# Patient Record
Sex: MALE | Race: BLACK OR AFRICAN AMERICAN | NOT HISPANIC OR LATINO | URBAN - METROPOLITAN AREA
[De-identification: names, ages, dates, MRNs, and addresses within clinical notes are randomized per-mention and may not be internally consistent; named-entity substitution may affect disease eponyms.]

---

## 2019-07-10 ENCOUNTER — INPATIENT (INPATIENT)
Facility: HOSPITAL | Age: 74
LOS: 25 days | DRG: 3 | End: 2019-08-05
Attending: THORACIC SURGERY (CARDIOTHORACIC VASCULAR SURGERY) | Admitting: INTERNAL MEDICINE
Payer: MEDICARE

## 2019-07-10 VITALS
WEIGHT: 162.26 LBS | HEIGHT: 67 IN | OXYGEN SATURATION: 99 % | DIASTOLIC BLOOD PRESSURE: 73 MMHG | RESPIRATION RATE: 18 BRPM | SYSTOLIC BLOOD PRESSURE: 139 MMHG

## 2019-07-10 DIAGNOSIS — I25.110 ATHEROSCLEROTIC HEART DISEASE OF NATIVE CORONARY ARTERY WITH UNSTABLE ANGINA PECTORIS: ICD-10-CM

## 2019-07-10 DIAGNOSIS — I50.9 HEART FAILURE, UNSPECIFIED: ICD-10-CM

## 2019-07-10 LAB
APPEARANCE UR: CLEAR — SIGNIFICANT CHANGE UP
BACTERIA # UR AUTO: NEGATIVE — SIGNIFICANT CHANGE UP
BILIRUB UR-MCNC: NEGATIVE — SIGNIFICANT CHANGE UP
COLOR SPEC: YELLOW — SIGNIFICANT CHANGE UP
DIFF PNL FLD: NEGATIVE — SIGNIFICANT CHANGE UP
EPI CELLS # UR: 1 /HPF — SIGNIFICANT CHANGE UP
GLUCOSE UR QL: NEGATIVE — SIGNIFICANT CHANGE UP
HYALINE CASTS # UR AUTO: 1 /LPF — SIGNIFICANT CHANGE UP (ref 0–2)
KETONES UR-MCNC: SIGNIFICANT CHANGE UP
LEUKOCYTE ESTERASE UR-ACNC: NEGATIVE — SIGNIFICANT CHANGE UP
NITRITE UR-MCNC: NEGATIVE — SIGNIFICANT CHANGE UP
PH UR: 6 — SIGNIFICANT CHANGE UP (ref 5–8)
PROT UR-MCNC: ABNORMAL
RBC CASTS # UR COMP ASSIST: 3 /HPF — SIGNIFICANT CHANGE UP (ref 0–4)
SP GR SPEC: 1.02 — SIGNIFICANT CHANGE UP (ref 1.01–1.02)
UROBILINOGEN FLD QL: ABNORMAL
WBC UR QL: 1 /HPF — SIGNIFICANT CHANGE UP (ref 0–5)

## 2019-07-10 PROCEDURE — 74176 CT ABD & PELVIS W/O CONTRAST: CPT | Mod: 26

## 2019-07-10 PROCEDURE — 99223 1ST HOSP IP/OBS HIGH 75: CPT

## 2019-07-10 PROCEDURE — 93306 TTE W/DOPPLER COMPLETE: CPT | Mod: 26

## 2019-07-10 PROCEDURE — 93454 CORONARY ARTERY ANGIO S&I: CPT | Mod: 26,GC

## 2019-07-10 PROCEDURE — 99152 MOD SED SAME PHYS/QHP 5/>YRS: CPT | Mod: GC

## 2019-07-10 PROCEDURE — 99253 IP/OBS CNSLTJ NEW/EST LOW 45: CPT

## 2019-07-10 PROCEDURE — 93010 ELECTROCARDIOGRAM REPORT: CPT

## 2019-07-10 RX ORDER — ISOSORBIDE MONONITRATE 60 MG/1
60 TABLET, EXTENDED RELEASE ORAL DAILY
Refills: 0 | Status: DISCONTINUED | OUTPATIENT
Start: 2019-07-10 | End: 2019-07-12

## 2019-07-10 RX ORDER — ASPIRIN/CALCIUM CARB/MAGNESIUM 324 MG
81 TABLET ORAL DAILY
Refills: 0 | Status: DISCONTINUED | OUTPATIENT
Start: 2019-07-10 | End: 2019-07-12

## 2019-07-10 RX ORDER — HYDRALAZINE HCL 50 MG
50 TABLET ORAL EVERY 8 HOURS
Refills: 0 | Status: DISCONTINUED | OUTPATIENT
Start: 2019-07-10 | End: 2019-07-12

## 2019-07-10 RX ORDER — CHOLECALCIFEROL (VITAMIN D3) 125 MCG
2000 CAPSULE ORAL DAILY
Refills: 0 | Status: DISCONTINUED | OUTPATIENT
Start: 2019-07-10 | End: 2019-07-12

## 2019-07-10 RX ORDER — FUROSEMIDE 40 MG
1 TABLET ORAL
Qty: 0 | Refills: 0 | DISCHARGE

## 2019-07-10 RX ORDER — HYDRALAZINE HCL 50 MG
0 TABLET ORAL
Qty: 0 | Refills: 0 | DISCHARGE

## 2019-07-10 RX ORDER — ISOSORBIDE MONONITRATE 60 MG/1
1 TABLET, EXTENDED RELEASE ORAL
Qty: 0 | Refills: 0 | DISCHARGE

## 2019-07-10 RX ORDER — ASPIRIN/CALCIUM CARB/MAGNESIUM 324 MG
81 TABLET ORAL
Qty: 0 | Refills: 0 | DISCHARGE

## 2019-07-10 RX ORDER — ATORVASTATIN CALCIUM 80 MG/1
40 TABLET, FILM COATED ORAL AT BEDTIME
Refills: 0 | Status: DISCONTINUED | OUTPATIENT
Start: 2019-07-10 | End: 2019-07-12

## 2019-07-10 RX ORDER — FUROSEMIDE 40 MG
40 TABLET ORAL DAILY
Refills: 0 | Status: DISCONTINUED | OUTPATIENT
Start: 2019-07-10 | End: 2019-07-12

## 2019-07-10 RX ORDER — HEPARIN SODIUM 5000 [USP'U]/ML
5000 INJECTION INTRAVENOUS; SUBCUTANEOUS EVERY 8 HOURS
Refills: 0 | Status: DISCONTINUED | OUTPATIENT
Start: 2019-07-10 | End: 2019-07-12

## 2019-07-10 RX ORDER — ATORVASTATIN CALCIUM 80 MG/1
1 TABLET, FILM COATED ORAL
Qty: 0 | Refills: 0 | DISCHARGE

## 2019-07-10 RX ORDER — CLOPIDOGREL BISULFATE 75 MG/1
1 TABLET, FILM COATED ORAL
Qty: 0 | Refills: 0 | DISCHARGE

## 2019-07-10 RX ADMIN — Medication 1 TABLET(S): at 17:45

## 2019-07-10 RX ADMIN — Medication 81 MILLIGRAM(S): at 17:44

## 2019-07-10 RX ADMIN — Medication 2000 UNIT(S): at 17:44

## 2019-07-10 RX ADMIN — Medication 40 MILLIGRAM(S): at 17:45

## 2019-07-10 RX ADMIN — ISOSORBIDE MONONITRATE 60 MILLIGRAM(S): 60 TABLET, EXTENDED RELEASE ORAL at 17:45

## 2019-07-10 RX ADMIN — ATORVASTATIN CALCIUM 40 MILLIGRAM(S): 80 TABLET, FILM COATED ORAL at 21:10

## 2019-07-10 RX ADMIN — Medication 50 MILLIGRAM(S): at 21:09

## 2019-07-10 NOTE — CONSULT NOTE ADULT - SUBJECTIVE AND OBJECTIVE BOX
History of Present Illness:  74 y/o male  h/o  No PMH  presented to [facility] on 19 with shortness of breath and LE edema found to have newly diagnosed HFrEF (EF 30%) and RALPH vs CKD, also PVD and claudication with extensive LE arterial disease on doppler. 19 abd US: small amount of perihepatic ascited. 19 TTE: EF 30%, mild LVH, multiple regional WMA's, mod MR/TR. 7/3/19 Nuclear ST: moderate focal apical/inferolat/aterolat ischemia. 19: h/h 12.9/39.5, platelets 180, Bun/Cr 28/1.9, eGFR 42. Pt was diuresed at [facility] treated with lasix/hydralzine/nitrates/statin/ASA. No beta blockade 2/2 low blood pressures. Per [facility] discharge note, pt will need  vascular intervention d/t extensive lower extremity vascular disease. Pt transferred to [facility] today for left heart cath to rule out ischemia.  Found to have multi vessel disease cardiac surgery consult called.    CT Surgery consulted for CABG  Past Medical History  Arterial disease: peripheral  TR (tricuspid regurgitation)  MR (mitral regurgitation)  RALPH (acute kidney injury)  HFrEF (heart failure with reduced ejection fraction)  Past Surgical History  No significant past surgical history    MEDICATIONS  (STANDING):  aspirin enteric coated 81 milliGRAM(s) Oral daily  atorvastatin 40 milliGRAM(s) Oral at bedtime  cholecalciferol 2000 Unit(s) Oral daily  furosemide    Tablet 40 milliGRAM(s) Oral daily  hydrALAZINE 50 milliGRAM(s) Oral every 8 hours  isosorbide   mononitrate ER Tablet (IMDUR) 60 milliGRAM(s) Oral daily  multivitamin 1 Tablet(s) Oral daily    MEDICATIONS  (PRN):    Vital Signs Last 24 Hrs  T(C): 37 (07-10-19 @ 12:52), Max: 37 (07-10-19 @ 12:52)  T(F): 98.6 (07-10-19 @ 12:52), Max: 98.6 (07-10-19 @ 12:52)  HR: 76 (07-10-19 @ 16:10) (74 - 88)  BP: 120/61 (07-10-19 @ 15:55) (120/61 - 139/73)  RR: 18 (07-10-19 @ 16:10) (16 - 18)  SpO2: 95% (07-10-19 @ 16:10) (95% - 99%)           Daily Height in cm: 170.18 (10 Jul 2019 12:52)    Daily Weight in k.5 (10 Jul 2019 12:52)  Admit Wt: Drug Dosing Weight  Height (cm): 170.18 (10 Jul 2019 13:48)  Weight (kg): 73.5 (10 Jul 2019 13:48)  BMI (kg/m2): 25.4 (10 Jul 2019 13:48)  BSA (m2): 1.85 (10 Jul 2019 13:48)    Allergies: No Known Allergies      SOCIAL HISTORY:  retired    Smoker: x[ ] Yes  [ ] No        PACK YEARS:      20                   WHEN QUIT?  ETOH use: [ x] Yes  [ ] No              FREQUENCY / QUANTITY: daily reports4 beers daily  Ilicit Drug use:  [ ] Yes  [ x] No      Relevant Family History  FAMILY HISTORY:  Family history of cancer (Father)      Review of Systems  GENERAL:  no weakness, fatigue, fevers or chills  NEURO: no dizziness, vertigo or fainting, numbness, tingling or weakness  SKIN: no itching, burning, rashes, or lesions   HEENT: no visual changes;  no headache, no vertigo, no recent colds  RESPIRATORY:  shortness of breath, denies  cough, sputum, wheezing, hemoptysis;   CARDIOVASCULAR:  no chest pain,  or palpitations  GI: no abdominal or epigastric pain. no heartburn, nausea, vomiting, or hematemesis; no diarrhea or constipation. no melena  PERIPHERAL VASCULAR: no swelling, no tenderness, no reports  intermittent claudication,  left leg cramps, no varicose veins     PHYSICAL EXAM  General: Well nourished, well developed, NAD.                                              Neuro: Normal exam oriented to person/place & time with no focal motor or sensory  deficits.                    Eyes: Normal exam of conjunctiva & lids, pupils equally reactive.   ENT: Normal exam of nasal/oral mucosa with absence of cyanosis.   Neck: Normal exam of jugular veins, trachea & thyroid.   Chest: Normal lung exam with good air movement absence of wheezes, rales, or rhonchi:                                                                          CV:  Auscultation: normal S1S2, Iregular   Murmurs   Carotids: No Bruits[ ]  Abdominal Aorta: normal [ ] nonpalpable[ ]                                                                         GI:  exam of abdomen with no  masses or tenderness- with noted fullness     +[id number]                                                                                            Extremities: Normal no evidence of cyanosis or deformity, Edema:-  LE dry warm pulses by doppler   Lower Extremity Pulses:By doppler only  Right[ x] Left[ x] Varicosities[-  SKIN : Normal exam to inspection & palpation.                                                           LABS:     Cardiac Cath: multi vessel disease    TTE / JAYME: pending

## 2019-07-10 NOTE — H&P CARDIOLOGY - PMH
RALPH (acute kidney injury)    Arterial disease  peripheral  HFrEF (heart failure with reduced ejection fraction)    MR (mitral regurgitation)    TR (tricuspid regurgitation)

## 2019-07-10 NOTE — PATIENT PROFILE ADULT - VISION (WITH CORRECTIVE LENSES IF THE PATIENT USUALLY WEARS THEM):
pt mentioned that he will be needing reading glasses soon/Partially impaired: cannot see medication labels or newsprint, but can see obstacles in path, and the surrounding layout; can count fingers at arm's length

## 2019-07-10 NOTE — H&P CARDIOLOGY - HISTORY OF PRESENT ILLNESS
73 year old Black male h/o no implantable devices, 73 year old Black male h/o no implantable devices who prior to his admission to [facility] had not been followed by a doctor regularly (on no meds at home), presented to [facility] on 7/1/19 with shortness of breath and LE edema found to have newly diagnosed HFrEF (EF 30%) and RALPH vs CKD, also PVD and claudication with extensive LE arterial disease on doppler. 7/2/19 abd US: small amount of perihepatic ascited. 7/2/19 TTE: EF 30%, mild LVH, multiple regional WMA's, mod MR/TR. 7/3/19 Nuclear ST: moderate focal apical/inferolat/aterolat ischemia. 7/9/19: h/h 12.9/39.5, platelets 180, Bun/Cr 28/1.9, eGFR 42. Pt was diuresed at [facility] treated with lasix/hydralzine/nitrates/statin/ASA. No beta blockade 2/2 low blood pressures. Per [facility] discharge note, pt will need  vascular intervention d/t extensive lower extremity vascular disease. Pt transferred to [facility] today for left heart cath to rule out ischemia.

## 2019-07-10 NOTE — CONSULT NOTE ADULT - ASSESSMENT
This is a 74 y/o male no PMH  presented to [facility] on 7/1/19 with shortness of breath and LE edema found to have newly diagnosed HFrEF (EF 30%) and RALPH vs CKD, also PVD and claudication with extensive LE arterial disease on doppler. 7/2/19 abd US: small amount of perihepatic ascites 7/2/19 TTE: EF 30%, mild LVH, multiple regional WMA's, mod MR/TR. 7/3/19 Nuclear ST: moderate focal apical/inferolat/aterolat ischemia. 7/9/19: h/h 12.9/39.5, platelets 180, Bun/Cr 28/1.9, eGFR 42. Pt was diuresed at [facility] treated with lasix/hydralzine/nitrates/statin/ASA. No beta blockade 2/2 low blood pressures. Per [facility] discharge note, pt will need  vascular intervention d/t extensive lower extremity vascular disease. Pt transferred to [facility] today for left heart cath to rule out ischemia.  Found to have multi vessel disease cardiac surgery consult called. Patient amenable to surgery.  Patient will need abdominal CT for hepatic congestion, LE duplex in addition to cardiac surgery workup. OR scheduled for Friday July 12,219 second case

## 2019-07-10 NOTE — CONSULT NOTE ADULT - PROBLEM SELECTOR RECOMMENDATION 9
Admit to Dr Hernandez   tele  ECHO Carotids UA MRSA  fibrinogen   LE duplex   vein  mapping by CTS team   AB CT  CABG Friday 2nd case

## 2019-07-10 NOTE — CHART NOTE - NSCHARTNOTEFT_GEN_A_CORE
Pt was seen and examined.  Briefly this is a elderly male c hx HTN PVD CKD stage 3 with CHF and CAD  Suggest  -Check UA and quantify proteinuria  -Abd sono ordered  -Awaiting bloods and contour of the liver      Full dictated note to follow in am     [facility]  [phone]

## 2019-07-11 ENCOUNTER — TRANSCRIPTION ENCOUNTER (OUTPATIENT)
Age: 74
End: 2019-07-11

## 2019-07-11 LAB
ALBUMIN SERPL ELPH-MCNC: 3.6 G/DL — SIGNIFICANT CHANGE UP (ref 3.3–5)
ALP SERPL-CCNC: 122 U/L — HIGH (ref 40–120)
ALT FLD-CCNC: 33 U/L — SIGNIFICANT CHANGE UP (ref 10–45)
ANION GAP SERPL CALC-SCNC: 12 MMOL/L — SIGNIFICANT CHANGE UP (ref 5–17)
ANION GAP SERPL CALC-SCNC: 13 MMOL/L — SIGNIFICANT CHANGE UP (ref 5–17)
APTT BLD: 35.6 SEC — SIGNIFICANT CHANGE UP (ref 27.5–36.3)
AST SERPL-CCNC: 58 U/L — HIGH (ref 10–40)
BASOPHILS # BLD AUTO: 0 K/UL — SIGNIFICANT CHANGE UP (ref 0–0.2)
BASOPHILS NFR BLD AUTO: 0.8 % — SIGNIFICANT CHANGE UP (ref 0–2)
BILIRUB SERPL-MCNC: 1 MG/DL — SIGNIFICANT CHANGE UP (ref 0.2–1.2)
BLD GP AB SCN SERPL QL: NEGATIVE — SIGNIFICANT CHANGE UP
BUN SERPL-MCNC: 22 MG/DL — SIGNIFICANT CHANGE UP (ref 7–23)
BUN SERPL-MCNC: 23 MG/DL — SIGNIFICANT CHANGE UP (ref 7–23)
CALCIUM SERPL-MCNC: 8.8 MG/DL — SIGNIFICANT CHANGE UP (ref 8.4–10.5)
CALCIUM SERPL-MCNC: 9.1 MG/DL — SIGNIFICANT CHANGE UP (ref 8.4–10.5)
CHLORIDE SERPL-SCNC: 92 MMOL/L — LOW (ref 96–108)
CHLORIDE SERPL-SCNC: 96 MMOL/L — SIGNIFICANT CHANGE UP (ref 96–108)
CO2 SERPL-SCNC: 27 MMOL/L — SIGNIFICANT CHANGE UP (ref 22–31)
CO2 SERPL-SCNC: 29 MMOL/L — SIGNIFICANT CHANGE UP (ref 22–31)
CREAT SERPL-MCNC: 1.59 MG/DL — HIGH (ref 0.5–1.3)
CREAT SERPL-MCNC: 1.67 MG/DL — HIGH (ref 0.5–1.3)
EOSINOPHIL # BLD AUTO: 0.1 K/UL — SIGNIFICANT CHANGE UP (ref 0–0.5)
EOSINOPHIL NFR BLD AUTO: 2 % — SIGNIFICANT CHANGE UP (ref 0–6)
FIBRINOGEN PPP-MCNC: 484 MG/DL — SIGNIFICANT CHANGE UP (ref 350–510)
GLUCOSE SERPL-MCNC: 90 MG/DL — SIGNIFICANT CHANGE UP (ref 70–99)
GLUCOSE SERPL-MCNC: 97 MG/DL — SIGNIFICANT CHANGE UP (ref 70–99)
HBA1C BLD-MCNC: 6.8 % — HIGH (ref 4–5.6)
HCT VFR BLD CALC: 36.8 % — LOW (ref 39–50)
HCT VFR BLD CALC: 39.6 % — SIGNIFICANT CHANGE UP (ref 39–50)
HGB BLD-MCNC: 12.8 G/DL — LOW (ref 13–17)
HGB BLD-MCNC: 13.2 G/DL — SIGNIFICANT CHANGE UP (ref 13–17)
INR BLD: 1.11 RATIO — SIGNIFICANT CHANGE UP (ref 0.88–1.16)
LYMPHOCYTES # BLD AUTO: 1.5 K/UL — SIGNIFICANT CHANGE UP (ref 1–3.3)
LYMPHOCYTES # BLD AUTO: 31.3 % — SIGNIFICANT CHANGE UP (ref 13–44)
MCHC RBC-ENTMCNC: 32.5 PG — SIGNIFICANT CHANGE UP (ref 27–34)
MCHC RBC-ENTMCNC: 33.3 GM/DL — SIGNIFICANT CHANGE UP (ref 32–36)
MCHC RBC-ENTMCNC: 34 PG — SIGNIFICANT CHANGE UP (ref 27–34)
MCHC RBC-ENTMCNC: 34.8 GM/DL — SIGNIFICANT CHANGE UP (ref 32–36)
MCV RBC AUTO: 97.5 FL — SIGNIFICANT CHANGE UP (ref 80–100)
MCV RBC AUTO: 97.7 FL — SIGNIFICANT CHANGE UP (ref 80–100)
MONOCYTES # BLD AUTO: 0.6 K/UL — SIGNIFICANT CHANGE UP (ref 0–0.9)
MONOCYTES NFR BLD AUTO: 13.2 % — SIGNIFICANT CHANGE UP (ref 2–14)
MRSA PCR RESULT.: SIGNIFICANT CHANGE UP
NEUTROPHILS # BLD AUTO: 2.5 K/UL — SIGNIFICANT CHANGE UP (ref 1.8–7.4)
NEUTROPHILS NFR BLD AUTO: 52.7 % — SIGNIFICANT CHANGE UP (ref 43–77)
PA ADP PRP-ACNC: 139 PRU — LOW (ref 194–417)
PA ADP PRP-ACNC: 150 PRU — LOW (ref 194–417)
PLATELET # BLD AUTO: 134 K/UL — LOW (ref 150–400)
PLATELET # BLD AUTO: 159 K/UL — SIGNIFICANT CHANGE UP (ref 150–400)
POTASSIUM SERPL-MCNC: 3.5 MMOL/L — SIGNIFICANT CHANGE UP (ref 3.5–5.3)
POTASSIUM SERPL-MCNC: 4.5 MMOL/L — SIGNIFICANT CHANGE UP (ref 3.5–5.3)
POTASSIUM SERPL-SCNC: 3.5 MMOL/L — SIGNIFICANT CHANGE UP (ref 3.5–5.3)
POTASSIUM SERPL-SCNC: 4.5 MMOL/L — SIGNIFICANT CHANGE UP (ref 3.5–5.3)
PROT SERPL-MCNC: 7.8 G/DL — SIGNIFICANT CHANGE UP (ref 6–8.3)
PROTHROM AB SERPL-ACNC: 12.8 SEC — SIGNIFICANT CHANGE UP (ref 10–12.9)
RBC # BLD: 3.77 M/UL — LOW (ref 4.2–5.8)
RBC # BLD: 4.06 M/UL — LOW (ref 4.2–5.8)
RBC # FLD: 13.1 % — SIGNIFICANT CHANGE UP (ref 10.3–14.5)
RBC # FLD: 13.1 % — SIGNIFICANT CHANGE UP (ref 10.3–14.5)
RH IG SCN BLD-IMP: POSITIVE — SIGNIFICANT CHANGE UP
RH IG SCN BLD-IMP: POSITIVE — SIGNIFICANT CHANGE UP
S AUREUS DNA NOSE QL NAA+PROBE: SIGNIFICANT CHANGE UP
SODIUM SERPL-SCNC: 132 MMOL/L — LOW (ref 135–145)
SODIUM SERPL-SCNC: 137 MMOL/L — SIGNIFICANT CHANGE UP (ref 135–145)
T3 SERPL-MCNC: 90 NG/DL — SIGNIFICANT CHANGE UP (ref 80–200)
T4 AB SER-ACNC: 9.3 UG/DL — SIGNIFICANT CHANGE UP (ref 4.6–12)
TSH SERPL-MCNC: 5.78 UIU/ML — HIGH (ref 0.27–4.2)
WBC # BLD: 4.2 K/UL — SIGNIFICANT CHANGE UP (ref 3.8–10.5)
WBC # BLD: 4.7 K/UL — SIGNIFICANT CHANGE UP (ref 3.8–10.5)
WBC # FLD AUTO: 4.2 K/UL — SIGNIFICANT CHANGE UP (ref 3.8–10.5)
WBC # FLD AUTO: 4.7 K/UL — SIGNIFICANT CHANGE UP (ref 3.8–10.5)

## 2019-07-11 PROCEDURE — 93010 ELECTROCARDIOGRAM REPORT: CPT | Mod: 77

## 2019-07-11 PROCEDURE — 76700 US EXAM ABDOM COMPLETE: CPT | Mod: 26

## 2019-07-11 PROCEDURE — 93010 ELECTROCARDIOGRAM REPORT: CPT

## 2019-07-11 PROCEDURE — 71045 X-RAY EXAM CHEST 1 VIEW: CPT | Mod: 26

## 2019-07-11 PROCEDURE — 94010 BREATHING CAPACITY TEST: CPT | Mod: 26

## 2019-07-11 PROCEDURE — 93880 EXTRACRANIAL BILAT STUDY: CPT | Mod: 26

## 2019-07-11 PROCEDURE — 93925 LOWER EXTREMITY STUDY: CPT | Mod: 26

## 2019-07-11 RX ORDER — CHLORHEXIDINE GLUCONATE 213 G/1000ML
30 SOLUTION TOPICAL ONCE
Refills: 0 | Status: COMPLETED | OUTPATIENT
Start: 2019-07-11 | End: 2019-07-12

## 2019-07-11 RX ORDER — CHLORHEXIDINE GLUCONATE 213 G/1000ML
1 SOLUTION TOPICAL ONCE
Refills: 0 | Status: COMPLETED | OUTPATIENT
Start: 2019-07-11 | End: 2019-07-11

## 2019-07-11 RX ORDER — LEVOTHYROXINE SODIUM 125 MCG
25 TABLET ORAL DAILY
Refills: 0 | Status: DISCONTINUED | OUTPATIENT
Start: 2019-07-11 | End: 2019-07-12

## 2019-07-11 RX ORDER — CEFUROXIME AXETIL 250 MG
1500 TABLET ORAL ONCE
Refills: 0 | Status: DISCONTINUED | OUTPATIENT
Start: 2019-07-11 | End: 2019-07-12

## 2019-07-11 RX ORDER — CEFUROXIME AXETIL 250 MG
1500 TABLET ORAL EVERY 8 HOURS
Refills: 0 | Status: DISCONTINUED | OUTPATIENT
Start: 2019-07-11 | End: 2019-07-11

## 2019-07-11 RX ADMIN — HEPARIN SODIUM 5000 UNIT(S): 5000 INJECTION INTRAVENOUS; SUBCUTANEOUS at 18:49

## 2019-07-11 RX ADMIN — Medication 50 MILLIGRAM(S): at 14:30

## 2019-07-11 RX ADMIN — ISOSORBIDE MONONITRATE 60 MILLIGRAM(S): 60 TABLET, EXTENDED RELEASE ORAL at 12:43

## 2019-07-11 RX ADMIN — CHLORHEXIDINE GLUCONATE 1 APPLICATION(S): 213 SOLUTION TOPICAL at 21:00

## 2019-07-11 RX ADMIN — Medication 50 MILLIGRAM(S): at 22:44

## 2019-07-11 RX ADMIN — Medication 2000 UNIT(S): at 12:43

## 2019-07-11 RX ADMIN — Medication 1 TABLET(S): at 12:43

## 2019-07-11 RX ADMIN — Medication 25 MICROGRAM(S): at 17:30

## 2019-07-11 RX ADMIN — Medication 40 MILLIGRAM(S): at 05:53

## 2019-07-11 RX ADMIN — Medication 81 MILLIGRAM(S): at 05:53

## 2019-07-11 RX ADMIN — HEPARIN SODIUM 5000 UNIT(S): 5000 INJECTION INTRAVENOUS; SUBCUTANEOUS at 09:24

## 2019-07-11 RX ADMIN — ATORVASTATIN CALCIUM 40 MILLIGRAM(S): 80 TABLET, FILM COATED ORAL at 21:38

## 2019-07-11 RX ADMIN — Medication 50 MILLIGRAM(S): at 05:53

## 2019-07-11 NOTE — PROVIDER CONTACT NOTE (OTHER) - SITUATION
[facility] tele tech Adrienne called pt HR briefly debi down  to 47 with 2:1 block. pt asymptomatic and sleeping not on any beta blockers. admit for diagnostic cath pending CT surgery Friday.

## 2019-07-11 NOTE — CONSULT NOTE ADULT - ASSESSMENT
ASSESSMENT: 73M w/ newly diagnosed CHF     PLAN:  - No contraindication to CABG, patient may follow up outpatient with Dr. Rankin for CEA workup  - Recommend medical management for peripheral vascular disease in the setting of 2block claudication pain w/o rest pain   - Please call with questions    Vascular Surgery  [id number]

## 2019-07-11 NOTE — PROGRESS NOTE ADULT - SUBJECTIVE AND OBJECTIVE BOX
Patient  is  seen Non  invasive  carotid  and  lower  extremity testing  looked  at  [facility]  does not  have  any  history of  TIA or  CVA  His  main symptoms  are bilateral  2 block claudication  and  recently  exertional dyspnea that  led  to the  cardiac  work up  he  needs  triple vessel CABG  for  symptomatic  CAD  As  far as  carotids  are  concerned  he  does  have  a high  grade  right  ICA stenosis  Left  side  is  less severe   stenosis  As  far as  lower  extremities  are  concerned  he has bilateral femoral  pulses   He  does not  have  any  palpable  distal pedal pulses  He is  a diabetic In my opinion  he does not  have  any Limb threat at present  He has no rest pain or paresthesias  As far as carotids  are concerned  Being  asymptomatic  staged  CABG followed by right  CEA  after he recovers from CABG  is  recommended  considering  combined  CABG abd  CEA  probably carries  higher risk than staged  His  creatinine  is high  and  he just got  Contrast for the  cath  Any  additional large  contrast dose  for  CTA will put  hm at  risk of  Renal failure  as well  IAB  is  feasible  based on  Physical exam

## 2019-07-11 NOTE — PROGRESS NOTE ADULT - ASSESSMENT
The patient is a 73-year-old ex-alcoholic drinker and tobacco smoker with severe peripheral vascular disease and coronary disease.  He has abnormal kidney function, which is likely related to hypertensive nephrosclerosis.  I do not think this is going to be new renal failure.  The patient has had very poor medical followup prior to this point.                            1.	Renal:  Check UA.  Quantify proteinuria burden.  Abdominal sonogram has already been ordered.  He is also getting a CAT scan of his abdomen.  Can check PTH and phos as well.  2.	Cardiovascular:  The patient at present seems to be well compensated.  He has no JVD and he is on afterload reduction.  Would not start him on an ACE or ARB prior to open heart surgery.  3.	Vascular:  The patient will need carotid Dopplers done prior to heart surgery.  4.	CTS:  Slated for possible open heart surgery as second case Friday. The patient is a 73-year-old ex-alcoholic drinker and tobacco smoker with severe peripheral vascular disease and coronary disease.  He has abnormal kidney function, which is likely related to hypertensive nephrosclerosis.  I do not think this is going to be new renal failure.  The patient has had very poor medical followup prior to this point.  CKD stage 3       1.	Renal:  Minimal proteinuria at present. Abdominal sonogram has already been ordered.  He is also getting a CAT scan of his abdomen.    2.	Cardiovascular:  The patient at present seems to be well compensated.  He has no JVD and he is on afterload reduction.  Would not start him on an ACE or ARB prior to open heart surgery.  3.	Vascular:  The patient will need carotid Dopplers done prior to heart surgery.  4.	CTS:  Slated for possible open heart surgery as second case Friday.

## 2019-07-11 NOTE — CONSULT NOTE ADULT - SUBJECTIVE AND OBJECTIVE BOX
General Surgery Consult  Consulting surgical team: Vascular  Consulting attending: Chucho Roberts     HPI:  73M h/o no implantable devices who prior to his admission to [facility] had not been followed by a doctor regularly (on no meds at home), presented to [facility] on 19 with shortness of breath and LE edema found to have newly diagnosed HFrEF (EF 30%) and RALPH vs CKD, also PVD and claudication with extensive LE arterial disease on doppler. 19 abd US: small amount of perihepatic ascited. 19 TTE: EF 30%, mild LVH, multiple regional WMA's, mod MR/TR. 7/3/19 Nuclear ST: moderate focal apical/inferolat/aterolat ischemia. 19: h/h 12.9/39.5, platelets 180, Bun/Cr 28/1.9, eGFR 42. Pt was diuresed at [facility] treated with lasix/hydralzine/nitrates/statin/ASA. No beta blockade 2/2 low blood pressures. Per [facility] discharge note, pt will need  vascular intervention d/t extensive lower extremity vascular disease. Pt transferred to [facility] today for left heart cath to rule out ischemia. Found to have multi vessel disease cardiac surgery consult called and patient planned for CABG tomorrow AM.    Vascular surgery consulted for carotid stenosis and PVD. Patient seen and examined with Dr. Rankin. Patient has asymptomatic carotid stenosis found on carotid duplex and is scheduled for a CABG tomorrow AM. Per patient he is able to walk 2 blocks before having claudication pain in b/l lower extremities. He denies pain at rest.       PAST MEDICAL HISTORY:  Arterial disease  TR (tricuspid regurgitation)  MR (mitral regurgitation)  RALPH (acute kidney injury)  HFrEF (heart failure with reduced ejection fraction)      PAST SURGICAL HISTORY:  No significant past surgical history      MEDICATIONS:  aspirin enteric coated 81 milliGRAM(s) Oral daily  atorvastatin 40 milliGRAM(s) Oral at bedtime  cefuroxime  IVPB 1500 milliGRAM(s) IV Intermittent once  chlorhexidine 0.12% Liquid 30 milliLiter(s) Swish and Spit once  chlorhexidine 4% Liquid 1 Application(s) Topical once  cholecalciferol 2000 Unit(s) Oral daily  furosemide    Tablet 40 milliGRAM(s) Oral daily  heparin  Injectable 5000 Unit(s) SubCutaneous every 8 hours  hydrALAZINE 50 milliGRAM(s) Oral every 8 hours  isosorbide   mononitrate ER Tablet (IMDUR) 60 milliGRAM(s) Oral daily  levothyroxine 25 MICROGram(s) Oral daily  multivitamin 1 Tablet(s) Oral daily      ALLERGIES:  No Known Allergies      VITALS & I/Os:  Vital Signs Last 24 Hrs  T(C): 36.9 (2019 14:31), Max: 36.9 (2019 14:31)  T(F): 98.4 (2019 14:31), Max: 98.4 (2019 14:31)  HR: 71 (2019 14:31) (18 - 81)  BP: 118/53 (2019 14:31) (103/58 - 126/66)  BP(mean): --  RR: 18 (2019 14:31) (18 - 18)  SpO2: 95% (2019 14:31) (95% - 98%)    I&O's Summary    10 Jul 2019 07:  -  2019 07:00  --------------------------------------------------------  IN: 140 mL / OUT: 1300 mL / NET: -1160 mL    2019 07:01  -  2019 17:18  --------------------------------------------------------  IN: 140 mL / OUT: 0 mL / NET: 140 mL        PHYSICAL EXAM:  General: No acute distress  Respiratory: Nonlabored  Extremities: Warm, b/l DP/PT signals    LABS:                        12.8   4.7   )-----------( 134      ( 2019 09:19 )             36.8     07-11    137  |  96  |  22  ----------------------------<  97  4.5   |  29  |  1.67<H>    Ca    9.1      2019 09:19    TPro  7.8  /  Alb  3.6  /  TBili  1.0  /  DBili  x   /  AST  58<H>  /  ALT  33  /  AlkPhos  122<H>  07-11    Lactate:    PT/INR - ( 2019 06:26 )   PT: 12.8 sec;   INR: 1.11 ratio         PTT - ( 2019 06:26 )  PTT:35.6 sec          Urinalysis Basic - ( 10 Jul 2019 21:34 )    Color: Yellow / Appearance: Clear / S.025 / pH: x  Gluc: x / Ketone: Trace  / Bili: Negative / Urobili: 2 mg/dL   Blood: x / Protein: Trace / Nitrite: Negative   Leuk Esterase: Negative / RBC: 3 /hpf / WBC 1 /HPF   Sq Epi: x / Non Sq Epi: 1 /hpf / Bacteria: Negative        IMAGING:

## 2019-07-11 NOTE — PROGRESS NOTE ADULT - SUBJECTIVE AND OBJECTIVE BOX
NEPHROLOGY-[facility] [phone]        Patient seen and examined in bed        MEDICATIONS  (STANDING):  aspirin enteric coated 81 milliGRAM(s) Oral daily  atorvastatin 40 milliGRAM(s) Oral at bedtime  cholecalciferol 2000 Unit(s) Oral daily  furosemide    Tablet 40 milliGRAM(s) Oral daily  heparin  Injectable 5000 Unit(s) SubCutaneous every 8 hours  hydrALAZINE 50 milliGRAM(s) Oral every 8 hours  isosorbide   mononitrate ER Tablet (IMDUR) 60 milliGRAM(s) Oral daily  multivitamin 1 Tablet(s) Oral daily      VITAL:  T(C): , Max: 37 (07-10-19 @ 12:52)  T(F): , Max: 98.6 (07-10-19 @ 12:52)  HR: 71 (19 @ 07:30)  BP: 114/61 (19 @ 07:30)  BP(mean): 95 (07-10-19 @ 12:52)  RR: 18 (19 @ 05:44)  SpO2: 98% (19 @ 05:44)  Wt(kg): --    I and O's:    07-10 @ 07:01  -   @ 07:00  --------------------------------------------------------  IN: 140 mL / OUT: 1300 mL / NET: -1160 mL      Height (cm): 170.18 (07-10 @ 13:48)  Weight (kg): 73.5 (07-10 @ 13:48)  BMI (kg/m2): 25.4 (07-10 @ 13:48)  BSA (m2): 1.85 (07-10 @ 13:48)    PHYSICAL EXAM:    Constitutional: NAD  Neck:  No JVD  Respiratory: CTAB/L  Cardiovascular: S1 and S2  Gastrointestinal: BS+, soft, NT/ND  Extremities: No peripheral edema  Neurological: A/O x 3, no focal deficits  Psychiatric: Normal mood, normal affect  : No Chávez  Skin: No rashes  Access: Not applicable    LABS:        132<L>  |  92<L>  |  23  ----------------------------<  90  3.5   |  27  |  1.59<H>    Ca    8.8      2019 06:26            Urine Studies:  Urinalysis Basic - ( 10 Jul 2019 21:34 )    Color: Yellow / Appearance: Clear / S.025 / pH: x  Gluc: x / Ketone: Trace  / Bili: Negative / Urobili: 2 mg/dL   Blood: x / Protein: Trace / Nitrite: Negative   Leuk Esterase: Negative / RBC: 3 /hpf / WBC 1 /HPF   Sq Epi: x / Non Sq Epi: 1 /hpf / Bacteria: Negative            RADIOLOGY & ADDITIONAL STUDIES: NEPHROLOGY-[facility] [phone]        Patient seen and examined in bed in bed.  He was in good spirits         MEDICATIONS  (STANDING):  aspirin enteric coated 81 milliGRAM(s) Oral daily  atorvastatin 40 milliGRAM(s) Oral at bedtime  cholecalciferol 2000 Unit(s) Oral daily  furosemide    Tablet 40 milliGRAM(s) Oral daily  heparin  Injectable 5000 Unit(s) SubCutaneous every 8 hours  hydrALAZINE 50 milliGRAM(s) Oral every 8 hours  isosorbide   mononitrate ER Tablet (IMDUR) 60 milliGRAM(s) Oral daily  multivitamin 1 Tablet(s) Oral daily      VITAL:  T(C): , Max: 37 (07-10-19 @ 12:52)  T(F): , Max: 98.6 (07-10-19 @ 12:52)  HR: 71 (19 @ 07:30)  BP: 114/61 (19 @ 07:30)  BP(mean): 95 (07-10-19 @ 12:52)  RR: 18 (19 @ 05:44)  SpO2: 98% (19 @ 05:44)  Wt(kg): --    I and O's:    07-10 @ 07:01  -   @ 07:00  --------------------------------------------------------  IN: 140 mL / OUT: 1300 mL / NET: -1160 mL      Height (cm): 170.18 (07-10 @ 13:48)  Weight (kg): 73.5 (07-10 @ 13:48)  BMI (kg/m2): 25.4 (07-10 @ 13:48)  BSA (m2): 1.85 (07-10 @ 13:48)    PHYSICAL EXAM:    Constitutional: NAD  Neck:  No JVD  Respiratory: CTAB/L  Cardiovascular: S1 and S2  Gastrointestinal: BS+, soft, NT/ND  Extremities: No peripheral edema  Neurological: A/O x 3, no focal deficits  Psychiatric: Normal mood, normal affect  : No Chávez  Skin: No rashes  Access: Not applicable    LABS:    07-11    132<L>  |  92<L>  |  23  ----------------------------<  90  3.5   |  27  |  1.59<H>    Ca    8.8      2019 06:26            Urine Studies:  Urinalysis Basic - ( 10 Jul 2019 21:34 )    Color: Yellow / Appearance: Clear / S.025 / pH: x  Gluc: x / Ketone: Trace  / Bili: Negative / Urobili: 2 mg/dL   Blood: x / Protein: Trace / Nitrite: Negative   Leuk Esterase: Negative / RBC: 3 /hpf / WBC 1 /HPF   Sq Epi: x / Non Sq Epi: 1 /hpf / Bacteria: Negative            RADIOLOGY & ADDITIONAL STUDIES:

## 2019-07-11 NOTE — PROGRESS NOTE ADULT - SUBJECTIVE AND OBJECTIVE BOX
Cardiac Surgery Pre-op Note:    CC: Patient is a 73y old  Male who presents with a chief complaint of CAD (10 Jul 2019 16:35)      Referring Physician:                                                                                                           Surgeon: Dr. Darrick Hernandez     Procedure: (19) (CABG)    Allergies    No Known Allergies    Intolerances    HPI:  73 year old Black male h/o no implantable devices who prior to his admission to [facility] had not been followed by a doctor regularly (on no meds at home), presented to [facility] on 19 with shortness of breath and LE edema found to have newly diagnosed HFrEF (EF 30%) and RALPH vs CKD, also PVD and claudication with extensive LE arterial disease on doppler. 19 abd US: small amount of perihepatic ascited. 19 TTE: EF 30%, mild LVH, multiple regional WMA's, mod MR/TR. 7/3/19 Nuclear ST: moderate focal apical/inferolat/aterolat ischemia. 19: h/h 12.9/39.5, platelets 180, Bun/Cr 28/1.9, eGFR 42. Pt was diuresed at [facility] treated with lasix/hydralzine/nitrates/statin/ASA. No beta blockade 2/2 low blood pressures. Per [facility] discharge note, pt will need  vascular intervention d/t extensive lower extremity vascular disease. Pt transferred to [facility] today for left heart cath to rule out ischemia. (10 Jul 2019 12:52)    PAST MEDICAL & SURGICAL HISTORY:  Arterial disease: peripheral  TR (tricuspid regurgitation)  MR (mitral regurgitation)  RALPH (acute kidney injury)  HFrEF (heart failure with reduced ejection fraction)  No significant past surgical history    MEDICATIONS  (STANDING):  aspirin enteric coated 81 milliGRAM(s) Oral daily  atorvastatin 40 milliGRAM(s) Oral at bedtime  cefuroxime  IVPB 1500 milliGRAM(s) IV Intermittent every 8 hours  chlorhexidine 0.12% Liquid 30 milliLiter(s) Swish and Spit once  chlorhexidine 4% Liquid 1 Application(s) Topical once  cholecalciferol 2000 Unit(s) Oral daily  furosemide    Tablet 40 milliGRAM(s) Oral daily  heparin  Injectable 5000 Unit(s) SubCutaneous every 8 hours  hydrALAZINE 50 milliGRAM(s) Oral every 8 hours  isosorbide   mononitrate ER Tablet (IMDUR) 60 milliGRAM(s) Oral daily  levothyroxine 25 MICROGram(s) Oral daily  multivitamin 1 Tablet(s) Oral daily    MEDICATIONS  (PRN):    Vital Signs Last 24 Hrs  T(C): 36.3 (19 @ 05:44), Max: 36.8 (07-10-19 @ 19:55)  T(F): 97.3 (19 @ 05:44), Max: 98.3 (07-10-19 @ 19:55)  HR: 18 (19 @ 14:00) (18 - 81)  BP: 118/53 (19 @ 14:00) (103/58 - 127/63)  RR: 18 (19 @ 05:44) (16 - 18)  SpO2: 98% (19 @ 05:44) (94% - 98%)          ABO Interpretation: A (:27)     Daily     Daily   Admit Wt: Drug Dosing Weight  Height (cm): 170.18 (10 Jul 2019 13:48)  Weight (kg): 73.5 (10 Jul 2019 13:48)  BMI (kg/m2): 25.4 (10 Jul 2019 13:48)  BSA (m2): 1.85 (10 Jul 2019 13:48)    Labs:                        12.8   4.7   )-----------( 134      ( 2019 09:19 )             36.8         137  |  96  |  22  ----------------------------<  97  4.5   |  29  |  1.67<H>    Ca    9.1      2019 09:19    TPro  7.8  /  Alb  3.6  /  TBili  1.0  /  DBili  x   /  AST  58<H>  /  ALT  33  /  AlkPhos  122<H>      PT/INR - ( 2019 06:26 )   PT: 12.8 sec;   INR: 1.11 ratio         PTT - ( 2019 06:26 )  PTT:35.6 sec  P2Y12: P2Y12 Plt Reactivity: 150 PRU (19 @ 06:25)      Blood Type: ABO Interpretation: A ( 06:27)    HGB A1C: Hemoglobin A1C, Whole Blood: 6.8 % ( @ 08:30)    Prealbumin:   Pro-BNP:   Thyroid Panel:  @ 09:34/5.78  --/.3/90    MRSA: MRSA PCR Result.: Gertrudistec ( @ 08:36)   / MSSA:   Urinalysis Basic - ( 10 Jul 2019 21:34 )    Color: Yellow / Appearance: Clear / S.025 / pH: x  Gluc: x / Ketone: Trace  / Bili: Negative / Urobili: 2 mg/dL   Blood: x / Protein: Trace / Nitrite: Negative   Leuk Esterase: Negative / RBC: 3 /hpf / WBC 1 /HPF   Sq Epi: x / Non Sq Epi: 1 /hpf / Bacteria: Negative    EKG < from: 12 Lead ECG (19 @ 03:12) >    Ventricular Rate 70 BPM    Atrial Rate 70 BPM    P-R Interval 346 ms    QRS Duration 120 ms    Q-T Interval 460 ms    QTC Calculation(Bezet) 496 ms    P Axis 49 degrees    R Axis -49 degrees    T Axis 141 degrees    Diagnosis Line SINUS RHYTHM [id number] DEGREE A-V BLOCK  POSSIBLE LEFT ATRIAL ENLARGEMENT  LEFT AXIS DEVIATION  ANTERIOR INFARCT , AGE UNDETERMINED  T WAVE ABNORMALITY, CONSIDER LATERAL ISCHEMIA  ABNORMAL ECG    CXR:     Carotid Duplex:      Echocardiogram: < from: Transthoracic Echocardiogram (07.10.19 @ 17:17) >    Tethered mitral valve leaflets with normal opening.  Mild-moderate mitral regurgitation.  2. Calcified trileaflet aortic valve with normal opening.  No aortic valve regurgitation seen.  3. Mild global left ventricular systolic dysfunction.  4. Moderate diastolic dysfunction (Stage II).  5. Mild right ventricular enlargement with decreased right  ventricular systolic function.  6. Normal pericardium with trace pericardial effusion.    Cardiac catheterization:     Gen: WN/WD NAD  Neuro: AAOx3, nonfocal  Pulm: CTA B/L  CV: RRR, S1S2  Abd: Soft, NT, ND +BS  Ext: No edema, + peripheral pulses      Pt has AICD/PPM [ ] Yes  [ X] No             Brand Name:  Pre-op Beta Blocker ordered within 24 hrs of surgery (CABG ONLY)?  [ ] Yes  [ ] No  If not, Why?  Type & Cross  [ ] Yes  [ ] No  NPO after Midnight [ ] Yes  [ ] No  Pre-op ABX ordered, to be taped on chart:  [ ] Yes  [ ] No     Hibiclens/Peridex ordered [ ] Yes  [ ] No  Intraop on Hold: PRBCs, CXR, JAYME [ ]   Consent obtained  [ ] Yes  [ ] No Cardiac Surgery Pre-op Note:    CC: Patient is a 73y old  Male who presents with a chief complaint of CAD (10 Jul 2019 16:35)    Referring Physician:                                                                                                           Surgeon: Dr. Darrick Hernandez     Procedure: (19) (CABG)    Allergies    No Known Allergies    Intolerances    HPI:  73 year old Black male h/o no implantable devices who prior to his admission to [facility] had not been followed by a doctor regularly (on no meds at home), presented to [facility] on 19 with shortness of breath and LE edema found to have newly diagnosed HFrEF (EF 30%) and RALPH vs CKD, also PVD and claudication with extensive LE arterial disease on doppler. 19 abd US: small amount of perihepatic ascited. 19 TTE: EF 30%, mild LVH, multiple regional WMA's, mod MR/TR. 7/3/19 Nuclear ST: moderate focal apical/inferolat/aterolat ischemia. 19: h/h 12.9/39.5, platelets 180, Bun/Cr 28/1.9, eGFR 42. Pt was diuresed at [facility] treated with lasix/hydralzine/nitrates/statin/ASA. No beta blockade 2/2 low blood pressures. Per [facility] discharge note, pt will need  vascular intervention d/t extensive lower extremity vascular disease. Pt transferred to [facility] today for left heart cath to rule out ischemia. (10 Jul 2019 12:52)    PAST MEDICAL & SURGICAL HISTORY:  Arterial disease: peripheral  TR (tricuspid regurgitation)  MR (mitral regurgitation)  RALPH (acute kidney injury)  HFrEF (heart failure with reduced ejection fraction)  No significant past surgical history    MEDICATIONS  (STANDING):  aspirin enteric coated 81 milliGRAM(s) Oral daily  atorvastatin 40 milliGRAM(s) Oral at bedtime  cefuroxime  IVPB 1500 milliGRAM(s) IV Intermittent every 8 hours  chlorhexidine 0.12% Liquid 30 milliLiter(s) Swish and Spit once  chlorhexidine 4% Liquid 1 Application(s) Topical once  cholecalciferol 2000 Unit(s) Oral daily  furosemide Tablet 40 milliGRAM(s) Oral daily  heparin  Injectable 5000 Unit(s) SubCutaneous every 8 hours  hydrALAZINE 50 milliGRAM(s) Oral every 8 hours  isosorbide  mononitrate ER Tablet (IMDUR) 60 milliGRAM(s) Oral daily  levothyroxine 25 MICROGram(s) Oral daily  multivitamin 1 Tablet(s) Oral daily    MEDICATIONS  (PRN):    Vital Signs Last 24 Hrs  T(C): 36.3 (19 @ 05:44), Max: 36.8 (07-10-19 @ 19:55)  T(F): 97.3 (19 @ 05:44), Max: 98.3 (07-10-19 @ 19:55)  HR: 18 (19 @ 14:00) (18 - 81)  BP: 118/53 (19 @ 14:00) (103/58 - 127/63)  RR: 18 (19 @ 05:44) (16 - 18)  SpO2: 98% (19 @ 05:44) (94% - 98%)          ABO Interpretation: A (:27)     Daily     Daily   Admit Wt: Drug Dosing Weight  Height (cm): 170.18 (10 Jul 2019 13:48)  Weight (kg): 73.5 (10 Jul 2019 13:48)  BMI (kg/m2): 25.4 (10 Jul 2019 13:48)  BSA (m2): 1.85 (10 Jul 2019 13:48)    Labs:                        12.8   4.7   )-----------( 134      ( 2019 09:19 )             36.8         137  |  96  |  22  ----------------------------<  97  4.5   |  29  |  1.67<H>    Ca    9.1      2019 09:19    TPro  7.8  /  Alb  3.6  /  TBili  1.0  /  DBili  x   /  AST  58<H>  /  ALT  33  /  AlkPhos  122<H>      PT/INR - ( 2019 06:26 )   PT: 12.8 sec;   INR: 1.11 ratio         PTT - ( 2019 06:26 )  PTT:35.6 sec  P2Y12: P2Y12 Plt Reactivity: 150 PRU (19 @ 06:25)      Blood Type: ABO Interpretation: A ( 06:27)    HGB A1C: Hemoglobin A1C, Whole Blood: 6.8 % ( @ 08:30)    Prealbumin:   Pro-BNP:   Thyroid Panel:  @ 09:34/5.78  --/.3/90    MRSA: MRSA PCR Result.: Gertrudistec ( @ 08:36)   / MSSA:   Urinalysis Basic - ( 10 Jul 2019 21:34 )    Color: Yellow / Appearance: Clear / S.025 / pH: x  Gluc: x / Ketone: Trace  / Bili: Negative / Urobili: 2 mg/dL   Blood: x / Protein: Trace / Nitrite: Negative   Leuk Esterase: Negative / RBC: 3 /hpf / WBC 1 /HPF   Sq Epi: x / Non Sq Epi: 1 /hpf / Bacteria: Negative    EKG < from: 12 Lead ECG (19 @ 03:12) >    Ventricular Rate 70 BPM    Atrial Rate 70 BPM    P-R Interval 346 ms    QRS Duration 120 ms    Q-T Interval 460 ms    QTC Calculation(Bezet) 496 ms    P Axis 49 degrees    R Axis -49 degrees    T Axis 141 degrees    Diagnosis Line SINUS RHYTHM [id number] DEGREE A-V BLOCK  POSSIBLE LEFT ATRIAL ENLARGEMENT  LEFT AXIS DEVIATION  ANTERIOR INFARCT , AGE UNDETERMINED  T WAVE ABNORMALITY, CONSIDER LATERAL ISCHEMIA  ABNORMAL ECG    CXR:     Carotid Duplex:      Echocardiogram: < from: Transthoracic Echocardiogram (07.10.19 @ 17:17) >    Tethered mitral valve leaflets with normal opening.  Mild-moderate mitral regurgitation.  2. Calcified trileaflet aortic valve with normal opening.  No aortic valve regurgitation seen.  3. Mild global left ventricular systolic dysfunction.  4. Moderate diastolic dysfunction (Stage II).  5. Mild right ventricular enlargement with decreased right  ventricular systolic function.  6. Normal pericardium with trace pericardial effusion.    Cardiac catheterization:     Gen: WN/WD NAD  Neuro: AAOx3, nonfocal  Pulm: CTA B/L  CV: RRR, S1S2  Abd: Soft, NT, ND +BS  Ext: No edema, + peripheral pulses      Pt has AICD/PPM [ ] Yes  [ X] No             Brand Name:  Pre-op Beta Blocker ordered within 24 hrs of surgery (CABG ONLY)?  [ ] Yes  [ ] No  If not, Why?  Type & Cross  [X ] Yes  [ ] No  NPO after Midnight [X ] Yes  [ ] No  Pre-op ABX ordered, to be taped on chart:  [X ] Yes  [ ] No     Hibiclens/Peridex ordered [X] Yes  [ ] No  Intraop on Hold:  JAYME [X ]   Consent obtained  [X ] Yes  [ ] No

## 2019-07-11 NOTE — PROGRESS NOTE ADULT - ASSESSMENT
72 y/o male no PMH  presented to [facility] on 7/1/19 with shortness of breath and LE edema found to have newly diagnosed HFrEF (EF 30%) and RALPH vs CKD, also PVD and claudication with extensive LE arterial disease on doppler. 7/2/19 abd US: small amount of perihepatic ascites 7/2/19 TTE: EF 30%, mild LVH, multiple regional WMA's, mod MR/TR. 7/3/19 Nuclear ST: moderate focal apical/inferolat/aterolat ischemia. 7/9/19: h/h 12.9/39.5, platelets 180, Bun/Cr 28/1.9, eGFR 42. Pt was diuresed at [facility] treated with lasix/hydralzine/nitrates/statin/ASA. No beta blockade 2/2 low blood pressures. Per [facility] discharge note, pt will need  vascular intervention d/t extensive lower extremity vascular disease. Pt transferred to [facility] today for left heart cath to rule out ischemia.  Found to have multi vessel disease cardiac surgery consult called. Patient amenable to surgery.  Patient will need abdominal CT for hepatic congestion, LE duplex in addition to cardiac surgery workup. OR scheduled for Friday July 12,219 second case.   7/10 VVS; Continue with current medication regimen.  Recheck P2Y12.  Abd/Pelvic CT revealed:  Mild pulmonary edema with small bilateral pleural effusions. Small ascites and anasarca.  No acute pathology at noncontrast imaging. Abd US revealed: Small nonmobile echogenic material in the gallbladder favoring tumefactive sludge. However, polyps are not excluded. Six-month follow-up ultrasound is recommended. Small amount of abdominal ascites. Pre op Cardiac surgery work up in progress.  Awaiting Carotid duplex study and LE Atrial duplex study. NPO after midnight for CABG 7/12 2nd case with DR. Hernandez

## 2019-07-12 ENCOUNTER — APPOINTMENT (OUTPATIENT)
Dept: CARDIOTHORACIC SURGERY | Facility: HOSPITAL | Age: 74
End: 2019-07-12

## 2019-07-12 PROBLEM — I34.0 NONRHEUMATIC MITRAL (VALVE) INSUFFICIENCY: Chronic | Status: ACTIVE | Noted: 2019-07-10

## 2019-07-12 PROBLEM — I77.9 DISORDER OF ARTERIES AND ARTERIOLES, UNSPECIFIED: Chronic | Status: ACTIVE | Noted: 2019-07-10

## 2019-07-12 PROBLEM — Z00.00 ENCOUNTER FOR PREVENTIVE HEALTH EXAMINATION: Status: ACTIVE | Noted: 2019-07-12

## 2019-07-12 PROBLEM — I07.1 RHEUMATIC TRICUSPID INSUFFICIENCY: Chronic | Status: ACTIVE | Noted: 2019-07-10

## 2019-07-12 PROBLEM — N17.9 ACUTE KIDNEY FAILURE, UNSPECIFIED: Chronic | Status: ACTIVE | Noted: 2019-07-10

## 2019-07-12 PROBLEM — I50.20 UNSPECIFIED SYSTOLIC (CONGESTIVE) HEART FAILURE: Chronic | Status: ACTIVE | Noted: 2019-07-10

## 2019-07-12 LAB
ALBUMIN SERPL ELPH-MCNC: 3.7 G/DL — SIGNIFICANT CHANGE UP (ref 3.3–5)
ALP SERPL-CCNC: 70 U/L — SIGNIFICANT CHANGE UP (ref 40–120)
ALT FLD-CCNC: 15 U/L — SIGNIFICANT CHANGE UP (ref 10–45)
ANION GAP SERPL CALC-SCNC: 14 MMOL/L — SIGNIFICANT CHANGE UP (ref 5–17)
ANION GAP SERPL CALC-SCNC: 14 MMOL/L — SIGNIFICANT CHANGE UP (ref 5–17)
APTT BLD: 34.7 SEC — SIGNIFICANT CHANGE UP (ref 27.5–36.3)
AST SERPL-CCNC: 33 U/L — SIGNIFICANT CHANGE UP (ref 10–40)
BASE EXCESS BLDV CALC-SCNC: 2.8 MMOL/L — HIGH (ref -2–2)
BASE EXCESS BLDV CALC-SCNC: 3.7 MMOL/L — HIGH (ref -2–2)
BASE EXCESS BLDV CALC-SCNC: 3.9 MMOL/L — HIGH (ref -2–2)
BASOPHILS # BLD AUTO: 0 K/UL — SIGNIFICANT CHANGE UP (ref 0–0.2)
BASOPHILS NFR BLD AUTO: 0.5 % — SIGNIFICANT CHANGE UP (ref 0–2)
BILIRUB SERPL-MCNC: 1.1 MG/DL — SIGNIFICANT CHANGE UP (ref 0.2–1.2)
BLOOD GAS VENOUS - CREATININE: SIGNIFICANT CHANGE UP MG/DL (ref 0.5–1.3)
BUN SERPL-MCNC: 22 MG/DL — SIGNIFICANT CHANGE UP (ref 7–23)
BUN SERPL-MCNC: 24 MG/DL — HIGH (ref 7–23)
CA-I SERPL-SCNC: 0.99 MMOL/L — LOW (ref 1.12–1.3)
CA-I SERPL-SCNC: 1.03 MMOL/L — LOW (ref 1.12–1.3)
CA-I SERPL-SCNC: 1.38 MMOL/L — HIGH (ref 1.12–1.3)
CALCIUM SERPL-MCNC: 9 MG/DL — SIGNIFICANT CHANGE UP (ref 8.4–10.5)
CALCIUM SERPL-MCNC: 9.1 MG/DL — SIGNIFICANT CHANGE UP (ref 8.4–10.5)
CHLORIDE BLDV-SCNC: SIGNIFICANT CHANGE UP MMOL/L (ref 96–108)
CHLORIDE SERPL-SCNC: 95 MMOL/L — LOW (ref 96–108)
CHLORIDE SERPL-SCNC: 99 MMOL/L — SIGNIFICANT CHANGE UP (ref 96–108)
CO2 SERPL-SCNC: 24 MMOL/L — SIGNIFICANT CHANGE UP (ref 22–31)
CO2 SERPL-SCNC: 25 MMOL/L — SIGNIFICANT CHANGE UP (ref 22–31)
CREAT SERPL-MCNC: 1.53 MG/DL — HIGH (ref 0.5–1.3)
CREAT SERPL-MCNC: 1.75 MG/DL — HIGH (ref 0.5–1.3)
EOSINOPHIL # BLD AUTO: 0.1 K/UL — SIGNIFICANT CHANGE UP (ref 0–0.5)
EOSINOPHIL NFR BLD AUTO: 1.1 % — SIGNIFICANT CHANGE UP (ref 0–6)
FIBRINOGEN PPP-MCNC: 327 MG/DL — LOW (ref 350–510)
GAS PNL BLDA: SIGNIFICANT CHANGE UP
GAS PNL BLDV: 134 MMOL/L — LOW (ref 135–145)
GAS PNL BLDV: 135 MMOL/L — SIGNIFICANT CHANGE UP (ref 135–145)
GAS PNL BLDV: 136 MMOL/L — SIGNIFICANT CHANGE UP (ref 135–145)
GAS PNL BLDV: SIGNIFICANT CHANGE UP
GLUCOSE BLDC GLUCOMTR-MCNC: 132 MG/DL — HIGH (ref 70–99)
GLUCOSE BLDC GLUCOMTR-MCNC: 185 MG/DL — HIGH (ref 70–99)
GLUCOSE BLDV-MCNC: 110 MG/DL — HIGH (ref 70–99)
GLUCOSE BLDV-MCNC: 114 MG/DL — HIGH (ref 70–99)
GLUCOSE BLDV-MCNC: 118 MG/DL — HIGH (ref 70–99)
GLUCOSE SERPL-MCNC: 106 MG/DL — HIGH (ref 70–99)
GLUCOSE SERPL-MCNC: 128 MG/DL — HIGH (ref 70–99)
HCO3 BLDV-SCNC: 27 MMOL/L — SIGNIFICANT CHANGE UP (ref 21–29)
HCO3 BLDV-SCNC: 28 MMOL/L — SIGNIFICANT CHANGE UP (ref 21–29)
HCO3 BLDV-SCNC: 29 MMOL/L — SIGNIFICANT CHANGE UP (ref 21–29)
HCT VFR BLD CALC: 27 % — LOW (ref 39–50)
HCT VFR BLD CALC: 36.8 % — LOW (ref 39–50)
HCT VFR BLDA CALC: 23 % — LOW (ref 39–50)
HCT VFR BLDA CALC: 27 % — LOW (ref 39–50)
HCT VFR BLDA CALC: 28 % — LOW (ref 39–50)
HGB BLD CALC-MCNC: 7.3 G/DL — LOW (ref 13–17)
HGB BLD CALC-MCNC: 8.6 G/DL — LOW (ref 13–17)
HGB BLD CALC-MCNC: 9 G/DL — LOW (ref 13–17)
HGB BLD-MCNC: 12.4 G/DL — LOW (ref 13–17)
HGB BLD-MCNC: 9.6 G/DL — LOW (ref 13–17)
HOROWITZ INDEX BLDV+IHG-RTO: 0 — SIGNIFICANT CHANGE UP
INR BLD: 1.43 RATIO — HIGH (ref 0.88–1.16)
LACTATE BLDV-MCNC: 0.7 MMOL/L — SIGNIFICANT CHANGE UP (ref 0.7–2)
LACTATE BLDV-MCNC: 0.8 MMOL/L — SIGNIFICANT CHANGE UP (ref 0.7–2)
LACTATE BLDV-MCNC: 1 MMOL/L — SIGNIFICANT CHANGE UP (ref 0.7–2)
LYMPHOCYTES # BLD AUTO: 0.8 K/UL — LOW (ref 1–3.3)
LYMPHOCYTES # BLD AUTO: 8.5 % — LOW (ref 13–44)
MCHC RBC-ENTMCNC: 32.9 PG — SIGNIFICANT CHANGE UP (ref 27–34)
MCHC RBC-ENTMCNC: 33.6 GM/DL — SIGNIFICANT CHANGE UP (ref 32–36)
MCHC RBC-ENTMCNC: 34.5 PG — HIGH (ref 27–34)
MCHC RBC-ENTMCNC: 35.7 GM/DL — SIGNIFICANT CHANGE UP (ref 32–36)
MCV RBC AUTO: 96.7 FL — SIGNIFICANT CHANGE UP (ref 80–100)
MCV RBC AUTO: 97.7 FL — SIGNIFICANT CHANGE UP (ref 80–100)
MONOCYTES # BLD AUTO: 0.5 K/UL — SIGNIFICANT CHANGE UP (ref 0–0.9)
MONOCYTES NFR BLD AUTO: 5.2 % — SIGNIFICANT CHANGE UP (ref 2–14)
NEUTROPHILS # BLD AUTO: 7.9 K/UL — HIGH (ref 1.8–7.4)
NEUTROPHILS NFR BLD AUTO: 84.8 % — HIGH (ref 43–77)
PA ADP PRP-ACNC: 178 PRU — LOW (ref 194–417)
PCO2 BLDV: 44 MMHG — SIGNIFICANT CHANGE UP (ref 35–50)
PCO2 BLDV: 46 MMHG — SIGNIFICANT CHANGE UP (ref 35–50)
PCO2 BLDV: 54 MMHG — HIGH (ref 35–50)
PH BLDV: 7.35 — SIGNIFICANT CHANGE UP (ref 7.35–7.45)
PH BLDV: 7.41 — SIGNIFICANT CHANGE UP (ref 7.35–7.45)
PH BLDV: 7.41 — SIGNIFICANT CHANGE UP (ref 7.35–7.45)
PLATELET # BLD AUTO: 131 K/UL — LOW (ref 150–400)
PLATELET # BLD AUTO: 185 K/UL — SIGNIFICANT CHANGE UP (ref 150–400)
PO2 BLDV: 56 MMHG — HIGH (ref 25–45)
PO2 BLDV: 63 MMHG — HIGH (ref 25–45)
PO2 BLDV: 85 MMHG — HIGH (ref 25–45)
POTASSIUM BLDV-SCNC: 3.7 MMOL/L — SIGNIFICANT CHANGE UP (ref 3.5–5)
POTASSIUM BLDV-SCNC: 3.9 MMOL/L — SIGNIFICANT CHANGE UP (ref 3.5–5)
POTASSIUM BLDV-SCNC: 4.3 MMOL/L — SIGNIFICANT CHANGE UP (ref 3.5–5)
POTASSIUM SERPL-MCNC: 3.8 MMOL/L — SIGNIFICANT CHANGE UP (ref 3.5–5.3)
POTASSIUM SERPL-MCNC: 4.1 MMOL/L — SIGNIFICANT CHANGE UP (ref 3.5–5.3)
POTASSIUM SERPL-SCNC: 3.8 MMOL/L — SIGNIFICANT CHANGE UP (ref 3.5–5.3)
POTASSIUM SERPL-SCNC: 4.1 MMOL/L — SIGNIFICANT CHANGE UP (ref 3.5–5.3)
PROT SERPL-MCNC: 6.1 G/DL — SIGNIFICANT CHANGE UP (ref 6–8.3)
PROTHROM AB SERPL-ACNC: 16.6 SEC — HIGH (ref 10–12.9)
RBC # BLD: 2.79 M/UL — LOW (ref 4.2–5.8)
RBC # BLD: 3.77 M/UL — LOW (ref 4.2–5.8)
RBC # FLD: 13 % — SIGNIFICANT CHANGE UP (ref 10.3–14.5)
RBC # FLD: 13.4 % — SIGNIFICANT CHANGE UP (ref 10.3–14.5)
SAO2 % BLDV: 87 % — SIGNIFICANT CHANGE UP (ref 67–88)
SAO2 % BLDV: 89 % — HIGH (ref 67–88)
SAO2 % BLDV: 96 % — HIGH (ref 67–88)
SODIUM SERPL-SCNC: 134 MMOL/L — LOW (ref 135–145)
SODIUM SERPL-SCNC: 137 MMOL/L — SIGNIFICANT CHANGE UP (ref 135–145)
WBC # BLD: 4.9 K/UL — SIGNIFICANT CHANGE UP (ref 3.8–10.5)
WBC # BLD: 9.3 K/UL — SIGNIFICANT CHANGE UP (ref 3.8–10.5)
WBC # FLD AUTO: 4.9 K/UL — SIGNIFICANT CHANGE UP (ref 3.8–10.5)
WBC # FLD AUTO: 9.3 K/UL — SIGNIFICANT CHANGE UP (ref 3.8–10.5)

## 2019-07-12 PROCEDURE — 99232 SBSQ HOSP IP/OBS MODERATE 35: CPT | Mod: GC

## 2019-07-12 PROCEDURE — 33518 CABG ARTERY-VEIN TWO: CPT

## 2019-07-12 PROCEDURE — 99291 CRITICAL CARE FIRST HOUR: CPT

## 2019-07-12 PROCEDURE — 33533 CABG ARTERIAL SINGLE: CPT

## 2019-07-12 PROCEDURE — 33508 ENDOSCOPIC VEIN HARVEST: CPT

## 2019-07-12 PROCEDURE — 71045 X-RAY EXAM CHEST 1 VIEW: CPT | Mod: 26

## 2019-07-12 PROCEDURE — 93010 ELECTROCARDIOGRAM REPORT: CPT

## 2019-07-12 RX ORDER — CHLORHEXIDINE GLUCONATE 213 G/1000ML
1 SOLUTION TOPICAL
Refills: 0 | Status: DISCONTINUED | OUTPATIENT
Start: 2019-07-12 | End: 2019-07-21

## 2019-07-12 RX ORDER — AMIODARONE HYDROCHLORIDE 400 MG/1
150 TABLET ORAL ONCE
Refills: 0 | Status: COMPLETED | OUTPATIENT
Start: 2019-07-12 | End: 2019-07-12

## 2019-07-12 RX ORDER — ATORVASTATIN CALCIUM 80 MG/1
40 TABLET, FILM COATED ORAL AT BEDTIME
Refills: 0 | Status: DISCONTINUED | OUTPATIENT
Start: 2019-07-12 | End: 2019-07-19

## 2019-07-12 RX ORDER — CHLORHEXIDINE GLUCONATE 213 G/1000ML
5 SOLUTION TOPICAL EVERY 4 HOURS
Refills: 0 | Status: DISCONTINUED | OUTPATIENT
Start: 2019-07-12 | End: 2019-07-12

## 2019-07-12 RX ORDER — AMIODARONE HYDROCHLORIDE 400 MG/1
0.5 TABLET ORAL
Qty: 900 | Refills: 0 | Status: DISCONTINUED | OUTPATIENT
Start: 2019-07-12 | End: 2019-07-13

## 2019-07-12 RX ORDER — DEXTROSE 50 % IN WATER 50 %
50 SYRINGE (ML) INTRAVENOUS
Refills: 0 | Status: DISCONTINUED | OUTPATIENT
Start: 2019-07-12 | End: 2019-08-03

## 2019-07-12 RX ORDER — POTASSIUM CHLORIDE 20 MEQ
10 PACKET (EA) ORAL
Refills: 0 | Status: DISCONTINUED | OUTPATIENT
Start: 2019-07-12 | End: 2019-07-13

## 2019-07-12 RX ORDER — SODIUM CHLORIDE 9 MG/ML
10 INJECTION INTRAMUSCULAR; INTRAVENOUS; SUBCUTANEOUS
Refills: 0 | Status: DISCONTINUED | OUTPATIENT
Start: 2019-07-12 | End: 2019-07-20

## 2019-07-12 RX ORDER — ALBUMIN HUMAN 25 %
250 VIAL (ML) INTRAVENOUS
Refills: 0 | Status: COMPLETED | OUTPATIENT
Start: 2019-07-12 | End: 2019-07-12

## 2019-07-12 RX ORDER — SODIUM CHLORIDE 9 MG/ML
1000 INJECTION INTRAMUSCULAR; INTRAVENOUS; SUBCUTANEOUS
Refills: 0 | Status: DISCONTINUED | OUTPATIENT
Start: 2019-07-12 | End: 2019-08-03

## 2019-07-12 RX ORDER — OXYCODONE AND ACETAMINOPHEN 5; 325 MG/1; MG/1
2 TABLET ORAL EVERY 6 HOURS
Refills: 0 | Status: DISCONTINUED | OUTPATIENT
Start: 2019-07-12 | End: 2019-07-14

## 2019-07-12 RX ORDER — DEXTROSE 50 % IN WATER 50 %
25 SYRINGE (ML) INTRAVENOUS
Refills: 0 | Status: DISCONTINUED | OUTPATIENT
Start: 2019-07-12 | End: 2019-08-03

## 2019-07-12 RX ORDER — NOREPINEPHRINE BITARTRATE/D5W 8 MG/250ML
0.04 PLASTIC BAG, INJECTION (ML) INTRAVENOUS
Qty: 8 | Refills: 0 | Status: DISCONTINUED | OUTPATIENT
Start: 2019-07-12 | End: 2019-07-13

## 2019-07-12 RX ORDER — MEPERIDINE HYDROCHLORIDE 50 MG/ML
25 INJECTION INTRAMUSCULAR; INTRAVENOUS; SUBCUTANEOUS ONCE
Refills: 0 | Status: DISCONTINUED | OUTPATIENT
Start: 2019-07-12 | End: 2019-07-12

## 2019-07-12 RX ORDER — INSULIN HUMAN 100 [IU]/ML
4 INJECTION, SOLUTION SUBCUTANEOUS
Qty: 100 | Refills: 0 | Status: DISCONTINUED | OUTPATIENT
Start: 2019-07-12 | End: 2019-07-13

## 2019-07-12 RX ORDER — METOCLOPRAMIDE HCL 10 MG
10 TABLET ORAL EVERY 8 HOURS
Refills: 0 | Status: DISCONTINUED | OUTPATIENT
Start: 2019-07-12 | End: 2019-07-12

## 2019-07-12 RX ORDER — CEFUROXIME AXETIL 250 MG
1500 TABLET ORAL EVERY 8 HOURS
Refills: 0 | Status: DISCONTINUED | OUTPATIENT
Start: 2019-07-12 | End: 2019-07-13

## 2019-07-12 RX ORDER — METOCLOPRAMIDE HCL 10 MG
5 TABLET ORAL EVERY 8 HOURS
Refills: 0 | Status: COMPLETED | OUTPATIENT
Start: 2019-07-12 | End: 2019-07-14

## 2019-07-12 RX ORDER — AMIODARONE HYDROCHLORIDE 400 MG/1
1 TABLET ORAL
Qty: 900 | Refills: 0 | Status: DISCONTINUED | OUTPATIENT
Start: 2019-07-12 | End: 2019-07-12

## 2019-07-12 RX ORDER — DOBUTAMINE HCL 250MG/20ML
4 VIAL (ML) INTRAVENOUS
Qty: 500 | Refills: 0 | Status: DISCONTINUED | OUTPATIENT
Start: 2019-07-12 | End: 2019-07-16

## 2019-07-12 RX ORDER — ASPIRIN/CALCIUM CARB/MAGNESIUM 324 MG
81 TABLET ORAL DAILY
Refills: 0 | Status: DISCONTINUED | OUTPATIENT
Start: 2019-07-13 | End: 2019-07-14

## 2019-07-12 RX ORDER — POLYETHYLENE GLYCOL 3350 17 G/17G
17 POWDER, FOR SOLUTION ORAL DAILY
Refills: 0 | Status: DISCONTINUED | OUTPATIENT
Start: 2019-07-12 | End: 2019-07-19

## 2019-07-12 RX ORDER — AMIODARONE HYDROCHLORIDE 400 MG/1
400 TABLET ORAL EVERY 8 HOURS
Refills: 0 | Status: DISCONTINUED | OUTPATIENT
Start: 2019-07-13 | End: 2019-07-14

## 2019-07-12 RX ORDER — ASPIRIN/CALCIUM CARB/MAGNESIUM 324 MG
300 TABLET ORAL ONCE
Refills: 0 | Status: DISCONTINUED | OUTPATIENT
Start: 2019-07-12 | End: 2019-07-12

## 2019-07-12 RX ORDER — DOCUSATE SODIUM 100 MG
100 CAPSULE ORAL THREE TIMES A DAY
Refills: 0 | Status: DISCONTINUED | OUTPATIENT
Start: 2019-07-12 | End: 2019-07-14

## 2019-07-12 RX ORDER — MAGNESIUM SULFATE 500 MG/ML
1 VIAL (ML) INJECTION ONCE
Refills: 0 | Status: COMPLETED | OUTPATIENT
Start: 2019-07-12 | End: 2019-07-12

## 2019-07-12 RX ORDER — FAMOTIDINE 10 MG/ML
20 INJECTION INTRAVENOUS DAILY
Refills: 0 | Status: DISCONTINUED | OUTPATIENT
Start: 2019-07-12 | End: 2019-07-14

## 2019-07-12 RX ORDER — PANTOPRAZOLE SODIUM 20 MG/1
40 TABLET, DELAYED RELEASE ORAL DAILY
Refills: 0 | Status: DISCONTINUED | OUTPATIENT
Start: 2019-07-12 | End: 2019-07-12

## 2019-07-12 RX ORDER — MAGNESIUM SULFATE 500 MG/ML
1 VIAL (ML) INJECTION ONCE
Refills: 0 | Status: DISCONTINUED | OUTPATIENT
Start: 2019-07-12 | End: 2019-07-12

## 2019-07-12 RX ORDER — OXYCODONE AND ACETAMINOPHEN 5; 325 MG/1; MG/1
1 TABLET ORAL EVERY 4 HOURS
Refills: 0 | Status: DISCONTINUED | OUTPATIENT
Start: 2019-07-12 | End: 2019-07-14

## 2019-07-12 RX ADMIN — Medication 5 MILLIGRAM(S): at 22:42

## 2019-07-12 RX ADMIN — Medication 50 MILLIGRAM(S): at 06:17

## 2019-07-12 RX ADMIN — Medication 40 MILLIGRAM(S): at 06:16

## 2019-07-12 RX ADMIN — CHLORHEXIDINE GLUCONATE 30 MILLILITER(S): 213 SOLUTION TOPICAL at 06:22

## 2019-07-12 RX ADMIN — Medication 125 MILLILITER(S): at 16:16

## 2019-07-12 RX ADMIN — AMIODARONE HYDROCHLORIDE 600 MILLIGRAM(S): 400 TABLET ORAL at 15:51

## 2019-07-12 RX ADMIN — CHLORHEXIDINE GLUCONATE 5 MILLILITER(S): 213 SOLUTION TOPICAL at 22:39

## 2019-07-12 RX ADMIN — Medication 5 MILLIGRAM(S): at 14:34

## 2019-07-12 RX ADMIN — CHLORHEXIDINE GLUCONATE 5 MILLILITER(S): 213 SOLUTION TOPICAL at 14:34

## 2019-07-12 RX ADMIN — Medication 100 MILLIGRAM(S): at 16:11

## 2019-07-12 RX ADMIN — Medication 100 GRAM(S): at 15:29

## 2019-07-12 RX ADMIN — Medication 25 MICROGRAM(S): at 06:16

## 2019-07-12 RX ADMIN — Medication 125 MILLILITER(S): at 16:33

## 2019-07-12 RX ADMIN — PANTOPRAZOLE SODIUM 40 MILLIGRAM(S): 20 TABLET, DELAYED RELEASE ORAL at 14:34

## 2019-07-12 RX ADMIN — CHLORHEXIDINE GLUCONATE 5 MILLILITER(S): 213 SOLUTION TOPICAL at 17:19

## 2019-07-12 NOTE — PROGRESS NOTE ADULT - SUBJECTIVE AND OBJECTIVE BOX
SYLVIA ROSA  MRN#:  [id number]    The patient is a 73y Male without prior medical care who presented with SOB and LE edema, found to have newly diagnosed HFrEF (EF 30%) and RALPH vs CKD, also PVD and claudication with extensive LE arterial disease on doppler, reports heavy alcohol use, further work up revealed multivessel CAD, now s/p C3L today, severe biventricular dysfunction on echocardiogram with some improvement after bypass was seen, evaluated, & examined with the CTICU staff on rounds and later in the evening with a multidisciplinary care plan formulated & implemented.  All available clinical, laboratory, radiographic, pharmacologic, and electrocardiographic data were reviewed & analyzed.      The patient was in the CTICU in critical condition secondary to persistent cardiopulmonary dysfunction, hemodynamically significant anemia/hypovolemia-shock, persistent cardiovascular dysfunction, renal insufficiency & hyperglycemia.      Respiratory status required full ventilatory support, close monitoring of respiratory rate and breathing pattern, the following of ABG’s with A-line monitoring, and continuous pulse oximetry monitoring for support & to evaluate for & prevent further decompensation secondary to persistent cardiopulmonary dysfunction.     Invasive hemodynamic monitoring with a central venous catheter & an A-line were required for the continuous central venous and MAP/BP monitoring to ensure adequate cardiovascular support and to evaluate for & help prevent decompensation while receiving intermittent volume expansion, external epicardial pacing, blood transfusion, an IV Levophed drip, an IV Dobutamine drip, and an IV Amiodarone drip secondary to hemodynamically significant anemia/anemia due to acute blood loss, hypovolemia-shock, cardiovascular dysfunction, and combined systolic and diastolic biventricular failure.    Metabolic stability, stress hyperglycemia/suspected untreated type 2 diabetes, & infection prophylaxis required an IV regular Insulin drip & the following of serial glucose levels to help achieve & maintain euglycemia.      Patient required acute postoperative critical care management and I provided 35 minutes of non-continuous care to the patient. I reviewed and assessed all available clinical, laboratory, radiographic, pharmacologic, and electrocardiographic data in order to decide on maintenance or adjustment of medications, ventilator settings/management of respiratory status, and fluid management.  Discussed at length with the CTICU staff and helped coordinate care.

## 2019-07-12 NOTE — PROGRESS NOTE ADULT - SUBJECTIVE AND OBJECTIVE BOX
NEPHROLOGY-[facility] [phone]        Patient seen and examined in bed in the CTICU.  He is intubated and sedated.  He was not woken up p anaesthesia  He is on pressors at present     ros-unable         MEDICATIONS  (STANDING):  albumin human  5% IVPB 250 milliLiter(s) IV Intermittent every 1 hour  amiodarone IVPB 150 milliGRAM(s) IV Intermittent once  aspirin Suppository 300 milliGRAM(s) Rectal once  cefuroxime  IVPB 1500 milliGRAM(s) IV Intermittent every 8 hours  chlorhexidine 0.12% Liquid 5 milliLiter(s) Oral Mucosa every 4 hours  chlorhexidine 4% Liquid 1 Application(s) Topical <User Schedule>  dextrose 50% Injectable 50 milliLiter(s) IV Push every 15 minutes  dextrose 50% Injectable 25 milliLiter(s) IV Push every 15 minutes  docusate sodium 100 milliGRAM(s) Oral three times a day  meperidine     Injectable 25 milliGRAM(s) IV Push once  metoclopramide Injectable 5 milliGRAM(s) IV Push every 8 hours  pantoprazole  Injectable 40 milliGRAM(s) IV Push daily  polyethylene glycol 3350 17 Gram(s) Oral daily  potassium chloride  10 mEq/50 mL IVPB 10 milliEquivalent(s) IV Intermittent every 1 hour  potassium chloride  10 mEq/50 mL IVPB 10 milliEquivalent(s) IV Intermittent every 1 hour  potassium chloride  10 mEq/50 mL IVPB 10 milliEquivalent(s) IV Intermittent every 1 hour  sodium chloride 0.9%. 1000 milliLiter(s) (10 mL/Hr) IV Continuous <Continuous>      VITAL:  T(C): , Max: 36.7 (19 @ 04:51)  T(F): , Max: 98.1 (19 @ 05:11)  HR: 97 (19 @ 15:00)  BP: 131/71 (19 @ 08:32)  BP(mean): --  RR: 12 (19 @ 15:00)  SpO2: 99% (19 @ 15:00)  Wt(kg): --    I and O's:     @ 07:01  -   @ 07:00  --------------------------------------------------------  IN: 400 mL / OUT: 200 mL / NET: 200 mL     @ 07:01  -   @ 15:45  --------------------------------------------------------  IN: 740 mL / OUT: 345 mL / NET: 395 mL      Height (cm): 170.18 ( @ 08:32)  Weight (kg): 73.6 ( @ 08:32)  BMI (kg/m2): 25.4 ( @ 08:32)  BSA (m2): 1.85 ( @ 08:32)    PHYSICAL EXAM:    Constitutional: intubated  Neck:  No JVD  Respiratory: transmitted upper   Cardiovascular: S1 and S2  Gastrointestinal: BS+, soft, NT/ND  Extremities: No peripheral edema  Neurological: unable   : +  Chávez  Skin: No rashes  Access: Not applicable    LABS:                        9.6    9.3   )-----------( 185      ( 2019 13:49 )             27.0         137  |  99  |  22  ----------------------------<  128<H>  3.8   |  24  |  1.53<H>    Ca    9.1      2019 13:49    TPro  6.1  /  Alb  3.7  /  TBili  1.1  /  DBili  x   /  AST  33  /  ALT  15  /  AlkPhos  70  0712          Urine Studies:  Urinalysis Basic - ( 10 Jul 2019 21:34 )    Color: Yellow / Appearance: Clear / S.025 / pH: x  Gluc: x / Ketone: Trace  / Bili: Negative / Urobili: 2 mg/dL   Blood: x / Protein: Trace / Nitrite: Negative   Leuk Esterase: Negative / RBC: 3 /hpf / WBC 1 /HPF   Sq Epi: x / Non Sq Epi: 1 /hpf / Bacteria: Negative            RADIOLOGY & ADDITIONAL STUDIES:

## 2019-07-12 NOTE — PROGRESS NOTE ADULT - ASSESSMENT
The patient is a 73-year-old ex-alcoholic drinker and tobacco smoker with severe peripheral vascular disease and coronary disease.  He has abnormal kidney function, which is likely related to hypertensive nephrosclerosis.     The patient has had very poor medical followup prior to this point.  CKD stage 3   POD #0 for Bypass  Cardiogenic shock       1.	Renal:  Minimal proteinuria at present.  Creatinine will increase in am.  Good urine output post op  2.	Cardiovascular:  On pressors at present.  Slowly wean as tolerated by BP   3.	Pulm-Wait for mental status to improve before weaning   4.	-Leave in law The patient is a 73-year-old ex-alcoholic drinker and tobacco smoker with severe peripheral vascular disease and coronary disease.  He has abnormal kidney function, which is likely related to hypertensive nephrosclerosis.     The patient has had very poor medical followup prior to this point.  CKD stage 3   POD #0 for Bypass  Cardiogenic shock   Afib during the surgery and now NSR      1.	Renal:  Minimal proteinuria at present.  Creatinine will increase in am.  Good urine output post op  2.	Cardiovascular:  On pressors at present.  Slowly wean as tolerated by BP   3.	Pulm-Wait for mental status to improve before weaning   4.	-Leave in law

## 2019-07-13 LAB
ALBUMIN SERPL ELPH-MCNC: 3.8 G/DL — SIGNIFICANT CHANGE UP (ref 3.3–5)
ALBUMIN SERPL ELPH-MCNC: 4 G/DL — SIGNIFICANT CHANGE UP (ref 3.3–5)
ALP SERPL-CCNC: 61 U/L — SIGNIFICANT CHANGE UP (ref 40–120)
ALP SERPL-CCNC: 67 U/L — SIGNIFICANT CHANGE UP (ref 40–120)
ALT FLD-CCNC: 19 U/L — SIGNIFICANT CHANGE UP (ref 10–45)
ALT FLD-CCNC: 21 U/L — SIGNIFICANT CHANGE UP (ref 10–45)
ANION GAP SERPL CALC-SCNC: 14 MMOL/L — SIGNIFICANT CHANGE UP (ref 5–17)
ANION GAP SERPL CALC-SCNC: 16 MMOL/L — SIGNIFICANT CHANGE UP (ref 5–17)
ANION GAP SERPL CALC-SCNC: 16 MMOL/L — SIGNIFICANT CHANGE UP (ref 5–17)
AST SERPL-CCNC: 40 U/L — SIGNIFICANT CHANGE UP (ref 10–40)
AST SERPL-CCNC: 50 U/L — HIGH (ref 10–40)
BASE EXCESS BLDV CALC-SCNC: -0.7 MMOL/L — SIGNIFICANT CHANGE UP (ref -2–2)
BASE EXCESS BLDV CALC-SCNC: -0.8 MMOL/L — SIGNIFICANT CHANGE UP (ref -2–2)
BASE EXCESS BLDV CALC-SCNC: -0.9 MMOL/L — SIGNIFICANT CHANGE UP (ref -2–2)
BASE EXCESS BLDV CALC-SCNC: -1.4 MMOL/L — SIGNIFICANT CHANGE UP (ref -2–2)
BASOPHILS # BLD AUTO: 0 K/UL — SIGNIFICANT CHANGE UP (ref 0–0.2)
BASOPHILS NFR BLD AUTO: 0 % — SIGNIFICANT CHANGE UP (ref 0–2)
BILIRUB SERPL-MCNC: 1.8 MG/DL — HIGH (ref 0.2–1.2)
BILIRUB SERPL-MCNC: 1.8 MG/DL — HIGH (ref 0.2–1.2)
BUN SERPL-MCNC: 26 MG/DL — HIGH (ref 7–23)
BUN SERPL-MCNC: 31 MG/DL — HIGH (ref 7–23)
BUN SERPL-MCNC: 35 MG/DL — HIGH (ref 7–23)
CALCIUM SERPL-MCNC: 8.6 MG/DL — SIGNIFICANT CHANGE UP (ref 8.4–10.5)
CALCIUM SERPL-MCNC: 8.8 MG/DL — SIGNIFICANT CHANGE UP (ref 8.4–10.5)
CALCIUM SERPL-MCNC: 9.2 MG/DL — SIGNIFICANT CHANGE UP (ref 8.4–10.5)
CHLORIDE SERPL-SCNC: 94 MMOL/L — LOW (ref 96–108)
CHLORIDE SERPL-SCNC: 97 MMOL/L — SIGNIFICANT CHANGE UP (ref 96–108)
CHLORIDE SERPL-SCNC: 99 MMOL/L — SIGNIFICANT CHANGE UP (ref 96–108)
CO2 BLDV-SCNC: 26 MMOL/L — SIGNIFICANT CHANGE UP (ref 22–30)
CO2 BLDV-SCNC: 26 MMOL/L — SIGNIFICANT CHANGE UP (ref 22–30)
CO2 BLDV-SCNC: 27 MMOL/L — SIGNIFICANT CHANGE UP (ref 22–30)
CO2 BLDV-SCNC: 27 MMOL/L — SIGNIFICANT CHANGE UP (ref 22–30)
CO2 SERPL-SCNC: 21 MMOL/L — LOW (ref 22–31)
CO2 SERPL-SCNC: 22 MMOL/L — SIGNIFICANT CHANGE UP (ref 22–31)
CO2 SERPL-SCNC: 24 MMOL/L — SIGNIFICANT CHANGE UP (ref 22–31)
CREAT ?TM UR-MCNC: 89 MG/DL — SIGNIFICANT CHANGE UP
CREAT SERPL-MCNC: 1.81 MG/DL — HIGH (ref 0.5–1.3)
CREAT SERPL-MCNC: 2.11 MG/DL — HIGH (ref 0.5–1.3)
CREAT SERPL-MCNC: 2.27 MG/DL — HIGH (ref 0.5–1.3)
EOSINOPHIL # BLD AUTO: 0 K/UL — SIGNIFICANT CHANGE UP (ref 0–0.5)
EOSINOPHIL NFR BLD AUTO: 0.3 % — SIGNIFICANT CHANGE UP (ref 0–6)
GAS PNL BLDA: SIGNIFICANT CHANGE UP
GAS PNL BLDV: SIGNIFICANT CHANGE UP
GLUCOSE BLDC GLUCOMTR-MCNC: 102 MG/DL — HIGH (ref 70–99)
GLUCOSE BLDC GLUCOMTR-MCNC: 111 MG/DL — HIGH (ref 70–99)
GLUCOSE BLDC GLUCOMTR-MCNC: 115 MG/DL — HIGH (ref 70–99)
GLUCOSE BLDC GLUCOMTR-MCNC: 129 MG/DL — HIGH (ref 70–99)
GLUCOSE BLDC GLUCOMTR-MCNC: 131 MG/DL — HIGH (ref 70–99)
GLUCOSE BLDC GLUCOMTR-MCNC: 134 MG/DL — HIGH (ref 70–99)
GLUCOSE BLDC GLUCOMTR-MCNC: 145 MG/DL — HIGH (ref 70–99)
GLUCOSE BLDC GLUCOMTR-MCNC: 213 MG/DL — HIGH (ref 70–99)
GLUCOSE BLDC GLUCOMTR-MCNC: 91 MG/DL — SIGNIFICANT CHANGE UP (ref 70–99)
GLUCOSE SERPL-MCNC: 139 MG/DL — HIGH (ref 70–99)
GLUCOSE SERPL-MCNC: 220 MG/DL — HIGH (ref 70–99)
GLUCOSE SERPL-MCNC: 94 MG/DL — SIGNIFICANT CHANGE UP (ref 70–99)
HCO3 BLDV-SCNC: 25 MMOL/L — SIGNIFICANT CHANGE UP (ref 21–29)
HCT VFR BLD CALC: 25.9 % — LOW (ref 39–50)
HGB BLD-MCNC: 9.2 G/DL — LOW (ref 13–17)
HOROWITZ INDEX BLDV+IHG-RTO: 32 — SIGNIFICANT CHANGE UP
LYMPHOCYTES # BLD AUTO: 0.4 K/UL — LOW (ref 1–3.3)
LYMPHOCYTES # BLD AUTO: 3 % — LOW (ref 13–44)
MAGNESIUM SERPL-MCNC: 1.6 MG/DL — SIGNIFICANT CHANGE UP (ref 1.6–2.6)
MCHC RBC-ENTMCNC: 33.6 PG — SIGNIFICANT CHANGE UP (ref 27–34)
MCHC RBC-ENTMCNC: 35.5 GM/DL — SIGNIFICANT CHANGE UP (ref 32–36)
MCV RBC AUTO: 94.6 FL — SIGNIFICANT CHANGE UP (ref 80–100)
MONOCYTES # BLD AUTO: 0.8 K/UL — SIGNIFICANT CHANGE UP (ref 0–0.9)
MONOCYTES NFR BLD AUTO: 6.3 % — SIGNIFICANT CHANGE UP (ref 2–14)
NEUTROPHILS # BLD AUTO: 11.5 K/UL — HIGH (ref 1.8–7.4)
NEUTROPHILS NFR BLD AUTO: 90.5 % — HIGH (ref 43–77)
PCO2 BLDV: 50 MMHG — SIGNIFICANT CHANGE UP (ref 35–50)
PCO2 BLDV: 52 MMHG — HIGH (ref 35–50)
PH BLDV: 7.3 — LOW (ref 7.35–7.45)
PH BLDV: 7.3 — LOW (ref 7.35–7.45)
PH BLDV: 7.31 — LOW (ref 7.35–7.45)
PH BLDV: 7.32 — LOW (ref 7.35–7.45)
PHOSPHATE SERPL-MCNC: 3.2 MG/DL — SIGNIFICANT CHANGE UP (ref 2.5–4.5)
PLATELET # BLD AUTO: 159 K/UL — SIGNIFICANT CHANGE UP (ref 150–400)
PO2 BLDV: 26 MMHG — SIGNIFICANT CHANGE UP (ref 25–45)
PO2 BLDV: 29 MMHG — SIGNIFICANT CHANGE UP (ref 25–45)
PO2 BLDV: 29 MMHG — SIGNIFICANT CHANGE UP (ref 25–45)
PO2 BLDV: 33 MMHG — SIGNIFICANT CHANGE UP (ref 25–45)
POTASSIUM SERPL-MCNC: 4.7 MMOL/L — SIGNIFICANT CHANGE UP (ref 3.5–5.3)
POTASSIUM SERPL-MCNC: 5.2 MMOL/L — SIGNIFICANT CHANGE UP (ref 3.5–5.3)
POTASSIUM SERPL-MCNC: 5.3 MMOL/L — SIGNIFICANT CHANGE UP (ref 3.5–5.3)
POTASSIUM SERPL-SCNC: 4.7 MMOL/L — SIGNIFICANT CHANGE UP (ref 3.5–5.3)
POTASSIUM SERPL-SCNC: 5.2 MMOL/L — SIGNIFICANT CHANGE UP (ref 3.5–5.3)
POTASSIUM SERPL-SCNC: 5.3 MMOL/L — SIGNIFICANT CHANGE UP (ref 3.5–5.3)
PROT SERPL-MCNC: 6.3 G/DL — SIGNIFICANT CHANGE UP (ref 6–8.3)
PROT SERPL-MCNC: 7 G/DL — SIGNIFICANT CHANGE UP (ref 6–8.3)
RBC # BLD: 2.74 M/UL — LOW (ref 4.2–5.8)
RBC # FLD: 14 % — SIGNIFICANT CHANGE UP (ref 10.3–14.5)
SAO2 % BLDV: 38 % — LOW (ref 67–88)
SAO2 % BLDV: 42 % — LOW (ref 67–88)
SAO2 % BLDV: 45 % — LOW (ref 67–88)
SAO2 % BLDV: 54 % — LOW (ref 67–88)
SODIUM SERPL-SCNC: 131 MMOL/L — LOW (ref 135–145)
SODIUM SERPL-SCNC: 135 MMOL/L — SIGNIFICANT CHANGE UP (ref 135–145)
SODIUM SERPL-SCNC: 137 MMOL/L — SIGNIFICANT CHANGE UP (ref 135–145)
SODIUM UR-SCNC: 28 MMOL/L — SIGNIFICANT CHANGE UP
WBC # BLD: 12.7 K/UL — HIGH (ref 3.8–10.5)
WBC # FLD AUTO: 12.7 K/UL — HIGH (ref 3.8–10.5)

## 2019-07-13 PROCEDURE — 93308 TTE F-UP OR LMTD: CPT | Mod: 26

## 2019-07-13 PROCEDURE — 93321 DOPPLER ECHO F-UP/LMTD STD: CPT | Mod: 26

## 2019-07-13 PROCEDURE — 99291 CRITICAL CARE FIRST HOUR: CPT

## 2019-07-13 PROCEDURE — 71045 X-RAY EXAM CHEST 1 VIEW: CPT | Mod: 26

## 2019-07-13 PROCEDURE — 93010 ELECTROCARDIOGRAM REPORT: CPT

## 2019-07-13 RX ORDER — FUROSEMIDE 40 MG
40 TABLET ORAL ONCE
Refills: 0 | Status: COMPLETED | OUTPATIENT
Start: 2019-07-13 | End: 2019-07-13

## 2019-07-13 RX ORDER — INSULIN LISPRO 100/ML
VIAL (ML) SUBCUTANEOUS AT BEDTIME
Refills: 0 | Status: DISCONTINUED | OUTPATIENT
Start: 2019-07-13 | End: 2019-07-15

## 2019-07-13 RX ORDER — INSULIN HUMAN 100 [IU]/ML
10 INJECTION, SOLUTION SUBCUTANEOUS ONCE
Refills: 0 | Status: COMPLETED | OUTPATIENT
Start: 2019-07-13 | End: 2019-07-13

## 2019-07-13 RX ORDER — CLOPIDOGREL BISULFATE 75 MG/1
75 TABLET, FILM COATED ORAL DAILY
Refills: 0 | Status: DISCONTINUED | OUTPATIENT
Start: 2019-07-13 | End: 2019-07-16

## 2019-07-13 RX ORDER — INSULIN LISPRO 100/ML
VIAL (ML) SUBCUTANEOUS
Refills: 0 | Status: DISCONTINUED | OUTPATIENT
Start: 2019-07-13 | End: 2019-07-15

## 2019-07-13 RX ORDER — FUROSEMIDE 40 MG
20 TABLET ORAL
Qty: 500 | Refills: 0 | Status: DISCONTINUED | OUTPATIENT
Start: 2019-07-13 | End: 2019-07-14

## 2019-07-13 RX ORDER — DEXTROSE 50 % IN WATER 50 %
25 SYRINGE (ML) INTRAVENOUS ONCE
Refills: 0 | Status: COMPLETED | OUTPATIENT
Start: 2019-07-13 | End: 2019-07-13

## 2019-07-13 RX ORDER — ALBUMIN HUMAN 25 %
250 VIAL (ML) INTRAVENOUS ONCE
Refills: 0 | Status: COMPLETED | OUTPATIENT
Start: 2019-07-13 | End: 2019-07-13

## 2019-07-13 RX ORDER — FUROSEMIDE 40 MG
100 TABLET ORAL ONCE
Refills: 0 | Status: COMPLETED | OUTPATIENT
Start: 2019-07-13 | End: 2019-07-13

## 2019-07-13 RX ORDER — HEPARIN SODIUM 5000 [USP'U]/ML
5000 INJECTION INTRAVENOUS; SUBCUTANEOUS EVERY 8 HOURS
Refills: 0 | Status: DISCONTINUED | OUTPATIENT
Start: 2019-07-13 | End: 2019-07-14

## 2019-07-13 RX ORDER — HYDROMORPHONE HYDROCHLORIDE 2 MG/ML
0.5 INJECTION INTRAMUSCULAR; INTRAVENOUS; SUBCUTANEOUS ONCE
Refills: 0 | Status: DISCONTINUED | OUTPATIENT
Start: 2019-07-13 | End: 2019-07-13

## 2019-07-13 RX ORDER — WARFARIN SODIUM 2.5 MG/1
3 TABLET ORAL ONCE
Refills: 0 | Status: COMPLETED | OUTPATIENT
Start: 2019-07-13 | End: 2019-07-13

## 2019-07-13 RX ORDER — FUROSEMIDE 40 MG
80 TABLET ORAL ONCE
Refills: 0 | Status: COMPLETED | OUTPATIENT
Start: 2019-07-13 | End: 2019-07-13

## 2019-07-13 RX ORDER — ASPIRIN/CALCIUM CARB/MAGNESIUM 324 MG
81 TABLET ORAL ONCE
Refills: 0 | Status: COMPLETED | OUTPATIENT
Start: 2019-07-13 | End: 2019-07-13

## 2019-07-13 RX ADMIN — Medication 100 MILLIGRAM(S): at 00:30

## 2019-07-13 RX ADMIN — CHLORHEXIDINE GLUCONATE 1 APPLICATION(S): 213 SOLUTION TOPICAL at 06:37

## 2019-07-13 RX ADMIN — Medication 100 MILLIGRAM(S): at 07:36

## 2019-07-13 RX ADMIN — HYDROMORPHONE HYDROCHLORIDE 0.5 MILLIGRAM(S): 2 INJECTION INTRAMUSCULAR; INTRAVENOUS; SUBCUTANEOUS at 17:32

## 2019-07-13 RX ADMIN — AMIODARONE HYDROCHLORIDE 400 MILLIGRAM(S): 400 TABLET ORAL at 21:30

## 2019-07-13 RX ADMIN — HEPARIN SODIUM 5000 UNIT(S): 5000 INJECTION INTRAVENOUS; SUBCUTANEOUS at 14:54

## 2019-07-13 RX ADMIN — Medication 4: at 17:41

## 2019-07-13 RX ADMIN — HYDROMORPHONE HYDROCHLORIDE 0.5 MILLIGRAM(S): 2 INJECTION INTRAMUSCULAR; INTRAVENOUS; SUBCUTANEOUS at 08:46

## 2019-07-13 RX ADMIN — AMIODARONE HYDROCHLORIDE 400 MILLIGRAM(S): 400 TABLET ORAL at 06:35

## 2019-07-13 RX ADMIN — Medication 40 MILLIGRAM(S): at 12:00

## 2019-07-13 RX ADMIN — POLYETHYLENE GLYCOL 3350 17 GRAM(S): 17 POWDER, FOR SOLUTION ORAL at 12:02

## 2019-07-13 RX ADMIN — Medication 40 MILLIGRAM(S): at 07:34

## 2019-07-13 RX ADMIN — Medication 80 MILLIGRAM(S): at 14:44

## 2019-07-13 RX ADMIN — CLOPIDOGREL BISULFATE 75 MILLIGRAM(S): 75 TABLET, FILM COATED ORAL at 12:02

## 2019-07-13 RX ADMIN — HYDROMORPHONE HYDROCHLORIDE 0.5 MILLIGRAM(S): 2 INJECTION INTRAMUSCULAR; INTRAVENOUS; SUBCUTANEOUS at 08:08

## 2019-07-13 RX ADMIN — Medication 120 MILLIGRAM(S): at 20:30

## 2019-07-13 RX ADMIN — Medication 81 MILLIGRAM(S): at 12:02

## 2019-07-13 RX ADMIN — INSULIN HUMAN 10 UNIT(S): 100 INJECTION, SOLUTION SUBCUTANEOUS at 20:30

## 2019-07-13 RX ADMIN — Medication 100 MILLIGRAM(S): at 21:30

## 2019-07-13 RX ADMIN — Medication 100 MILLIGRAM(S): at 14:54

## 2019-07-13 RX ADMIN — Medication 5 MILLIGRAM(S): at 06:35

## 2019-07-13 RX ADMIN — Medication 81 MILLIGRAM(S): at 03:45

## 2019-07-13 RX ADMIN — Medication 5 MILLIGRAM(S): at 21:31

## 2019-07-13 RX ADMIN — AMIODARONE HYDROCHLORIDE 400 MILLIGRAM(S): 400 TABLET ORAL at 14:54

## 2019-07-13 RX ADMIN — HEPARIN SODIUM 5000 UNIT(S): 5000 INJECTION INTRAVENOUS; SUBCUTANEOUS at 21:31

## 2019-07-13 RX ADMIN — Medication 25 MILLILITER(S): at 20:30

## 2019-07-13 RX ADMIN — ATORVASTATIN CALCIUM 40 MILLIGRAM(S): 80 TABLET, FILM COATED ORAL at 21:30

## 2019-07-13 RX ADMIN — Medication 5 MILLIGRAM(S): at 14:54

## 2019-07-13 RX ADMIN — Medication 125 MILLILITER(S): at 14:44

## 2019-07-13 RX ADMIN — WARFARIN SODIUM 3 MILLIGRAM(S): 2.5 TABLET ORAL at 21:37

## 2019-07-13 RX ADMIN — Medication 100 MILLIGRAM(S): at 06:35

## 2019-07-13 RX ADMIN — Medication 10 MG/HR: at 21:52

## 2019-07-13 RX ADMIN — FAMOTIDINE 20 MILLIGRAM(S): 10 INJECTION INTRAVENOUS at 12:02

## 2019-07-13 RX ADMIN — HYDROMORPHONE HYDROCHLORIDE 0.5 MILLIGRAM(S): 2 INJECTION INTRAMUSCULAR; INTRAVENOUS; SUBCUTANEOUS at 14:43

## 2019-07-13 NOTE — PROGRESS NOTE ADULT - SUBJECTIVE AND OBJECTIVE BOX
The patient is a 73y Male seen today at [facility]. Awake/alert, sitting up in chair without complaints.    REVIEW OF SYSTEMS:  Denies any nausea, vomiting, diarrhea, fevers or chills. Denies chest pain, SOB, focal weakness.  Fair oral intake.  All other pertinent systems are reviewed and are negative.    MEDICATIONS  (STANDING):  amiodarone    Tablet 400 milliGRAM(s) Oral every 8 hours  aspirin enteric coated 81 milliGRAM(s) Oral daily  atorvastatin 40 milliGRAM(s) Oral at bedtime  cefuroxime  IVPB 1500 milliGRAM(s) IV Intermittent every 8 hours  chlorhexidine 4% Liquid 1 Application(s) Topical <User Schedule>  clopidogrel Tablet 75 milliGRAM(s) Oral daily  dextrose 50% Injectable 50 milliLiter(s) IV Push every 15 minutes  dextrose 50% Injectable 25 milliLiter(s) IV Push every 15 minutes  dextrose 50% Injectable 50 milliLiter(s) IV Push every 15 minutes  dextrose 50% Injectable 25 milliLiter(s) IV Push every 15 minutes  DOBUTamine Infusion 2.5 MICROgram(s)/kG/Min (5.52 mL/Hr) IV Continuous <Continuous>  docusate sodium 100 milliGRAM(s) Oral three times a day  famotidine    Tablet 20 milliGRAM(s) Oral daily  heparin  Injectable 5000 Unit(s) SubCutaneous every 8 hours  insulin lispro (HumaLOG) corrective regimen sliding scale   SubCutaneous three times a day before meals  insulin lispro (HumaLOG) corrective regimen sliding scale   SubCutaneous at bedtime  metoclopramide Injectable 5 milliGRAM(s) IV Push every 8 hours  polyethylene glycol 3350 17 Gram(s) Oral daily  sodium chloride 0.9%. 1000 milliLiter(s) (10 mL/Hr) IV Continuous <Continuous>  warfarin 3 milliGRAM(s) Oral once      VITAL:  T(F): 98.1 (07-13-19 @ 12:00), Max: 99 (07-13-19 @ 08:00)  HR: 79 (07-13-19 @ 12:00) (61 - 97)  SpO2: 100% (07-13-19 @ 12:00)  WEIGHTS:  Weight (kg): 73.6 (07-12 @ 08:32)    I and O's:    07-12 @ 07:01  -  07-13 @ 07:00  --------------------------------------------------------  IN: 2201.2 mL / OUT: 1475 mL / NET: 726.2 mL    07-13 @ 07:01  -  07-13 @ 12:27  --------------------------------------------------------  IN: 382 mL / OUT: 275 mL / NET: 107 mL      PHYSICAL EXAM:  General:  alert, cooperative and no distress  Lungs: clear to auscultation bilaterally  Heart: normal S1/S2  Abdomen: soft, non-tender not distended, + BS  Extremities: tr edema  Urologic: + law  Neurologic: Grossly normal    LABS:                        9.2    12.7  )-----------( 159      ( 13 Jul 2019 02:22 )             25.9     07-13    137  |  99  |  26<H>  ----------------------------<  94  4.7   |  24  |  1.81<H>    Ca    9.2      13 Jul 2019 02:22  Phos  3.2     07-13  Mg     1.6     07-13    TPro  6.3  /  Alb  3.8  /  TBili  1.8<H>  /  DBili  x   /  AST  40  /  ALT  19  /  AlkPhos  61  07-13      ASSESSMENT  The patient is a 73y Male with CAD, HFrEF and PVD s/p CABGx3 followed by AFib.  - CKD vs RALPH: nonoliguric. Limited baseline data available (poor medical follow-up).       - azotemia slightly higher this AM and urine output marginal (10-40 cc/hr). Emerging toxic ATN?  - volume status acceptable    RECOMMENDATIONS  - check urine Na+ and creatinine  - repeat BMP  - a/w lasix 40 mg IV x1 now; may need higher dosage  - please dose any new medications for a CrCl of 15-20 cc/min  - avoid NSAIDs/nephrotoxins as able    Case d/w CTU team.    Oscar Ramos M.D.  [facility], [facility]  [id number]  [place] [address]  [phone]

## 2019-07-13 NOTE — PHYSICAL THERAPY INITIAL EVALUATION ADULT - PERTINENT HX OF CURRENT PROBLEM, REHAB EVAL
Pt is a 73yoM h/o  presented to [facility] on 7/1/19 with SOB and LE edema found to have newly diagnosed HFrEF (EF 30%) and RALPH vs CKD, also PVD and claudication with extensive LE arterial disease. 7/2/19 abd US: small amount of perihepatic ascited. 7/2/19 TTE: EF 30%, mild LVH, multiple regional WMA's, mod MR/TR. 7/3/19 Nuclear ST: moderate focal apical/inferolat/aterolat ischemia. Pt transferred to [facility] today for left heart cath to rule out ischemia. Pt found to have MVD. Pt is now s/p C3L.

## 2019-07-13 NOTE — PHYSICAL THERAPY INITIAL EVALUATION ADULT - GENERAL OBSERVATIONS, REHAB EVAL
Pt recd seated in bedside chair, NAD, +Chest tube, +External pacemaker, +RIJ, +Chávez, agreeable to PT.

## 2019-07-13 NOTE — PHYSICAL THERAPY INITIAL EVALUATION ADULT - PLANNED THERAPY INTERVENTIONS, PT EVAL
bed mobility training/GOAL: pt will be able to negotiate 9 steps independently with least restrictive AD in 2 weeks/balance training/gait training/transfer training/strengthening

## 2019-07-13 NOTE — PROGRESS NOTE ADULT - SUBJECTIVE AND OBJECTIVE BOX
Patient is  seen  Neurologically intact  lower  extremities   stable No rest pain  He is  going to need  Carotid  intervention  once  recovered from CABG

## 2019-07-13 NOTE — PROGRESS NOTE ADULT - SUBJECTIVE AND OBJECTIVE BOX
5pm-5:30pm     Remained critically ill on continuos ICU monitoring.    OBJECTIVE:  ICU Vital Signs Last 24 Hrs  T(C): 36.8 (2019 16:00), Max: 37.2 (2019 08:00)  T(F): 98.2 (2019 16:00), Max: 99 (2019 08:00)  HR: 87 (2019 18:00) (61 - 87)  BP: --  BP(mean): --  ABP: 153/60 (2019 18:00) (108/36 - 153/60)  ABP(mean): 85 (2019 18:00) (63 - 87)  RR: 23 (2019 18:00) (0 - 109)  SpO2: 99% (2019 18:00) (98% - 100%)    Mode: off     @ :  -   @ 07:00  --------------------------------------------------------  IN: 2201.2 mL / OUT: 1475 mL / NET: 726.2 mL     @ : @ 19:43  --------------------------------------------------------  IN: 1097 mL / OUT: 460 mL / NET: 637 mL      CAPILLARY BLOOD GLUCOSE      POCT Blood Glucose.: 213 mg/dL (2019 17:29)      PHYSICAL EXAM:   Daily Weight in k (2019 05:00)  General: in bed   Neurology: A&Ox3, nonfocal, GÓMEZ x 4  Eyes: PERRLA/ EOMI, Gross vision intact  ENT/Neck: Neck supple, trachea midline, No++ JVD, Gross hearing intact  Respiratory:  B/L fine  rales, + sternal dressing intact + M & L chest tube   CV: A fib with occasional V pacing ,  Abdominal: Soft, NT, ND +BS,   Extremities: No edema, + peripheral pulses  Skin: No Rashes, Hematoma, Ecchymosis      LINES: R IJ Intro, L rad A-line     [facility] MEDICATIONS:  MEDICATIONS  (STANDING):  amiodarone    Tablet 400 milliGRAM(s) Oral every 8 hours  aspirin enteric coated 81 milliGRAM(s) Oral daily  atorvastatin 40 milliGRAM(s) Oral at bedtime  chlorhexidine 4% Liquid 1 Application(s) Topical <User Schedule>  clopidogrel Tablet 75 milliGRAM(s) Oral daily  dextrose 50% Injectable 50 milliLiter(s) IV Push every 15 minutes  dextrose 50% Injectable 25 milliLiter(s) IV Push every 15 minutes  dextrose 50% Injectable 50 milliLiter(s) IV Push every 15 minutes  dextrose 50% Injectable 25 milliLiter(s) IV Push every 15 minutes  DOBUTamine Infusion 5 MICROgram(s)/kG/Min (11.04 mL/Hr) IV Continuous <Continuous>  docusate sodium 100 milliGRAM(s) Oral three times a day  famotidine    Tablet 20 milliGRAM(s) Oral daily  furosemide Infusion 20 mG/Hr (10 mL/Hr) IV Continuous <Continuous>  heparin  Injectable 5000 Unit(s) SubCutaneous every 8 hours  insulin lispro (HumaLOG) corrective regimen sliding scale   SubCutaneous three times a day before meals  insulin lispro (HumaLOG) corrective regimen sliding scale   SubCutaneous at bedtime  metoclopramide Injectable 5 milliGRAM(s) IV Push every 8 hours  polyethylene glycol 3350 17 Gram(s) Oral daily  sodium chloride 0.9%. 1000 milliLiter(s) (10 mL/Hr) IV Continuous <Continuous>  warfarin 3 milliGRAM(s) Oral once    MEDICATIONS  (PRN):  oxyCODONE    5 mG/acetaminophen 325 mG 1 Tablet(s) Oral every 4 hours PRN Mild Pain (1 - 3)  oxyCODONE    5 mG/acetaminophen 325 mG 2 Tablet(s) Oral every 6 hours PRN Moderate Pain (4 - 6)  sodium chloride 0.9% lock flush 10 milliLiter(s) IV Push every 1 hour PRN Pre/post blood products, medications, blood draw, and to maintain line patency      LABS:                        9.2    12.7  )-----------( 159      ( 2019 02:22 )             25.9     07-13    131<L>  |  94<L>  |  35<H>  ----------------------------<  220<H>  5.3   |  21<L>  |  2.27<H>    Ca    8.6      2019 18:31  Phos  3.2       Mg     1.6         TPro  7.0  /  Alb  4.0  /  TBili  1.8<H>  /  DBili  x   /  AST  50<H>  /  ALT  21  /  AlkPhos  67      PT/INR - ( 2019 13:49 )   PT: 16.6 sec;   INR: 1.43 ratio         PTT - ( 2019 13:49 )  PTT:34.7 sec    Arterial Blood Gas:   @ 12:51  7.38/41/147/24/99/-.7  ABG lactate: --  Arterial Blood Gas:   @ 10:28  7.39/40/137/24/99/-.7  ABG lactate: --  Arterial Blood Gas:   @ 06:08  7.42/39/125/25/99/.7  ABG lactate: --  Arterial Blood Gas:   @ 02:02  7.40/43/170/26/100/1.1  ABG lactate: --  Arterial Blood Gas:   @ 00:07  7.40/40/166/25/99/.4  ABG lactate: --  Arterial Blood Gas:   @ 23:19  7.36/46/221/25/100/.2  ABG lactate: --  Arterial Blood Gas:   @ 20:59  7.45/36/225/24/99/.7  ABG lactate: --  Arterial Blood Gas:   @ 19:38  7.46/36/214/25/99/1.7  ABG lactate: --  Arterial Blood Gas:   @ 17:37  7.45/36/204/25/100/1.4  ABG lactate: --  Arterial Blood Gas:   @ 14:51  7.46/34/162/24/99/1.1  ABG lactate: --  Arterial Blood Gas:   @ 13:16  7.47/35/355/25/100/2.2  ABG lactate: --  Arterial Blood Gas:   @ 12:32  7.52/33/489/27/100/4.1  ABG lactate: --  Arterial Blood Gas:   @ 11:35  7.49/34/513/26/100/3.1  ABG lactate: --  Arterial Blood Gas:   @ 11:15  7.36/50/483/27/100/2.1  ABG lactate: --  Arterial Blood Gas:   @ 10:46  7.44/42/539/28/100/3.6  ABG lactate: --  Arterial Blood Gas:   @ 10:18  7.47/34/589/24/100/1.2  ABG lactate: --  Arterial Blood Gas:   @ 07:53  7.57/28/533/26/100/4.6  ABG lactate: --    Venous Blood Gas:   @ 14:54  7.30/52/29/25/42  VBG Lactate: --  Venous Blood Gas:   @ 11:15  7.35/54/63/29/89  VBG Lactate: 1.0  Venous Blood Gas:   @ 10:47  7.41/46/85/28/96  VBG Lactate: 0.8  Venous Blood Gas:   @ 10:18  7.41/44/56/27/87  VBG Lactate: 0.7      LIVER FUNCTIONS - ( 2019 12:53 )  Alb: 4.0 g/dL / Pro: 7.0 g/dL / ALK PHOS: 67 U/L / ALT: 21 U/L / AST: 50 U/L / GGT: x           MICROBIOLOGY:     RADIOLOGY:  X Reviewed and interpreted by me

## 2019-07-13 NOTE — PROGRESS NOTE ADULT - ASSESSMENT
73 yr old male with H/O DM2, HLD, HTN, multivessel CAD, severe left & Right ventricular dysfunction EF <30%, systolic heart failure, PVD, severe B/L carotid stenosis &  renal insufficiency.  S/P CABG x3 POD # 1  post op course complicated with oliguria, Acute Kidney Injury, PAF, Inotropic support & hyperglycemia.    Supplemental oxygen, f/u ABGs, Spo2, CXR  Maintain chest tubes monitor outputs  Invasive hemodynamic monitoring  Increase CVP with oligo-anuric renal failure, low ScVo2  A fib cont Amio load, back up pacing in place   Dobutamine gtt f/u perfusion indices  ASA, Plavix & statins  TTE to R/O any pericardial effusion    Renal consult appreciated, maintain Chávez, S/P > 160 mg Lasix with  no response with rising Cr, start Lasix gtt treat hyperkalemia will require hemodialysis if no response.    DM2, severe Hyperglycemia cont INS gtt BG Q 1hr.    B/L carotid disease & PVD, cont to monitor neurologically intact & legs with positive pulses.    I have reviewed all labs and radiological data and coordinated care with CTICU staff & spend 30 min providing acute care to this critically ill patient.

## 2019-07-13 NOTE — PHYSICAL THERAPY INITIAL EVALUATION ADULT - ADDITIONAL COMMENTS
PTA Pt was independent with functional mobility and ADL's without an AD, pt states recently he has been limited in his wlking ability used to go for 10minute walks but now required multiple breaks throught walks due to fatigue/leg pain (Pt with PVD). Pt lives in a  with a friend, 1 flight of steps (~10), 1st floor setup inside.

## 2019-07-14 LAB
ALBUMIN SERPL ELPH-MCNC: 4 G/DL — SIGNIFICANT CHANGE UP (ref 3.3–5)
ALP SERPL-CCNC: 65 U/L — SIGNIFICANT CHANGE UP (ref 40–120)
ALT FLD-CCNC: 22 U/L — SIGNIFICANT CHANGE UP (ref 10–45)
ANION GAP SERPL CALC-SCNC: 14 MMOL/L — SIGNIFICANT CHANGE UP (ref 5–17)
APTT BLD: 58.9 SEC — HIGH (ref 27.5–36.3)
APTT BLD: 73.2 SEC — HIGH (ref 27.5–36.3)
AST SERPL-CCNC: 47 U/L — HIGH (ref 10–40)
BASE EXCESS BLDMV CALC-SCNC: -0.2 MMOL/L — SIGNIFICANT CHANGE UP (ref -3–3)
BASE EXCESS BLDMV CALC-SCNC: -1.1 MMOL/L — SIGNIFICANT CHANGE UP (ref -3–3)
BASE EXCESS BLDMV CALC-SCNC: -1.9 MMOL/L — SIGNIFICANT CHANGE UP (ref -3–3)
BASE EXCESS BLDMV CALC-SCNC: -3.1 MMOL/L — LOW (ref -3–3)
BASE EXCESS BLDMV CALC-SCNC: 0.2 MMOL/L — SIGNIFICANT CHANGE UP (ref -3–3)
BASE EXCESS BLDMV CALC-SCNC: 0.5 MMOL/L — SIGNIFICANT CHANGE UP (ref -3–3)
BASE EXCESS BLDMV CALC-SCNC: 0.7 MMOL/L — SIGNIFICANT CHANGE UP (ref -3–3)
BASE EXCESS BLDMV CALC-SCNC: 0.8 MMOL/L — SIGNIFICANT CHANGE UP (ref -3–3)
BASE EXCESS BLDMV CALC-SCNC: 1.7 MMOL/L — SIGNIFICANT CHANGE UP (ref -3–3)
BILIRUB SERPL-MCNC: 1.4 MG/DL — HIGH (ref 0.2–1.2)
BUN SERPL-MCNC: 39 MG/DL — HIGH (ref 7–23)
CALCIUM SERPL-MCNC: 8.5 MG/DL — SIGNIFICANT CHANGE UP (ref 8.4–10.5)
CHLORIDE SERPL-SCNC: 96 MMOL/L — SIGNIFICANT CHANGE UP (ref 96–108)
CO2 BLDMV-SCNC: 24 MMOL/L — SIGNIFICANT CHANGE UP (ref 21–29)
CO2 BLDMV-SCNC: 26 MMOL/L — SIGNIFICANT CHANGE UP (ref 21–29)
CO2 BLDMV-SCNC: 27 MMOL/L — SIGNIFICANT CHANGE UP (ref 21–29)
CO2 BLDMV-SCNC: 28 MMOL/L — SIGNIFICANT CHANGE UP (ref 21–29)
CO2 SERPL-SCNC: 23 MMOL/L — SIGNIFICANT CHANGE UP (ref 22–31)
CREAT SERPL-MCNC: 2.66 MG/DL — HIGH (ref 0.5–1.3)
GAS PNL BLDA: SIGNIFICANT CHANGE UP
GAS PNL BLDMV: SIGNIFICANT CHANGE UP
GLUCOSE BLDC GLUCOMTR-MCNC: 107 MG/DL — HIGH (ref 70–99)
GLUCOSE BLDC GLUCOMTR-MCNC: 47 MG/DL — LOW (ref 70–99)
GLUCOSE BLDC GLUCOMTR-MCNC: 96 MG/DL — SIGNIFICANT CHANGE UP (ref 70–99)
GLUCOSE SERPL-MCNC: 141 MG/DL — HIGH (ref 70–99)
HCO3 BLDMV-SCNC: 23 MMOL/L — SIGNIFICANT CHANGE UP (ref 20–28)
HCO3 BLDMV-SCNC: 24 MMOL/L — SIGNIFICANT CHANGE UP (ref 20–28)
HCO3 BLDMV-SCNC: 25 MMOL/L — SIGNIFICANT CHANGE UP (ref 20–28)
HCO3 BLDMV-SCNC: 26 MMOL/L — SIGNIFICANT CHANGE UP (ref 20–28)
HCO3 BLDMV-SCNC: 27 MMOL/L — SIGNIFICANT CHANGE UP (ref 20–28)
HCT VFR BLD CALC: 22.6 % — LOW (ref 39–50)
HGB BLD-MCNC: 8 G/DL — LOW (ref 13–17)
HOROWITZ INDEX BLDMV+IHG-RTO: 100 — SIGNIFICANT CHANGE UP
HOROWITZ INDEX BLDMV+IHG-RTO: 32 — SIGNIFICANT CHANGE UP
HOROWITZ INDEX BLDMV+IHG-RTO: 50 — SIGNIFICANT CHANGE UP
INR BLD: 1.16 RATIO — SIGNIFICANT CHANGE UP (ref 0.88–1.16)
MAGNESIUM SERPL-MCNC: 1.7 MG/DL — SIGNIFICANT CHANGE UP (ref 1.6–2.6)
MCHC RBC-ENTMCNC: 34 PG — SIGNIFICANT CHANGE UP (ref 27–34)
MCHC RBC-ENTMCNC: 35.6 GM/DL — SIGNIFICANT CHANGE UP (ref 32–36)
MCV RBC AUTO: 95.5 FL — SIGNIFICANT CHANGE UP (ref 80–100)
O2 CT VFR BLD CALC: 28 MMHG — LOW (ref 30–65)
O2 CT VFR BLD CALC: 31 MMHG — SIGNIFICANT CHANGE UP (ref 30–65)
O2 CT VFR BLD CALC: 33 MMHG — SIGNIFICANT CHANGE UP (ref 30–65)
O2 CT VFR BLD CALC: 34 MMHG — SIGNIFICANT CHANGE UP (ref 30–65)
O2 CT VFR BLD CALC: 35 MMHG — SIGNIFICANT CHANGE UP (ref 30–65)
O2 CT VFR BLD CALC: 36 MMHG — SIGNIFICANT CHANGE UP (ref 30–65)
O2 CT VFR BLD CALC: 46 MMHG — SIGNIFICANT CHANGE UP (ref 30–65)
PCO2 BLDMV: 43 MMHG — SIGNIFICANT CHANGE UP (ref 30–65)
PCO2 BLDMV: 46 MMHG — SIGNIFICANT CHANGE UP (ref 30–65)
PCO2 BLDMV: 46 MMHG — SIGNIFICANT CHANGE UP (ref 30–65)
PCO2 BLDMV: 47 MMHG — SIGNIFICANT CHANGE UP (ref 30–65)
PCO2 BLDMV: 47 MMHG — SIGNIFICANT CHANGE UP (ref 30–65)
PCO2 BLDMV: 48 MMHG — SIGNIFICANT CHANGE UP (ref 30–65)
PCO2 BLDMV: 48 MMHG — SIGNIFICANT CHANGE UP (ref 30–65)
PCO2 BLDMV: 49 MMHG — SIGNIFICANT CHANGE UP (ref 30–65)
PCO2 BLDMV: 50 MMHG — SIGNIFICANT CHANGE UP (ref 30–65)
PCO2 BLDMV: 51 MMHG — SIGNIFICANT CHANGE UP (ref 30–65)
PCO2 BLDMV: 54 MMHG — SIGNIFICANT CHANGE UP (ref 30–65)
PH BLDMV: 7.29 — LOW (ref 7.32–7.45)
PH BLDMV: 7.33 — SIGNIFICANT CHANGE UP (ref 7.32–7.45)
PH BLDMV: 7.34 — SIGNIFICANT CHANGE UP (ref 7.32–7.45)
PH BLDMV: 7.35 — SIGNIFICANT CHANGE UP (ref 7.32–7.45)
PH BLDMV: 7.36 — SIGNIFICANT CHANGE UP (ref 7.32–7.45)
PH BLDMV: 7.36 — SIGNIFICANT CHANGE UP (ref 7.32–7.45)
PH BLDMV: 7.37 — SIGNIFICANT CHANGE UP (ref 7.32–7.45)
PH BLDMV: 7.37 — SIGNIFICANT CHANGE UP (ref 7.32–7.45)
PH BLDMV: 7.39 — SIGNIFICANT CHANGE UP (ref 7.32–7.45)
PHOSPHATE SERPL-MCNC: 4.2 MG/DL — SIGNIFICANT CHANGE UP (ref 2.5–4.5)
PLATELET # BLD AUTO: 111 K/UL — LOW (ref 150–400)
POTASSIUM SERPL-MCNC: 5.6 MMOL/L — HIGH (ref 3.5–5.3)
POTASSIUM SERPL-SCNC: 5.6 MMOL/L — HIGH (ref 3.5–5.3)
PROT SERPL-MCNC: 6.9 G/DL — SIGNIFICANT CHANGE UP (ref 6–8.3)
PROTHROM AB SERPL-ACNC: 13.4 SEC — HIGH (ref 10–12.9)
RBC # BLD: 2.37 M/UL — LOW (ref 4.2–5.8)
RBC # FLD: 14 % — SIGNIFICANT CHANGE UP (ref 10.3–14.5)
SAO2 % BLDMV: 41 % — LOW (ref 60–90)
SAO2 % BLDMV: 42 % — LOW (ref 60–90)
SAO2 % BLDMV: 45 % — LOW (ref 60–90)
SAO2 % BLDMV: 54 % — LOW (ref 60–90)
SAO2 % BLDMV: 59 % — LOW (ref 60–90)
SAO2 % BLDMV: 61 % — SIGNIFICANT CHANGE UP (ref 60–90)
SAO2 % BLDMV: 62 % — SIGNIFICANT CHANGE UP (ref 60–90)
SAO2 % BLDMV: 64 % — SIGNIFICANT CHANGE UP (ref 60–90)
SAO2 % BLDMV: 65 % — SIGNIFICANT CHANGE UP (ref 60–90)
SAO2 % BLDMV: 66 % — SIGNIFICANT CHANGE UP (ref 60–90)
SAO2 % BLDMV: 74 % — SIGNIFICANT CHANGE UP (ref 60–90)
SODIUM SERPL-SCNC: 133 MMOL/L — LOW (ref 135–145)
WBC # BLD: 12.3 K/UL — HIGH (ref 3.8–10.5)
WBC # FLD AUTO: 12.3 K/UL — HIGH (ref 3.8–10.5)

## 2019-07-14 PROCEDURE — 36800 INSERTION OF CANNULA: CPT

## 2019-07-14 PROCEDURE — 31500 INSERT EMERGENCY AIRWAY: CPT

## 2019-07-14 PROCEDURE — 71045 X-RAY EXAM CHEST 1 VIEW: CPT | Mod: 26,76

## 2019-07-14 PROCEDURE — 99291 CRITICAL CARE FIRST HOUR: CPT | Mod: 25

## 2019-07-14 PROCEDURE — 93503 INSERT/PLACE HEART CATHETER: CPT

## 2019-07-14 PROCEDURE — 93321 DOPPLER ECHO F-UP/LMTD STD: CPT | Mod: 26

## 2019-07-14 PROCEDURE — 93308 TTE F-UP OR LMTD: CPT | Mod: 26

## 2019-07-14 PROCEDURE — 71045 X-RAY EXAM CHEST 1 VIEW: CPT | Mod: 26,77

## 2019-07-14 RX ORDER — VECURONIUM BROMIDE 20 MG/1
10 INJECTION, POWDER, FOR SOLUTION INTRAVENOUS ONCE
Refills: 0 | Status: COMPLETED | OUTPATIENT
Start: 2019-07-14 | End: 2019-07-14

## 2019-07-14 RX ORDER — ASPIRIN/CALCIUM CARB/MAGNESIUM 324 MG
325 TABLET ORAL DAILY
Refills: 0 | Status: DISCONTINUED | OUTPATIENT
Start: 2019-07-14 | End: 2019-07-16

## 2019-07-14 RX ORDER — CALCIUM GLUCONATE 100 MG/ML
1 VIAL (ML) INTRAVENOUS ONCE
Refills: 0 | Status: COMPLETED | OUTPATIENT
Start: 2019-07-14 | End: 2019-07-14

## 2019-07-14 RX ORDER — LEVOTHYROXINE SODIUM 125 MCG
25 TABLET ORAL AT BEDTIME
Refills: 0 | Status: DISCONTINUED | OUTPATIENT
Start: 2019-07-14 | End: 2019-07-19

## 2019-07-14 RX ORDER — INSULIN HUMAN 100 [IU]/ML
10 INJECTION, SOLUTION SUBCUTANEOUS ONCE
Refills: 0 | Status: COMPLETED | OUTPATIENT
Start: 2019-07-14 | End: 2019-07-14

## 2019-07-14 RX ORDER — HEPARIN SODIUM 5000 [USP'U]/ML
800 INJECTION INTRAVENOUS; SUBCUTANEOUS
Qty: 25000 | Refills: 0 | Status: DISCONTINUED | OUTPATIENT
Start: 2019-07-14 | End: 2019-07-16

## 2019-07-14 RX ORDER — DEXTROSE 10 % IN WATER 10 %
1000 INTRAVENOUS SOLUTION INTRAVENOUS
Refills: 0 | Status: DISCONTINUED | OUTPATIENT
Start: 2019-07-14 | End: 2019-07-16

## 2019-07-14 RX ORDER — MAGNESIUM SULFATE 500 MG/ML
1 VIAL (ML) INJECTION ONCE
Refills: 0 | Status: COMPLETED | OUTPATIENT
Start: 2019-07-14 | End: 2019-07-14

## 2019-07-14 RX ORDER — SODIUM CHLORIDE 9 MG/ML
1000 INJECTION, SOLUTION INTRAVENOUS
Refills: 0 | Status: DISCONTINUED | OUTPATIENT
Start: 2019-07-14 | End: 2019-07-14

## 2019-07-14 RX ORDER — DOCUSATE SODIUM 100 MG
200 CAPSULE ORAL
Refills: 0 | Status: DISCONTINUED | OUTPATIENT
Start: 2019-07-14 | End: 2019-07-19

## 2019-07-14 RX ORDER — HYDROMORPHONE HYDROCHLORIDE 2 MG/ML
0.5 INJECTION INTRAMUSCULAR; INTRAVENOUS; SUBCUTANEOUS ONCE
Refills: 0 | Status: DISCONTINUED | OUTPATIENT
Start: 2019-07-14 | End: 2019-07-14

## 2019-07-14 RX ORDER — CHLORHEXIDINE GLUCONATE 213 G/1000ML
15 SOLUTION TOPICAL EVERY 12 HOURS
Refills: 0 | Status: DISCONTINUED | OUTPATIENT
Start: 2019-07-14 | End: 2019-07-16

## 2019-07-14 RX ORDER — PROPOFOL 10 MG/ML
10 INJECTION, EMULSION INTRAVENOUS
Qty: 500 | Refills: 0 | Status: DISCONTINUED | OUTPATIENT
Start: 2019-07-14 | End: 2019-07-16

## 2019-07-14 RX ORDER — FAMOTIDINE 10 MG/ML
20 INJECTION INTRAVENOUS DAILY
Refills: 0 | Status: DISCONTINUED | OUTPATIENT
Start: 2019-07-14 | End: 2019-07-17

## 2019-07-14 RX ORDER — MILRINONE LACTATE 1 MG/ML
0.12 INJECTION, SOLUTION INTRAVENOUS
Qty: 20 | Refills: 0 | Status: DISCONTINUED | OUTPATIENT
Start: 2019-07-14 | End: 2019-07-21

## 2019-07-14 RX ORDER — ETOMIDATE 2 MG/ML
20 INJECTION INTRAVENOUS ONCE
Refills: 0 | Status: COMPLETED | OUTPATIENT
Start: 2019-07-14 | End: 2019-07-14

## 2019-07-14 RX ORDER — DEXTROSE 50 % IN WATER 50 %
25 SYRINGE (ML) INTRAVENOUS ONCE
Refills: 0 | Status: COMPLETED | OUTPATIENT
Start: 2019-07-14 | End: 2019-07-14

## 2019-07-14 RX ADMIN — INSULIN HUMAN 10 UNIT(S): 100 INJECTION, SOLUTION SUBCUTANEOUS at 02:17

## 2019-07-14 RX ADMIN — HYDROMORPHONE HYDROCHLORIDE 0.5 MILLIGRAM(S): 2 INJECTION INTRAMUSCULAR; INTRAVENOUS; SUBCUTANEOUS at 21:20

## 2019-07-14 RX ADMIN — Medication 325 MILLIGRAM(S): at 12:15

## 2019-07-14 RX ADMIN — HYDROMORPHONE HYDROCHLORIDE 0.5 MILLIGRAM(S): 2 INJECTION INTRAMUSCULAR; INTRAVENOUS; SUBCUTANEOUS at 21:15

## 2019-07-14 RX ADMIN — PROPOFOL 4.42 MICROGRAM(S)/KG/MIN: 10 INJECTION, EMULSION INTRAVENOUS at 22:46

## 2019-07-14 RX ADMIN — ATORVASTATIN CALCIUM 40 MILLIGRAM(S): 80 TABLET, FILM COATED ORAL at 22:45

## 2019-07-14 RX ADMIN — ETOMIDATE 20 MILLIGRAM(S): 2 INJECTION INTRAVENOUS at 06:00

## 2019-07-14 RX ADMIN — Medication 200 MILLIGRAM(S): at 18:12

## 2019-07-14 RX ADMIN — HYDROMORPHONE HYDROCHLORIDE 0.5 MILLIGRAM(S): 2 INJECTION INTRAMUSCULAR; INTRAVENOUS; SUBCUTANEOUS at 12:00

## 2019-07-14 RX ADMIN — CHLORHEXIDINE GLUCONATE 15 MILLILITER(S): 213 SOLUTION TOPICAL at 18:12

## 2019-07-14 RX ADMIN — Medication 15 MILLILITER(S): at 22:47

## 2019-07-14 RX ADMIN — Medication 6.62 MICROGRAM(S)/KG/MIN: at 22:47

## 2019-07-14 RX ADMIN — Medication 25 MILLILITER(S): at 02:15

## 2019-07-14 RX ADMIN — MILRINONE LACTATE 8.83 MICROGRAM(S)/KG/MIN: 1 INJECTION, SOLUTION INTRAVENOUS at 22:46

## 2019-07-14 RX ADMIN — Medication 25 MICROGRAM(S): at 22:45

## 2019-07-14 RX ADMIN — Medication 200 GRAM(S): at 12:16

## 2019-07-14 RX ADMIN — Medication 100 GRAM(S): at 05:41

## 2019-07-14 RX ADMIN — CLOPIDOGREL BISULFATE 75 MILLIGRAM(S): 75 TABLET, FILM COATED ORAL at 12:15

## 2019-07-14 RX ADMIN — FAMOTIDINE 100 MILLIGRAM(S): 10 INJECTION INTRAVENOUS at 12:15

## 2019-07-14 RX ADMIN — HYDROMORPHONE HYDROCHLORIDE 0.5 MILLIGRAM(S): 2 INJECTION INTRAMUSCULAR; INTRAVENOUS; SUBCUTANEOUS at 11:45

## 2019-07-14 RX ADMIN — VECURONIUM BROMIDE 10 MILLIGRAM(S): 20 INJECTION, POWDER, FOR SOLUTION INTRAVENOUS at 06:00

## 2019-07-14 NOTE — PROGRESS NOTE ADULT - SUBJECTIVE AND OBJECTIVE BOX
The patient is a 73y Male seen today at [facility]. Events noted. CRRT started ~4:30 AM as planned. Patient subsequently intubated for resp distress. Patient remains intubated in ICU.    REVIEW OF SYSTEMS:  Unable to obtain as patient intubated and sedated.     MEDICATIONS  (STANDING):  aspirin 325 milliGRAM(s) Oral daily  aspirin enteric coated 81 milliGRAM(s) Oral daily  atorvastatin 40 milliGRAM(s) Oral at bedtime  chlorhexidine 0.12% Liquid 15 milliLiter(s) Oral Mucosa every 12 hours  chlorhexidine 4% Liquid 1 Application(s) Topical <User Schedule>  clopidogrel Tablet 75 milliGRAM(s) Oral daily  CRRT Treatment    <Continuous>  dextrose 10%. 1000 milliLiter(s) (15 mL/Hr) IV Continuous <Continuous>  dextrose 50% Injectable 50 milliLiter(s) IV Push every 15 minutes  dextrose 50% Injectable 25 milliLiter(s) IV Push every 15 minutes  dextrose 50% Injectable 50 milliLiter(s) IV Push every 15 minutes  dextrose 50% Injectable 25 milliLiter(s) IV Push every 15 minutes  DOBUTamine Infusion 3 MICROgram(s)/kG/Min (6.624 mL/Hr) IV Continuous <Continuous>  docusate sodium Liquid 200 milliGRAM(s) Oral two times a day  famotidine  IVPB 20 milliGRAM(s) IV Intermittent daily  heparin  Infusion 800 Unit(s)/Hr (8 mL/Hr) IV Continuous <Continuous>  insulin lispro (HumaLOG) corrective regimen sliding scale   SubCutaneous three times a day before meals  insulin lispro (HumaLOG) corrective regimen sliding scale   SubCutaneous at bedtime  levothyroxine Injectable 25 MICROGram(s) IV Push at bedtime  milrinone Infusion 0.4 MICROgram(s)/kG/Min (8.832 mL/Hr) IV Continuous <Continuous>  polyethylene glycol 3350 17 Gram(s) Oral daily  PrismaSATE Dialysate BK 0 / 3.5 5000 milliLiter(s) (1000 mL/Hr) CRRT <Continuous>  PrismaSOL Filtration BGK 0 / 2.5 5000 milliLiter(s) (1000 mL/Hr) CRRT <Continuous>  PrismaSOL Filtration BGK 0 / 2.5 5000 milliLiter(s) (1000 mL/Hr) CRRT <Continuous>  propofol Infusion 10 MICROgram(s)/kG/Min (4.416 mL/Hr) IV Continuous <Continuous>  sodium chloride 0.9%. 1000 milliLiter(s) (10 mL/Hr) IV Continuous <Continuous>    VITAL:  T(F): 93.2 (07-14-19 @ 07:00), Max: 98.6 (07-13-19 @ 20:00)  HR: 69 (07-14-19 @ 08:54) (69 - 89)  SpO2: 100% (07-14-19 @ 08:54)    WEIGHTS:  Weight (kg): 73.6 (07-12 @ 08:32)    I and O's:    07-13 @ 07:01  -  07-14 @ 07:00  --------------------------------------------------------  IN: 1577 mL / OUT: 465 mL / NET: 1112 mL    07-14 @ 07:01  -  07-14 @ 09:27  --------------------------------------------------------  IN: 90.6 mL / OUT: 5 mL / NET: 85.6 mL      PHYSICAL EXAM:  General:   intubated  Lungs: coarse BS  Heart: normal S1/S2  Abdomen: soft, non-tender not distended,  Extremities: no edema  Urologic: + law  Neurologic: UTO   Vascular Access: L SC temp HD catheter (7/14)    LABS:                        8.0    12.3  )-----------( 111      ( 14 Jul 2019 00:53 )             22.6     07-14    133<L>  |  96  |  39<H>  ----------------------------<  141<H>  5.6<H>   |  23  |  2.66<H>    Ca    8.5      14 Jul 2019 00:53  Phos  4.2     07-14  Mg     1.7     07-14    TPro  6.9  /  Alb  4.0  /  TBili  1.4<H>  /  DBili  x   /  AST  47<H>  /  ALT  22  /  AlkPhos  65  07-14      Urine Studies:  Sodium, Random Urine: 28 mmol/L (07-13 @ 13:48)  Creatinine, Random Urine: 89 mg/dL (07-13 @ 13:48)    ASSESSMENT  The patient is a 73y Male with CAD, HFrEF and PVD s/p CABGx3 followed by AFib, resp failure and acute on chronic kidney injury.  - CKD: stage TBD with limited baseline data available (poor medical follow-up).   - RALPH: nonoliguric. Urine lytes c/w prerenal physiology, likely hypotension +/- ineffective circulating volume. Now on dialysis.  - hyperkalemia: due to RALPH. Improving with last K+ (gas) 4.0    RECOMMENDATIONS  - continue CVVHDF for clearance and volume removal:    - BFR  200 cc/min    - DFR 1000 cc/hr (4K+)    - RFR(pre): 500 cc/hr (0K+)    - RFR (post) 500 cc/hr (0K+)    - increase  cc/hr  - please dose any new medications for a CrCl of 20-25 cc/min on CRRT  - avoid NSAIDs/nephrotoxins as able    Case d/w CTU team.    Oscar Ramos M.D.  [facility], [facility]  [id number]  [place] [address] [phone]

## 2019-07-14 NOTE — PROVIDER CONTACT NOTE (CHANGE IN STATUS NOTIFICATION) - SITUATION
Pt remains oliguric and hyperkalemic on ABGs/mixed. Hyperkalemia protocol initiated x2 with little effect.

## 2019-07-14 NOTE — PROGRESS NOTE ADULT - SUBJECTIVE AND OBJECTIVE BOX
4:30pm-5pm     Remained critically ill on continuos ICU monitoring.     OBJECTIVE:  ICU Vital Signs Last 24 Hrs  T(C): 36.2 (14 Jul 2019 15:00), Max: 37 (13 Jul 2019 20:00)  T(F): 97.2 (14 Jul 2019 15:00), Max: 98.6 (13 Jul 2019 20:00)  HR: 84 (14 Jul 2019 18:00) (68 - 89)  BP: --  BP(mean): --  ABP: 122/49 (14 Jul 2019 18:00) (106/47 - 193/193)  ABP(mean): 66 (14 Jul 2019 18:00) (61 - 193)  RR: 14 (14 Jul 2019 18:00) (3 - 50)  SpO2: 100% (14 Jul 2019 18:00) (92% - 100%)    Mode: AC/ CMV (Assist Control/ Continuous Mandatory Ventilation), RR (machine): 14, TV (machine): 500, FiO2: 50, PEEP: 5, ITime: 1, MAP: 10, PIP: 28    07-13 @ 07:01 - 07-14 @ 07:00  --------------------------------------------------------  IN: 1577 mL / OUT: 596 mL / NET: 981 mL    07-14 @ 07:01 - 07-14 @ 19:21  --------------------------------------------------------  IN: 1266 mL / OUT: 2439 mL / NET: -1173 mL      CAPILLARY BLOOD GLUCOSE      POCT Blood Glucose.: 96 mg/dL (14 Jul 2019 06:04)      PHYSICAL EXAM:     General: intubated sedated on ventilator  support with multiple lines & gtt   Neurology: Sedated  nonfocal, GÓMEZ x 4  Eyes: PERRLA/ EOMI, Gross vision intact  ENT/Neck: Neck supple, +ET trachea midline, No JVD, Gross hearing intact  Respiratory:  B/L fine  rales + sternal dressing Left pleural chest tube   CV: A- FIB with occasional pacing   Abdominal: Soft, NT, ND +BS,   Extremities: No edema, + peripheral pulses  Skin: No Rashes, Hematoma, Ecchymosis    Tubes: NGT       HOSPITAL MEDICATIONS:  MEDICATIONS  (STANDING):  aspirin 325 milliGRAM(s) Oral daily  atorvastatin 40 milliGRAM(s) Oral at bedtime  chlorhexidine 0.12% Liquid 15 milliLiter(s) Oral Mucosa every 12 hours  chlorhexidine 4% Liquid 1 Application(s) Topical <User Schedule>  clopidogrel Tablet 75 milliGRAM(s) Oral daily  CRRT Treatment    <Continuous>  dextrose 10%. 1000 milliLiter(s) (15 mL/Hr) IV Continuous <Continuous>  dextrose 50% Injectable 50 milliLiter(s) IV Push every 15 minutes  dextrose 50% Injectable 25 milliLiter(s) IV Push every 15 minutes  dextrose 50% Injectable 50 milliLiter(s) IV Push every 15 minutes  dextrose 50% Injectable 25 milliLiter(s) IV Push every 15 minutes  DOBUTamine Infusion 3 MICROgram(s)/kG/Min (6.624 mL/Hr) IV Continuous <Continuous>  docusate sodium Liquid 200 milliGRAM(s) Oral two times a day  famotidine  IVPB 20 milliGRAM(s) IV Intermittent daily  heparin  Infusion 800 Unit(s)/Hr (8 mL/Hr) IV Continuous <Continuous>  insulin lispro (HumaLOG) corrective regimen sliding scale   SubCutaneous three times a day before meals  insulin lispro (HumaLOG) corrective regimen sliding scale   SubCutaneous at bedtime  levothyroxine Injectable 25 MICROGram(s) IV Push at bedtime  milrinone Infusion 0.4 MICROgram(s)/kG/Min (8.832 mL/Hr) IV Continuous <Continuous>  polyethylene glycol 3350 17 Gram(s) Oral daily  PrismaSATE Dialysate BGK 4 / 2.5 5000 milliLiter(s) (1000 mL/Hr) CRRT <Continuous>  PrismaSOL Filtration BGK 0 / 2.5 5000 milliLiter(s) (500 mL/Hr) CRRT <Continuous>  PrismaSOL Filtration BGK 0 / 2.5 5000 milliLiter(s) (500 mL/Hr) CRRT <Continuous>  propofol Infusion 10 MICROgram(s)/kG/Min (4.416 mL/Hr) IV Continuous <Continuous>  sodium chloride 0.9%. 1000 milliLiter(s) (10 mL/Hr) IV Continuous <Continuous>    MEDICATIONS  (PRN):  sodium chloride 0.9% lock flush 10 milliLiter(s) IV Push every 1 hour PRN Pre/post blood products, medications, blood draw, and to maintain line patency      LABS:                        8.0    12.3  )-----------( 111      ( 14 Jul 2019 00:53 )             22.6     07-14    133<L>  |  96  |  39<H>  ----------------------------<  141<H>  5.6<H>   |  23  |  2.66<H>    Ca    8.5      14 Jul 2019 00:53  Phos  4.2     07-14  Mg     1.7     07-14    TPro  6.9  /  Alb  4.0  /  TBili  1.4<H>  /  DBili  x   /  AST  47<H>  /  ALT  22  /  AlkPhos  65  07-14    PTT - ( 14 Jul 2019 16:06 )  PTT:58.9 sec    Arterial Blood Gas:  07-14 @ 17:18  7.40/41/131/25/99/1.1  ABG lactate: --  Arterial Blood Gas:  07-14 @ 15:20  7.39/42/120/25/98/.2  ABG lactate: --  Arterial Blood Gas:  07-14 @ 12:55  7.36/44/157/24/99/-.4  ABG lactate: --  Arterial Blood Gas:  07-14 @ 10:56  7.39/41/136/24/99/.0  ABG lactate: --  Arterial Blood Gas:  07-14 @ 09:11  7.39/41/146/24/99/-.3  ABG lactate: --  Arterial Blood Gas:  07-14 @ 07:00  7.33/46/397/24/99/-1.4  ABG lactate: --  Arterial Blood Gas:  07-14 @ 05:46  7.36/40/105/22/98/-2.6  ABG lactate: --  Arterial Blood Gas:  07-14 @ 05:20  7.36/40/103/22/98/-2.6  ABG lactate: --  Arterial Blood Gas:  07-14 @ 04:17  7.39/39/134/23/99/-.9  ABG lactate: --  Arterial Blood Gas:  07-14 @ 03:27  7.40/38/69/22/94/-1.6  ABG lactate: --  Arterial Blood Gas:  07-14 @ 02:07  7.39/41/143/24/99/-.3  ABG lactate: --  Arterial Blood Gas:  07-13 @ 23:32  7.38/40/120/23/98/-1.5  ABG lactate: --  Arterial Blood Gas:  07-13 @ 21:23  7.39/39/143/23/99/-1.2  ABG lactate: --  Arterial Blood Gas:  07-13 @ 19:57  7.38/38/125/22/99/-2.1  ABG lactate: --  Arterial Blood Gas:  07-13 @ 12:51  7.38/41/147/24/99/-.7  ABG lactate: --  Arterial Blood Gas:  07-13 @ 10:28  7.39/40/137/24/99/-.7  ABG lactate: --  Arterial Blood Gas:  07-13 @ 06:08  7.42/39/125/25/99/.7  ABG lactate: --  Arterial Blood Gas:  07-13 @ 02:02  7.40/43/170/26/100/1.1  ABG lactate: --  Arterial Blood Gas:  07-13 @ 00:07  7.40/40/166/25/99/.4  ABG lactate: --  Arterial Blood Gas:  07-12 @ 23:19  7.36/46/221/25/100/.2  ABG lactate: --  Arterial Blood Gas:  07-12 @ 20:59  7.45/36/225/24/99/.7  ABG lactate: --  Arterial Blood Gas:  07-12 @ 19:38  7.46/36/214/25/99/1.7  ABG lactate: --    Venous Blood Gas:  07-13 @ 23:32  7.31/52/26/25/38  VBG Lactate: --  Venous Blood Gas:  07-13 @ 21:23  7.32/50/33/25/54  VBG Lactate: --  Venous Blood Gas:  07-13 @ 19:57  7.30/52/29/25/45  VBG Lactate: --  Venous Blood Gas:  07-13 @ 14:54  7.30/52/29/25/42  VBG Lactate: --      LIVER FUNCTIONS - ( 14 Jul 2019 00:53 )  Alb: 4.0 g/dL / Pro: 6.9 g/dL / ALK PHOS: 65 U/L / ALT: 22 U/L / AST: 47 U/L / GGT: x           MICROBIOLOGY:     RADIOLOGY:  X Reviewed and interpreted by me        I have spent 30 minutes providing critical care management to this patient. 4:30pm-5pm     Remained critically ill on continuos ICU monitoring.     OBJECTIVE:  ICU Vital Signs Last 24 Hrs  T(C): 36.2 (14 Jul 2019 15:00), Max: 37 (13 Jul 2019 20:00)  T(F): 97.2 (14 Jul 2019 15:00), Max: 98.6 (13 Jul 2019 20:00)  HR: 84 (14 Jul 2019 18:00) (68 - 89)  BP: --  BP(mean): --  ABP: 122/49 (14 Jul 2019 18:00) (106/47 - 193/193)  ABP(mean): 66 (14 Jul 2019 18:00) (61 - 193)  RR: 14 (14 Jul 2019 18:00) (3 - 50)  SpO2: 100% (14 Jul 2019 18:00) (92% - 100%)    Mode: AC/ CMV (Assist Control/ Continuous Mandatory Ventilation), RR (machine): 14, TV (machine): 500, FiO2: 50, PEEP: 5, ITime: 1, MAP: 10, PIP: 28    07-13 @ 07:01 - 07-14 @ 07:00  --------------------------------------------------------  IN: 1577 mL / OUT: 596 mL / NET: 981 mL    07-14 @ 07:01 - 07-14 @ 19:21  --------------------------------------------------------  IN: 1266 mL / OUT: 2439 mL / NET: -1173 mL      CAPILLARY BLOOD GLUCOSE      POCT Blood Glucose.: 96 mg/dL (14 Jul 2019 06:04)      PHYSICAL EXAM:     General: intubated sedated on ventilator  support with multiple lines & gtt   Neurology: Sedated  nonfocal, GÓMEZ x 4  Eyes: PERRLA/ EOMI, Gross vision intact  ENT/Neck: Neck supple, +ET trachea midline, No JVD, Gross hearing intact  Respiratory:  B/L fine  rales + sternal dressing Left pleural chest tube   CV: A- FIB with occasional pacing   Abdominal: Soft, NT, ND +BS,   Extremities: No edema, + peripheral pulses  Skin: No Rashes, Hematoma, Ecchymosis    Tubes: NGT       HOSPITAL MEDICATIONS:  MEDICATIONS  (STANDING):  aspirin 325 milliGRAM(s) Oral daily  atorvastatin 40 milliGRAM(s) Oral at bedtime  chlorhexidine 0.12% Liquid 15 milliLiter(s) Oral Mucosa every 12 hours  chlorhexidine 4% Liquid 1 Application(s) Topical <User Schedule>  clopidogrel Tablet 75 milliGRAM(s) Oral daily  CRRT Treatment    <Continuous>  dextrose 10%. 1000 milliLiter(s) (15 mL/Hr) IV Continuous <Continuous>  dextrose 50% Injectable 50 milliLiter(s) IV Push every 15 minutes  dextrose 50% Injectable 25 milliLiter(s) IV Push every 15 minutes  dextrose 50% Injectable 50 milliLiter(s) IV Push every 15 minutes  dextrose 50% Injectable 25 milliLiter(s) IV Push every 15 minutes  DOBUTamine Infusion 3 MICROgram(s)/kG/Min (6.624 mL/Hr) IV Continuous <Continuous>  docusate sodium Liquid 200 milliGRAM(s) Oral two times a day  famotidine  IVPB 20 milliGRAM(s) IV Intermittent daily  heparin  Infusion 800 Unit(s)/Hr (8 mL/Hr) IV Continuous <Continuous>  insulin lispro (HumaLOG) corrective regimen sliding scale   SubCutaneous three times a day before meals  insulin lispro (HumaLOG) corrective regimen sliding scale   SubCutaneous at bedtime  levothyroxine Injectable 25 MICROGram(s) IV Push at bedtime  milrinone Infusion 0.4 MICROgram(s)/kG/Min (8.832 mL/Hr) IV Continuous <Continuous>  polyethylene glycol 3350 17 Gram(s) Oral daily  PrismaSATE Dialysate BGK 4 / 2.5 5000 milliLiter(s) (1000 mL/Hr) CRRT <Continuous>  PrismaSOL Filtration BGK 0 / 2.5 5000 milliLiter(s) (500 mL/Hr) CRRT <Continuous>  PrismaSOL Filtration BGK 0 / 2.5 5000 milliLiter(s) (500 mL/Hr) CRRT <Continuous>  propofol Infusion 10 MICROgram(s)/kG/Min (4.416 mL/Hr) IV Continuous <Continuous>  sodium chloride 0.9%. 1000 milliLiter(s) (10 mL/Hr) IV Continuous <Continuous>    MEDICATIONS  (PRN):  sodium chloride 0.9% lock flush 10 milliLiter(s) IV Push every 1 hour PRN Pre/post blood products, medications, blood draw, and to maintain line patency      LABS:                        8.0    12.3  )-----------( 111      ( 14 Jul 2019 00:53 )             22.6     07-14    133<L>  |  96  |  39<H>  ----------------------------<  141<H>  5.6<H>   |  23  |  2.66<H>    Ca    8.5      14 Jul 2019 00:53  Phos  4.2     07-14  Mg     1.7     07-14    TPro  6.9  /  Alb  4.0  /  TBili  1.4<H>  /  DBili  x   /  AST  47<H>  /  ALT  22  /  AlkPhos  65  07-14    PTT - ( 14 Jul 2019 16:06 )  PTT:58.9 sec    Arterial Blood Gas:  07-14 @ 17:18  7.40/41/131/25/99/1.1  ABG lactate: --  Arterial Blood Gas:  07-14 @ 15:20  7.39/42/120/25/98/.2  ABG lactate: --  Arterial Blood Gas:  07-14 @ 12:55  7.36/44/157/24/99/-.4  ABG lactate: --  Arterial Blood Gas:  07-14 @ 10:56  7.39/41/136/24/99/.0  ABG lactate: --  Arterial Blood Gas:  07-14 @ 09:11  7.39/41/146/24/99/-.3  ABG lactate: --  Arterial Blood Gas:  07-14 @ 07:00  7.33/46/397/24/99/-1.4  ABG lactate: --  Arterial Blood Gas:  07-14 @ 05:46  7.36/40/105/22/98/-2.6  ABG lactate: --  Arterial Blood Gas:  07-14 @ 05:20  7.36/40/103/22/98/-2.6  ABG lactate: --  Arterial Blood Gas:  07-14 @ 04:17  7.39/39/134/23/99/-.9  ABG lactate: --  Arterial Blood Gas:  07-14 @ 03:27  7.40/38/69/22/94/-1.6  ABG lactate: --  Arterial Blood Gas:  07-14 @ 02:07  7.39/41/143/24/99/-.3  ABG lactate: --  Arterial Blood Gas:  07-13 @ 23:32  7.38/40/120/23/98/-1.5  ABG lactate: --  Arterial Blood Gas:  07-13 @ 21:23  7.39/39/143/23/99/-1.2  ABG lactate: --  Arterial Blood Gas:  07-13 @ 19:57  7.38/38/125/22/99/-2.1  ABG lactate: --  Arterial Blood Gas:  07-13 @ 12:51  7.38/41/147/24/99/-.7  ABG lactate: --  Arterial Blood Gas:  07-13 @ 10:28  7.39/40/137/24/99/-.7  ABG lactate: --  Arterial Blood Gas:  07-13 @ 06:08  7.42/39/125/25/99/.7  ABG lactate: --  Arterial Blood Gas:  07-13 @ 02:02  7.40/43/170/26/100/1.1  ABG lactate: --  Arterial Blood Gas:  07-13 @ 00:07  7.40/40/166/25/99/.4  ABG lactate: --  Arterial Blood Gas:  07-12 @ 23:19  7.36/46/221/25/100/.2  ABG lactate: --  Arterial Blood Gas:  07-12 @ 20:59  7.45/36/225/24/99/.7  ABG lactate: --  Arterial Blood Gas:  07-12 @ 19:38  7.46/36/214/25/99/1.7  ABG lactate: --    Venous Blood Gas:  07-13 @ 23:32  7.31/52/26/25/38  VBG Lactate: --  Venous Blood Gas:  07-13 @ 21:23  7.32/50/33/25/54  VBG Lactate: --  Venous Blood Gas:  07-13 @ 19:57  7.30/52/29/25/45  VBG Lactate: --  Venous Blood Gas:  07-13 @ 14:54  7.30/52/29/25/42  VBG Lactate: --      LIVER FUNCTIONS - ( 14 Jul 2019 00:53 )  Alb: 4.0 g/dL / Pro: 6.9 g/dL / ALK PHOS: 65 U/L / ALT: 22 U/L / AST: 47 U/L / GGT: x           MICROBIOLOGY:     RADIOLOGY:  X Reviewed and interpreted by me

## 2019-07-14 NOTE — PROGRESS NOTE ADULT - ASSESSMENT
73 yr old male with H/O DM2, HLD, HTN, multivessel CAD, severe left & Right ventricular dysfunction EF <30%, systolic heart failure, PVD, severe B/L carotid stenosis &  renal insufficiency.  S/P CABG x3 POD # 2  post op course complicated with oliguria, Acute on chronic renal failure with fluid overload, hyperkalemia, respiratory failure requiring intubation, cardiogenic shock,  PAF, Double  Inotropic support & hyperglycemia.  Maintain Sedation, Vent support titrate Fio2 &Peep, on NO for severe pulm HTN  Supplemental oxygen, f/u ABGs, Spo2, CXR  Maintain chest tubes monitor outputs  Invasive hemodynamic monitoring,   Increase CVP with oligo-anuric renal failure, low ScVo2  A fib cont Amio Dced sec slow Ventricular rate  back up pacing in place   Dobutamine & Primacor for heart failure gtt f/u perfusion indices  Hep gtt for Afib   ASA, Plavix & statins  TTE  x2 small  pericardial effusion severe Biventricular dysfunction     Renal consult appreciated, maintain Chávez, RRT started continue fluid removal     DM2, severe Hyperglycemia off INS gtt for hypoglycemia   Enteral nutrition   B/L carotid disease & PVD, cont to monitor neurologically intact post op & legs with positive pulses.    I have reviewed all labs and radiological data and coordinated care with CTICU staff & spend 30 min providing acute care to this critically ill patient 73 yr old male with H/O DM2, HLD, HTN, multivessel CAD, severe left & Right ventricular dysfunction EF <30%, systolic heart failure, PVD, severe B/L carotid stenosis &  renal insufficiency.  S/P CABG x3 POD # 2  post op course complicated with oliguria, Acute on chronic renal failure with fluid overload, hyperkalemia, respiratory failure requiring intubation, cardiogenic shock,  PAF, Double  Inotropic support & hyperglycemia.  Maintain Sedation, Vent support titrate Fio2 &Peep, on NO for severe pulm HTN  Supplemental oxygen, f/u ABGs, Spo2, CXR  Maintain chest tubes monitor outputs  Post op anemia, S/P 2 Units PRBC Tx f/u Hct.  Invasive hemodynamic monitoring,   Increase CVP with oligo-anuric renal failure, low ScVo2  A fib cont Amio Dced sec slow Ventricular rate  back up pacing in place   Dobutamine & Primacor for heart failure gtt f/u perfusion indices  Hep gtt for Afib   ASA, Plavix & statins  TTE  x2 small  pericardial effusion severe Biventricular dysfunction     Renal consult appreciated, maintain Chávez, RRT started continue fluid removal     DM2, severe Hyperglycemia off INS gtt for hypoglycemia   Enteral nutrition   B/L carotid disease & PVD, cont to monitor neurologically intact post op & legs with positive pulses.    I have reviewed all labs and radiological data and coordinated care with CTICU staff & spend 30 min providing acute care to this critically ill patient

## 2019-07-14 NOTE — PROGRESS NOTE ADULT - SUBJECTIVE AND OBJECTIVE BOX
Patient with oligoanuric RALPH and hyperkalemia not responsive to lasix infusion.  Will start CVVHDF for clearance and volume removal:    -  cc/min    - DFR 1000 cc/hr (OK+)    - RFR(pre): 1000 cc/hr (0K+)    - RFR (post) 1000 cc/hr (0K+)    -  cc/hr    LABS:  07-14    133<L>  |  96  |  39<H>  ----------------------------<  141<H>  5.6<H>   |  23  |  2.66<H>    Case d/w CTU.    Oscar Ramos M.D.  [facility], [facility]  [id number]  [place] [address] [phone]

## 2019-07-15 LAB
ALBUMIN SERPL ELPH-MCNC: 3.8 G/DL — SIGNIFICANT CHANGE UP (ref 3.3–5)
ALP SERPL-CCNC: 66 U/L — SIGNIFICANT CHANGE UP (ref 40–120)
ALT FLD-CCNC: 23 U/L — SIGNIFICANT CHANGE UP (ref 10–45)
ANION GAP SERPL CALC-SCNC: 15 MMOL/L — SIGNIFICANT CHANGE UP (ref 5–17)
APTT BLD: 71.4 SEC — HIGH (ref 27.5–36.3)
AST SERPL-CCNC: 53 U/L — HIGH (ref 10–40)
BASE EXCESS BLDMV CALC-SCNC: 0.9 MMOL/L — SIGNIFICANT CHANGE UP (ref -3–3)
BASE EXCESS BLDMV CALC-SCNC: 1.1 MMOL/L — SIGNIFICANT CHANGE UP (ref -3–3)
BASE EXCESS BLDMV CALC-SCNC: 1.1 MMOL/L — SIGNIFICANT CHANGE UP (ref -3–3)
BASE EXCESS BLDMV CALC-SCNC: 1.3 MMOL/L — SIGNIFICANT CHANGE UP (ref -3–3)
BASE EXCESS BLDMV CALC-SCNC: 1.3 MMOL/L — SIGNIFICANT CHANGE UP (ref -3–3)
BASE EXCESS BLDMV CALC-SCNC: 1.4 MMOL/L — SIGNIFICANT CHANGE UP (ref -3–3)
BASE EXCESS BLDMV CALC-SCNC: 1.5 MMOL/L — SIGNIFICANT CHANGE UP (ref -3–3)
BASE EXCESS BLDMV CALC-SCNC: 1.7 MMOL/L — SIGNIFICANT CHANGE UP (ref -3–3)
BASE EXCESS BLDMV CALC-SCNC: 1.8 MMOL/L — SIGNIFICANT CHANGE UP (ref -3–3)
BASE EXCESS BLDMV CALC-SCNC: 2.4 MMOL/L — SIGNIFICANT CHANGE UP (ref -3–3)
BASE EXCESS BLDMV CALC-SCNC: 2.6 MMOL/L — SIGNIFICANT CHANGE UP (ref -3–3)
BILIRUB SERPL-MCNC: 1.8 MG/DL — HIGH (ref 0.2–1.2)
BLD GP AB SCN SERPL QL: NEGATIVE — SIGNIFICANT CHANGE UP
BUN SERPL-MCNC: 26 MG/DL — HIGH (ref 7–23)
CALCIUM SERPL-MCNC: 8.3 MG/DL — LOW (ref 8.4–10.5)
CHLORIDE SERPL-SCNC: 94 MMOL/L — LOW (ref 96–108)
CO2 BLDMV-SCNC: 27 MMOL/L — SIGNIFICANT CHANGE UP (ref 21–29)
CO2 BLDMV-SCNC: 27 MMOL/L — SIGNIFICANT CHANGE UP (ref 21–29)
CO2 BLDMV-SCNC: 28 MMOL/L — SIGNIFICANT CHANGE UP (ref 21–29)
CO2 BLDMV-SCNC: 29 MMOL/L — SIGNIFICANT CHANGE UP (ref 21–29)
CO2 BLDMV-SCNC: 29 MMOL/L — SIGNIFICANT CHANGE UP (ref 21–29)
CO2 SERPL-SCNC: 23 MMOL/L — SIGNIFICANT CHANGE UP (ref 22–31)
CREAT SERPL-MCNC: 1.8 MG/DL — HIGH (ref 0.5–1.3)
GAS PNL BLDA: SIGNIFICANT CHANGE UP
GAS PNL BLDMV: SIGNIFICANT CHANGE UP
GLUCOSE BLDC GLUCOMTR-MCNC: 200 MG/DL — HIGH (ref 70–99)
GLUCOSE SERPL-MCNC: 179 MG/DL — HIGH (ref 70–99)
HCO3 BLDMV-SCNC: 26 MMOL/L — SIGNIFICANT CHANGE UP (ref 20–28)
HCO3 BLDMV-SCNC: 27 MMOL/L — SIGNIFICANT CHANGE UP (ref 20–28)
HCO3 BLDMV-SCNC: 28 MMOL/L — SIGNIFICANT CHANGE UP (ref 20–28)
HCO3 BLDMV-SCNC: 28 MMOL/L — SIGNIFICANT CHANGE UP (ref 20–28)
HCT VFR BLD CALC: 25.4 % — LOW (ref 39–50)
HEPARIN-PF4 AB RESULT: 0.8 U/ML — SIGNIFICANT CHANGE UP (ref 0–0.9)
HGB BLD-MCNC: 8.9 G/DL — LOW (ref 13–17)
HOROWITZ INDEX BLDMV+IHG-RTO: 50 — SIGNIFICANT CHANGE UP
INR BLD: 1.14 RATIO — SIGNIFICANT CHANGE UP (ref 0.88–1.16)
MAGNESIUM SERPL-MCNC: 2.1 MG/DL — SIGNIFICANT CHANGE UP (ref 1.6–2.6)
MCHC RBC-ENTMCNC: 32.9 PG — SIGNIFICANT CHANGE UP (ref 27–34)
MCHC RBC-ENTMCNC: 34.9 GM/DL — SIGNIFICANT CHANGE UP (ref 32–36)
MCV RBC AUTO: 94.4 FL — SIGNIFICANT CHANGE UP (ref 80–100)
O2 CT VFR BLD CALC: 35 MMHG — SIGNIFICANT CHANGE UP (ref 30–65)
O2 CT VFR BLD CALC: 35 MMHG — SIGNIFICANT CHANGE UP (ref 30–65)
O2 CT VFR BLD CALC: 36 MMHG — SIGNIFICANT CHANGE UP (ref 30–65)
O2 CT VFR BLD CALC: 37 MMHG — SIGNIFICANT CHANGE UP (ref 30–65)
O2 CT VFR BLD CALC: 37 MMHG — SIGNIFICANT CHANGE UP (ref 30–65)
O2 CT VFR BLD CALC: 41 MMHG — SIGNIFICANT CHANGE UP (ref 30–65)
O2 CT VFR BLD CALC: 42 MMHG — SIGNIFICANT CHANGE UP (ref 30–65)
PCO2 BLDMV: 44 MMHG — SIGNIFICANT CHANGE UP (ref 30–65)
PCO2 BLDMV: 46 MMHG — SIGNIFICANT CHANGE UP (ref 30–65)
PCO2 BLDMV: 47 MMHG — SIGNIFICANT CHANGE UP (ref 30–65)
PCO2 BLDMV: 48 MMHG — SIGNIFICANT CHANGE UP (ref 30–65)
PCO2 BLDMV: 49 MMHG — SIGNIFICANT CHANGE UP (ref 30–65)
PCO2 BLDMV: 49 MMHG — SIGNIFICANT CHANGE UP (ref 30–65)
PCO2 BLDMV: 50 MMHG — SIGNIFICANT CHANGE UP (ref 30–65)
PF4 HEPARIN CMPLX AB SER-ACNC: NEGATIVE — SIGNIFICANT CHANGE UP
PH BLDMV: 7.35 — SIGNIFICANT CHANGE UP (ref 7.32–7.45)
PH BLDMV: 7.36 — SIGNIFICANT CHANGE UP (ref 7.32–7.45)
PH BLDMV: 7.37 — SIGNIFICANT CHANGE UP (ref 7.32–7.45)
PH BLDMV: 7.38 — SIGNIFICANT CHANGE UP (ref 7.32–7.45)
PH BLDMV: 7.39 — SIGNIFICANT CHANGE UP (ref 7.32–7.45)
PHOSPHATE SERPL-MCNC: 2.5 MG/DL — SIGNIFICANT CHANGE UP (ref 2.5–4.5)
PLATELET # BLD AUTO: 84 K/UL — LOW (ref 150–400)
POTASSIUM SERPL-MCNC: 4.3 MMOL/L — SIGNIFICANT CHANGE UP (ref 3.5–5.3)
POTASSIUM SERPL-SCNC: 4.3 MMOL/L — SIGNIFICANT CHANGE UP (ref 3.5–5.3)
PROT SERPL-MCNC: 6.9 G/DL — SIGNIFICANT CHANGE UP (ref 6–8.3)
PROTHROM AB SERPL-ACNC: 13.2 SEC — HIGH (ref 10–12.9)
RBC # BLD: 2.69 M/UL — LOW (ref 4.2–5.8)
RBC # FLD: 13.9 % — SIGNIFICANT CHANGE UP (ref 10.3–14.5)
RH IG SCN BLD-IMP: POSITIVE — SIGNIFICANT CHANGE UP
SAO2 % BLDMV: 64 % — SIGNIFICANT CHANGE UP (ref 60–90)
SAO2 % BLDMV: 66 % — SIGNIFICANT CHANGE UP (ref 60–90)
SAO2 % BLDMV: 67 % — SIGNIFICANT CHANGE UP (ref 60–90)
SAO2 % BLDMV: 69 % — SIGNIFICANT CHANGE UP (ref 60–90)
SAO2 % BLDMV: 75 % — SIGNIFICANT CHANGE UP (ref 60–90)
SAO2 % BLDMV: 76 % — SIGNIFICANT CHANGE UP (ref 60–90)
SODIUM SERPL-SCNC: 132 MMOL/L — LOW (ref 135–145)
WBC # BLD: 12.4 K/UL — HIGH (ref 3.8–10.5)
WBC # FLD AUTO: 12.4 K/UL — HIGH (ref 3.8–10.5)

## 2019-07-15 PROCEDURE — 99291 CRITICAL CARE FIRST HOUR: CPT

## 2019-07-15 PROCEDURE — 71045 X-RAY EXAM CHEST 1 VIEW: CPT | Mod: 26

## 2019-07-15 PROCEDURE — 76604 US EXAM CHEST: CPT | Mod: 26,RT

## 2019-07-15 RX ORDER — DEXMEDETOMIDINE HYDROCHLORIDE IN 0.9% SODIUM CHLORIDE 4 UG/ML
0.7 INJECTION INTRAVENOUS
Qty: 200 | Refills: 0 | Status: DISCONTINUED | OUTPATIENT
Start: 2019-07-15 | End: 2019-07-16

## 2019-07-15 RX ORDER — INSULIN LISPRO 100/ML
VIAL (ML) SUBCUTANEOUS EVERY 6 HOURS
Refills: 0 | Status: DISCONTINUED | OUTPATIENT
Start: 2019-07-15 | End: 2019-07-19

## 2019-07-15 RX ORDER — CALCIUM GLUCONATE 100 MG/ML
1 VIAL (ML) INTRAVENOUS ONCE
Refills: 0 | Status: COMPLETED | OUTPATIENT
Start: 2019-07-15 | End: 2019-07-15

## 2019-07-15 RX ORDER — VASOPRESSIN 20 [USP'U]/ML
0.05 INJECTION INTRAVENOUS
Qty: 50 | Refills: 0 | Status: DISCONTINUED | OUTPATIENT
Start: 2019-07-15 | End: 2019-08-03

## 2019-07-15 RX ORDER — HYDROMORPHONE HYDROCHLORIDE 2 MG/ML
0.5 INJECTION INTRAMUSCULAR; INTRAVENOUS; SUBCUTANEOUS ONCE
Refills: 0 | Status: DISCONTINUED | OUTPATIENT
Start: 2019-07-15 | End: 2019-07-15

## 2019-07-15 RX ADMIN — Medication 200 MILLIGRAM(S): at 17:40

## 2019-07-15 RX ADMIN — MILRINONE LACTATE 4.42 MICROGRAM(S)/KG/MIN: 1 INJECTION, SOLUTION INTRAVENOUS at 06:17

## 2019-07-15 RX ADMIN — FAMOTIDINE 100 MILLIGRAM(S): 10 INJECTION INTRAVENOUS at 14:04

## 2019-07-15 RX ADMIN — Medication 200 MILLIGRAM(S): at 06:16

## 2019-07-15 RX ADMIN — CHLORHEXIDINE GLUCONATE 15 MILLILITER(S): 213 SOLUTION TOPICAL at 06:16

## 2019-07-15 RX ADMIN — Medication 25 MICROGRAM(S): at 23:34

## 2019-07-15 RX ADMIN — HYDROMORPHONE HYDROCHLORIDE 0.5 MILLIGRAM(S): 2 INJECTION INTRAMUSCULAR; INTRAVENOUS; SUBCUTANEOUS at 16:30

## 2019-07-15 RX ADMIN — Medication 4 UNIT(S): at 20:30

## 2019-07-15 RX ADMIN — CLOPIDOGREL BISULFATE 75 MILLIGRAM(S): 75 TABLET, FILM COATED ORAL at 12:15

## 2019-07-15 RX ADMIN — Medication 2: at 06:45

## 2019-07-15 RX ADMIN — CHLORHEXIDINE GLUCONATE 1 APPLICATION(S): 213 SOLUTION TOPICAL at 06:22

## 2019-07-15 RX ADMIN — ATORVASTATIN CALCIUM 40 MILLIGRAM(S): 80 TABLET, FILM COATED ORAL at 21:09

## 2019-07-15 RX ADMIN — HYDROMORPHONE HYDROCHLORIDE 0.5 MILLIGRAM(S): 2 INJECTION INTRAMUSCULAR; INTRAVENOUS; SUBCUTANEOUS at 16:45

## 2019-07-15 RX ADMIN — Medication 1: at 19:08

## 2019-07-15 RX ADMIN — POLYETHYLENE GLYCOL 3350 17 GRAM(S): 17 POWDER, FOR SOLUTION ORAL at 12:15

## 2019-07-15 RX ADMIN — Medication 325 MILLIGRAM(S): at 12:15

## 2019-07-15 RX ADMIN — CHLORHEXIDINE GLUCONATE 15 MILLILITER(S): 213 SOLUTION TOPICAL at 17:40

## 2019-07-15 RX ADMIN — Medication 200 GRAM(S): at 21:09

## 2019-07-15 NOTE — PROGRESS NOTE ADULT - ASSESSMENT
The patient is a 73y Male with CAD, HFrEF and PVD s/p CABGx3 followed by AFib, resp failure and acute on chronic kidney injury.  - CKD: stage TBD with limited baseline data available (poor medical follow-up).   - RALPH: nonoliguric.   Now on dialysis.      RECOMMENDATIONS  - continue CVVHDF for clearance and volume removal.  At present at 75cc/hr   -Given the low phos will give Phoxillium on the pre dialysate.  Would also like the feeds changed to Nepro   - please dose any new medications for a CrCl of 20-25 cc/min on CRRT  - avoid NSAIDs/nephrotoxins as able  -Remains on Nitric oxide and has elevated PA pressures.  On Cpap at present     Case d/w CTU team.

## 2019-07-15 NOTE — DIETITIAN INITIAL EVALUATION ADULT. - ADD RECOMMEND
1) See above for EN recommendations. 2) Defer initiation of PO diet to medical team. Defer consistency to medical team, SLP. Consider consistent CHO diet, renal. 3) Diet education deferred at this time in context of intubation. RD to obtain subjective diet/wt history, provide education (if pt amenable) PRN. 4) Monitor EN provision, RD to adjust formulary, volume/rate PRN. If PO diet, monitor intake/tolerance to diet. 5) Monitor wt trends, nutrition related labs, skin integrity, BM regularity

## 2019-07-15 NOTE — PROGRESS NOTE ADULT - SUBJECTIVE AND OBJECTIVE BOX
CRITICAL CARE ATTENDING - CTICU    MEDICATIONS  (STANDING):  aspirin 325 milliGRAM(s) Oral daily  atorvastatin 40 milliGRAM(s) Oral at bedtime  chlorhexidine 0.12% Liquid 15 milliLiter(s) Oral Mucosa every 12 hours  chlorhexidine 4% Liquid 1 Application(s) Topical <User Schedule>  clopidogrel Tablet 75 milliGRAM(s) Oral daily  CRRT Treatment    <Continuous>  dexmedetomidine Infusion 0.7 MICROgram(s)/kG/Hr (12.88 mL/Hr) IV Continuous <Continuous>  dextrose 10%. 1000 milliLiter(s) (15 mL/Hr) IV Continuous <Continuous>  dextrose 50% Injectable 50 milliLiter(s) IV Push every 15 minutes  dextrose 50% Injectable 25 milliLiter(s) IV Push every 15 minutes  dextrose 50% Injectable 50 milliLiter(s) IV Push every 15 minutes  dextrose 50% Injectable 25 milliLiter(s) IV Push every 15 minutes  DOBUTamine Infusion 5 MICROgram(s)/kG/Min (11.04 mL/Hr) IV Continuous <Continuous>  docusate sodium Liquid 200 milliGRAM(s) Oral two times a day  famotidine  IVPB 20 milliGRAM(s) IV Intermittent daily  heparin  Infusion 800 Unit(s)/Hr (8 mL/Hr) IV Continuous <Continuous>  insulin lispro (HumaLOG) corrective regimen sliding scale   SubCutaneous every 6 hours  levothyroxine Injectable 25 MICROGram(s) IV Push at bedtime  milrinone Infusion 0.2 MICROgram(s)/kG/Min (4.416 mL/Hr) IV Continuous <Continuous>  Phoxillum Filtration BK 4 / 2.5 5000 milliLiter(s) (500 mL/Hr) CRRT <Continuous>  polyethylene glycol 3350 17 Gram(s) Oral daily  PrismaSATE Dialysate BGK 4 / 2.5 5000 milliLiter(s) (1000 mL/Hr) CRRT <Continuous>  PrismaSOL Filtration BGK 4 / 2.5 5000 milliLiter(s) (500 mL/Hr) CRRT <Continuous>  propofol Infusion 10 MICROgram(s)/kG/Min (4.416 mL/Hr) IV Continuous <Continuous>  sodium chloride 0.9%. 1000 milliLiter(s) (10 mL/Hr) IV Continuous <Continuous>  vasopressin Infusion 0.05 Unit(s)/Min (3 mL/Hr) IV Continuous <Continuous>                                    8.9    12.4  )-----------( 84       ( 15 Jul 2019 00:54 )             25.4       07-15    132<L>  |  94<L>  |  26<H>  ----------------------------<  179<H>  4.3   |  23  |  1.80<H>    Ca    8.3<L>      15 Jul 2019 00:54  Phos  2.5     07-15  Mg     2.1     07-15    TPro  6.9  /  Alb  3.8  /  TBili  1.8<H>  /  DBili  x   /  AST  53<H>  /  ALT  23  /  AlkPhos  66  07-15      PT/INR - ( 15 Jul 2019 05:18 )   PT: 13.2 sec;   INR: 1.14 ratio         PTT - ( 15 Jul 2019 05:18 )  PTT:71.4 sec    Mode: CPAP with PS  RR (machine):   TV (machine):   FiO2: 50  PEEP: 5  PS: 10  ITime:   MAP:   PIP:       Daily     Daily Weight in k.3 (15 Jul 2019 00:00)       @ 07:01  -  07-15 @ 07:00  --------------------------------------------------------  IN: 2402.8 mL / OUT: 4465 mL / NET: -2062.2 mL    07-15 @ 07:01  -  07-15 @ 11:42  --------------------------------------------------------  IN: 795.1 mL / OUT: 884 mL / NET: -88.9 mL        Critically Ill patient  : [ ] preoperative ,   [x ] post operative    Requires :  [x ] Arterial Line   [x ] Central Line  [ ] PA catheter  [ ] IABP  [ ] ECMO  [ ] LVAD  [ x] Ventilator  [x ] pacemaker [ ] Impella.                      [ x] ABG's     [x ] Pulse Oxymetry Monitoring  Bedside evaluation , monitoring , treatment of hemodynamics , fluids , IVP/ IVCD meds.        Diagnosis:     POD 3 - CABG X 3 L    Cardiogenic Shock     CHF- acute [x ]   chronic [x ]    systolic [x ]   diatolic [ ]          - Echo- EF -   40%          [ x] RV dysfunction          - Cxr-cardiomegally, edema          - Clinical-  [x ]inotropes   [x ]pressors   [x ]diuresis   [ ]IABP   [ ]ECMO   [ ]LVAD   [ x]Respiratory Failure    Hemodynamic lability,instability. Requires IVCD [ x] vasopressors [x ] inotropes  [ ] vasodilator  [ ]IVSS fluid  to maintain MAP, perfusion, C.I.      Renal Failure - Acute Kidney Injury     CVVHD     respiratory failure     Requires chest PT, pulmonary toilet, ambu bagging, suctioning to maintain SaO2,  patent airway and treat atelectasis.     Ventilator Management:  [x ]AC-rest    [x ]CPAP-PS Wean    [ ]Trach Collar     [ ]Extubate    [ ] T-Piece  [ ]peep>5     Difficult weaning process - multiple organ system involvement in critically ill patient     fluid overload     Temporary pacemaker (TPM) interrogation and setting.     Requires   [x ]VVI   temporary pacing at 80 min     to maintain HR, MAP, CI, and perfusion.     PVD / Carotid Stenosis     Thrombocytopenia     Hyponatremia                         -                     Discussed with CT surgeon, Physician's Assistant - Nurse Practitioner- Critical care medicine team.   Dicussed at  AM / PM rounds.   Chart, labs , films reviewed.    Total Time: 30 min

## 2019-07-15 NOTE — DIETITIAN INITIAL EVALUATION ADULT. - REASON INDICATOR FOR ASSESSMENT
Nutrition Assessment warranted for length of stay.  Information obtained from: RN, comprehensive chart review. Pt intubated, no family present at bedside.   Per chart: Pt is a 72 yo M, presented to OSH with shortness of breath and LE edema, found to have newly diagnosed HFrEF and RALPH vs CKD, PVD and claudication with extensive LE arterial disease. Pt transferred to [facility] for left heart cath. Found to have multivessel disease, s/p CABG x 3 on 7/12. Started on CVVHD (see nephrology note 7/13, 7/14) Nutrition Assessment warranted for length of stay.  Information obtained from: RN, comprehensive chart review. Pt intubated/sedated, no family present at bedside.   Per chart: Pt is a 72 yo M, presented to OSH with shortness of breath and LE edema, found to have newly diagnosed HFrEF and RALPH vs CKD, PVD and claudication c extensive LE arterial disease. Pt transferred to [facility] for left heart cath. Found to have multivessel disease, s/p CABG x 3 on 7/12. Started on CVVHD per nephrology.

## 2019-07-15 NOTE — DIETITIAN INITIAL EVALUATION ADULT. - PERTINENT LABORATORY DATA
(7/15): Hgb 8.9, Hct 25.4, Na 132, Cl 94, BUN 26, Cr 1.80, Glu 179, Ca 8.3, GFR 36. (7/11) A1c 6.8%.   Blood Gas (7/15): Glu 182, Na 132, Hgb 9.6, Hct 30

## 2019-07-15 NOTE — PROGRESS NOTE ADULT - SUBJECTIVE AND OBJECTIVE BOX
[facility] [phone]        Patient seen and examined in bed.  He was reintubated and placed on renal replacement  Intubated   On inotropes and sedation at present     ros-unable         MEDICATIONS  (STANDING):  aspirin 325 milliGRAM(s) Oral daily  atorvastatin 40 milliGRAM(s) Oral at bedtime  chlorhexidine 0.12% Liquid 15 milliLiter(s) Oral Mucosa every 12 hours  chlorhexidine 4% Liquid 1 Application(s) Topical <User Schedule>  clopidogrel Tablet 75 milliGRAM(s) Oral daily  CRRT Treatment    <Continuous>  dextrose 10%. 1000 milliLiter(s) (15 mL/Hr) IV Continuous <Continuous>  dextrose 50% Injectable 50 milliLiter(s) IV Push every 15 minutes  dextrose 50% Injectable 25 milliLiter(s) IV Push every 15 minutes  dextrose 50% Injectable 50 milliLiter(s) IV Push every 15 minutes  dextrose 50% Injectable 25 milliLiter(s) IV Push every 15 minutes  DOBUTamine Infusion 5 MICROgram(s)/kG/Min (11.04 mL/Hr) IV Continuous <Continuous>  docusate sodium Liquid 200 milliGRAM(s) Oral two times a day  famotidine  IVPB 20 milliGRAM(s) IV Intermittent daily  heparin  Infusion 800 Unit(s)/Hr (8 mL/Hr) IV Continuous <Continuous>  insulin lispro (HumaLOG) corrective regimen sliding scale   SubCutaneous every 6 hours  levothyroxine Injectable 25 MICROGram(s) IV Push at bedtime  milrinone Infusion 0.2 MICROgram(s)/kG/Min (4.416 mL/Hr) IV Continuous <Continuous>  polyethylene glycol 3350 17 Gram(s) Oral daily  PrismaSATE Dialysate BGK 4 / 2.5 5000 milliLiter(s) (1000 mL/Hr) CRRT <Continuous>  PrismaSOL Filtration BGK 0 / 2.5 5000 milliLiter(s) (500 mL/Hr) CRRT <Continuous>  PrismaSOL Filtration BGK 0 / 2.5 5000 milliLiter(s) (500 mL/Hr) CRRT <Continuous>  propofol Infusion 10 MICROgram(s)/kG/Min (4.416 mL/Hr) IV Continuous <Continuous>  sodium chloride 0.9%. 1000 milliLiter(s) (10 mL/Hr) IV Continuous <Continuous>  vasopressin Infusion 0.05 Unit(s)/Min (3 mL/Hr) IV Continuous <Continuous>      VITAL:  T(C): , Max: 36.2 (07-14-19 @ 15:00)  T(F): , Max: 97.2 (07-14-19 @ 15:00)  HR: 89 (07-15-19 @ 08:15)  BP: --  BP(mean): --  RR: 12 (07-15-19 @ 08:15)  SpO2: 99% (07-15-19 @ 08:15)  Wt(kg): --    I and O's:    07-14 @ 07:01  -  07-15 @ 07:00  --------------------------------------------------------  IN: 2402.8 mL / OUT: 4465 mL / NET: -2062.2 mL    07-15 @ 07:01  -  07-15 @ 08:22  --------------------------------------------------------  IN: 104 mL / OUT: 201 mL / NET: -97 mL          PHYSICAL EXAM:    Constitutional: intubated and sedated   Neck:  No JVD  Respiratory: CTAB/L  Cardiovascular: S1 and S2  Gastrointestinal: BS+, soft, NT/ND  Extremities: No peripheral edema  Neurological: unable   : +  Chávez  Skin: No rashes  Access: [facility]     LABS:                        8.9    12.4  )-----------( 84       ( 15 Jul 2019 00:54 )             25.4     07-15    132<L>  |  94<L>  |  26<H>  ----------------------------<  179<H>  4.3   |  23  |  1.80<H>    Ca    8.3<L>      15 Jul 2019 00:54  Phos  2.5     07-15  Mg     2.1     07-15    TPro  6.9  /  Alb  3.8  /  TBili  1.8<H>  /  DBili  x   /  AST  53<H>  /  ALT  23  /  AlkPhos  66  07-15          Urine Studies:    Sodium, Random Urine: 28 mmol/L (07-13 @ 13:48)  Creatinine, Random Urine: 89 mg/dL (07-13 @ 13:48)        RADIOLOGY & ADDITIONAL STUDIES:    < from: Xray Chest 1 View- PORTABLE-Routine (07.15.19 @ 02:50) >    EXAM:  XR CHEST PORTABLE ROUTINE 1V                            PROCEDURE DATE:  07/15/2019            INTERPRETATION:  A single chest x-ray was obtained on July 15, 2019.    Indication: Status post cardiac surgery.    Impression:    The heart is enlarged. Right pleural effusion. Left lower lobe pneumonia   and/or atelectasis. Pulmonary vascular congestion. All life supporting   devices are in good position and unchanged when compared to previous   study done July 14, 2019. Status post sternotomy. No pneumothorax.                    RITA ORTEGA M.D., ATTENDING RADIOLOGIST  This document has been electronically signed. Jul 15 2019  8:18AM                < end of copied text > [facility] [phone]        Patient seen and examined in bed.  He was reintubated and placed on renal replacement  Intubated   On inotropes and sedation at present     ros-unable         MEDICATIONS  (STANDING):  aspirin 325 milliGRAM(s) Oral daily  atorvastatin 40 milliGRAM(s) Oral at bedtime  chlorhexidine 0.12% Liquid 15 milliLiter(s) Oral Mucosa every 12 hours  chlorhexidine 4% Liquid 1 Application(s) Topical <User Schedule>  clopidogrel Tablet 75 milliGRAM(s) Oral daily  CRRT Treatment    <Continuous>  dextrose 10%. 1000 milliLiter(s) (15 mL/Hr) IV Continuous <Continuous>  dextrose 50% Injectable 50 milliLiter(s) IV Push every 15 minutes  dextrose 50% Injectable 25 milliLiter(s) IV Push every 15 minutes  dextrose 50% Injectable 50 milliLiter(s) IV Push every 15 minutes  dextrose 50% Injectable 25 milliLiter(s) IV Push every 15 minutes  DOBUTamine Infusion 5 MICROgram(s)/kG/Min (11.04 mL/Hr) IV Continuous <Continuous>  docusate sodium Liquid 200 milliGRAM(s) Oral two times a day  famotidine  IVPB 20 milliGRAM(s) IV Intermittent daily  heparin  Infusion 800 Unit(s)/Hr (8 mL/Hr) IV Continuous <Continuous>  insulin lispro (HumaLOG) corrective regimen sliding scale   SubCutaneous every 6 hours  levothyroxine Injectable 25 MICROGram(s) IV Push at bedtime  milrinone Infusion 0.2 MICROgram(s)/kG/Min (4.416 mL/Hr) IV Continuous <Continuous>  polyethylene glycol 3350 17 Gram(s) Oral daily  PrismaSATE Dialysate BGK 4 / 2.5 5000 milliLiter(s) (1000 mL/Hr) CRRT <Continuous>  PrismaSOL Filtration BGK 0 / 2.5 5000 milliLiter(s) (500 mL/Hr) CRRT <Continuous>  PrismaSOL Filtration BGK 0 / 2.5 5000 milliLiter(s) (500 mL/Hr) CRRT <Continuous>  propofol Infusion 10 MICROgram(s)/kG/Min (4.416 mL/Hr) IV Continuous <Continuous>  sodium chloride 0.9%. 1000 milliLiter(s) (10 mL/Hr) IV Continuous <Continuous>  vasopressin Infusion 0.05 Unit(s)/Min (3 mL/Hr) IV Continuous <Continuous>      VITAL:  T(C): , Max: 36.2 (07-14-19 @ 15:00)  T(F): , Max: 97.2 (07-14-19 @ 15:00)  HR: 89 (07-15-19 @ 08:15)  BP: --  BP(mean): --  RR: 12 (07-15-19 @ 08:15)  SpO2: 99% (07-15-19 @ 08:15)  Wt(kg): --    I and O's:    07-14 @ 07:01  -  07-15 @ 07:00  --------------------------------------------------------  IN: 2402.8 mL / OUT: 4465 mL / NET: -2062.2 mL    07-15 @ 07:01  -  07-15 @ 08:22  --------------------------------------------------------  IN: 104 mL / OUT: 201 mL / NET: -97 mL          PHYSICAL EXAM:    Constitutional: intubated and sedated   Neck:  No JVD  Respiratory: CTAB/L  Cardiovascular: S1 and S2  Gastrointestinal: BS+, soft, NT/ND  Extremities: No peripheral edema  Neurological: unable   : +  Chávez  Skin: No rashes  Access: [facility]     LABS:                        8.9    12.4  )-----------( 84       ( 15 Jul 2019 00:54 )             25.4     07-15    132<L>  |  94<L>  |  26<H>  ----------------------------<  179<H>  4.3   |  23  |  1.80<H>    Ca    8.3<L>      15 Jul 2019 00:54  Phos  2.5     07-15  Mg     2.1     07-15    TPro  6.9  /  Alb  3.8  /  TBili  1.8<H>  /  DBili  x   /  AST  53<H>  /  ALT  23  /  AlkPhos  66  07-15          Urine Studies:    Sodium, Random Urine: 28 mmol/L (07-13 @ 13:48)  Creatinine, Random Urine: 89 mg/dL (07-13 @ 13:48)        RADIOLOGY & ADDITIONAL STUDIES:    < from: Xray Chest 1 View- PORTABLE-Routine (07.15.19 @ 02:50) >    EXAM:  XR CHEST PORTABLE ROUTINE 1V                            PROCEDURE DATE:  07/15/2019            INTERPRETATION:  A single chest x-ray was obtained on July 15, 2019.    Indication: Status post cardiac surgery.    Impression:    The heart is enlarged. Right pleural effusion. Left lower lobe pneumonia   and/or atelectasis. Pulmonary vascular congestion. All life supporting   devices are in good position and unchanged when compared to previous   study done July 14, 2019. Status post sternotomy. No pneumothorax.                    RITA ORTEGA M.D., ATTENDING RADIOLOGIST  This document has been electronically signed. Jul 15 2019  8:18AM                < end of copied text >

## 2019-07-15 NOTE — DIETITIAN INITIAL EVALUATION ADULT. - ENTERAL
1) Consider change enteral feeds to Vital 1.5 at 60ml/hr x 24 hrs. To provide: 1440ml, 2160kcal and 98g protein. Based on dosing wt 73.6kg, provides: 29kcal/kg and 1.3g protein/kg. If elevated Phos/K noted, consider change to Nepro with Carb Steady at goal rate 50ml/hr x 24 hrs. To provide: 1200ml, 2160kcal (29kcal/kg) and 97g protein (1.3g protein/kg).

## 2019-07-15 NOTE — DIETITIAN INITIAL EVALUATION ADULT. - OTHER INFO
INFORMATION PTA - Pt intubated and sedated at time of RD visit. All information obtained via discussion with RN/comprehensive chart review. No family present at bedside at time of RD visit.   ·Diet PTA: Unknown at this time.   ·Nutrition Status PTA: Per review of medical chart, pt noted with hx of drinking 4 beers/day 3-4x/week. Hx of overall fair PO intake (per nephrology note 7/13). Will monitor.   ·Nutrition Supplements PTA: Unknown at this time. Outpatient medication status not specified in H&P.   ·Food Allergies: No known allergies per EMR.   ·Weight History PTA: Unknown. Dosing wt (7/10): 162.2 lbs.   ·Other Subjective Information: Baseline tolerance to chewing/swallowing unknown. Per review of medical chart, pt previously denied hx of N/V/D.   INFORMATION THIS ADMISSION  ·EN input x 24 hrs: 1080ml (Jevity 1.2)  ·Last BM: None at this time (per RN/review of flow sheet)  ·Other Subjective Information: Pt noted with order of propofol. Last infused at 10am (7/15) at 6.6ml. Not receiving at this time. Received 200ml of H2O EN flush at 12pm.   ·Therapeutic Diet Education Provided: N/A at this time. Pt intubated.

## 2019-07-15 NOTE — DIETITIAN INITIAL EVALUATION ADULT. - ENERGY NEEDS
Ht: 67"  Wt: 162.2 lbs  BMI: 25.37 kg/m2   IBW: 148 lbs (+/-10%)     109 % IBW  Edema: 1+ scrotum, 2+ generalized      Skin: surgical incision: left groin, right groin, midsternal operative site

## 2019-07-16 DIAGNOSIS — E11.9 TYPE 2 DIABETES MELLITUS WITHOUT COMPLICATIONS: ICD-10-CM

## 2019-07-16 DIAGNOSIS — N17.9 ACUTE KIDNEY FAILURE, UNSPECIFIED: ICD-10-CM

## 2019-07-16 LAB
ALBUMIN SERPL ELPH-MCNC: 3.6 G/DL — SIGNIFICANT CHANGE UP (ref 3.3–5)
ALP SERPL-CCNC: 75 U/L — SIGNIFICANT CHANGE UP (ref 40–120)
ALT FLD-CCNC: 43 U/L — SIGNIFICANT CHANGE UP (ref 10–45)
ANION GAP SERPL CALC-SCNC: 11 MMOL/L — SIGNIFICANT CHANGE UP (ref 5–17)
APTT BLD: 52.4 SEC — HIGH (ref 27.5–36.3)
APTT BLD: 65.3 SEC — HIGH (ref 27.5–36.3)
AST SERPL-CCNC: 200 U/L — HIGH (ref 10–40)
BASE EXCESS BLDMV CALC-SCNC: -0.5 MMOL/L — SIGNIFICANT CHANGE UP (ref -3–3)
BASE EXCESS BLDMV CALC-SCNC: -1.1 MMOL/L — SIGNIFICANT CHANGE UP (ref -3–3)
BASE EXCESS BLDMV CALC-SCNC: -2.9 MMOL/L — SIGNIFICANT CHANGE UP (ref -3–3)
BASE EXCESS BLDMV CALC-SCNC: 0.2 MMOL/L — SIGNIFICANT CHANGE UP (ref -3–3)
BASE EXCESS BLDMV CALC-SCNC: 0.3 MMOL/L — SIGNIFICANT CHANGE UP (ref -3–3)
BASE EXCESS BLDMV CALC-SCNC: 1 MMOL/L — SIGNIFICANT CHANGE UP (ref -3–3)
BASE EXCESS BLDMV CALC-SCNC: 1.1 MMOL/L — SIGNIFICANT CHANGE UP (ref -3–3)
BASE EXCESS BLDMV CALC-SCNC: 1.1 MMOL/L — SIGNIFICANT CHANGE UP (ref -3–3)
BASE EXCESS BLDMV CALC-SCNC: 2.3 MMOL/L — SIGNIFICANT CHANGE UP (ref -3–3)
BASE EXCESS BLDMV CALC-SCNC: 2.4 MMOL/L — SIGNIFICANT CHANGE UP (ref -3–3)
BASE EXCESS BLDMV CALC-SCNC: 2.5 MMOL/L — SIGNIFICANT CHANGE UP (ref -3–3)
BASE EXCESS BLDMV CALC-SCNC: 3.3 MMOL/L — HIGH (ref -3–3)
BILIRUB SERPL-MCNC: 1.7 MG/DL — HIGH (ref 0.2–1.2)
BUN SERPL-MCNC: 20 MG/DL — SIGNIFICANT CHANGE UP (ref 7–23)
CALCIUM SERPL-MCNC: 8 MG/DL — LOW (ref 8.4–10.5)
CHLORIDE SERPL-SCNC: 97 MMOL/L — SIGNIFICANT CHANGE UP (ref 96–108)
CO2 BLDMV-SCNC: 23 MMOL/L — SIGNIFICANT CHANGE UP (ref 21–29)
CO2 BLDMV-SCNC: 25 MMOL/L — SIGNIFICANT CHANGE UP (ref 21–29)
CO2 BLDMV-SCNC: 26 MMOL/L — SIGNIFICANT CHANGE UP (ref 21–29)
CO2 BLDMV-SCNC: 27 MMOL/L — SIGNIFICANT CHANGE UP (ref 21–29)
CO2 BLDMV-SCNC: 28 MMOL/L — SIGNIFICANT CHANGE UP (ref 21–29)
CO2 BLDMV-SCNC: 29 MMOL/L — SIGNIFICANT CHANGE UP (ref 21–29)
CO2 BLDMV-SCNC: 29 MMOL/L — SIGNIFICANT CHANGE UP (ref 21–29)
CO2 BLDMV-SCNC: 30 MMOL/L — HIGH (ref 21–29)
CO2 SERPL-SCNC: 24 MMOL/L — SIGNIFICANT CHANGE UP (ref 22–31)
CREAT SERPL-MCNC: 1.59 MG/DL — HIGH (ref 0.5–1.3)
GAS PNL BLDA: SIGNIFICANT CHANGE UP
GAS PNL BLDMV: SIGNIFICANT CHANGE UP
GLUCOSE SERPL-MCNC: 94 MG/DL — SIGNIFICANT CHANGE UP (ref 70–99)
HCO3 BLDMV-SCNC: 22 MMOL/L — SIGNIFICANT CHANGE UP (ref 20–28)
HCO3 BLDMV-SCNC: 24 MMOL/L — SIGNIFICANT CHANGE UP (ref 20–28)
HCO3 BLDMV-SCNC: 24 MMOL/L — SIGNIFICANT CHANGE UP (ref 20–28)
HCO3 BLDMV-SCNC: 25 MMOL/L — SIGNIFICANT CHANGE UP (ref 20–28)
HCO3 BLDMV-SCNC: 25 MMOL/L — SIGNIFICANT CHANGE UP (ref 20–28)
HCO3 BLDMV-SCNC: 26 MMOL/L — SIGNIFICANT CHANGE UP (ref 20–28)
HCO3 BLDMV-SCNC: 27 MMOL/L — SIGNIFICANT CHANGE UP (ref 20–28)
HCO3 BLDMV-SCNC: 28 MMOL/L — SIGNIFICANT CHANGE UP (ref 20–28)
HCT VFR BLD CALC: 25.8 % — LOW (ref 39–50)
HEPARIN-PF4 AB RESULT: 0.8 U/ML — SIGNIFICANT CHANGE UP (ref 0–0.9)
HGB BLD-MCNC: 8.8 G/DL — LOW (ref 13–17)
HOROWITZ INDEX BLDMV+IHG-RTO: 50 — SIGNIFICANT CHANGE UP
INR BLD: 1.15 RATIO — SIGNIFICANT CHANGE UP (ref 0.88–1.16)
MAGNESIUM SERPL-MCNC: 2.2 MG/DL — SIGNIFICANT CHANGE UP (ref 1.6–2.6)
MCHC RBC-ENTMCNC: 32.2 PG — SIGNIFICANT CHANGE UP (ref 27–34)
MCHC RBC-ENTMCNC: 34.4 GM/DL — SIGNIFICANT CHANGE UP (ref 32–36)
MCV RBC AUTO: 93.6 FL — SIGNIFICANT CHANGE UP (ref 80–100)
O2 CT VFR BLD CALC: 22 MMHG — LOW (ref 30–65)
O2 CT VFR BLD CALC: 23 MMHG — LOW (ref 30–65)
O2 CT VFR BLD CALC: 25 MMHG — LOW (ref 30–65)
O2 CT VFR BLD CALC: 26 MMHG — LOW (ref 30–65)
O2 CT VFR BLD CALC: 29 MMHG — LOW (ref 30–65)
O2 CT VFR BLD CALC: 31 MMHG — SIGNIFICANT CHANGE UP (ref 30–65)
O2 CT VFR BLD CALC: 32 MMHG — SIGNIFICANT CHANGE UP (ref 30–65)
O2 CT VFR BLD CALC: 34 MMHG — SIGNIFICANT CHANGE UP (ref 30–65)
O2 CT VFR BLD CALC: 35 MMHG — SIGNIFICANT CHANGE UP (ref 30–65)
O2 CT VFR BLD CALC: 36 MMHG — SIGNIFICANT CHANGE UP (ref 30–65)
PCO2 BLDMV: 41 MMHG — SIGNIFICANT CHANGE UP (ref 30–65)
PCO2 BLDMV: 43 MMHG — SIGNIFICANT CHANGE UP (ref 30–65)
PCO2 BLDMV: 43 MMHG — SIGNIFICANT CHANGE UP (ref 30–65)
PCO2 BLDMV: 44 MMHG — SIGNIFICANT CHANGE UP (ref 30–65)
PCO2 BLDMV: 45 MMHG — SIGNIFICANT CHANGE UP (ref 30–65)
PCO2 BLDMV: 46 MMHG — SIGNIFICANT CHANGE UP (ref 30–65)
PCO2 BLDMV: 47 MMHG — SIGNIFICANT CHANGE UP (ref 30–65)
PCO2 BLDMV: 48 MMHG — SIGNIFICANT CHANGE UP (ref 30–65)
PCO2 BLDMV: 50 MMHG — SIGNIFICANT CHANGE UP (ref 30–65)
PF4 HEPARIN CMPLX AB SER-ACNC: NEGATIVE — SIGNIFICANT CHANGE UP
PH BLDMV: 7.35 — SIGNIFICANT CHANGE UP (ref 7.32–7.45)
PH BLDMV: 7.36 — SIGNIFICANT CHANGE UP (ref 7.32–7.45)
PH BLDMV: 7.37 — SIGNIFICANT CHANGE UP (ref 7.32–7.45)
PH BLDMV: 7.38 — SIGNIFICANT CHANGE UP (ref 7.32–7.45)
PH BLDMV: 7.39 — SIGNIFICANT CHANGE UP (ref 7.32–7.45)
PH BLDMV: 7.4 — SIGNIFICANT CHANGE UP (ref 7.32–7.45)
PH BLDMV: 7.4 — SIGNIFICANT CHANGE UP (ref 7.32–7.45)
PHOSPHATE SERPL-MCNC: 2 MG/DL — LOW (ref 2.5–4.5)
PLATELET # BLD AUTO: 57 K/UL — LOW (ref 150–400)
POTASSIUM SERPL-MCNC: 4.4 MMOL/L — SIGNIFICANT CHANGE UP (ref 3.5–5.3)
POTASSIUM SERPL-SCNC: 4.4 MMOL/L — SIGNIFICANT CHANGE UP (ref 3.5–5.3)
PROT SERPL-MCNC: 6.9 G/DL — SIGNIFICANT CHANGE UP (ref 6–8.3)
PROTHROM AB SERPL-ACNC: 13.2 SEC — HIGH (ref 10–12.9)
RBC # BLD: 2.75 M/UL — LOW (ref 4.2–5.8)
RBC # FLD: 13.6 % — SIGNIFICANT CHANGE UP (ref 10.3–14.5)
SAO2 % BLDMV: 34 % — LOW (ref 60–90)
SAO2 % BLDMV: 34 % — LOW (ref 60–90)
SAO2 % BLDMV: 45 % — LOW (ref 60–90)
SAO2 % BLDMV: 46 % — LOW (ref 60–90)
SAO2 % BLDMV: 52 % — LOW (ref 60–90)
SAO2 % BLDMV: 58 % — LOW (ref 60–90)
SAO2 % BLDMV: 58 % — LOW (ref 60–90)
SAO2 % BLDMV: 60 % — SIGNIFICANT CHANGE UP (ref 60–90)
SAO2 % BLDMV: 62 % — SIGNIFICANT CHANGE UP (ref 60–90)
SAO2 % BLDMV: 65 % — SIGNIFICANT CHANGE UP (ref 60–90)
SAO2 % BLDMV: 65 % — SIGNIFICANT CHANGE UP (ref 60–90)
SAO2 % BLDMV: 66 % — SIGNIFICANT CHANGE UP (ref 60–90)
SODIUM SERPL-SCNC: 132 MMOL/L — LOW (ref 135–145)
WBC # BLD: 9.1 K/UL — SIGNIFICANT CHANGE UP (ref 3.8–10.5)
WBC # FLD AUTO: 9.1 K/UL — SIGNIFICANT CHANGE UP (ref 3.8–10.5)

## 2019-07-16 PROCEDURE — 93010 ELECTROCARDIOGRAM REPORT: CPT

## 2019-07-16 PROCEDURE — 99291 CRITICAL CARE FIRST HOUR: CPT

## 2019-07-16 PROCEDURE — 71045 X-RAY EXAM CHEST 1 VIEW: CPT | Mod: 26

## 2019-07-16 PROCEDURE — 99222 1ST HOSP IP/OBS MODERATE 55: CPT | Mod: GC

## 2019-07-16 RX ORDER — ARGATROBAN 50 MG/50ML
0.5 INJECTION, SOLUTION INTRAVENOUS
Qty: 250 | Refills: 0 | Status: DISCONTINUED | OUTPATIENT
Start: 2019-07-16 | End: 2019-07-18

## 2019-07-16 RX ORDER — HEPARIN SODIUM 5000 [USP'U]/ML
900 INJECTION INTRAVENOUS; SUBCUTANEOUS
Qty: 25000 | Refills: 0 | Status: DISCONTINUED | OUTPATIENT
Start: 2019-07-16 | End: 2019-07-16

## 2019-07-16 RX ORDER — INSULIN LISPRO 100/ML
4 VIAL (ML) SUBCUTANEOUS ONCE
Refills: 0 | Status: COMPLETED | OUTPATIENT
Start: 2019-07-16 | End: 2019-07-15

## 2019-07-16 RX ORDER — HEPARIN SODIUM 5000 [USP'U]/ML
3000 INJECTION INTRAVENOUS; SUBCUTANEOUS EVERY 6 HOURS
Refills: 0 | Status: DISCONTINUED | OUTPATIENT
Start: 2019-07-16 | End: 2019-07-16

## 2019-07-16 RX ORDER — DOBUTAMINE HCL 250MG/20ML
5 VIAL (ML) INTRAVENOUS
Qty: 500 | Refills: 0 | Status: DISCONTINUED | OUTPATIENT
Start: 2019-07-16 | End: 2019-08-03

## 2019-07-16 RX ORDER — ACETAMINOPHEN 500 MG
1000 TABLET ORAL ONCE
Refills: 0 | Status: COMPLETED | OUTPATIENT
Start: 2019-07-16 | End: 2019-07-16

## 2019-07-16 RX ORDER — HEPARIN SODIUM 5000 [USP'U]/ML
6000 INJECTION INTRAVENOUS; SUBCUTANEOUS EVERY 6 HOURS
Refills: 0 | Status: DISCONTINUED | OUTPATIENT
Start: 2019-07-16 | End: 2019-07-16

## 2019-07-16 RX ORDER — ASPIRIN/CALCIUM CARB/MAGNESIUM 324 MG
81 TABLET ORAL DAILY
Refills: 0 | Status: DISCONTINUED | OUTPATIENT
Start: 2019-07-16 | End: 2019-08-01

## 2019-07-16 RX ORDER — HEPARIN SODIUM 5000 [USP'U]/ML
800 INJECTION INTRAVENOUS; SUBCUTANEOUS
Qty: 25000 | Refills: 0 | Status: DISCONTINUED | OUTPATIENT
Start: 2019-07-16 | End: 2019-07-16

## 2019-07-16 RX ORDER — ARGATROBAN 50 MG/50ML
1 INJECTION, SOLUTION INTRAVENOUS
Qty: 50 | Refills: 0 | Status: DISCONTINUED | OUTPATIENT
Start: 2019-07-16 | End: 2019-07-16

## 2019-07-16 RX ADMIN — DEXMEDETOMIDINE HYDROCHLORIDE IN 0.9% SODIUM CHLORIDE 12.88 MICROGRAM(S)/KG/HR: 4 INJECTION INTRAVENOUS at 09:47

## 2019-07-16 RX ADMIN — CHLORHEXIDINE GLUCONATE 15 MILLILITER(S): 213 SOLUTION TOPICAL at 06:24

## 2019-07-16 RX ADMIN — Medication 400 MILLIGRAM(S): at 17:57

## 2019-07-16 RX ADMIN — Medication 11.04 MICROGRAM(S)/KG/MIN: at 09:47

## 2019-07-16 RX ADMIN — Medication 1: at 12:54

## 2019-07-16 RX ADMIN — Medication 25 MICROGRAM(S): at 22:12

## 2019-07-16 RX ADMIN — FAMOTIDINE 100 MILLIGRAM(S): 10 INJECTION INTRAVENOUS at 12:30

## 2019-07-16 RX ADMIN — Medication 1000 MILLIGRAM(S): at 18:30

## 2019-07-16 RX ADMIN — VASOPRESSIN 3 UNIT(S)/MIN: 20 INJECTION INTRAVENOUS at 09:47

## 2019-07-16 RX ADMIN — CHLORHEXIDINE GLUCONATE 1 APPLICATION(S): 213 SOLUTION TOPICAL at 06:25

## 2019-07-16 RX ADMIN — MILRINONE LACTATE 6.62 MICROGRAM(S)/KG/MIN: 1 INJECTION, SOLUTION INTRAVENOUS at 09:46

## 2019-07-16 RX ADMIN — ARGATROBAN 2.21 MICROGRAM(S)/KG/MIN: 50 INJECTION, SOLUTION INTRAVENOUS at 17:58

## 2019-07-16 RX ADMIN — CLOPIDOGREL BISULFATE 75 MILLIGRAM(S): 75 TABLET, FILM COATED ORAL at 12:30

## 2019-07-16 RX ADMIN — POLYETHYLENE GLYCOL 3350 17 GRAM(S): 17 POWDER, FOR SOLUTION ORAL at 12:30

## 2019-07-16 RX ADMIN — Medication 325 MILLIGRAM(S): at 12:30

## 2019-07-16 RX ADMIN — Medication 200 MILLIGRAM(S): at 06:24

## 2019-07-16 RX ADMIN — ARGATROBAN 2.21 MICROGRAM(S)/KG/MIN: 50 INJECTION, SOLUTION INTRAVENOUS at 16:25

## 2019-07-16 NOTE — PROGRESS NOTE ADULT - SUBJECTIVE AND OBJECTIVE BOX
CRITICAL CARE ATTENDING - CTICU    MEDICATIONS  (STANDING):  aspirin 325 milliGRAM(s) Oral daily  atorvastatin 40 milliGRAM(s) Oral at bedtime  chlorhexidine 0.12% Liquid 15 milliLiter(s) Oral Mucosa every 12 hours  chlorhexidine 4% Liquid 1 Application(s) Topical <User Schedule>  clopidogrel Tablet 75 milliGRAM(s) Oral daily  CRRT Treatment    <Continuous>  dexmedetomidine Infusion 0.7 MICROgram(s)/kG/Hr (12.88 mL/Hr) IV Continuous <Continuous>  dextrose 50% Injectable 50 milliLiter(s) IV Push every 15 minutes  dextrose 50% Injectable 25 milliLiter(s) IV Push every 15 minutes  dextrose 50% Injectable 50 milliLiter(s) IV Push every 15 minutes  dextrose 50% Injectable 25 milliLiter(s) IV Push every 15 minutes  DOBUTamine Infusion 5 MICROgram(s)/kG/Min (11.04 mL/Hr) IV Continuous <Continuous>  docusate sodium Liquid 200 milliGRAM(s) Oral two times a day  famotidine  IVPB 20 milliGRAM(s) IV Intermittent daily  heparin  Infusion 800 Unit(s)/Hr (8 mL/Hr) IV Continuous <Continuous>  insulin lispro (HumaLOG) corrective regimen sliding scale   SubCutaneous every 6 hours  levothyroxine Injectable 25 MICROGram(s) IV Push at bedtime  milrinone Infusion 0.3 MICROgram(s)/kG/Min (6.624 mL/Hr) IV Continuous <Continuous>  Phoxillum Filtration BK 4 / 2.5 5000 milliLiter(s) (500 mL/Hr) CRRT <Continuous>  Phoxillum Filtration BK 4 / 2.5 5000 milliLiter(s) (500 mL/Hr) CRRT <Continuous>  polyethylene glycol 3350 17 Gram(s) Oral daily  PrismaSATE Dialysate BGK 4 / 2.5 5000 milliLiter(s) (1000 mL/Hr) CRRT <Continuous>  sodium chloride 0.9%. 1000 milliLiter(s) (10 mL/Hr) IV Continuous <Continuous>  vasopressin Infusion 0.05 Unit(s)/Min (3 mL/Hr) IV Continuous <Continuous>                                    8.8    9.1   )-----------( 57       ( 2019 00:28 )             25.8       07-16    132<L>  |  97  |  20  ----------------------------<  94  4.4   |  24  |  1.59<H>    Ca    8.0<L>      2019 00:28  Phos  2.0       Mg     2.2         TPro  6.9  /  Alb  3.6  /  TBili  1.7<H>  /  DBili  x   /  AST  200<H>  /  ALT  43  /  AlkPhos  75        PT/INR - ( 2019 00:28 )   PT: 13.2 sec;   INR: 1.15 ratio         PTT - ( 2019 00:28 )  PTT:65.3 sec    Mode: CPAP with PS  FiO2: 50  PEEP: 5  PS: 5  MAP: 7  PIP: 20      Daily     Daily Weight in k.9 (2019 04:00)      07-15 @ 07: @ 07:00  --------------------------------------------------------  IN: 2741 mL / OUT: 5232 mL / NET: -2491 mL     @ 07: @ 12:51  --------------------------------------------------------  IN: 170.4 mL / OUT: 828 mL / NET: -657.6 mL        Critically Ill patient  : [ ] preoperative ,   [ x] post operative    Requires :  [ x] Arterial Line   [x ] Central Line  [x ] PA catheter  [ ] IABP  [ ] ECMO  [ ] LVAD  [x ] Ventilator  [ x] pacemaker [ ] Impella.                      [x ABG's     [ x] Pulse Oxymetry Monitoring  Bedside evaluation , monitoring , treatment of hemodynamics , fluids , IVP/ IVCD meds.        Diagnosis:     POD 4 - CABG X 3 L    respiratory failure     Requires chest PT, pulmonary toilet, ambu bagging, suctioning to maintain SaO2,  patent airway and treat atelectasis.     Ventilator Management:  [ x]AC-rest    [x ]CPAP-PS Wean    [ ]Trach Collar     [ ]Extubate    [x ] T-Piece  [ ]peep>5     Difficult weaning process - multiple organ system involvement in critically ill patient     CHF- acute [ x]   chronic [x ]    systolic [x ]   diatolic [ ]          - Echo- EF - 40%            [x ] RV dysfunction          - Cxr-cardiomegally, edema          - Clinical-  [x ]inotropes   [ x]pressors   [ ]diuresis   [ ]IABP   [ ]ECMO   [ ]LVAD   [ x]Respiratory Failure    Hemodynamic lability,instability. Requires IVCD [x ] vasopressors [ x] inotropes  [ ] vasodilator  [ ]IVSS fluid  to maintain MAP, perfusion, C.I.     fluid overload      Renal Failure - Acute Kidney Injury     CVVHD   -125cc/hr     Thrombocytopenia     Hyponatremia     Clifford Ramos catheter interpretation and therapeutic management of unstable hemodynamics     Bradycardia    Temporary pacemaker (TPM) interrogation and setting.     Requires  [ ]DDD  [x ]VVI    [ ]AII  temporary pacing at  80 min    to maintain HR, MAP, CI, and perfusion.     IVCD anticoagulation with [ x] Heparin  [ ] Argatroban for  A Fib / Carotid stenosis     Tolerates NG / NJ feeds at [x ] goal rate    [ ] trophic rate    [ ]       rate                         -                     Discussed with CT surgeon, Physician's Assistant - Nurse Practitioner- Critical care medicine team.   Dicussed at  AM / PM rounds.   Chart, labs , films reviewed.    Total Time: 30 min

## 2019-07-16 NOTE — CONSULT NOTE ADULT - SUBJECTIVE AND OBJECTIVE BOX
HPI:  Patient is a 74 y/o M who initially presented to [facility] on 7/1/19 with shortness of breath and LE edema and was subsequently found to have newly diagnosed HFrEF (EF 30%) and RALPH vs CKD, also PVD and claudication with extensive LE arterial disease on doppler. He was diuresed while at [facility] and was found to require vascular intervention due to extensive lower extremity vascular disease. Patient was subsequently transferred to [facility] in order to rule out ischemia. He underwent a LHC on 7/10 which revealed multivessel disease. CT Surgery was consulted and patient underwent a CABG on 7/12. His hospital course was then been c/b post-op A-fib, acute on chronic renal failure with fluid overload requiring CVVH, cardiogenic shock (on Milrinone, Dobutamine, and Vasopressin gtts), and respiratory failure requiring intubation (extubated on 7/16). Patient's CBC was WNL on day of presentation to [facility]. His platelet count has been steadily downtrending since 7/12. Hematology was consulted for further evaluation. During hospitalization, patient has been on DAPT. He was also receiving prophylactic HSQ on presentation and was then on a Heparin gtt from 7/14-7/16. HIT antibody was checked on 7/15 and it was negative. Unclear if patient has received Heparin in the past.     Patient seen and examined. He had just been extubated ~ 1-2 hours earlier and was placed on facemask. Unable to obtain further ROS due to underlying lethargy. Further history was obtained per chart review.    PAST MEDICAL & SURGICAL HISTORY:  Arterial disease: peripheral  TR (tricuspid regurgitation)  MR (mitral regurgitation)  RALPH (acute kidney injury)  HFrEF (heart failure with reduced ejection fraction)  No significant past surgical history      Review of Systems: Unable to assess 2/2 lethargy    Allergies    No Known Allergies    Intolerances        Social History: Former smoker (20 pack year history, Quit in 2006), + EtOH use,     FAMILY HISTORY:  Family history of cancer (Father)      MEDICATIONS  (STANDING):  argatroban Infusion 0.5 MICROgram(s)/kG/Min (2.208 mL/Hr) IV Continuous <Continuous>  aspirin  chewable 81 milliGRAM(s) Oral daily  atorvastatin 40 milliGRAM(s) Oral at bedtime  chlorhexidine 4% Liquid 1 Application(s) Topical <User Schedule>  CRRT Treatment    <Continuous>  dextrose 50% Injectable 50 milliLiter(s) IV Push every 15 minutes  dextrose 50% Injectable 25 milliLiter(s) IV Push every 15 minutes  dextrose 50% Injectable 50 milliLiter(s) IV Push every 15 minutes  dextrose 50% Injectable 25 milliLiter(s) IV Push every 15 minutes  DOBUTamine Infusion 3 MICROgram(s)/kG/Min (6.624 mL/Hr) IV Continuous <Continuous>  docusate sodium Liquid 200 milliGRAM(s) Oral two times a day  famotidine  IVPB 20 milliGRAM(s) IV Intermittent daily  insulin lispro (HumaLOG) corrective regimen sliding scale   SubCutaneous every 6 hours  levothyroxine Injectable 25 MICROGram(s) IV Push at bedtime  milrinone Infusion 0.3 MICROgram(s)/kG/Min (6.624 mL/Hr) IV Continuous <Continuous>  Phoxillum Filtration BK 4 / 2.5 5000 milliLiter(s) (500 mL/Hr) CRRT <Continuous>  Phoxillum Filtration BK 4 / 2.5 5000 milliLiter(s) (500 mL/Hr) CRRT <Continuous>  polyethylene glycol 3350 17 Gram(s) Oral daily  PrismaSATE Dialysate BGK 4 / 2.5 5000 milliLiter(s) (1000 mL/Hr) CRRT <Continuous>  sodium chloride 0.9%. 1000 milliLiter(s) (10 mL/Hr) IV Continuous <Continuous>  vasopressin Infusion 0.05 Unit(s)/Min (3 mL/Hr) IV Continuous <Continuous>    MEDICATIONS  (PRN):  sodium chloride 0.9% lock flush 10 milliLiter(s) IV Push every 1 hour PRN Pre/post blood products, medications, blood draw, and to maintain line patency        CAPILLARY BLOOD GLUCOSE      POCT Blood Glucose.: 200 mg/dL (15 Jul 2019 19:06)    I&O's Summary    15 Jul 2019 07:01  -  16 Jul 2019 07:00  --------------------------------------------------------  IN: 2741 mL / OUT: 5232 mL / NET: -2491 mL    16 Jul 2019 07:01  -  16 Jul 2019 18:42  --------------------------------------------------------  IN: 613.9 mL / OUT: 1798 mL / NET: -1184.1 mL    Vital Signs Last 24 Hrs  T(C): 35.7 (16 Jul 2019 16:00), Max: 36.8 (16 Jul 2019 00:00)  T(F): 96.3 (16 Jul 2019 16:00), Max: 98.2 (16 Jul 2019 00:00)  HR: 81 (16 Jul 2019 18:00) (69 - 89)  BP: --  BP(mean): --  RR: 24 (16 Jul 2019 18:00) (9 - 24)  SpO2: 100% (16 Jul 2019 18:00) (98% - 100%)    PHYSICAL EXAM:  GENERAL: NAD, On facemask  HEENT: NC/AT, Slightly dry mucous membranes   NECK: Supple  CHEST/LUNG: Grossly clear anteriorly, No rhonchi appreciated  HEART: RRR; +S1/S2  ABDOMEN: +BS, Soft, NT  EXTREMITIES: No LE edema  NEUROLOGY: A bit lethargic, Moving extremities  SKIN: + Bandage seen over chest incision, Warm and dry  PSYCH: Unable to assess    LABS:                        8.8    9.1   )-----------( 57       ( 16 Jul 2019 00:28 )             25.8     07-16    132<L>  |  97  |  20  ----------------------------<  94  4.4   |  24  |  1.59<H>    Ca    8.0<L>      16 Jul 2019 00:28  Phos  2.0     07-16  Mg     2.2     07-16    TPro  6.9  /  Alb  3.6  /  TBili  1.7<H>  /  DBili  x   /  AST  200<H>  /  ALT  43  /  AlkPhos  75  07-16    PT/INR - ( 16 Jul 2019 00:28 )   PT: 13.2 sec;   INR: 1.15 ratio         PTT - ( 16 Jul 2019 00:28 )  PTT:65.3 sec          RADIOLOGY & ADDITIONAL TESTS:  Studies reviewed. HPI:  Patient is a 72 y/o M who initially presented to [facility] on 7/1/19 with shortness of breath and LE edema and was subsequently found to have newly diagnosed HFrEF (EF 30%) and RALPH vs CKD, also PVD and claudication with extensive LE arterial disease on doppler. He was diuresed while at [facility] and was found to require vascular intervention due to extensive lower extremity vascular disease. Patient was subsequently transferred to [facility] in order to rule out ischemia. He underwent a LHC on 7/10 which revealed multivessel disease. CT Surgery was consulted and patient underwent a CABG on 7/12. His hospital course was then been c/b post-op A-fib, acute on chronic renal failure with fluid overload requiring CVVH, cardiogenic shock (on Milrinone, Dobutamine, and Vasopressin gtts), and respiratory failure requiring intubation (extubated on 7/16). Patient's CBC was WNL on day of presentation to [facility]. His platelet count has been steadily downtrending since 7/12. Hematology was consulted for further evaluation. During hospitalization, patient has been on DAPT. He was also receiving prophylactic HSQ on presentation and was then on a Heparin gtt from 7/14-7/16. HIT antibody was checked on 7/15 and it was negative. Unclear if patient has received Heparin in the past.     Patient seen and examined. He had just been extubated ~ 1-2 hours earlier and was placed on facemask. Unable to obtain further ROS due to underlying lethargy. Further history was obtained per chart review.    PAST MEDICAL & SURGICAL HISTORY:  Arterial disease: peripheral  TR (tricuspid regurgitation)  MR (mitral regurgitation)  RALPH (acute kidney injury)  HFrEF (heart failure with reduced ejection fraction)  No significant past surgical history      Review of Systems: Unable to assess 2/2 lethargy    Allergies    No Known Allergies    Intolerances        Social History: Former smoker (20 pack year history, Quit in 2006), + EtOH use,     FAMILY HISTORY:  Family history of cancer (Father)      MEDICATIONS  (STANDING):  argatroban Infusion 0.5 MICROgram(s)/kG/Min (2.208 mL/Hr) IV Continuous <Continuous>  aspirin  chewable 81 milliGRAM(s) Oral daily  atorvastatin 40 milliGRAM(s) Oral at bedtime  chlorhexidine 4% Liquid 1 Application(s) Topical <User Schedule>  CRRT Treatment    <Continuous>  dextrose 50% Injectable 50 milliLiter(s) IV Push every 15 minutes  dextrose 50% Injectable 25 milliLiter(s) IV Push every 15 minutes  dextrose 50% Injectable 50 milliLiter(s) IV Push every 15 minutes  dextrose 50% Injectable 25 milliLiter(s) IV Push every 15 minutes  DOBUTamine Infusion 3 MICROgram(s)/kG/Min (6.624 mL/Hr) IV Continuous <Continuous>  docusate sodium Liquid 200 milliGRAM(s) Oral two times a day  famotidine  IVPB 20 milliGRAM(s) IV Intermittent daily  insulin lispro (HumaLOG) corrective regimen sliding scale   SubCutaneous every 6 hours  levothyroxine Injectable 25 MICROGram(s) IV Push at bedtime  milrinone Infusion 0.3 MICROgram(s)/kG/Min (6.624 mL/Hr) IV Continuous <Continuous>  Phoxillum Filtration BK 4 / 2.5 5000 milliLiter(s) (500 mL/Hr) CRRT <Continuous>  Phoxillum Filtration BK 4 / 2.5 5000 milliLiter(s) (500 mL/Hr) CRRT <Continuous>  polyethylene glycol 3350 17 Gram(s) Oral daily  PrismaSATE Dialysate BGK 4 / 2.5 5000 milliLiter(s) (1000 mL/Hr) CRRT <Continuous>  sodium chloride 0.9%. 1000 milliLiter(s) (10 mL/Hr) IV Continuous <Continuous>  vasopressin Infusion 0.05 Unit(s)/Min (3 mL/Hr) IV Continuous <Continuous>    MEDICATIONS  (PRN):  sodium chloride 0.9% lock flush 10 milliLiter(s) IV Push every 1 hour PRN Pre/post blood products, medications, blood draw, and to maintain line patency        CAPILLARY BLOOD GLUCOSE      POCT Blood Glucose.: 200 mg/dL (15 Jul 2019 19:06)    I&O's Summary    15 Jul 2019 07:01  -  16 Jul 2019 07:00  --------------------------------------------------------  IN: 2741 mL / OUT: 5232 mL / NET: -2491 mL    16 Jul 2019 07:01  -  16 Jul 2019 18:42  --------------------------------------------------------  IN: 613.9 mL / OUT: 1798 mL / NET: -1184.1 mL    Vital Signs Last 24 Hrs  T(C): 35.7 (16 Jul 2019 16:00), Max: 36.8 (16 Jul 2019 00:00)  T(F): 96.3 (16 Jul 2019 16:00), Max: 98.2 (16 Jul 2019 00:00)  HR: 81 (16 Jul 2019 18:00) (69 - 89)  BP: --  BP(mean): --  RR: 24 (16 Jul 2019 18:00) (9 - 24)  SpO2: 100% (16 Jul 2019 18:00) (98% - 100%)    PHYSICAL EXAM:  GENERAL: NAD, On facemask  HEENT: NC/AT, Slightly dry mucous membranes   NECK: Supple  CHEST/LUNG: Grossly clear anteriorly, No rhonchi appreciated  HEART: RRR; +S1/S2  ABDOMEN: +BS, Soft, NT  EXTREMITIES: No LE edema  NEUROLOGY: A bit lethargic, Moving extremities  SKIN: + Bandage seen over chest incision, Warm and dry  PSYCH: Unable to assess    LABS:                        8.8    9.1   )-----------( 57       ( 16 Jul 2019 00:28 )             25.8     07-16    132<L>  |  97  |  20  ----------------------------<  94  4.4   |  24  |  1.59<H>    Ca    8.0<L>      16 Jul 2019 00:28  Phos  2.0     07-16  Mg     2.2     07-16    TPro  6.9  /  Alb  3.6  /  TBili  1.7<H>  /  DBili  x   /  AST  200<H>  /  ALT  43  /  AlkPhos  75  07-16    PT/INR - ( 16 Jul 2019 00:28 )   PT: 13.2 sec;   INR: 1.15 ratio         PTT - ( 16 Jul 2019 00:28 )  PTT:65.3 sec          RADIOLOGY & ADDITIONAL TESTS:  Studies reviewed.     Peripheral smear reviewed: Variable RBC sizes c/w recent blood transfusion, Multiple large platelets seen which are well granulated, No schistocytes seen

## 2019-07-16 NOTE — PROGRESS NOTE ADULT - ASSESSMENT
BMT  visit note    Mrs Paul is a 50yo female, day +529 s/p auto PBSCT for MCL.        HPI: Pretty is having intestinal issues, mostly nausea and emesis basically after each larger meal. Also mid-abdominal discomfort and acidity. No sharp pain, no diarrhea. Otherwise no weight loss, no blood in emesis or stools, this GI upset is lingering since mid december.   has intermittent heamorhoids flare-up.     Wt down but now all edema is gone.       Review of Systems:  o/w neg    Physical Exam:   /72 mmHg  Pulse 81  Temp(Src) 98.4  F (36.9  C) (Oral)  Resp 16  Wt 103.103 kg (227 lb 4.8 oz)  SpO2 99%   Wt Readings from Last 4 Encounters:   01/23/17 102.83 kg (226 lb 11.2 oz)   11/07/16 109.4 kg (241 lb 2.9 oz)   08/29/16 111.993 kg (246 lb 14.4 oz)   08/17/16 114.443 kg (252 lb 4.8 oz)     General: NAD, interactive, appropriate, %   Eyes: : ROSA, sclera anicteric   Nose/Mouth/Throat: OP clear, mucosa moist,  Sl red on soft palate bilaterally; no exudate  Ears; TM clear bilat; no ext canal inflammation.  No sinus tenderness. Both nares clear for breathing.  Lungs:no crackles or wheezes  Cardiovascular: RRR, no M/R/G   Abdominal/Rectal: mild epigastic tenderness to palpation, no guarding, no RUQ or Mares sign, obese  Lymphatics: No cerv, axill supraclav nodes.  R groin soft mass smaller.   No BLE edema  Skin: No rashes or petechaie.   Neuro: A&O. Grossly non-focal.   Additional Findings: port in place; not tnder  LabS  Lab Results   Component Value Date    WBC 5.0 01/23/2017    ANEU 3.5 01/23/2017    HGB 13.2 01/23/2017    HCT 40.3 01/23/2017    * 01/23/2017     01/23/2017    POTASSIUM 3.6 01/23/2017    CHLORIDE 111* 01/23/2017    CO2 26 01/23/2017    GLC 83 01/23/2017    BUN 15 01/23/2017    CR 1.75* 01/23/2017    MAG 1.7 08/31/2015    INR 1.08 09/09/2015    AST 17 01/23/2017    ALT 26 01/23/2017     CT 1/4/2017  IMPRESSION: In this patient with a history of non-Hodgkin lymphoma:  1. No  The patient is a 73y Male with CAD, HFrEF and PVD s/p CABGx3 followed by AFib, resp failure and acute on chronic kidney injury.  - CKD: stage TBD with limited baseline data available (poor medical follow-up).   - RALPH: nonoliguric.   Now on dialysis.      RECOMMENDATIONS  - continue CVVHDF for clearance and volume removal.  At present at 100cc/hr.  Low Phos so will add phoxillium as pre and post  filter    - Feeds are Nepro   - please dose any new medications for a CrCl of 20-25 cc/min on CRRT  - avoid NSAIDs/nephrotoxins as able  -Remains on Nitric oxide and has elevated PA pressures but the nitric oxide is being weaned at present.  On Cpap at present.  Possible extubation today     Case d/w CTU team. evidence evidence of pathologically enlarged lymphadenopathy.  Stable mild central mesenteric stranding and nonpathologically  enlarged mesenteric lymph nodes.  2. Duodenal wall thickening with mildly increased mesenteric  inflammatory stranding, concerning for infection/duodenitis, this felt  less likely to represent lymphomatous involvement.  3. Colonic wall thickening of the anorectal region, appears improved  from 8/17/2016.  4. Stable bilateral partially calcified breast masses.    BONE MARROW BIOPSY 11/20/2015  Bone marrow, posterior iliac crest, right decalcified trephine biopsy and touch imprint; right particle crush, direct aspirate smear, and concentrated aspirate smear; and peripheral blood smear:  - Marrow cellularity of 50-60%, with maturing trilineage hematopoiesis, and no morphologic or immunophenotypic evidence of  recurrent/persistent B-cell lymphoma (see comment)  - Peripheral blood showing slight normochromic, normocytic anemia; slight thrombocytopenia    Assessment and Plan: 48yo female, day +589 s/p auto PBSCT for MCL.    1.MCL : Received BEAM prep. Auto PBSCT8/18/15, cell dose 6.58.   - In complete remission.   - Received Rituximab 325mg q 3 months for 2 years. This is based on promising phase 2 studies.   is managing Rituximab.   - 1 year anniversary - ongoing CR.   -on maintanance Rituximab x 2 years     2. HEME: counts excellent   - No transfusion requirements.     3. ID: Afebrile  - no active infections.   -1 year immunization     -hypogamaglobulineia due to Rituximab. Will replace IVIG next week as level is below 200.  Due insurance and no active infection - it is ok no to replace.     - PPx:take  Cont HD ACV 800mg bid resumed   - Sulfa allergy (hives). pentamidine  monthly at 's office.      4. GI: new onset Duodenal wall thickening with mildly increased mesenteric inflammatory stranding, concerning for infection/duodenitis, this felt  less likely to represent  lymphomatous involvement.    Refer to GI for EGD - will need bx to r/o Mantle cell lymphoma.      - Hemorrhoids: internal and external per patient report. Using Proctofoam. PET active - pt refused further referral to colorectal surgery which I offered her today.  - Protonix for GI prophy.     5. FEN/Renal: CKD with new TORSTEN likely due to UTI and recent hypotension. Baseline Cr 2.0.   - Cr back to baseline.      - No electrolyte SS due to CKD.   MUST NOT GET IV CONTRAST and she is aware    6. Endo:  - Hypothyroid: Cont Levothyroxine    PLAN:  Refer for EGD  Cont Protonix  Ativan prn for nausea    Carol Rose MD  Associate Professor of Medicine

## 2019-07-16 NOTE — PROGRESS NOTE ADULT - SUBJECTIVE AND OBJECTIVE BOX
NEPHROLOGY-[facility] [phone]        Patient seen and examined in bed.  He was off pressors and on CPAP  Nitric oxide is off  Still anuric at present     ROS-unable         MEDICATIONS  (STANDING):  aspirin 325 milliGRAM(s) Oral daily  atorvastatin 40 milliGRAM(s) Oral at bedtime  chlorhexidine 0.12% Liquid 15 milliLiter(s) Oral Mucosa every 12 hours  chlorhexidine 4% Liquid 1 Application(s) Topical <User Schedule>  clopidogrel Tablet 75 milliGRAM(s) Oral daily  CRRT Treatment    <Continuous>  dexmedetomidine Infusion 0.7 MICROgram(s)/kG/Hr (12.88 mL/Hr) IV Continuous <Continuous>  dextrose 50% Injectable 50 milliLiter(s) IV Push every 15 minutes  dextrose 50% Injectable 25 milliLiter(s) IV Push every 15 minutes  dextrose 50% Injectable 50 milliLiter(s) IV Push every 15 minutes  dextrose 50% Injectable 25 milliLiter(s) IV Push every 15 minutes  DOBUTamine Infusion 5 MICROgram(s)/kG/Min (11.04 mL/Hr) IV Continuous <Continuous>  docusate sodium Liquid 200 milliGRAM(s) Oral two times a day  famotidine  IVPB 20 milliGRAM(s) IV Intermittent daily  heparin  Infusion 800 Unit(s)/Hr (8 mL/Hr) IV Continuous <Continuous>  insulin lispro (HumaLOG) corrective regimen sliding scale   SubCutaneous every 6 hours  levothyroxine Injectable 25 MICROGram(s) IV Push at bedtime  milrinone Infusion 0.3 MICROgram(s)/kG/Min (6.624 mL/Hr) IV Continuous <Continuous>  Phoxillum Filtration BK 4 / 2.5 5000 milliLiter(s) (500 mL/Hr) CRRT <Continuous>  polyethylene glycol 3350 17 Gram(s) Oral daily  PrismaSATE Dialysate BGK 4 / 2.5 5000 milliLiter(s) (1000 mL/Hr) CRRT <Continuous>  PrismaSOL Filtration BGK 4 / 2.5 5000 milliLiter(s) (500 mL/Hr) CRRT <Continuous>  sodium chloride 0.9%. 1000 milliLiter(s) (10 mL/Hr) IV Continuous <Continuous>  vasopressin Infusion 0.05 Unit(s)/Min (3 mL/Hr) IV Continuous <Continuous>      VITAL:  T(C): , Max: 36.8 (07-16-19 @ 00:00)  T(F): , Max: 98.2 (07-16-19 @ 00:00)  HR: 80 (07-16-19 @ 08:45)  BP: --  BP(mean): --  RR: 11 (07-16-19 @ 08:45)  SpO2: 99% (07-16-19 @ 08:45)  Wt(kg): --    I and O's:    07-15 @ 07:01  -  07-16 @ 07:00  --------------------------------------------------------  IN: 2741 mL / OUT: 5232 mL / NET: -2491 mL    07-16 @ 07:01  -  07-16 @ 09:11  --------------------------------------------------------  IN: 31.1 mL / OUT: 214 mL / NET: -182.9 mL          PHYSICAL EXAM:    Constitutional: Intubated   Neck:  No JVD  Respiratory: transmitted upper   Cardiovascular: S1 and S2  Gastrointestinal: BS+, soft, NT/ND  Extremities: No peripheral edema  Neurological: unable   : +  Chávez  Skin: No rashes  Access: [facility]     LABS:                        8.8    9.1   )-----------( 57       ( 16 Jul 2019 00:28 )             25.8     07-16    132<L>  |  97  |  20  ----------------------------<  94  4.4   |  24  |  1.59<H>    Ca    8.0<L>      16 Jul 2019 00:28  Phos  2.0     07-16  Mg     2.2     07-16    TPro  6.9  /  Alb  3.6  /  TBili  1.7<H>  /  DBili  x   /  AST  200<H>  /  ALT  43  /  AlkPhos  75  07-16          Urine Studies:          RADIOLOGY & ADDITIONAL STUDIES:      < from: Xray Chest 1 View- PORTABLE-Routine (07.16.19 @ 02:17) >    EXAM:  XR CHEST PORTABLE ROUTINE 1V                            PROCEDURE DATE:  07/16/2019            INTERPRETATION:  A single chest x-ray was obtained on July 16, 2019.    Indication: Heart failure.    Impression:    The heart is enlarged. Improving pulmonary vascular congestion.   Endotracheal tube and NG tube are in good position. A Cook-Ramos catheter   is seen on the right and the tip is in the main pulmonary artery. No   pneumothorax.                    RITA ORTEGA M.D., ATTENDING RADIOLOGIST  This document has been electronically signed. Jul 16 2019  8:17AM                < end of copied text >

## 2019-07-16 NOTE — CONSULT NOTE ADULT - ASSESSMENT
Assessment  DMT2: 73y Male with newly diagnosed DM T2 with hyperglycemia, on insulin, blood sugars in acceptable range no hypoglycemic episode,  NPO now.  CAD: s/P CABG, on medications, stable, monitored.  HTN: Controlled,  on antihypertensive medications.      Tammie Magdaleno MD  Cell: [phone]  Office: [phone]

## 2019-07-16 NOTE — AIRWAY REMOVAL NOTE  ADULT & PEDS - ARTIFICAL AIRWAY REMOVAL COMMENTS
Written order for extubation verified. The patient was identified by full name and birth date compared to the identification band. Present during the procedure was AILEEN Kennedy and ROBER
Written order for extubation verified. Pt was identified by full name and birthday compared to ID band.  Present during the procedure was Callum RICHTER

## 2019-07-16 NOTE — CONSULT NOTE ADULT - ASSESSMENT
Patient is a 72 y/o M who was initially admitted to [facility] on 7/1 for newly diagnosed HFrEF (EF 30%), RALPH vs CKD, and PVD, transferred to [facility] for ischemic evaluation, found to have multivessel disease s/p CABG w/ hospital course c/b post-op A-fib, acute on chronic renal failure with fluid overload requiring CVVH, cardiogenic shock (on Milrinone, Dobutamine, and Vasopressin gtts), respiratory failure requiring intubation (extubated on 7/16), and new thrombocytopenia. Hematology consulted for further evaluation of thrombocytopenia.       ** NOTE INCOMPLETE ** Patient is a 72 y/o M who was initially admitted to [facility] on 7/1 for newly diagnosed HFrEF (EF 30%), RALPH vs CKD, and PVD, transferred to [facility] for ischemic evaluation, found to have multivessel disease s/p CABG w/ hospital course c/b post-op A-fib, acute on chronic renal failure with fluid overload requiring CVVH, cardiogenic shock (on Milrinone, Dobutamine, and Vasopressin gtts), respiratory failure requiring intubation (extubated on 7/16), and new thrombocytopenia. Hematology consulted for further evaluation of thrombocytopenia.     # Thrombocytopenia  - Platelet count has been steadily declining since 7/12 which was the date of patient's CABG. Thrombocytopenia may be related to consumptive process post cardiac procedure in addition to underlying active issues, including cardiogenic shock and acute on chronic renal failure with fluid overload  - Patient currently on an Argatroban gtt per CTICU team. Calculated 4T score ~ 2-3 and HIT AB was negative on 7/15. Low suspicion for HIT at this time  - Peripheral smear reviewed: Multiple large platelets seen which are well granulated (likely reactive), No schistocytes seen.   - Recommend to c/w supportive management per CTICU team and to monitor CBC for now.   - Will continue to follow    Jerry Shin, PGY4  Hematology-Oncology Fellow  Pager: [phone] Patient is a 72 y/o M who was initially admitted to [facility] on 7/1 for newly diagnosed HFrEF (EF 30%), RALPH vs CKD, and PVD, transferred to [facility] for ischemic evaluation, found to have multivessel disease s/p CABG w/ hospital course c/b post-op A-fib, acute on chronic renal failure with fluid overload requiring CVVH, cardiogenic shock (on Milrinone, Dobutamine, and Vasopressin gtts), respiratory failure requiring intubation (extubated on 7/16), and new thrombocytopenia. Hematology consulted for further evaluation of thrombocytopenia.     # Thrombocytopenia  - Platelet count has been steadily declining since 7/12 which was the date of patient's CABG. Thrombocytopenia may be related to consumptive process post cardiac procedure in addition to underlying active issues, including cardiogenic shock and acute on chronic renal failure with fluid overload  - Patient currently on an Argatroban gtt per CTICU team. Calculated 4T score ~ 2-3 and HIT AB was negative on 7/15. Low suspicion for HIT at this time  - Peripheral smear reviewed: Multiple large platelets seen which are well granulated (likely reactive), No schistocytes seen.   - Recommend to c/w supportive management per CTICU team and to monitor CBC for now. If no improvement, will need to consider alternative etiologies  - Will continue to follow    Jerry Shin, PGY4  Hematology-Oncology Fellow  Pager: [phone]

## 2019-07-16 NOTE — CONSULT NOTE ADULT - SUBJECTIVE AND OBJECTIVE BOX
HPI:  73 year old Black male h/o no implantable devices who prior to his admission to [facility] had not been followed by a doctor regularly (on no meds at home), presented to [facility] on 7/1/19 with shortness of breath and LE edema found to have newly diagnosed HFrEF (EF 30%) and RALPH vs CKD, also PVD and claudication with extensive LE arterial disease on doppler. 7/2/19 abd US: small amount of perihepatic ascited. 7/2/19 TTE: EF 30%, mild LVH, multiple regional WMA's, mod MR/TR. 7/3/19 Nuclear ST: moderate focal apical/inferolat/aterolat ischemia. 7/9/19: h/h 12.9/39.5, platelets 180, Bun/Cr 28/1.9, eGFR 42. Pt was diuresed at [facility] treated with lasix/hydralzine/nitrates/statin/ASA. No beta blockade 2/2 low blood pressures. Per [facility] discharge note, pt will need  vascular intervention d/t extensive lower extremity vascular disease. Pt transferred to [facility] today for left heart cath to rule out ischemia. (10 Jul 2019 12:52)  Patient apparently no history of diabetes ,intubated now, could not get full history.  PAST MEDICAL & SURGICAL HISTORY:  Arterial disease: peripheral  TR (tricuspid regurgitation)  MR (mitral regurgitation)  RALPH (acute kidney injury)  HFrEF (heart failure with reduced ejection fraction)  No significant past surgical history      FAMILY HISTORY:  Family history of cancer (Father)      Social History:    Outpatient Medications:    MEDICATIONS  (STANDING):  argatroban Infusion 0.5 MICROgram(s)/kG/Min (2.208 mL/Hr) IV Continuous <Continuous>  aspirin  chewable 81 milliGRAM(s) Oral daily  atorvastatin 40 milliGRAM(s) Oral at bedtime  chlorhexidine 4% Liquid 1 Application(s) Topical <User Schedule>  CRRT Treatment    <Continuous>  dextrose 50% Injectable 50 milliLiter(s) IV Push every 15 minutes  dextrose 50% Injectable 25 milliLiter(s) IV Push every 15 minutes  dextrose 50% Injectable 50 milliLiter(s) IV Push every 15 minutes  dextrose 50% Injectable 25 milliLiter(s) IV Push every 15 minutes  DOBUTamine Infusion 3 MICROgram(s)/kG/Min (6.624 mL/Hr) IV Continuous <Continuous>  docusate sodium Liquid 200 milliGRAM(s) Oral two times a day  famotidine  IVPB 20 milliGRAM(s) IV Intermittent daily  insulin lispro (HumaLOG) corrective regimen sliding scale   SubCutaneous every 6 hours  levothyroxine Injectable 25 MICROGram(s) IV Push at bedtime  milrinone Infusion 0.3 MICROgram(s)/kG/Min (6.624 mL/Hr) IV Continuous <Continuous>  Phoxillum Filtration BK 4 / 2.5 5000 milliLiter(s) (500 mL/Hr) CRRT <Continuous>  Phoxillum Filtration BK 4 / 2.5 5000 milliLiter(s) (500 mL/Hr) CRRT <Continuous>  polyethylene glycol 3350 17 Gram(s) Oral daily  PrismaSATE Dialysate BGK 4 / 2.5 5000 milliLiter(s) (1000 mL/Hr) CRRT <Continuous>  sodium chloride 0.9%. 1000 milliLiter(s) (10 mL/Hr) IV Continuous <Continuous>  vasopressin Infusion 0.05 Unit(s)/Min (3 mL/Hr) IV Continuous <Continuous>    MEDICATIONS  (PRN):  sodium chloride 0.9% lock flush 10 milliLiter(s) IV Push every 1 hour PRN Pre/post blood products, medications, blood draw, and to maintain line patency      Allergies    No Known Allergies    Intolerances        UNABLE TO OBTAIN    PHYSICAL EXAM:  VITALS: T(C): 35.7 (07-16-19 @ 16:00)  T(F): 96.3 (07-16-19 @ 16:00), Max: 98.2 (07-16-19 @ 00:00)  HR: 80 (07-16-19 @ 19:30) (69 - 89)  BP: --  RR:  (9 - 29)  SpO2:  (97% - 100%)  Wt(kg): --  GENERAL: NAD, well-groomed, well-developed  EYES: No proptosis, no lid lag  HEENT:  Atraumatic, Normocephalic  THYROID: Normal size, no palpable nodules  RESPIRATORY: Clear to auscultation bilaterally; No rales, rhonchi, wheezing  CARDIOVASCULAR: Si S2, No murmurs;  GI: Soft, non distended, normal bowel sounds  SKIN: Dry, intact, No rashes or lesions  MUSCULOSKELETAL: Has BL lower extremity edema.  NEURO:  no tremor, sensation decreased in feet BL,    POCT Blood Glucose.: 200 mg/dL (07-15-19 @ 19:06)  POCT Blood Glucose.: 96 mg/dL (07-14-19 @ 06:04)  POCT Blood Glucose.: 107 mg/dL (07-14-19 @ 04:51)  POCT Blood Glucose.: 47 mg/dL (07-14-19 @ 04:25)                            8.8    9.1   )-----------( 57       ( 16 Jul 2019 00:28 )             25.8       07-16    132<L>  |  97  |  20  ----------------------------<  94  4.4   |  24  |  1.59<H>    EGFR if : 49<L>  EGFR if non : 42<L>    Ca    8.0<L>      07-16  Mg     2.2     07-16  Phos  2.0     07-16    TPro  6.9  /  Alb  3.6  /  TBili  1.7<H>  /  DBili  x   /  AST  200<H>  /  ALT  43  /  AlkPhos  75  07-16      Thyroid Function Tests:  07-11 @ 09:34 TSH 5.78 FreeT4 -- T3 90 Anti TPO -- Anti Thyroglobulin Ab -- TSI --      Hemoglobin A1C, Whole Blood: 6.8 % <H> [4.0 - 5.6] (07-11-19 @ 08:30)          Radiology:

## 2019-07-17 LAB
ALBUMIN SERPL ELPH-MCNC: 3.6 G/DL — SIGNIFICANT CHANGE UP (ref 3.3–5)
ALP SERPL-CCNC: 86 U/L — SIGNIFICANT CHANGE UP (ref 40–120)
ALT FLD-CCNC: 56 U/L — HIGH (ref 10–45)
ANION GAP SERPL CALC-SCNC: 17 MMOL/L — SIGNIFICANT CHANGE UP (ref 5–17)
APTT BLD: 54.4 SEC — HIGH (ref 27.5–36.3)
AST SERPL-CCNC: 289 U/L — HIGH (ref 10–40)
BASE EXCESS BLDMV CALC-SCNC: -1.7 MMOL/L — SIGNIFICANT CHANGE UP (ref -3–3)
BASE EXCESS BLDMV CALC-SCNC: -1.8 MMOL/L — SIGNIFICANT CHANGE UP (ref -3–3)
BASE EXCESS BLDMV CALC-SCNC: -1.9 MMOL/L — SIGNIFICANT CHANGE UP (ref -3–3)
BASE EXCESS BLDMV CALC-SCNC: -1.9 MMOL/L — SIGNIFICANT CHANGE UP (ref -3–3)
BASE EXCESS BLDMV CALC-SCNC: -2.6 MMOL/L — SIGNIFICANT CHANGE UP (ref -3–3)
BASE EXCESS BLDMV CALC-SCNC: -2.9 MMOL/L — SIGNIFICANT CHANGE UP (ref -3–3)
BASE EXCESS BLDMV CALC-SCNC: -3.1 MMOL/L — LOW (ref -3–3)
BILIRUB SERPL-MCNC: 2.1 MG/DL — HIGH (ref 0.2–1.2)
BUN SERPL-MCNC: 21 MG/DL — SIGNIFICANT CHANGE UP (ref 7–23)
CALCIUM SERPL-MCNC: 8.2 MG/DL — LOW (ref 8.4–10.5)
CHLORIDE SERPL-SCNC: 98 MMOL/L — SIGNIFICANT CHANGE UP (ref 96–108)
CO2 BLDMV-SCNC: 23 MMOL/L — SIGNIFICANT CHANGE UP (ref 21–29)
CO2 BLDMV-SCNC: 24 MMOL/L — SIGNIFICANT CHANGE UP (ref 21–29)
CO2 SERPL-SCNC: 20 MMOL/L — LOW (ref 22–31)
CREAT SERPL-MCNC: 1.46 MG/DL — HIGH (ref 0.5–1.3)
GAS PNL BLDA: SIGNIFICANT CHANGE UP
GAS PNL BLDMV: SIGNIFICANT CHANGE UP
GLUCOSE BLDC GLUCOMTR-MCNC: 114 MG/DL — HIGH (ref 70–99)
GLUCOSE BLDC GLUCOMTR-MCNC: 127 MG/DL — HIGH (ref 70–99)
GLUCOSE SERPL-MCNC: 129 MG/DL — HIGH (ref 70–99)
HCO3 BLDMV-SCNC: 22 MMOL/L — SIGNIFICANT CHANGE UP (ref 20–28)
HCO3 BLDMV-SCNC: 23 MMOL/L — SIGNIFICANT CHANGE UP (ref 20–28)
HCT VFR BLD CALC: 25.2 % — LOW (ref 39–50)
HGB BLD-MCNC: 8.9 G/DL — LOW (ref 13–17)
HOROWITZ INDEX BLDMV+IHG-RTO: 40 — SIGNIFICANT CHANGE UP
HOROWITZ INDEX BLDMV+IHG-RTO: 50 — SIGNIFICANT CHANGE UP
MAGNESIUM SERPL-MCNC: 2.4 MG/DL — SIGNIFICANT CHANGE UP (ref 1.6–2.6)
MCHC RBC-ENTMCNC: 33.2 PG — SIGNIFICANT CHANGE UP (ref 27–34)
MCHC RBC-ENTMCNC: 35.4 GM/DL — SIGNIFICANT CHANGE UP (ref 32–36)
MCV RBC AUTO: 93.8 FL — SIGNIFICANT CHANGE UP (ref 80–100)
O2 CT VFR BLD CALC: 27 MMHG — LOW (ref 30–65)
O2 CT VFR BLD CALC: 27 MMHG — LOW (ref 30–65)
O2 CT VFR BLD CALC: 29 MMHG — LOW (ref 30–65)
O2 CT VFR BLD CALC: 32 MMHG — SIGNIFICANT CHANGE UP (ref 30–65)
O2 CT VFR BLD CALC: 33 MMHG — SIGNIFICANT CHANGE UP (ref 30–65)
PCO2 BLDMV: 39 MMHG — SIGNIFICANT CHANGE UP (ref 30–65)
PCO2 BLDMV: 40 MMHG — SIGNIFICANT CHANGE UP (ref 30–65)
PCO2 BLDMV: 41 MMHG — SIGNIFICANT CHANGE UP (ref 30–65)
PCO2 BLDMV: 42 MMHG — SIGNIFICANT CHANGE UP (ref 30–65)
PCO2 BLDMV: 43 MMHG — SIGNIFICANT CHANGE UP (ref 30–65)
PH BLDMV: 7.35 — SIGNIFICANT CHANGE UP (ref 7.32–7.45)
PH BLDMV: 7.35 — SIGNIFICANT CHANGE UP (ref 7.32–7.45)
PH BLDMV: 7.36 — SIGNIFICANT CHANGE UP (ref 7.32–7.45)
PH BLDMV: 7.37 — SIGNIFICANT CHANGE UP (ref 7.32–7.45)
PH BLDMV: 7.37 — SIGNIFICANT CHANGE UP (ref 7.32–7.45)
PHOSPHATE SERPL-MCNC: 2.9 MG/DL — SIGNIFICANT CHANGE UP (ref 2.5–4.5)
PLATELET # BLD AUTO: 64 K/UL — LOW (ref 150–400)
POTASSIUM SERPL-MCNC: 4.8 MMOL/L — SIGNIFICANT CHANGE UP (ref 3.5–5.3)
POTASSIUM SERPL-SCNC: 4.8 MMOL/L — SIGNIFICANT CHANGE UP (ref 3.5–5.3)
PROT SERPL-MCNC: 7 G/DL — SIGNIFICANT CHANGE UP (ref 6–8.3)
RBC # BLD: 2.68 M/UL — LOW (ref 4.2–5.8)
RBC # FLD: 13.5 % — SIGNIFICANT CHANGE UP (ref 10.3–14.5)
SAO2 % BLDMV: 46 % — LOW (ref 60–90)
SAO2 % BLDMV: 46 % — LOW (ref 60–90)
SAO2 % BLDMV: 49 % — LOW (ref 60–90)
SAO2 % BLDMV: 51 % — LOW (ref 60–90)
SAO2 % BLDMV: 52 % — LOW (ref 60–90)
SAO2 % BLDMV: 57 % — LOW (ref 60–90)
SAO2 % BLDMV: 61 % — SIGNIFICANT CHANGE UP (ref 60–90)
SODIUM SERPL-SCNC: 135 MMOL/L — SIGNIFICANT CHANGE UP (ref 135–145)
SRA INTERP SER-IMP: SIGNIFICANT CHANGE UP
T4 FREE SERPL-MCNC: 1.3 NG/DL — SIGNIFICANT CHANGE UP (ref 0.9–1.8)
TSH SERPL-MCNC: 1.56 UIU/ML — SIGNIFICANT CHANGE UP (ref 0.27–4.2)
WBC # BLD: 10 K/UL — SIGNIFICANT CHANGE UP (ref 3.8–10.5)
WBC # FLD AUTO: 10 K/UL — SIGNIFICANT CHANGE UP (ref 3.8–10.5)

## 2019-07-17 PROCEDURE — 93306 TTE W/DOPPLER COMPLETE: CPT | Mod: 26

## 2019-07-17 PROCEDURE — 99291 CRITICAL CARE FIRST HOUR: CPT

## 2019-07-17 PROCEDURE — 71045 X-RAY EXAM CHEST 1 VIEW: CPT | Mod: 26

## 2019-07-17 RX ORDER — FENTANYL CITRATE 50 UG/ML
25 INJECTION INTRAVENOUS ONCE
Refills: 0 | Status: DISCONTINUED | OUTPATIENT
Start: 2019-07-17 | End: 2019-07-17

## 2019-07-17 RX ORDER — EPINEPHRINE 0.3 MG/.3ML
0.04 INJECTION INTRAMUSCULAR; SUBCUTANEOUS
Qty: 4 | Refills: 0 | Status: DISCONTINUED | OUTPATIENT
Start: 2019-07-17 | End: 2019-07-18

## 2019-07-17 RX ORDER — FAMOTIDINE 10 MG/ML
20 INJECTION INTRAVENOUS DAILY
Refills: 0 | Status: DISCONTINUED | OUTPATIENT
Start: 2019-07-17 | End: 2019-07-19

## 2019-07-17 RX ADMIN — CHLORHEXIDINE GLUCONATE 1 APPLICATION(S): 213 SOLUTION TOPICAL at 05:33

## 2019-07-17 RX ADMIN — VASOPRESSIN 3 UNIT(S)/MIN: 20 INJECTION INTRAVENOUS at 00:17

## 2019-07-17 RX ADMIN — SODIUM CHLORIDE 10 MILLILITER(S): 9 INJECTION INTRAMUSCULAR; INTRAVENOUS; SUBCUTANEOUS at 10:25

## 2019-07-17 RX ADMIN — FENTANYL CITRATE 25 MICROGRAM(S): 50 INJECTION INTRAVENOUS at 00:30

## 2019-07-17 RX ADMIN — FENTANYL CITRATE 25 MICROGRAM(S): 50 INJECTION INTRAVENOUS at 22:55

## 2019-07-17 RX ADMIN — MILRINONE LACTATE 6.62 MICROGRAM(S)/KG/MIN: 1 INJECTION, SOLUTION INTRAVENOUS at 10:25

## 2019-07-17 RX ADMIN — SODIUM CHLORIDE 10 MILLILITER(S): 9 INJECTION INTRAMUSCULAR; INTRAVENOUS; SUBCUTANEOUS at 00:18

## 2019-07-17 RX ADMIN — FENTANYL CITRATE 25 MICROGRAM(S): 50 INJECTION INTRAVENOUS at 00:00

## 2019-07-17 RX ADMIN — MILRINONE LACTATE 6.62 MICROGRAM(S)/KG/MIN: 1 INJECTION, SOLUTION INTRAVENOUS at 22:17

## 2019-07-17 RX ADMIN — ARGATROBAN 2.21 MICROGRAM(S)/KG/MIN: 50 INJECTION, SOLUTION INTRAVENOUS at 10:24

## 2019-07-17 RX ADMIN — Medication 11.04 MICROGRAM(S)/KG/MIN: at 10:24

## 2019-07-17 RX ADMIN — ARGATROBAN 2.21 MICROGRAM(S)/KG/MIN: 50 INJECTION, SOLUTION INTRAVENOUS at 00:17

## 2019-07-17 RX ADMIN — Medication 16.56 MICROGRAM(S)/KG/MIN: at 00:17

## 2019-07-17 RX ADMIN — VASOPRESSIN 3 UNIT(S)/MIN: 20 INJECTION INTRAVENOUS at 22:17

## 2019-07-17 RX ADMIN — EPINEPHRINE 10 MICROGRAM(S)/KG/MIN: 0.3 INJECTION INTRAMUSCULAR; SUBCUTANEOUS at 10:24

## 2019-07-17 RX ADMIN — POLYETHYLENE GLYCOL 3350 17 GRAM(S): 17 POWDER, FOR SOLUTION ORAL at 12:47

## 2019-07-17 RX ADMIN — ATORVASTATIN CALCIUM 40 MILLIGRAM(S): 80 TABLET, FILM COATED ORAL at 22:16

## 2019-07-17 RX ADMIN — Medication 25 MICROGRAM(S): at 22:16

## 2019-07-17 RX ADMIN — FENTANYL CITRATE 25 MICROGRAM(S): 50 INJECTION INTRAVENOUS at 23:05

## 2019-07-17 RX ADMIN — Medication 81 MILLIGRAM(S): at 12:47

## 2019-07-17 RX ADMIN — MILRINONE LACTATE 6.62 MICROGRAM(S)/KG/MIN: 1 INJECTION, SOLUTION INTRAVENOUS at 00:17

## 2019-07-17 RX ADMIN — VASOPRESSIN 3 UNIT(S)/MIN: 20 INJECTION INTRAVENOUS at 10:24

## 2019-07-17 RX ADMIN — FAMOTIDINE 20 MILLIGRAM(S): 10 INJECTION INTRAVENOUS at 12:47

## 2019-07-17 NOTE — PROGRESS NOTE ADULT - SUBJECTIVE AND OBJECTIVE BOX
CRITICAL CARE ATTENDING - CTICU    MEDICATIONS  (STANDING):  argatroban Infusion 0.5 MICROgram(s)/kG/Min (2.208 mL/Hr) IV Continuous <Continuous>  aspirin  chewable 81 milliGRAM(s) Oral daily  atorvastatin 40 milliGRAM(s) Oral at bedtime  chlorhexidine 4% Liquid 1 Application(s) Topical <User Schedule>  CRRT Treatment    <Continuous>  dextrose 50% Injectable 50 milliLiter(s) IV Push every 15 minutes  dextrose 50% Injectable 25 milliLiter(s) IV Push every 15 minutes  dextrose 50% Injectable 50 milliLiter(s) IV Push every 15 minutes  dextrose 50% Injectable 25 milliLiter(s) IV Push every 15 minutes  DOBUTamine Infusion 5 MICROgram(s)/kG/Min (11.04 mL/Hr) IV Continuous <Continuous>  docusate sodium Liquid 200 milliGRAM(s) Oral two times a day  EPINEPHrine    Infusion 0.036 MICROgram(s)/kG/Min (10 mL/Hr) IV Continuous <Continuous>  famotidine Injectable 20 milliGRAM(s) IV Push daily  insulin lispro (HumaLOG) corrective regimen sliding scale   SubCutaneous every 6 hours  levothyroxine Injectable 25 MICROGram(s) IV Push at bedtime  milrinone Infusion 0.3 MICROgram(s)/kG/Min (6.624 mL/Hr) IV Continuous <Continuous>  Phoxillum Filtration BK 4 / 2.5 5000 milliLiter(s) (400 mL/Hr) CRRT <Continuous>  Phoxillum Filtration BK 4 / 2.5 5000 milliLiter(s) (400 mL/Hr) CRRT <Continuous>  Phoxillum Filtration BK 4 / 2.5 5000 milliLiter(s) (800 mL/Hr) CRRT <Continuous>  polyethylene glycol 3350 17 Gram(s) Oral daily  sodium chloride 0.9%. 1000 milliLiter(s) (10 mL/Hr) IV Continuous <Continuous>  vasopressin Infusion 0.05 Unit(s)/Min (3 mL/Hr) IV Continuous <Continuous>                                    8.9    10.0  )-----------( 64       ( 2019 01:23 )             25.2       -    135  |  98  |  21  ----------------------------<  129<H>  4.8   |  20<L>  |  1.46<H>    Ca    8.2<L>      2019 01:23  Phos  2.9       Mg     2.4         TPro  7.0  /  Alb  3.6  /  TBili  2.1<H>  /  DBili  x   /  AST  289<H>  /  ALT  56<H>  /  AlkPhos  86        PT/INR - ( 2019 00:28 )   PT: 13.2 sec;   INR: 1.15 ratio         PTT - ( 2019 01:23 )  PTT:54.4 sec    Mode: standby      Daily     Daily Weight in k.8 (2019 00:00)       @ 07:  -   @ 07:00  --------------------------------------------------------  IN: 1445.5 mL / OUT: 4370 mL / NET: -2924.5 mL     @ 07: @ 12:24  --------------------------------------------------------  IN: 90.2 mL / OUT: 710 mL / NET: -619.8 mL        Critically Ill patient  : [ ] preoperative ,   [ x] post operative    Requires :  [x ] Arterial Line   [ x] Central Line  [ x] PA catheter  [ ] IABP  [ ] ECMO  [ ] LVAD  [ ] Ventilator  [x ] pacemaker [ ] Impella.                      [ x] ABG's     [x ] Pulse Oxymetry Monitoring  Bedside evaluation , monitoring , treatment of hemodynamics , fluids , IVP/ IVCD meds.        Diagnosis:     POD5 - CABG X 3 L    CHF- acute [ x]   chronic [x ]    systolic [x ]   diatolic [ ]          - Echo- EF -    40%         [ ] RV dysfunction          - Cxr-cardiomegally, edema          - Clinical-  [x ]inotropes   [x ]pressors   [ ]diuresis   [ ]IABP   [ ]ECMO   [ ]LVAD   [x ]Respiratory Failure    fluid overload      Renal Failure - Acute Kidney Injury     CVVHD   -200cc/hr    respiratory failure - HF o2    Requires chest PT, pulmonary toilet, suctioning to maintain SaO2,  patent airway and treat atelectasis.     Hemodynamic lability,instability. Requires IVCD [ x] vasopressors [x ] inotropes  [ ] vasodilator  [ ]IVSS fluid  to maintain MAP, perfusion, C.I.     Harwood Ramos catheter interpretation and therapeutic management of unstable hemodynamics     Thrombocytopenia     Requires bedside physical therapy, mobilization and total [facility] care.     IVCD anticoagulation with [ ] Heparin  [x ] Argatroban for A Fib / PVD    Bradycardia    Temporary pacemaker (TPM) interrogation and setting.    Requires    [x ]VVI    temporary pacing at 90 min      to maintain HR, MAP, CI, and perfusion.                           -                       Discussed with CT surgeon, Physician's Assistant - Nurse Practitioner- Critical care medicine team.   Dicussed at  AM / PM rounds.   Chart, labs , films reviewed.    Total Time: 30 min

## 2019-07-17 NOTE — PROGRESS NOTE ADULT - SUBJECTIVE AND OBJECTIVE BOX
SURGERY DAILY PROGRESS NOTE: Vascular Surgery     SUBJECTIVE Paged received from CTU NANNETTE Bennett in regards to loss of pedal signals this morning.   Interval History: Patient is s/p CABG 19. Patient reintubated on 19 for respiratory distress. Started on CVVHD yesterday night. Patient extubated yesterday and started on high flow nasal canula. Patient currently on epinephrine gtt, dobutamine gtt, vasopressin gtt and milirinone gtt.     Patient seen and evaluated at the bedside with Dr. Rankin and Dr. Osborn. Patient is a poor history, however he denies pain in bilateral lower extremities and tingling. Endorses numbness in the bilateral lower extremities.      OBJECTIVE:    Vital Signs Last 24 Hrs  T(C): 36.5 (2019 08:00), Max: 36.5 (2019 08:00)  T(F): 97.7 (2019 08:00), Max: 97.7 (2019 08:00)  HR: 97 (2019 10:00) (79 - 97)  BP: --  BP(mean): --  RR: 16 (2019 10:00) (8 - 33)  SpO2: 98% (2019 10:00) (82% - 100%)  I&O's Detail    2019 07:01  -  2019 07:00  --------------------------------------------------------  IN:    argatroban Infusion: 39.6 mL    dexmedetomidine Infusion: 38.5 mL    DOBUTamine Infusion: 77 mL    DOBUTamine Infusion: 11 mL    DOBUTamine Infusion: 66 mL    DOBUTamine Infusion: 11 mL    DOBUTamine Infusion: 33 mL    DOBUTamine Infusion: 94 mL    Enteral Tube Flush: 60 mL    EPINEPHrine Infusion: 50 mL    heparin Infusion: 45 mL    IV PiggyBack: 100 mL    milrinone  Infusion: 158.4 mL    Packed Red Blood Cells: 350 mL    sodium chloride 0.9%.: 240 mL    vasopressin Infusion: 72 mL  Total IN: 1445.5 mL    OUT:    Other: 4370 mL  Total OUT: 4370 mL    Total NET: -2924.5 mL      2019 07:01  -  2019 12:20  --------------------------------------------------------  IN:    argatroban Infusion: 6.6 mL    DOBUTamine Infusion: 33 mL    EPINEPHrine Infusion: 23.8 mL    milrinone  Infusion: 19.8 mL    vasopressin Infusion: 7 mL  Total IN: 90.2 mL    OUT:    Other: 710 mL  Total OUT: 710 mL    Total NET: -619.8 mL        Daily     Daily Weight in k.8 (2019 00:00)  MEDICATIONS  (STANDING):  argatroban Infusion 0.5 MICROgram(s)/kG/Min (2.208 mL/Hr) IV Continuous <Continuous>  aspirin  chewable 81 milliGRAM(s) Oral daily  atorvastatin 40 milliGRAM(s) Oral at bedtime  chlorhexidine 4% Liquid 1 Application(s) Topical <User Schedule>  CRRT Treatment    <Continuous>  dextrose 50% Injectable 50 milliLiter(s) IV Push every 15 minutes  dextrose 50% Injectable 25 milliLiter(s) IV Push every 15 minutes  dextrose 50% Injectable 50 milliLiter(s) IV Push every 15 minutes  dextrose 50% Injectable 25 milliLiter(s) IV Push every 15 minutes  DOBUTamine Infusion 5 MICROgram(s)/kG/Min (11.04 mL/Hr) IV Continuous <Continuous>  docusate sodium Liquid 200 milliGRAM(s) Oral two times a day  EPINEPHrine    Infusion 0.036 MICROgram(s)/kG/Min (10 mL/Hr) IV Continuous <Continuous>  famotidine Injectable 20 milliGRAM(s) IV Push daily  insulin lispro (HumaLOG) corrective regimen sliding scale   SubCutaneous every 6 hours  levothyroxine Injectable 25 MICROGram(s) IV Push at bedtime  milrinone Infusion 0.3 MICROgram(s)/kG/Min (6.624 mL/Hr) IV Continuous <Continuous>  Phoxillum Filtration BK 4 / 2.5 5000 milliLiter(s) (400 mL/Hr) CRRT <Continuous>  Phoxillum Filtration BK 4 / 2.5 5000 milliLiter(s) (400 mL/Hr) CRRT <Continuous>  Phoxillum Filtration BK 4 / 2.5 5000 milliLiter(s) (800 mL/Hr) CRRT <Continuous>  polyethylene glycol 3350 17 Gram(s) Oral daily  sodium chloride 0.9%. 1000 milliLiter(s) (10 mL/Hr) IV Continuous <Continuous>  vasopressin Infusion 0.05 Unit(s)/Min (3 mL/Hr) IV Continuous <Continuous>    MEDICATIONS  (PRN):  sodium chloride 0.9% lock flush 10 milliLiter(s) IV Push every 1 hour PRN Pre/post blood products, medications, blood draw, and to maintain line patency      LABS:                        8.9    10.0  )-----------( 64       ( 2019 01:23 )             25.2     07-    135  |  98  |  21  ----------------------------<  129<H>  4.8   |  20<L>  |  1.46<H>    Ca    8.2<L>      2019 01:23  Phos  2.9       Mg     2.4         TPro  7.0  /  Alb  3.6  /  TBili  2.1<H>  /  DBili  x   /  AST  289<H>  /  ALT  56<H>  /  AlkPhos  86      PT/INR - ( 2019 00:28 )   PT: 13.2 sec;   INR: 1.15 ratio         PTT - ( 2019 01:23 )  PTT:54.4 sec        PHYSICAL EXAM:  Constitutional: NAD, resting comfortably in the bed   Respiratory: nonn labored breathing on high flow nasal cannula  Extremities: B/L lower extremities cool to touch from the just above the ankle down to the toes, movement of toes present, no dopplerable DP/PT bilaterally

## 2019-07-17 NOTE — PROGRESS NOTE ADULT - SUBJECTIVE AND OBJECTIVE BOX
[facility] [phone]        Patient seen and examined in bed.  He was extubated and was originally on 50% and now on high flow  Off Nitric at present   Agartoban gtt at present   Pulm pressures are still elevated and he is on Epi gtt and Vaso         MEDICATIONS  (STANDING):  argatroban Infusion 0.5 MICROgram(s)/kG/Min (2.208 mL/Hr) IV Continuous <Continuous>  aspirin  chewable 81 milliGRAM(s) Oral daily  atorvastatin 40 milliGRAM(s) Oral at bedtime  chlorhexidine 4% Liquid 1 Application(s) Topical <User Schedule>  CRRT Treatment    <Continuous>  dextrose 50% Injectable 50 milliLiter(s) IV Push every 15 minutes  dextrose 50% Injectable 25 milliLiter(s) IV Push every 15 minutes  dextrose 50% Injectable 50 milliLiter(s) IV Push every 15 minutes  dextrose 50% Injectable 25 milliLiter(s) IV Push every 15 minutes  DOBUTamine Infusion 5 MICROgram(s)/kG/Min (11.04 mL/Hr) IV Continuous <Continuous>  docusate sodium Liquid 200 milliGRAM(s) Oral two times a day  EPINEPHrine    Infusion 0.036 MICROgram(s)/kG/Min (10 mL/Hr) IV Continuous <Continuous>  famotidine Injectable 20 milliGRAM(s) IV Push daily  insulin lispro (HumaLOG) corrective regimen sliding scale   SubCutaneous every 6 hours  levothyroxine Injectable 25 MICROGram(s) IV Push at bedtime  milrinone Infusion 0.3 MICROgram(s)/kG/Min (6.624 mL/Hr) IV Continuous <Continuous>  Phoxillum Filtration BK 4 / 2.5 5000 milliLiter(s) (500 mL/Hr) CRRT <Continuous>  Phoxillum Filtration BK 4 / 2.5 5000 milliLiter(s) (500 mL/Hr) CRRT <Continuous>  polyethylene glycol 3350 17 Gram(s) Oral daily  PrismaSATE Dialysate BGK 4 / 2.5 5000 milliLiter(s) (1000 mL/Hr) CRRT <Continuous>  sodium chloride 0.9%. 1000 milliLiter(s) (10 mL/Hr) IV Continuous <Continuous>  vasopressin Infusion 0.05 Unit(s)/Min (3 mL/Hr) IV Continuous <Continuous>      VITAL:  T(C): , Max: 36.5 (07-17-19 @ 08:00)  T(F): , Max: 97.7 (07-17-19 @ 08:00)  HR: 97 (07-17-19 @ 08:44)  BP: --  BP(mean): --  RR: 18 (07-17-19 @ 08:44)  SpO2: 99% (07-17-19 @ 08:44)  Wt(kg): --    I and O's:    07-16 @ 07:01  -  07-17 @ 07:00  --------------------------------------------------------  IN: 1445.5 mL / OUT: 4370 mL / NET: -2924.5 mL    07-17 @ 07:01  -  07-17 @ 08:51  --------------------------------------------------------  IN: 31.8 mL / OUT: 0 mL / NET: 31.8 mL          PHYSICAL EXAM:    Constitutional: NAD on high flow   Neck:  No JVD  Respiratory: poor effort   Cardiovascular: S1 and S2  Gastrointestinal: BS+, soft, NT/ND  Extremities: No peripheral edema  Neurological: A/O x 3, no focal deficits  Psychiatric: Normal mood, normal affect  : No Chávez  Skin: No rashes  Access: [facility]     LABS:                        8.9    10.0  )-----------( 64       ( 17 Jul 2019 01:23 )             25.2     07-17    135  |  98  |  21  ----------------------------<  129<H>  4.8   |  20<L>  |  1.46<H>    Ca    8.2<L>      17 Jul 2019 01:23  Phos  2.9     07-17  Mg     2.4     07-17    TPro  7.0  /  Alb  3.6  /  TBili  2.1<H>  /  DBili  x   /  AST  289<H>  /  ALT  56<H>  /  AlkPhos  86  07-17          Urine Studies:          RADIOLOGY & ADDITIONAL STUDIES:        < from: Xray Chest 1 View- PORTABLE-Routine (07.17.19 @ 02:54) >    EXAM:  XR CHEST PORTABLE ROUTINE 1V                            PROCEDURE DATE:  07/17/2019            INTERPRETATION:  A single chest x-ray was obtained on July 17, 2019.    Indication: Status post cardiac surgery.    Impression:    The heart is markedly enlarged. The lungs appear to be clear.   Endotracheal tube and NG tube were removed. The [place]-Ramos catheter is   seen on the right and the tip is in the main pulmonary artery. No   pneumothorax. Status post sternotomy. The overall appearance of the chest   remain unchanged when compared to previous study done July 16, 2019.                    RITA ORTEGA M.D., ATTENDING RADIOLOGIST  This document has been electronically signed. Jul 17 2019  7:34AM                < end of copied text >

## 2019-07-17 NOTE — PROGRESS NOTE ADULT - ASSESSMENT
Assessment  DMT2: 73y Male with newly diagnosed DM T2 with hyperglycemia, on insulin, blood sugars in acceptable range no hypoglycemic episode, extubated now, on oxygen.  CAD: s/P CABG, on medications, stable, monitored.  HTN: Controlled,  on antihypertensive medications.      Tammie Magdaleno MD  Cell: [phone]  Office: [phone]

## 2019-07-17 NOTE — PROGRESS NOTE ADULT - ASSESSMENT
The patient is a 73y Male with CAD, HFrEF and PVD s/p CABGx3 followed by AFib, resp failure and acute on chronic kidney injury.  - CKD: stage TBD with limited baseline data available (poor medical follow-up).   - RALPH: nonoliguric.   Now on dialysis.  -Successful extubation          RECOMMENDATIONS  - continue CVVHDF for clearance and volume removal.  At present at 200cc/hr.  Low Phos has improved on the phoxillium as pre and post  filter    - Feeds are Nepro   - please dose any new medications for a CrCl of 20-25 cc/min on CRRT  - avoid NSAIDs/nephrotoxins as able  -Elevated PA pressures remain and try to maintain 's   -Cont to follow LE pulses    -Argatroban gtt     Case d/w CTU team.

## 2019-07-17 NOTE — PROGRESS NOTE ADULT - SUBJECTIVE AND OBJECTIVE BOX
Chief complaint  Patient is a 73y old  Male who presents with a chief complaint of PVD, S/p CABG (16 Jul 2019 18:41)   Review of systems  Patient in bed, looks comfortable, no fever, no hypoglycemia.    Labs and Fingersticks  CAPILLARY BLOOD GLUCOSE      POCT Blood Glucose.: 127 mg/dL (17 Jul 2019 12:11)      Anion Gap, Serum: 17 (07-17 @ 01:23)  Anion Gap, Serum: 11 (07-16 @ 00:28)      Calcium, Total Serum: 8.2 <L> (07-17 @ 01:23)  Calcium, Total Serum: 8.0 <L> (07-16 @ 00:28)  Albumin, Serum: 3.6 (07-17 @ 01:23)  Albumin, Serum: 3.6 (07-16 @ 00:28)    Alanine Aminotransferase (ALT/SGPT): 56 <H> (07-17 @ 01:23)  Alanine Aminotransferase (ALT/SGPT): 43 (07-16 @ 00:28)  Alkaline Phosphatase, Serum: 86 (07-17 @ 01:23)  Alkaline Phosphatase, Serum: 75 (07-16 @ 00:28)  Aspartate Aminotransferase (AST/SGOT): 289 <H> (07-17 @ 01:23)  Aspartate Aminotransferase (AST/SGOT): 200 <H> (07-16 @ 00:28)        07-17    135  |  98  |  21  ----------------------------<  129<H>  4.8   |  20<L>  |  1.46<H>    Ca    8.2<L>      17 Jul 2019 01:23  Phos  2.9     07-17  Mg     2.4     07-17    TPro  7.0  /  Alb  3.6  /  TBili  2.1<H>  /  DBili  x   /  AST  289<H>  /  ALT  56<H>  /  AlkPhos  86  07-17                        8.9    10.0  )-----------( 64       ( 17 Jul 2019 01:23 )             25.2     Medications  MEDICATIONS  (STANDING):  argatroban Infusion 0.5 MICROgram(s)/kG/Min (2.208 mL/Hr) IV Continuous <Continuous>  aspirin  chewable 81 milliGRAM(s) Oral daily  atorvastatin 40 milliGRAM(s) Oral at bedtime  chlorhexidine 4% Liquid 1 Application(s) Topical <User Schedule>  CRRT Treatment    <Continuous>  dextrose 50% Injectable 50 milliLiter(s) IV Push every 15 minutes  dextrose 50% Injectable 25 milliLiter(s) IV Push every 15 minutes  dextrose 50% Injectable 50 milliLiter(s) IV Push every 15 minutes  dextrose 50% Injectable 25 milliLiter(s) IV Push every 15 minutes  DOBUTamine Infusion 5 MICROgram(s)/kG/Min (11.04 mL/Hr) IV Continuous <Continuous>  docusate sodium Liquid 200 milliGRAM(s) Oral two times a day  EPINEPHrine    Infusion 0.036 MICROgram(s)/kG/Min (10 mL/Hr) IV Continuous <Continuous>  famotidine Injectable 20 milliGRAM(s) IV Push daily  insulin lispro (HumaLOG) corrective regimen sliding scale   SubCutaneous every 6 hours  levothyroxine Injectable 25 MICROGram(s) IV Push at bedtime  milrinone Infusion 0.3 MICROgram(s)/kG/Min (6.624 mL/Hr) IV Continuous <Continuous>  Phoxillum Filtration BK 4 / 2.5 5000 milliLiter(s) (400 mL/Hr) CRRT <Continuous>  Phoxillum Filtration BK 4 / 2.5 5000 milliLiter(s) (400 mL/Hr) CRRT <Continuous>  Phoxillum Filtration BK 4 / 2.5 5000 milliLiter(s) (800 mL/Hr) CRRT <Continuous>  polyethylene glycol 3350 17 Gram(s) Oral daily  sodium chloride 0.9%. 1000 milliLiter(s) (10 mL/Hr) IV Continuous <Continuous>  vasopressin Infusion 0.05 Unit(s)/Min (3 mL/Hr) IV Continuous <Continuous>      Physical Exam  General: Patient comfortable in bed  Vital Signs Last 12 Hrs  T(F): 98.2 (07-17-19 @ 12:00), Max: 98.2 (07-17-19 @ 12:00)  HR: 94 (07-17-19 @ 16:15) (86 - 97)  BP: --  BP(mean): --  RR: 24 (07-17-19 @ 16:15) (8 - 41)  SpO2: 97% (07-17-19 @ 16:15) (96% - 100%)  Neck: No palpable thyroid nodules.  CVS: S1S2, No murmurs  Respiratory: No wheezing, no crepitations  GI: Abdomen soft, bowel sounds positive  Musculoskeletal:  edema lower extremities.   Skin: No skin rashes, no ecchymosis    Diagnostics    Free Thyroxine, Serum: AM Sched. Collection: 17-Jul-2019 06:00 (07-16 @ 06:29)  Thyroid Stimulating Hormone, Serum: AM Sched. Collection: 17-Jul-2019 06:00 (07-16 @ 06:29)

## 2019-07-17 NOTE — PROGRESS NOTE ADULT - ASSESSMENT
73 year old male found to have newly diagnosed HFrEF, RALPH vs CKD, PVD with extensive LE arterial disease underwent further testing which revealed multivessel CAD now s/p CABG 7/12. Now on CVVHD and pressor support with loss of pedal signals.     -wean off pressors as tolerated  -continue to keep extremities warm   -Pt seen with Dr. Rankin, plan discussed with CTU NANNETTE Arvizu and Sabrina Alarcon PA-C   [id number]

## 2019-07-18 LAB
ANION GAP SERPL CALC-SCNC: 19 MMOL/L — HIGH (ref 5–17)
APTT BLD: 47.7 SEC — HIGH (ref 27.5–36.3)
APTT BLD: 65.1 SEC — HIGH (ref 27.5–36.3)
APTT BLD: 80.5 SEC — HIGH (ref 27.5–36.3)
BUN SERPL-MCNC: 25 MG/DL — HIGH (ref 7–23)
CALCIUM SERPL-MCNC: 8.6 MG/DL — SIGNIFICANT CHANGE UP (ref 8.4–10.5)
CHLORIDE SERPL-SCNC: 97 MMOL/L — SIGNIFICANT CHANGE UP (ref 96–108)
CO2 SERPL-SCNC: 18 MMOL/L — LOW (ref 22–31)
CREAT SERPL-MCNC: 1.59 MG/DL — HIGH (ref 0.5–1.3)
GAS PNL BLDA: SIGNIFICANT CHANGE UP
GLUCOSE BLDC GLUCOMTR-MCNC: 103 MG/DL — HIGH (ref 70–99)
GLUCOSE BLDC GLUCOMTR-MCNC: 178 MG/DL — HIGH (ref 70–99)
GLUCOSE BLDC GLUCOMTR-MCNC: 182 MG/DL — HIGH (ref 70–99)
GLUCOSE SERPL-MCNC: 115 MG/DL — HIGH (ref 70–99)
HCT VFR BLD CALC: 28.2 % — LOW (ref 39–50)
HGB BLD-MCNC: 9.5 G/DL — LOW (ref 13–17)
INR BLD: 2.2 RATIO — HIGH (ref 0.88–1.16)
MAGNESIUM SERPL-MCNC: 2.6 MG/DL — SIGNIFICANT CHANGE UP (ref 1.6–2.6)
MCHC RBC-ENTMCNC: 31.6 PG — SIGNIFICANT CHANGE UP (ref 27–34)
MCHC RBC-ENTMCNC: 33.8 GM/DL — SIGNIFICANT CHANGE UP (ref 32–36)
MCV RBC AUTO: 93.6 FL — SIGNIFICANT CHANGE UP (ref 80–100)
PHOSPHATE SERPL-MCNC: 4 MG/DL — SIGNIFICANT CHANGE UP (ref 2.5–4.5)
PLATELET # BLD AUTO: 74 K/UL — LOW (ref 150–400)
POTASSIUM SERPL-MCNC: 4.6 MMOL/L — SIGNIFICANT CHANGE UP (ref 3.5–5.3)
POTASSIUM SERPL-SCNC: 4.6 MMOL/L — SIGNIFICANT CHANGE UP (ref 3.5–5.3)
PROTHROM AB SERPL-ACNC: 25.7 SEC — HIGH (ref 10–12.9)
RBC # BLD: 3.02 M/UL — LOW (ref 4.2–5.8)
RBC # FLD: 13.7 % — SIGNIFICANT CHANGE UP (ref 10.3–14.5)
SODIUM SERPL-SCNC: 134 MMOL/L — LOW (ref 135–145)
WBC # BLD: 13 K/UL — HIGH (ref 3.8–10.5)
WBC # FLD AUTO: 13 K/UL — HIGH (ref 3.8–10.5)

## 2019-07-18 PROCEDURE — 71045 X-RAY EXAM CHEST 1 VIEW: CPT | Mod: 26

## 2019-07-18 PROCEDURE — 99291 CRITICAL CARE FIRST HOUR: CPT

## 2019-07-18 RX ORDER — OXYCODONE HYDROCHLORIDE 5 MG/1
10 TABLET ORAL EVERY 4 HOURS
Refills: 0 | Status: DISCONTINUED | OUTPATIENT
Start: 2019-07-18 | End: 2019-07-22

## 2019-07-18 RX ORDER — FENTANYL CITRATE 50 UG/ML
25 INJECTION INTRAVENOUS ONCE
Refills: 0 | Status: DISCONTINUED | OUTPATIENT
Start: 2019-07-18 | End: 2019-07-18

## 2019-07-18 RX ORDER — DEXMEDETOMIDINE HYDROCHLORIDE IN 0.9% SODIUM CHLORIDE 4 UG/ML
0.5 INJECTION INTRAVENOUS
Qty: 200 | Refills: 0 | Status: DISCONTINUED | OUTPATIENT
Start: 2019-07-18 | End: 2019-07-19

## 2019-07-18 RX ORDER — HEPARIN SODIUM 5000 [USP'U]/ML
900 INJECTION INTRAVENOUS; SUBCUTANEOUS
Qty: 25000 | Refills: 0 | Status: DISCONTINUED | OUTPATIENT
Start: 2019-07-18 | End: 2019-07-22

## 2019-07-18 RX ORDER — OXYCODONE HYDROCHLORIDE 5 MG/1
5 TABLET ORAL EVERY 4 HOURS
Refills: 0 | Status: DISCONTINUED | OUTPATIENT
Start: 2019-07-18 | End: 2019-07-22

## 2019-07-18 RX ADMIN — FENTANYL CITRATE 25 MICROGRAM(S): 50 INJECTION INTRAVENOUS at 13:10

## 2019-07-18 RX ADMIN — HEPARIN SODIUM 9 UNIT(S)/HR: 5000 INJECTION INTRAVENOUS; SUBCUTANEOUS at 23:23

## 2019-07-18 RX ADMIN — Medication 11.04 MICROGRAM(S)/KG/MIN: at 05:41

## 2019-07-18 RX ADMIN — DEXMEDETOMIDINE HYDROCHLORIDE IN 0.9% SODIUM CHLORIDE 9.2 MICROGRAM(S)/KG/HR: 4 INJECTION INTRAVENOUS at 21:44

## 2019-07-18 RX ADMIN — FENTANYL CITRATE 25 MICROGRAM(S): 50 INJECTION INTRAVENOUS at 09:35

## 2019-07-18 RX ADMIN — Medication 1: at 12:19

## 2019-07-18 RX ADMIN — FAMOTIDINE 20 MILLIGRAM(S): 10 INJECTION INTRAVENOUS at 12:16

## 2019-07-18 RX ADMIN — MILRINONE LACTATE 6.62 MICROGRAM(S)/KG/MIN: 1 INJECTION, SOLUTION INTRAVENOUS at 21:45

## 2019-07-18 RX ADMIN — ATORVASTATIN CALCIUM 40 MILLIGRAM(S): 80 TABLET, FILM COATED ORAL at 21:44

## 2019-07-18 RX ADMIN — Medication 25 MICROGRAM(S): at 21:44

## 2019-07-18 RX ADMIN — VASOPRESSIN 3 UNIT(S)/MIN: 20 INJECTION INTRAVENOUS at 21:45

## 2019-07-18 RX ADMIN — VASOPRESSIN 3 UNIT(S)/MIN: 20 INJECTION INTRAVENOUS at 05:41

## 2019-07-18 RX ADMIN — Medication 1: at 17:42

## 2019-07-18 RX ADMIN — ARGATROBAN 2.21 MICROGRAM(S)/KG/MIN: 50 INJECTION, SOLUTION INTRAVENOUS at 05:41

## 2019-07-18 RX ADMIN — CHLORHEXIDINE GLUCONATE 1 APPLICATION(S): 213 SOLUTION TOPICAL at 05:43

## 2019-07-18 RX ADMIN — FENTANYL CITRATE 25 MICROGRAM(S): 50 INJECTION INTRAVENOUS at 09:20

## 2019-07-18 RX ADMIN — FENTANYL CITRATE 25 MICROGRAM(S): 50 INJECTION INTRAVENOUS at 18:55

## 2019-07-18 RX ADMIN — FENTANYL CITRATE 25 MICROGRAM(S): 50 INJECTION INTRAVENOUS at 19:10

## 2019-07-18 RX ADMIN — FENTANYL CITRATE 25 MICROGRAM(S): 50 INJECTION INTRAVENOUS at 13:25

## 2019-07-18 RX ADMIN — Medication 5.52 MICROGRAM(S)/KG/MIN: at 21:45

## 2019-07-18 RX ADMIN — MILRINONE LACTATE 6.62 MICROGRAM(S)/KG/MIN: 1 INJECTION, SOLUTION INTRAVENOUS at 05:41

## 2019-07-18 RX ADMIN — Medication 81 MILLIGRAM(S): at 12:16

## 2019-07-18 RX ADMIN — OXYCODONE HYDROCHLORIDE 5 MILLIGRAM(S): 5 TABLET ORAL at 17:05

## 2019-07-18 RX ADMIN — OXYCODONE HYDROCHLORIDE 5 MILLIGRAM(S): 5 TABLET ORAL at 16:35

## 2019-07-18 NOTE — PROGRESS NOTE ADULT - ASSESSMENT
Assessment  DMT2: 73y Male with newly diagnosed DM T2 with hyperglycemia, on insulin, FS improving,  no hypoglycemic episode, eating partial meals, in CTICU.  CAD: s/P CABG, on medications, stable, monitored.  HTN: Controlled,  on antihypertensive medications.      Tammie Magdaleno MD  Cell: [phone]  Office: [phone]

## 2019-07-18 NOTE — PROGRESS NOTE ADULT - ASSESSMENT
The patient is a 73y Male with CAD, HFrEF and PVD s/p CABGx3 followed by AFib, resp failure and acute on chronic kidney injury.  - CKD: stage TBD with limited baseline data available (poor medical follow-up).   - RALPH: nonoliguric.   Now on dialysis.  -Heparin gtt  -Midodrine gtt  -Dobutamine gtt  -Vasopressors gtt      RECOMMENDATIONS  - continue CVVHDF for clearance and volume removal.  At present at 200cc/hr.  Low Phos has improved on the phoxillium as pre and post  filter    - Feeds are Nepro   - please dose any new medications for a CrCl of 20-25 cc/min on CRRT  - avoid NSAIDs/nephrotoxins as able  -Elevated PA pressures remain and try to maintain 's   -Cont to follow BLAIRE starkey        Case d/w CTU team. The patient is a 73y Male with CAD, HFrEF and PVD s/p CABGx3 followed by AFib, resp failure and acute on chronic kidney injury.  - CKD: stage TBD with limited baseline data available (poor medical follow-up).   - RALPH: oliguric.   Now on dialysis.  - Hyponatremia       RECOMMENDATIONS  - continue CVVHDF for clearance and volume removal.  At present at 200cc/hr but will now reduce to 100cc/hr and i suspect can cont taper .  Change back to prismisate and prismosol   - Feeds are Nepro   - please dose any new medications for a CrCl of 20-25 cc/min on CRRT  - avoid NSAIDs/nephrotoxins as able  -Elevated PA pressures remain and try to maintain 's   -Cont to follow BLAIRE strakey        Case d/w CTU team.

## 2019-07-18 NOTE — PROGRESS NOTE ADULT - SUBJECTIVE AND OBJECTIVE BOX
No pain, no shortness of breath      VITAL:  T(C): , Max: 37.2 (07-17-19 @ 20:00)  T(F): , Max: 99 (07-17-19 @ 20:00)  HR: 84 (07-18-19 @ 09:53)  BP: 126/50 (07-17-19 @ 19:45)  BP(mean): 85 (07-17-19 @ 19:45)  RR: 18 (07-18-19 @ 09:53)  SpO2: 97% (07-18-19 @ 09:53)  Wt(kg): --      PHYSICAL EXAM:    Constitutional: NAD; Alert  HEENT:  NCAT; DMM  Neck: No JVD; supple  Respiratory: CTA-b/l  Cardiac: RRR s1s2  Gastrointestinal: BS+, soft, NT/ND  Urologic: No law  Extremities: No peripheral edema  Back: No CVAT b/l    LABS:                        9.5    13.0  )-----------( 73       ( 18 Jul 2019 03:07 )             28.2     Na(134)/K(4.6)/Cl(97)/HCO3(18)/BUN(25)/Cr(1.59)Glu(115)/Ca(8.6)/Mg(2.6)/PO4(4.0)    07-18 @ 03:07  Na(135)/K(4.8)/Cl(98)/HCO3(20)/BUN(21)/Cr(1.46)Glu(129)/Ca(8.2)/Mg(2.4)/PO4(2.9)    07-17 @ 01:23  Na(132)/K(4.4)/Cl(97)/HCO3(24)/BUN(20)/Cr(1.59)Glu(94)/Ca(8.0)/Mg(2.2)/PO4(2.0)    07-16 @ 00:28                  Stacey Leonardo NP-C  [employer]  [phone] [facility] [phone]        Patient seen and examined in bed.  He is extubated and on NC  Pt is on Inotropes at present and Vaso gtt     ROS-+weakness + fatigue; all other ros were reviewed and were negative         MEDICATIONS  (STANDING):  aspirin  chewable 81 milliGRAM(s) Oral daily  atorvastatin 40 milliGRAM(s) Oral at bedtime  chlorhexidine 4% Liquid 1 Application(s) Topical <User Schedule>  CRRT Treatment    <Continuous>  dextrose 50% Injectable 50 milliLiter(s) IV Push every 15 minutes  dextrose 50% Injectable 25 milliLiter(s) IV Push every 15 minutes  dextrose 50% Injectable 50 milliLiter(s) IV Push every 15 minutes  dextrose 50% Injectable 25 milliLiter(s) IV Push every 15 minutes  DOBUTamine Infusion 2.5 MICROgram(s)/kG/Min (5.52 mL/Hr) IV Continuous <Continuous>  docusate sodium Liquid 200 milliGRAM(s) Oral two times a day  famotidine Injectable 20 milliGRAM(s) IV Push daily  heparin  Infusion 900 Unit(s)/Hr (9 mL/Hr) IV Continuous <Continuous>  insulin lispro (HumaLOG) corrective regimen sliding scale   SubCutaneous every 6 hours  levothyroxine Injectable 25 MICROGram(s) IV Push at bedtime  milrinone Infusion 0.3 MICROgram(s)/kG/Min (6.624 mL/Hr) IV Continuous <Continuous>  Phoxillum Filtration BK 4 / 2.5 5000 milliLiter(s) (400 mL/Hr) CRRT <Continuous>  Phoxillum Filtration BK 4 / 2.5 5000 milliLiter(s) (400 mL/Hr) CRRT <Continuous>  Phoxillum Filtration BK 4 / 2.5 5000 milliLiter(s) (800 mL/Hr) CRRT <Continuous>  polyethylene glycol 3350 17 Gram(s) Oral daily  sodium chloride 0.9%. 1000 milliLiter(s) (10 mL/Hr) IV Continuous <Continuous>  vasopressin Infusion 0.05 Unit(s)/Min (3 mL/Hr) IV Continuous <Continuous>      VITAL:  T(C): , Max: 37.2 (07-17-19 @ 20:00)  T(F): , Max: 99 (07-17-19 @ 20:00)  HR: 84 (07-18-19 @ 09:53)  BP: 126/50 (07-17-19 @ 19:45)  BP(mean): 85 (07-17-19 @ 19:45)  RR: 18 (07-18-19 @ 09:53)  SpO2: 97% (07-18-19 @ 09:53)  Wt(kg): --    I and O's:    07-17 @ 07:01  -  07-18 @ 07:00  --------------------------------------------------------  IN: 1649.3 mL / OUT: 6199 mL / NET: -4549.7 mL    07-18 @ 07:01  -  07-18 @ 10:23  --------------------------------------------------------  IN: 197.7 mL / OUT: 396 mL / NET: -198.3 mL          PHYSICAL EXAM:    Constitutional: NAD  Neck:  No JVD  Respiratory: CTAB/L  Cardiovascular: S1 and S2  Gastrointestinal: BS+, soft, NT/ND  Extremities: No peripheral edema  Neurological: A/O x 3, no focal deficits  Psychiatric: Normal mood, normal affect  : No Chávez  Skin: No rashes  Access: left [facility]     LABS:                        9.5    13.0  )-----------( 73       ( 18 Jul 2019 03:07 )             28.2     07-18    134<L>  |  97  |  25<H>  ----------------------------<  115<H>  4.6   |  18<L>  |  1.59<H>    Ca    8.6      18 Jul 2019 03:07  Phos  4.0     07-18  Mg     2.6     07-18    TPro  7.0  /  Alb  3.6  /  TBili  2.1<H>  /  DBili  x   /  AST  289<H>  /  ALT  56<H>  /  AlkPhos  86  07-17          Urine Studies:          RADIOLOGY & ADDITIONAL STUDIES:    < from: Xray Chest 1 View- PORTABLE-Routine (07.17.19 @ 02:54) >    EXAM:  XR CHEST PORTABLE ROUTINE 1V                            PROCEDURE DATE:  07/17/2019            INTERPRETATION:  A single chest x-ray was obtained on July 17, 2019.    Indication: Status post cardiac surgery.    Impression:    The heart is markedly enlarged. The lungs appear to be clear.   Endotracheal tube and NG tube were removed. The [place]-Ramos catheter is   seen on the right and the tip is in the main pulmonary artery. No   pneumothorax. Status post sternotomy. The overall appearance of the chest   remain unchanged when compared to previous study done July 16, 2019.                    RITA ORTEGA M.D., ATTENDING RADIOLOGIST  This document has been electronically signed. Jul 17 2019  7:34AM                < end of copied text >

## 2019-07-18 NOTE — PROGRESS NOTE ADULT - SUBJECTIVE AND OBJECTIVE BOX
Chief complaint  Patient is a 73y old  Male who presents with a chief complaint of PVD, S/p CABG (16 Jul 2019 18:41)   Review of systems  Patient in bed, looks comfortable, no fever, no hypoglycemia.    Labs and Fingersticks  CAPILLARY BLOOD GLUCOSE  94 (18 Jul 2019 05:00)  103 (18 Jul 2019 00:00)      POCT Blood Glucose.: 103 mg/dL (18 Jul 2019 00:47)  POCT Blood Glucose.: 114 mg/dL (17 Jul 2019 17:41)  POCT Blood Glucose.: 127 mg/dL (17 Jul 2019 12:11)      Anion Gap, Serum: 19 <H> (07-18 @ 03:07)  Anion Gap, Serum: 17 (07-17 @ 01:23)      Calcium, Total Serum: 8.6 (07-18 @ 03:07)  Calcium, Total Serum: 8.2 <L> (07-17 @ 01:23)  Albumin, Serum: 3.6 (07-17 @ 01:23)    Alanine Aminotransferase (ALT/SGPT): 56 <H> (07-17 @ 01:23)  Alkaline Phosphatase, Serum: 86 (07-17 @ 01:23)  Aspartate Aminotransferase (AST/SGOT): 289 <H> (07-17 @ 01:23)        07-18    134<L>  |  97  |  25<H>  ----------------------------<  115<H>  4.6   |  18<L>  |  1.59<H>    Ca    8.6      18 Jul 2019 03:07  Phos  4.0     07-18  Mg     2.6     07-18    TPro  7.0  /  Alb  3.6  /  TBili  2.1<H>  /  DBili  x   /  AST  289<H>  /  ALT  56<H>  /  AlkPhos  86  07-17                        9.5    13.0  )-----------( 73       ( 18 Jul 2019 03:07 )             28.2     Medications  MEDICATIONS  (STANDING):  aspirin  chewable 81 milliGRAM(s) Oral daily  atorvastatin 40 milliGRAM(s) Oral at bedtime  chlorhexidine 4% Liquid 1 Application(s) Topical <User Schedule>  CRRT Treatment    <Continuous>  dextrose 50% Injectable 50 milliLiter(s) IV Push every 15 minutes  dextrose 50% Injectable 25 milliLiter(s) IV Push every 15 minutes  dextrose 50% Injectable 50 milliLiter(s) IV Push every 15 minutes  dextrose 50% Injectable 25 milliLiter(s) IV Push every 15 minutes  DOBUTamine Infusion 2.5 MICROgram(s)/kG/Min (5.52 mL/Hr) IV Continuous <Continuous>  docusate sodium Liquid 200 milliGRAM(s) Oral two times a day  famotidine Injectable 20 milliGRAM(s) IV Push daily  heparin  Infusion 900 Unit(s)/Hr (9 mL/Hr) IV Continuous <Continuous>  insulin lispro (HumaLOG) corrective regimen sliding scale   SubCutaneous every 6 hours  levothyroxine Injectable 25 MICROGram(s) IV Push at bedtime  milrinone Infusion 0.3 MICROgram(s)/kG/Min (6.624 mL/Hr) IV Continuous <Continuous>  Phoxillum Filtration BK 4 / 2.5 5000 milliLiter(s) (400 mL/Hr) CRRT <Continuous>  Phoxillum Filtration BK 4 / 2.5 5000 milliLiter(s) (400 mL/Hr) CRRT <Continuous>  Phoxillum Filtration BK 4 / 2.5 5000 milliLiter(s) (800 mL/Hr) CRRT <Continuous>  polyethylene glycol 3350 17 Gram(s) Oral daily  sodium chloride 0.9%. 1000 milliLiter(s) (10 mL/Hr) IV Continuous <Continuous>  vasopressin Infusion 0.05 Unit(s)/Min (3 mL/Hr) IV Continuous <Continuous>      Physical Exam  General: Patient comfortable in bed  Vital Signs Last 12 Hrs  T(F): 97.2 (07-18-19 @ 07:00), Max: 98.1 (07-18-19 @ 00:00)  HR: 84 (07-18-19 @ 09:53) (84 - 97)  BP: --  BP(mean): --  RR: 18 (07-18-19 @ 09:53) (18 - 32)  SpO2: 97% (07-18-19 @ 09:53) (93% - 98%)  Neck: No palpable thyroid nodules.  CVS: S1S2, No murmurs  Respiratory: No wheezing, no crepitations  GI: Abdomen soft, bowel sounds positive  Musculoskeletal:  edema lower extremities.   Skin: No skin rashes, no ecchymosis    Diagnostics    Free Thyroxine, Serum: AM Sched. Collection: 17-Jul-2019 06:00 (07-16 @ 06:29)  Thyroid Stimulating Hormone, Serum: AM Sched. Collection: 17-Jul-2019 06:00 (07-16 @ 06:29)

## 2019-07-18 NOTE — PROGRESS NOTE ADULT - SUBJECTIVE AND OBJECTIVE BOX
CRITICAL CARE ATTENDING - CTICU    MEDICATIONS  (STANDING):  aspirin  chewable 81 milliGRAM(s) Oral daily  atorvastatin 40 milliGRAM(s) Oral at bedtime  chlorhexidine 4% Liquid 1 Application(s) Topical <User Schedule>  CRRT Treatment    <Continuous>  dextrose 50% Injectable 50 milliLiter(s) IV Push every 15 minutes  dextrose 50% Injectable 25 milliLiter(s) IV Push every 15 minutes  dextrose 50% Injectable 50 milliLiter(s) IV Push every 15 minutes  dextrose 50% Injectable 25 milliLiter(s) IV Push every 15 minutes  DOBUTamine Infusion 2.5 MICROgram(s)/kG/Min (5.52 mL/Hr) IV Continuous <Continuous>  docusate sodium Liquid 200 milliGRAM(s) Oral two times a day  famotidine Injectable 20 milliGRAM(s) IV Push daily  heparin  Infusion 900 Unit(s)/Hr (9 mL/Hr) IV Continuous <Continuous>  insulin lispro (HumaLOG) corrective regimen sliding scale   SubCutaneous every 6 hours  levothyroxine Injectable 25 MICROGram(s) IV Push at bedtime  milrinone Infusion 0.3 MICROgram(s)/kG/Min (6.624 mL/Hr) IV Continuous <Continuous>  polyethylene glycol 3350 17 Gram(s) Oral daily  PrismaSATE Dialysate BGK 4 / 2.5 5000 milliLiter(s) (1000 mL/Hr) CRRT <Continuous>  PrismaSOL Filtration BGK 4 / 2.5 5000 milliLiter(s) (400 mL/Hr) CRRT <Continuous>  PrismaSOL Filtration BGK 4 / 2.5 5000 milliLiter(s) (400 mL/Hr) CRRT <Continuous>  sodium chloride 0.9%. 1000 milliLiter(s) (10 mL/Hr) IV Continuous <Continuous>  vasopressin Infusion 0.05 Unit(s)/Min (3 mL/Hr) IV Continuous <Continuous>                                    9.5    13.0  )-----------( 73       ( 2019 03:07 )             28.2       07-18    134<L>  |  97  |  25<H>  ----------------------------<  115<H>  4.6   |  18<L>  |  1.59<H>    Ca    8.6      2019 03:07  Phos  4.0     07-18  Mg     2.6         TPro  7.0  /  Alb  3.6  /  TBili  2.1<H>  /  DBili  x   /  AST  289<H>  /  ALT  56<H>  /  AlkPhos  86        PT/INR - ( 2019 03:07 )   PT: 25.7 sec;   INR: 2.20 ratio         PTT - ( 2019 03:07 )  PTT:65.1 sec        Daily     Daily Weight in k (2019 03:00)       @ 07:  -   @ 07:00  --------------------------------------------------------  IN: 1649.3 mL / OUT: 6199 mL / NET: -4549.7 mL     @ 07:01  -   @ 11:59  --------------------------------------------------------  IN: 229.8 mL / OUT: 526 mL / NET: -296.2 mL        Critically Ill patient  : [ ] preoperative ,   [x ] post operative    Requires :  [x ] Arterial Line   [x ] Central Line  [ x] PA catheter  [ ] IABP  [ ] ECMO  [ ] LVAD  [ ] Ventilator  [x ] pacemaker [ ] Impella.                      [ x] ABG's     [x ] Pulse Oxymetry Monitoring  Bedside evaluation , monitoring , treatment of hemodynamics , fluids , IVP/ IVCD meds.        Diagnosis:     POD 6  - CABG X 3 L    Cardiogenic Shock - resolved    CHF- acute [ x]   chronic [x ]    systolic [x ]   diatolic [x ]          - Echo- EF -  35%           [ ] RV dysfunction          - Cxr-cardiomegally, edema          - Clinical-  [ x]inotropes   [x ]pressors   [ ]diuresis   [ ]IABP   [ ]ECMO   [ ]LVAD   [x ]Respiratory Failure    Hemodynamic lability,instability. Requires IVCD [x ] vasopressors [x ] inotropes  [ ] vasodilator  [ ]IVSS fluid  to maintain MAP, perfusion, C.I.     respiratory failure    hypoxia - on / off HF O2    fluid overload      Renal Failure - Acute Kidney Injury     CVVHD   -100cc/hr    Requires bedside physical therapy, mobilization and total longterm care.     Tolerates NG / NJ feeds at [x ] goal rate    [ ] trophic rate    [ ]       rate     Temporary pacemaker (TPM) interrogation and setting.     [place] Ramos catheter interpretation and therapeutic management of unstable hemodynamics    Thrombocytopenia     Hyponatremia     Requires chest PT, pulmonary toilet, suctioning to maintain SaO2,  patent airway and treat atelectasis.     IVCD anticoagulation with [x ] Heparin  [ ] Argatroban for  AFib / PVD                    -                     Discussed with CT surgeon, Physician's Assistant - Nurse Practitioner- Critical care medicine team.   Dicussed at  AM / PM rounds.   Chart, labs , films reviewed.    Total Time: 30 min

## 2019-07-19 LAB
ALBUMIN SERPL ELPH-MCNC: 3.7 G/DL — SIGNIFICANT CHANGE UP (ref 3.3–5)
ALP SERPL-CCNC: 101 U/L — SIGNIFICANT CHANGE UP (ref 40–120)
ALT FLD-CCNC: 123 U/L — HIGH (ref 10–45)
ANION GAP SERPL CALC-SCNC: 16 MMOL/L — SIGNIFICANT CHANGE UP (ref 5–17)
ANION GAP SERPL CALC-SCNC: 19 MMOL/L — HIGH (ref 5–17)
APTT BLD: 54.7 SEC — HIGH (ref 27.5–36.3)
APTT BLD: 55.9 SEC — HIGH (ref 27.5–36.3)
APTT BLD: 67.6 SEC — HIGH (ref 27.5–36.3)
AST SERPL-CCNC: 423 U/L — HIGH (ref 10–40)
BASE EXCESS BLDMV CALC-SCNC: -1 MMOL/L — SIGNIFICANT CHANGE UP (ref -3–3)
BASE EXCESS BLDV CALC-SCNC: -2.3 MMOL/L — LOW (ref -2–2)
BILIRUB SERPL-MCNC: 3 MG/DL — HIGH (ref 0.2–1.2)
BUN SERPL-MCNC: 27 MG/DL — HIGH (ref 7–23)
BUN SERPL-MCNC: 36 MG/DL — HIGH (ref 7–23)
CA-I SERPL-SCNC: 1.12 MMOL/L — SIGNIFICANT CHANGE UP (ref 1.12–1.3)
CALCIUM SERPL-MCNC: 8.1 MG/DL — LOW (ref 8.4–10.5)
CALCIUM SERPL-MCNC: 8.6 MG/DL — SIGNIFICANT CHANGE UP (ref 8.4–10.5)
CHLORIDE BLDV-SCNC: 106 MMOL/L — SIGNIFICANT CHANGE UP (ref 96–108)
CHLORIDE SERPL-SCNC: 97 MMOL/L — SIGNIFICANT CHANGE UP (ref 96–108)
CHLORIDE SERPL-SCNC: 98 MMOL/L — SIGNIFICANT CHANGE UP (ref 96–108)
CO2 BLDMV-SCNC: 25 MMOL/L — SIGNIFICANT CHANGE UP (ref 21–29)
CO2 BLDV-SCNC: 23 MMOL/L — SIGNIFICANT CHANGE UP (ref 22–30)
CO2 SERPL-SCNC: 20 MMOL/L — LOW (ref 22–31)
CO2 SERPL-SCNC: 20 MMOL/L — LOW (ref 22–31)
CREAT SERPL-MCNC: 1.58 MG/DL — HIGH (ref 0.5–1.3)
CREAT SERPL-MCNC: 2.08 MG/DL — HIGH (ref 0.5–1.3)
GAS PNL BLDA: SIGNIFICANT CHANGE UP
GAS PNL BLDMV: SIGNIFICANT CHANGE UP
GAS PNL BLDV: 135 MMOL/L — SIGNIFICANT CHANGE UP (ref 135–145)
GAS PNL BLDV: SIGNIFICANT CHANGE UP
GLUCOSE BLDC GLUCOMTR-MCNC: 159 MG/DL — HIGH (ref 70–99)
GLUCOSE BLDC GLUCOMTR-MCNC: 181 MG/DL — HIGH (ref 70–99)
GLUCOSE BLDV-MCNC: 165 MG/DL — HIGH (ref 70–99)
GLUCOSE SERPL-MCNC: 125 MG/DL — HIGH (ref 70–99)
GLUCOSE SERPL-MCNC: 179 MG/DL — HIGH (ref 70–99)
HCO3 BLDMV-SCNC: 23 MMOL/L — SIGNIFICANT CHANGE UP (ref 20–28)
HCO3 BLDV-SCNC: 22 MMOL/L — SIGNIFICANT CHANGE UP (ref 21–29)
HCT VFR BLD CALC: 26 % — LOW (ref 39–50)
HCT VFR BLD CALC: 27.4 % — LOW (ref 39–50)
HCT VFR BLDA CALC: 25 % — LOW (ref 39–50)
HGB BLD CALC-MCNC: 8.1 G/DL — LOW (ref 13–17)
HGB BLD-MCNC: 8.8 G/DL — LOW (ref 13–17)
HGB BLD-MCNC: 9.3 G/DL — LOW (ref 13–17)
HOROWITZ INDEX BLDMV+IHG-RTO: 36 — SIGNIFICANT CHANGE UP
INR BLD: 1.71 RATIO — HIGH (ref 0.88–1.16)
LACTATE BLDV-MCNC: 1.3 MMOL/L — SIGNIFICANT CHANGE UP (ref 0.7–2)
MAGNESIUM SERPL-MCNC: 2.6 MG/DL — SIGNIFICANT CHANGE UP (ref 1.6–2.6)
MCHC RBC-ENTMCNC: 31.8 PG — SIGNIFICANT CHANGE UP (ref 27–34)
MCHC RBC-ENTMCNC: 31.8 PG — SIGNIFICANT CHANGE UP (ref 27–34)
MCHC RBC-ENTMCNC: 34 GM/DL — SIGNIFICANT CHANGE UP (ref 32–36)
MCHC RBC-ENTMCNC: 34.1 GM/DL — SIGNIFICANT CHANGE UP (ref 32–36)
MCV RBC AUTO: 93.4 FL — SIGNIFICANT CHANGE UP (ref 80–100)
MCV RBC AUTO: 93.7 FL — SIGNIFICANT CHANGE UP (ref 80–100)
O2 CT VFR BLD CALC: 30 MMHG — SIGNIFICANT CHANGE UP (ref 30–65)
OB PNL STL: POSITIVE
OTHER CELLS CSF MANUAL: 6 ML/DL — LOW (ref 18–22)
PCO2 BLDMV: 40 MMHG — SIGNIFICANT CHANGE UP (ref 30–65)
PCO2 BLDV: 38 MMHG — SIGNIFICANT CHANGE UP (ref 35–50)
PH BLDMV: 7.38 — SIGNIFICANT CHANGE UP (ref 7.32–7.45)
PH BLDV: 7.38 — SIGNIFICANT CHANGE UP (ref 7.35–7.45)
PHOSPHATE SERPL-MCNC: 3.1 MG/DL — SIGNIFICANT CHANGE UP (ref 2.5–4.5)
PLATELET # BLD AUTO: 73 K/UL — LOW (ref 150–400)
PLATELET # BLD AUTO: 81 K/UL — LOW (ref 150–400)
PO2 BLDV: 34 MMHG — SIGNIFICANT CHANGE UP (ref 25–45)
POTASSIUM BLDV-SCNC: 4.2 MMOL/L — SIGNIFICANT CHANGE UP (ref 3.5–5.3)
POTASSIUM SERPL-MCNC: 4.9 MMOL/L — SIGNIFICANT CHANGE UP (ref 3.5–5.3)
POTASSIUM SERPL-MCNC: 5 MMOL/L — SIGNIFICANT CHANGE UP (ref 3.5–5.3)
POTASSIUM SERPL-SCNC: 4.9 MMOL/L — SIGNIFICANT CHANGE UP (ref 3.5–5.3)
POTASSIUM SERPL-SCNC: 5 MMOL/L — SIGNIFICANT CHANGE UP (ref 3.5–5.3)
PROT SERPL-MCNC: 7.5 G/DL — SIGNIFICANT CHANGE UP (ref 6–8.3)
PROTHROM AB SERPL-ACNC: 19.8 SEC — HIGH (ref 10–12.9)
RBC # BLD: 2.78 M/UL — LOW (ref 4.2–5.8)
RBC # BLD: 2.92 M/UL — LOW (ref 4.2–5.8)
RBC # FLD: 13.5 % — SIGNIFICANT CHANGE UP (ref 10.3–14.5)
RBC # FLD: 13.5 % — SIGNIFICANT CHANGE UP (ref 10.3–14.5)
SAO2 % BLDMV: 49 % — LOW (ref 60–90)
SAO2 % BLDV: 56 % — LOW (ref 67–88)
SODIUM SERPL-SCNC: 134 MMOL/L — LOW (ref 135–145)
SODIUM SERPL-SCNC: 136 MMOL/L — SIGNIFICANT CHANGE UP (ref 135–145)
WBC # BLD: 12.6 K/UL — HIGH (ref 3.8–10.5)
WBC # BLD: 12.9 K/UL — HIGH (ref 3.8–10.5)
WBC # FLD AUTO: 12.6 K/UL — HIGH (ref 3.8–10.5)
WBC # FLD AUTO: 12.9 K/UL — HIGH (ref 3.8–10.5)

## 2019-07-19 PROCEDURE — 99291 CRITICAL CARE FIRST HOUR: CPT

## 2019-07-19 PROCEDURE — 71045 X-RAY EXAM CHEST 1 VIEW: CPT | Mod: 26

## 2019-07-19 RX ORDER — LEVOTHYROXINE SODIUM 125 MCG
50 TABLET ORAL DAILY
Refills: 0 | Status: DISCONTINUED | OUTPATIENT
Start: 2019-07-19 | End: 2019-07-26

## 2019-07-19 RX ORDER — INSULIN LISPRO 100/ML
VIAL (ML) SUBCUTANEOUS
Refills: 0 | Status: DISCONTINUED | OUTPATIENT
Start: 2019-07-19 | End: 2019-07-23

## 2019-07-19 RX ORDER — NOREPINEPHRINE BITARTRATE/D5W 8 MG/250ML
0.05 PLASTIC BAG, INJECTION (ML) INTRAVENOUS
Qty: 8 | Refills: 0 | Status: DISCONTINUED | OUTPATIENT
Start: 2019-07-19 | End: 2019-07-21

## 2019-07-19 RX ORDER — FAMOTIDINE 10 MG/ML
20 INJECTION INTRAVENOUS DAILY
Refills: 0 | Status: DISCONTINUED | OUTPATIENT
Start: 2019-07-19 | End: 2019-07-19

## 2019-07-19 RX ORDER — FENTANYL CITRATE 50 UG/ML
25 INJECTION INTRAVENOUS ONCE
Refills: 0 | Status: DISCONTINUED | OUTPATIENT
Start: 2019-07-19 | End: 2019-07-19

## 2019-07-19 RX ORDER — INSULIN LISPRO 100/ML
VIAL (ML) SUBCUTANEOUS AT BEDTIME
Refills: 0 | Status: DISCONTINUED | OUTPATIENT
Start: 2019-07-19 | End: 2019-07-23

## 2019-07-19 RX ORDER — PANTOPRAZOLE SODIUM 20 MG/1
8 TABLET, DELAYED RELEASE ORAL
Qty: 80 | Refills: 0 | Status: DISCONTINUED | OUTPATIENT
Start: 2019-07-19 | End: 2019-07-20

## 2019-07-19 RX ADMIN — CHLORHEXIDINE GLUCONATE 1 APPLICATION(S): 213 SOLUTION TOPICAL at 06:39

## 2019-07-19 RX ADMIN — FENTANYL CITRATE 25 MICROGRAM(S): 50 INJECTION INTRAVENOUS at 16:00

## 2019-07-19 RX ADMIN — Medication 5.52 MICROGRAM(S)/KG/MIN: at 18:00

## 2019-07-19 RX ADMIN — PANTOPRAZOLE SODIUM 10 MG/HR: 20 TABLET, DELAYED RELEASE ORAL at 14:30

## 2019-07-19 RX ADMIN — Medication 81 MILLIGRAM(S): at 12:01

## 2019-07-19 RX ADMIN — Medication 6.9 MICROGRAM(S)/KG/MIN: at 13:02

## 2019-07-19 RX ADMIN — FENTANYL CITRATE 25 MICROGRAM(S): 50 INJECTION INTRAVENOUS at 15:45

## 2019-07-19 RX ADMIN — Medication 1: at 12:01

## 2019-07-19 RX ADMIN — FAMOTIDINE 20 MILLIGRAM(S): 10 INJECTION INTRAVENOUS at 12:01

## 2019-07-19 RX ADMIN — VASOPRESSIN 3 UNIT(S)/MIN: 20 INJECTION INTRAVENOUS at 18:01

## 2019-07-19 RX ADMIN — MILRINONE LACTATE 6.62 MICROGRAM(S)/KG/MIN: 1 INJECTION, SOLUTION INTRAVENOUS at 18:01

## 2019-07-19 RX ADMIN — HEPARIN SODIUM 8 UNIT(S)/HR: 5000 INJECTION INTRAVENOUS; SUBCUTANEOUS at 18:47

## 2019-07-19 RX ADMIN — Medication 1: at 18:01

## 2019-07-19 RX ADMIN — Medication 50 MICROGRAM(S): at 17:59

## 2019-07-19 NOTE — PROGRESS NOTE ADULT - SUBJECTIVE AND OBJECTIVE BOX
SYLVIA ROSA   MRN#: [id number]     The patient is a 73y Male who was seen, evaluated, & examined with the CTICU staff post-operatively with a multidisciplinary care plan formulated & implemented.  All available clinical, laboratory, radiographic, pharmacologic, and electrocardiographic data were reviewed & analyzed.      The patient was in the CTICU in critical condition secondary to:     persistent cardiopulmonary dysfunction  cardiogenic shock-cardiovascular dysfunction    For support and evaluation & prevention of further decompensation secondary to persistent cardiopulmonary dysfunction and cardiogenic shock-cardiovascular dysfunction, respiratory status required:     supplemental oxygen with nasal cannula  continuous pulse oximetry monitoring  following ABGs with A-line monitoring    Invasive hemodynamic monitoring with     a PA catheter was required to follow serial CIs/MVO2s   an A-line was required for continuous MAP/BP monitoring     to ensure adequate cardiovascular support and to evaluate for & help prevent decompensation while receiving     IV Vasopressin infusion  IV Dobutamine infusion  IV Primacor infusion  CVVHD    secondary to     cardiogenic shock-cardiovascular dysfunction  acute on chronic kidney injury-failure    In addition:  Improving cardiogenic shock, Dobutamine decreased from 5 to 2.5 with cardiac indices 1.9-2.2 while still on Primacor  Also on max dose vasopressin to maintain MAPs>60 although MAPs are borderline - will monitor  Negative overall 4.5L two days prior on CVVH, decreased flow off from 200 to 100 cc/hr, negative 1.7 L over last 24 hours - will maintain this rate as long as MAPs hold on Vasopressin    The patient required critical care management and I personally provided 30 minutes of non-continuous care to the patient, excluding separate procedures, in addition to  discussing the patient and plan at length with the CTICU staff and helping coordinate care.

## 2019-07-19 NOTE — PROGRESS NOTE ADULT - ASSESSMENT
Assessment  DMT2: 73y Male with newly diagnosed DM T2 with hyperglycemia, on low dose insulin, blood sugars in acceptable range,  no hypoglycemic episode, eating partial meals, in CTICU.  CAD: s/P CABG, on medications, stable, monitored.  HTN: Controlled,  on antihypertensive medications.      Tammie Magdaleno MD  Cell: [phone]  Office: [phone]

## 2019-07-19 NOTE — PROGRESS NOTE ADULT - SUBJECTIVE AND OBJECTIVE BOX
Chief complaint  Patient is a 73y old  Male who presents with a chief complaint of CABG (19 Jul 2019 06:44)   Review of systems  Patient in bed, looks comfortable, no fever, no hypoglycemia.    Labs and Fingersticks  CAPILLARY BLOOD GLUCOSE      POCT Blood Glucose.: 181 mg/dL (19 Jul 2019 11:50)  POCT Blood Glucose.: 182 mg/dL (18 Jul 2019 17:32)      Anion Gap, Serum: 16 (07-19 @ 01:32)  Anion Gap, Serum: 19 <H> (07-18 @ 03:07)      Calcium, Total Serum: 8.6 (07-19 @ 01:32)  Calcium, Total Serum: 8.6 (07-18 @ 03:07)  Albumin, Serum: 3.7 (07-19 @ 01:32)    Alanine Aminotransferase (ALT/SGPT): 123 <H> (07-19 @ 01:32)  Alkaline Phosphatase, Serum: 101 (07-19 @ 01:32)  Aspartate Aminotransferase (AST/SGOT): 423 <H> (07-19 @ 01:32)        07-19    134<L>  |  98  |  27<H>  ----------------------------<  125<H>  4.9   |  20<L>  |  1.58<H>    Ca    8.6      19 Jul 2019 01:32  Phos  3.1     07-19  Mg     2.6     07-19    TPro  7.5  /  Alb  3.7  /  TBili  3.0<H>  /  DBili  x   /  AST  423<H>  /  ALT  123<H>  /  AlkPhos  101  07-19                        9.3    12.6  )-----------( 73       ( 19 Jul 2019 01:32 )             27.4     Medications  MEDICATIONS  (STANDING):  aspirin  chewable 81 milliGRAM(s) Oral daily  chlorhexidine 4% Liquid 1 Application(s) Topical <User Schedule>  dextrose 50% Injectable 50 milliLiter(s) IV Push every 15 minutes  dextrose 50% Injectable 25 milliLiter(s) IV Push every 15 minutes  dextrose 50% Injectable 50 milliLiter(s) IV Push every 15 minutes  dextrose 50% Injectable 25 milliLiter(s) IV Push every 15 minutes  DOBUTamine Infusion 2.5 MICROgram(s)/kG/Min (5.52 mL/Hr) IV Continuous <Continuous>  heparin  Infusion 900 Unit(s)/Hr (9 mL/Hr) IV Continuous <Continuous>  insulin lispro (HumaLOG) corrective regimen sliding scale   SubCutaneous three times a day before meals  insulin lispro (HumaLOG) corrective regimen sliding scale   SubCutaneous at bedtime  levothyroxine 50 MICROGram(s) Oral daily  milrinone Infusion 0.3 MICROgram(s)/kG/Min (6.624 mL/Hr) IV Continuous <Continuous>  norepinephrine Infusion 0.05 MICROgram(s)/kG/Min (6.9 mL/Hr) IV Continuous <Continuous>  pantoprazole Infusion 8 mG/Hr (10 mL/Hr) IV Continuous <Continuous>  sodium chloride 0.9%. 1000 milliLiter(s) (10 mL/Hr) IV Continuous <Continuous>  vasopressin Infusion 0.05 Unit(s)/Min (3 mL/Hr) IV Continuous <Continuous>      Physical Exam  General: Patient comfortable in bed  Vital Signs Last 12 Hrs  T(F): 99 (07-19-19 @ 12:00), Max: 99 (07-19-19 @ 12:00)  HR: 81 (07-19-19 @ 17:00) (73 - 90)  BP: --  BP(mean): --  RR: 8 (07-19-19 @ 17:00) (0 - 38)  SpO2: 94% (07-19-19 @ 17:00) (76% - 100%)  Neck: No palpable thyroid nodules.  CVS: S1S2, No murmurs  Respiratory: No wheezing, no crepitations  GI: Abdomen soft, bowel sounds positive  Musculoskeletal:  edema lower extremities.   Skin: No skin rashes, no ecchymosis    Diagnostics    Free Thyroxine, Serum: AM Sched. Collection: 17-Jul-2019 06:00 (07-16 @ 06:29)  Thyroid Stimulating Hormone, Serum: AM Sched. Collection: 17-Jul-2019 06:00 (07-16 @ 06:29)

## 2019-07-19 NOTE — PROGRESS NOTE ADULT - ASSESSMENT
The patient is a 73y Male with CAD, HFrEF and PVD s/p CABGx3 followed by AFib, resp failure and acute on chronic kidney injury.  - CKD: stage TBD with limited baseline data available (poor medical follow-up).   - RALPH: oliguric.   Now on dialysis.  - Hyponatremia       RECOMMENDATIONS  - DC  CVVHDF  and plan for traditional HD in am  - Feeds are Nepro   - please dose any new medications for a CrCl of 20-25 cc/min on CRRT  - avoid NSAIDs/nephrotoxins as able  -Elevated PA pressures remain and try to maintain 's   -Cont to follow BLAIRE starkey        Case d/w CTU team.

## 2019-07-19 NOTE — PROGRESS NOTE ADULT - SUBJECTIVE AND OBJECTIVE BOX
[facility] [phone]        Patient seen and examined in bed.  He was in good spirits.  He is on  Milrinone and vaso gtt         MEDICATIONS  (STANDING):  aspirin  chewable 81 milliGRAM(s) Oral daily  chlorhexidine 4% Liquid 1 Application(s) Topical <User Schedule>  CRRT Treatment    <Continuous>  dexmedetomidine Infusion 0.5 MICROgram(s)/kG/Hr (9.2 mL/Hr) IV Continuous <Continuous>  dextrose 50% Injectable 50 milliLiter(s) IV Push every 15 minutes  dextrose 50% Injectable 25 milliLiter(s) IV Push every 15 minutes  dextrose 50% Injectable 50 milliLiter(s) IV Push every 15 minutes  dextrose 50% Injectable 25 milliLiter(s) IV Push every 15 minutes  DOBUTamine Infusion 2.5 MICROgram(s)/kG/Min (5.52 mL/Hr) IV Continuous <Continuous>  docusate sodium Liquid 200 milliGRAM(s) Oral two times a day  famotidine    Tablet 20 milliGRAM(s) Oral daily  heparin  Infusion 900 Unit(s)/Hr (9 mL/Hr) IV Continuous <Continuous>  insulin lispro (HumaLOG) corrective regimen sliding scale   SubCutaneous every 6 hours  levothyroxine 50 MICROGram(s) Oral daily  milrinone Infusion 0.3 MICROgram(s)/kG/Min (6.624 mL/Hr) IV Continuous <Continuous>  polyethylene glycol 3350 17 Gram(s) Oral daily  PrismaSATE Dialysate BGK 4 / 2.5 5000 milliLiter(s) (1000 mL/Hr) CRRT <Continuous>  PrismaSOL Filtration BGK 4 / 2.5 5000 milliLiter(s) (400 mL/Hr) CRRT <Continuous>  PrismaSOL Filtration BGK 4 / 2.5 5000 milliLiter(s) (400 mL/Hr) CRRT <Continuous>  sodium chloride 0.9%. 1000 milliLiter(s) (10 mL/Hr) IV Continuous <Continuous>  vasopressin Infusion 0.05 Unit(s)/Min (3 mL/Hr) IV Continuous <Continuous>      VITAL:  T(C): , Max: 36.9 (19 @ 15:00)  T(F): , Max: 98.4 (19 @ 15:00)  HR: 78 (19 @ 07:00)  BP: --  BP(mean): --  RR: 15 (19 @ 07:00)  SpO2: 98% (19 @ 07:00)  Wt(kg): --    I and O's:     @ 07:01  -   @ 07:00  --------------------------------------------------------  IN: 1189.4 mL / OUT: 3190 mL / NET: -2000.6 mL     @ 07:01  -   @ 08:42  --------------------------------------------------------  IN: 37.1 mL / OUT: 124 mL / NET: -86.9 mL          PHYSICAL EXAM:    Constitutional: NAD  Neck:  No JVD  Respiratory: poor effort  Cardiovascular: S1 and S2  Gastrointestinal: BS+, soft, NT/ND  Extremities: No peripheral edema  Neurological: A/O x 3, no focal deficits  Psychiatric: Normal mood, normal affect  : No Chávez  Skin: No rashes  Access: [facility]     LABS:                        9.3    12.6  )-----------( 73       ( 2019 01:32 )             27.4         134<L>  |  98  |  27<H>  ----------------------------<  125<H>  4.9   |  20<L>  |  1.58<H>    Ca    8.6      2019 01:32  Phos  3.1       Mg     2.6         TPro  7.5  /  Alb  3.7  /  TBili  3.0<H>  /  DBili  x   /  AST  423<H>  /  ALT  123<H>  /  AlkPhos  101            Urine Studies:          RADIOLOGY & ADDITIONAL STUDIES:  < from: Xray Chest 1 View- PORTABLE-Routine (19 @ 03:02) >    EXAM:  XR CHEST PORTABLE ROUTINE 1V                            PROCEDURE DATE:  2019            INTERPRETATION:  CLINICAL INFORMATION: S/p cardiac sx    COMPARISON:  Chest x-ray from 2019    TECHNIQUE:   Single frontal view chest radiograph    FINDINGS:     Status post median sternotomy. Right sided Truro-Ramos catheter with tip in   main pulmonary artery. Left-sided catheter with tip overlying the left   innominate vein. The cardiac silhouette is enlarged. Bilateral reticular   opacification more prominent in left upper lung, unchanged from prior. No   acute bony pathology.      IMPRESSION:  Bilateral reticular opacification as above, unchanged from   prior                ESE ALCAZAR M.D., RADIOLOGY RESIDENT  This document has beenelectronically signed.  AVIS LUJAN M.D., ATTENDING RADIOLOGIST  This document has been electronically signed. 2019  1:25PM                < end of copied text >

## 2019-07-19 NOTE — CHART NOTE - NSCHARTNOTEFT_GEN_A_CORE
Upon Nutritional Assessment by the Registered Dietitian your patient was determined to meet criteria / has evidence of the following diagnosis/diagnoses:          [ ]  Mild Protein Calorie Malnutrition        [ X]  Moderate Protein Calorie Malnutrition        [ ] Severe Protein Calorie Malnutrition        [ ] Unspecified Protein Calorie Malnutrition        [ ] Underweight / BMI <19        [ ] Morbid Obesity / BMI > 40      Findings as based on:  [X ] Comprehensive nutrition assessment   [ ] Nutrition Focused Physical Exam  [ X] Other: 10lb weight loss during admission, visual fast and muscle wasting, Consume 40% of meals currently      Nutrition Plan/Recommendations:      Change diet to Mechanical soft, Low Na, lactose free. Add Nepro x2 daily   Trend BG levels and renal indices, and add additional diet restrictions as needed.     PROVIDER Section:     By signing this assessment you are acknowledging and agree with the diagnosis/diagnoses assigned by the Registered Dietitian    Comments:

## 2019-07-19 NOTE — CHART NOTE - NSCHARTNOTEFT_GEN_A_CORE
Nutrition Follow Up Note  Patient seen for: Nutritional follow up     Source: RN, NP, daughter at bedside, CTU rounds     Chart reviewed, events noted: 73 year old male with no PMHx and poor medical follow up. Admitted to [facility] for SOB and LE edema. Newly Dx HF, and RALPH vs CKD and PVD. S/p cardiac cath found with multi vessel disease. Transferred to [facility] and is now POD 7 fomr CABG x3. Required CVVHD, however held to day and plan of IHD tomorrow.     Diet : Mechanical soft.     Patient reports: Pt seen, sleeping. Daughter at bedside, who does not live with Pt reports a fair PO intake at breakfast and 0% consumed for lunch today. Daughter agrees to pt trying Nepro as a PO supplement. Daughter states feels Pt was a "good eater PTA" but states he does look much thinner to  her then usual. She is unaware of his UBW. Pt was admitted at 162lbs, weight increase post operatively to 177lbs. Pt is currently 153lbs ? decrease related to fluid loss from CVVHD vs fluids hiding prior weight loss. Will continue to monitor.  Daughter confirms lactose intolerance   Nutrition focused physical exam deferred, however has visual wasting at temporals, clavicles    GI: Pt with 3 small BM's thus far.     Daily Weight in k.5 (-19), Weight in k (-18), Weight in k.8 (-17), Weight in k.9 (-16), Weight in k.5 (-15), Weight in k.3 (-15), Weight in k (-13)  % Weight Change    Pertinent Medications: MEDICATIONS  (STANDING):  aspirin  chewable 81 milliGRAM(s) Oral daily  chlorhexidine 4% Liquid 1 Application(s) Topical <User Schedule>  dextrose 50% Injectable 50 milliLiter(s) IV Push every 15 minutes  dextrose 50% Injectable 25 milliLiter(s) IV Push every 15 minutes  dextrose 50% Injectable 50 milliLiter(s) IV Push every 15 minutes  dextrose 50% Injectable 25 milliLiter(s) IV Push every 15 minutes  DOBUTamine Infusion 2.5 MICROgram(s)/kG/Min (5.52 mL/Hr) IV Continuous <Continuous>  heparin  Infusion 900 Unit(s)/Hr (9 mL/Hr) IV Continuous <Continuous>  insulin lispro (HumaLOG) corrective regimen sliding scale   SubCutaneous every 6 hours  levothyroxine 50 MICROGram(s) Oral daily  milrinone Infusion 0.3 MICROgram(s)/kG/Min (6.624 mL/Hr) IV Continuous <Continuous>  norepinephrine Infusion 0.05 MICROgram(s)/kG/Min (6.9 mL/Hr) IV Continuous <Continuous>  pantoprazole Infusion 8 mG/Hr (10 mL/Hr) IV Continuous <Continuous>  sodium chloride 0.9%. 1000 milliLiter(s) (10 mL/Hr) IV Continuous <Continuous>  vasopressin Infusion 0.05 Unit(s)/Min (3 mL/Hr) IV Continuous <Continuous>    MEDICATIONS  (PRN):  oxyCODONE    IR 5 milliGRAM(s) Oral every 4 hours PRN Moderate Pain (4 - 6)  oxyCODONE    IR 10 milliGRAM(s) Oral every 4 hours PRN Severe Pain (7 - 10)  sodium chloride 0.9% lock flush 10 milliLiter(s) IV Push every 1 hour PRN Pre/post blood products, medications, blood draw, and to maintain line patency    Pertinent Labs:  @ 01:32: Na 134<L>, BUN 27<H>, Cr 1.58<H>, <H>, K+ 4.9, Phos 3.1, Mg 2.6, Alk Phos 101, ALT/SGPT 123<H>, AST/SGOT 423<H>, HbA1c --    Finger Sticks:  POCT Blood Glucose.: 182 mg/dL ( @ 17:32)      Skin per nursing documentation: intact  Edema: +1 generalized edema      Estimated Needs:   [X ] no change since previous assessment  [ ] recalculated:     Previous Nutrition Diagnosis: increased nutrient needs  Nutrition Diagnosis is: on going     New Nutrition Diagnosis: Moderate malnutrition  Related to: suspected inadequate PO intake PTA and currently    As evidenced by: 10lb weight loss during admission, visual fast and muscle wasting, Consume 40% of meals currently.      Interventions:     Recommend  1. Would continue Mechanical soft diet. however add Low Na and lactose free. Trend renal indices and BG levels and add diet resections as indicated   2. Would Add Nepro x2 daily to supplement intake  3. Encourage adequate PO intake at meal times   4. Provide meal time feeding assistance as needed  5. Malnutrition alert in chart  6. Trend BG levels, renal indices.     Monitoring and Evaluation:     Continue to monitor Nutritional intake, Tolerance to diet prescription, weights, labs, skin integrity    RD remains available upon request and will follow up per protocol  Sarah Siegler RD, RDN, [facility] Pager #[phone]

## 2019-07-20 LAB
ALBUMIN SERPL ELPH-MCNC: 3.5 G/DL — SIGNIFICANT CHANGE UP (ref 3.3–5)
ALP SERPL-CCNC: 113 U/L — SIGNIFICANT CHANGE UP (ref 40–120)
ALT FLD-CCNC: 217 U/L — HIGH (ref 10–45)
ANION GAP SERPL CALC-SCNC: 18 MMOL/L — HIGH (ref 5–17)
APTT BLD: 47.6 SEC — HIGH (ref 27.5–36.3)
APTT BLD: 49.4 SEC — HIGH (ref 27.5–36.3)
APTT BLD: 49.4 SEC — HIGH (ref 27.5–36.3)
APTT BLD: 53.9 SEC — HIGH (ref 27.5–36.3)
AST SERPL-CCNC: 625 U/L — HIGH (ref 10–40)
BASE EXCESS BLDV CALC-SCNC: -2.3 MMOL/L — LOW (ref -2–2)
BASE EXCESS BLDV CALC-SCNC: 1.2 MMOL/L — SIGNIFICANT CHANGE UP (ref -2–2)
BILIRUB SERPL-MCNC: 2.8 MG/DL — HIGH (ref 0.2–1.2)
BLD GP AB SCN SERPL QL: NEGATIVE — SIGNIFICANT CHANGE UP
BUN SERPL-MCNC: 43 MG/DL — HIGH (ref 7–23)
CALCIUM SERPL-MCNC: 8.3 MG/DL — LOW (ref 8.4–10.5)
CHLORIDE SERPL-SCNC: 100 MMOL/L — SIGNIFICANT CHANGE UP (ref 96–108)
CO2 BLDV-SCNC: 23 MMOL/L — SIGNIFICANT CHANGE UP (ref 22–30)
CO2 BLDV-SCNC: 27 MMOL/L — SIGNIFICANT CHANGE UP (ref 22–30)
CO2 SERPL-SCNC: 19 MMOL/L — LOW (ref 22–31)
CREAT SERPL-MCNC: 2.63 MG/DL — HIGH (ref 0.5–1.3)
GAS PNL BLDA: SIGNIFICANT CHANGE UP
GAS PNL BLDV: SIGNIFICANT CHANGE UP
GLUCOSE BLDC GLUCOMTR-MCNC: 167 MG/DL — HIGH (ref 70–99)
GLUCOSE BLDC GLUCOMTR-MCNC: 182 MG/DL — HIGH (ref 70–99)
GLUCOSE BLDC GLUCOMTR-MCNC: 201 MG/DL — HIGH (ref 70–99)
GLUCOSE SERPL-MCNC: 165 MG/DL — HIGH (ref 70–99)
HBV SURFACE AB SER-ACNC: 608.7 MIU/ML — SIGNIFICANT CHANGE UP
HBV SURFACE AG SER-ACNC: SIGNIFICANT CHANGE UP
HCO3 BLDV-SCNC: 22 MMOL/L — SIGNIFICANT CHANGE UP (ref 21–29)
HCO3 BLDV-SCNC: 25 MMOL/L — SIGNIFICANT CHANGE UP (ref 21–29)
HCT VFR BLD CALC: 25.1 % — LOW (ref 39–50)
HCV AB S/CO SERPL IA: 0.14 S/CO — SIGNIFICANT CHANGE UP (ref 0–0.99)
HCV AB SERPL-IMP: SIGNIFICANT CHANGE UP
HGB BLD-MCNC: 8.8 G/DL — LOW (ref 13–17)
HOROWITZ INDEX BLDV+IHG-RTO: 36 — SIGNIFICANT CHANGE UP
HOROWITZ INDEX BLDV+IHG-RTO: 36 — SIGNIFICANT CHANGE UP
INR BLD: 1.49 RATIO — HIGH (ref 0.88–1.16)
INR BLD: 1.53 RATIO — HIGH (ref 0.88–1.16)
MAGNESIUM SERPL-MCNC: 2.8 MG/DL — HIGH (ref 1.6–2.6)
MCHC RBC-ENTMCNC: 32.7 PG — SIGNIFICANT CHANGE UP (ref 27–34)
MCHC RBC-ENTMCNC: 34.9 GM/DL — SIGNIFICANT CHANGE UP (ref 32–36)
MCV RBC AUTO: 93.9 FL — SIGNIFICANT CHANGE UP (ref 80–100)
PCO2 BLDV: 38 MMHG — SIGNIFICANT CHANGE UP (ref 35–50)
PCO2 BLDV: 40 MMHG — SIGNIFICANT CHANGE UP (ref 35–50)
PH BLDV: 7.38 — SIGNIFICANT CHANGE UP (ref 7.35–7.45)
PH BLDV: 7.41 — SIGNIFICANT CHANGE UP (ref 7.35–7.45)
PHOSPHATE SERPL-MCNC: 3 MG/DL — SIGNIFICANT CHANGE UP (ref 2.5–4.5)
PLATELET # BLD AUTO: 81 K/UL — LOW (ref 150–400)
PO2 BLDV: 30 MMHG — SIGNIFICANT CHANGE UP (ref 25–45)
PO2 BLDV: 32 MMHG — SIGNIFICANT CHANGE UP (ref 25–45)
POTASSIUM SERPL-MCNC: 4.4 MMOL/L — SIGNIFICANT CHANGE UP (ref 3.5–5.3)
POTASSIUM SERPL-SCNC: 4.4 MMOL/L — SIGNIFICANT CHANGE UP (ref 3.5–5.3)
PROT SERPL-MCNC: 6.9 G/DL — SIGNIFICANT CHANGE UP (ref 6–8.3)
PROTHROM AB SERPL-ACNC: 17.2 SEC — HIGH (ref 10–12.9)
PROTHROM AB SERPL-ACNC: 17.7 SEC — HIGH (ref 10–12.9)
RBC # BLD: 2.67 M/UL — LOW (ref 4.2–5.8)
RBC # FLD: 13.5 % — SIGNIFICANT CHANGE UP (ref 10.3–14.5)
RH IG SCN BLD-IMP: POSITIVE — SIGNIFICANT CHANGE UP
SAO2 % BLDV: 51 % — LOW (ref 67–88)
SAO2 % BLDV: 56 % — LOW (ref 67–88)
SODIUM SERPL-SCNC: 137 MMOL/L — SIGNIFICANT CHANGE UP (ref 135–145)
WBC # BLD: 13.8 K/UL — HIGH (ref 3.8–10.5)
WBC # FLD AUTO: 13.8 K/UL — HIGH (ref 3.8–10.5)

## 2019-07-20 PROCEDURE — 99291 CRITICAL CARE FIRST HOUR: CPT

## 2019-07-20 PROCEDURE — 71045 X-RAY EXAM CHEST 1 VIEW: CPT | Mod: 26

## 2019-07-20 RX ORDER — MIDODRINE HYDROCHLORIDE 2.5 MG/1
10 TABLET ORAL EVERY 8 HOURS
Refills: 0 | Status: DISCONTINUED | OUTPATIENT
Start: 2019-07-20 | End: 2019-07-31

## 2019-07-20 RX ORDER — INSULIN LISPRO 100/ML
3 VIAL (ML) SUBCUTANEOUS ONCE
Refills: 0 | Status: COMPLETED | OUTPATIENT
Start: 2019-07-20 | End: 2019-07-20

## 2019-07-20 RX ORDER — PANTOPRAZOLE SODIUM 20 MG/1
40 TABLET, DELAYED RELEASE ORAL
Refills: 0 | Status: DISCONTINUED | OUTPATIENT
Start: 2019-07-20 | End: 2019-08-03

## 2019-07-20 RX ORDER — BUMETANIDE 0.25 MG/ML
2 INJECTION INTRAMUSCULAR; INTRAVENOUS
Refills: 0 | Status: DISCONTINUED | OUTPATIENT
Start: 2019-07-20 | End: 2019-07-21

## 2019-07-20 RX ADMIN — Medication 5.52 MICROGRAM(S)/KG/MIN: at 09:20

## 2019-07-20 RX ADMIN — Medication 81 MILLIGRAM(S): at 12:36

## 2019-07-20 RX ADMIN — MIDODRINE HYDROCHLORIDE 10 MILLIGRAM(S): 2.5 TABLET ORAL at 23:40

## 2019-07-20 RX ADMIN — CHLORHEXIDINE GLUCONATE 1 APPLICATION(S): 213 SOLUTION TOPICAL at 05:19

## 2019-07-20 RX ADMIN — Medication 3: at 12:35

## 2019-07-20 RX ADMIN — PANTOPRAZOLE SODIUM 10 MG/HR: 20 TABLET, DELAYED RELEASE ORAL at 09:19

## 2019-07-20 RX ADMIN — OXYCODONE HYDROCHLORIDE 5 MILLIGRAM(S): 5 TABLET ORAL at 10:30

## 2019-07-20 RX ADMIN — Medication 50 MICROGRAM(S): at 05:19

## 2019-07-20 RX ADMIN — Medication 1: at 09:16

## 2019-07-20 RX ADMIN — VASOPRESSIN 3 UNIT(S)/MIN: 20 INJECTION INTRAVENOUS at 09:19

## 2019-07-20 RX ADMIN — Medication 2: at 17:16

## 2019-07-20 RX ADMIN — OXYCODONE HYDROCHLORIDE 5 MILLIGRAM(S): 5 TABLET ORAL at 11:00

## 2019-07-20 RX ADMIN — Medication 3 UNIT(S): at 17:15

## 2019-07-20 RX ADMIN — Medication 6.9 MICROGRAM(S)/KG/MIN: at 09:19

## 2019-07-20 RX ADMIN — BUMETANIDE 2 MILLIGRAM(S): 0.25 INJECTION INTRAMUSCULAR; INTRAVENOUS at 12:37

## 2019-07-20 RX ADMIN — MILRINONE LACTATE 4.42 MICROGRAM(S)/KG/MIN: 1 INJECTION, SOLUTION INTRAVENOUS at 09:19

## 2019-07-20 NOTE — PROGRESS NOTE ADULT - SUBJECTIVE AND OBJECTIVE BOX
Patient seen and examined  Intubated  on , milrinone, and levo gtt   s/p PRBC  off CRRT   seen on HD     REVIEW OF SYSTEMS:  UTO    MEDICATIONS  (STANDING):  aspirin  chewable 81 milliGRAM(s) Oral daily  buMETAnide Injectable 2 milliGRAM(s) IV Push two times a day  chlorhexidine 4% Liquid 1 Application(s) Topical <User Schedule>  dextrose 50% Injectable 50 milliLiter(s) IV Push every 15 minutes  dextrose 50% Injectable 25 milliLiter(s) IV Push every 15 minutes  dextrose 50% Injectable 50 milliLiter(s) IV Push every 15 minutes  dextrose 50% Injectable 25 milliLiter(s) IV Push every 15 minutes  DOBUTamine Infusion 2.5 MICROgram(s)/kG/Min (5.52 mL/Hr) IV Continuous <Continuous>  heparin  Infusion 900 Unit(s)/Hr (8.5 mL/Hr) IV Continuous <Continuous>  insulin lispro (HumaLOG) corrective regimen sliding scale   SubCutaneous three times a day before meals  insulin lispro (HumaLOG) corrective regimen sliding scale   SubCutaneous at bedtime  levothyroxine 50 MICROGram(s) Oral daily  milrinone Infusion 0.2 MICROgram(s)/kG/Min (4.416 mL/Hr) IV Continuous <Continuous>  norepinephrine Infusion 0.05 MICROgram(s)/kG/Min (6.9 mL/Hr) IV Continuous <Continuous>  pantoprazole Infusion 8 mG/Hr (10 mL/Hr) IV Continuous <Continuous>  sodium chloride 0.9%. 1000 milliLiter(s) (10 mL/Hr) IV Continuous <Continuous>  vasopressin Infusion 0.05 Unit(s)/Min (3 mL/Hr) IV Continuous <Continuous>      VITAL:  T(C): , Max: 37.8 (19 @ 04:00)  T(F): , Max: 100 (19 @ 04:00)  HR: 84 (19 @ 12:30)  BP: --  BP(mean): --  RR: 17 (19 @ 12:30)  SpO2: 96% (19 @ 12:30)  Wt(kg): --    I and O's:     @ 07:01  -   @ 07:00  --------------------------------------------------------  IN: 1145.5 mL / OUT: 717 mL / NET: 428.5 mL     @ 07:01   @ 12:42  --------------------------------------------------------  IN: 585.2 mL / OUT: 5 mL / NET: 580.2 mL          PHYSICAL EXAM:    Constitutional: NAD  Neck:  No JVD  Respiratory: poor effort  Cardiovascular: S1 and S2  Gastrointestinal: BS+, soft, NT/ND  Extremities: No peripheral edema  Neurological: A/O x 3, no focal deficits  Psychiatric: Normal mood, normal affect  : No Chávez  Skin: No rashes  Access: [facility]         LABS:                        8.8    13.8  )-----------( 81       ( 2019 01:45 )             25.1         137  |  100  |  43<H>  ----------------------------<  165<H>  4.4   |  19<L>  |  2.63<H>    Ca    8.3<L>      2019 01:45  Phos  3.0       Mg     2.8         TPro  6.9  /  Alb  3.5  /  TBili  2.8<H>  /  DBili  x   /  AST  625<H>  /  ALT  217<H>  /  AlkPhos  113          Assessment and Plan:   · Assessment		  The patient is a 73y Male with CAD, HFrEF and PVD s/p CABGx3 followed by AFib, resp failure and acute on chronic kidney injury.  - CKD: stage TBD with limited baseline data available (poor medical follow-up).   - RALPH: oliguric.   Now on dialysis.  - Appears volume overloaded  - Increased LFTs      RECOMMENDATIONS  - HD today UF 1 kg  - Consider bumex 2 mg IV BID  - Wean pressors as able  - DC shiley post HD and reinsert another one on Monday   - Feeds are Nepro   - please dose any new medications for a CrCl of 10-15 ml/min         Case d/w CTU team.        Attending Attestation:   I was physically present for the key portions of the evaluation and management (E/M) service provided.  I agree with the above history, physical, and plan which I have reviewed and edited where appropriate.     64 minutes spent on total encounter; more than 50% of the visit was spent counseling and/or coordinating care by the attending physician.     Plan discussed with Dr Hernandez.

## 2019-07-20 NOTE — PROGRESS NOTE ADULT - ASSESSMENT
Assessment  DMT2: 73y Male with newly diagnosed DM T2 with hyperglycemia, on insulin, Fs improving,  no hypoglycemic episode, eating partial meals, in CTICU.  CAD: s/P CABG, on medications, stable, monitored.  HTN: Controlled,  on antihypertensive medications.      Tammie Magdaleno MD  Cell: [phone]  Office: [phone]

## 2019-07-20 NOTE — PROGRESS NOTE ADULT - SUBJECTIVE AND OBJECTIVE BOX
CRITICAL CARE ATTENDING - CTICU    MEDICATIONS  (STANDING):  aspirin  chewable 81 milliGRAM(s) Oral daily  chlorhexidine 4% Liquid 1 Application(s) Topical <User Schedule>  dextrose 50% Injectable 50 milliLiter(s) IV Push every 15 minutes  dextrose 50% Injectable 25 milliLiter(s) IV Push every 15 minutes  dextrose 50% Injectable 50 milliLiter(s) IV Push every 15 minutes  dextrose 50% Injectable 25 milliLiter(s) IV Push every 15 minutes  DOBUTamine Infusion 2.5 MICROgram(s)/kG/Min (5.52 mL/Hr) IV Continuous <Continuous>  heparin  Infusion 900 Unit(s)/Hr (8.5 mL/Hr) IV Continuous <Continuous>  insulin lispro (HumaLOG) corrective regimen sliding scale   SubCutaneous three times a day before meals  insulin lispro (HumaLOG) corrective regimen sliding scale   SubCutaneous at bedtime  levothyroxine 50 MICROGram(s) Oral daily  milrinone Infusion 0.2 MICROgram(s)/kG/Min (4.416 mL/Hr) IV Continuous <Continuous>  norepinephrine Infusion 0.05 MICROgram(s)/kG/Min (6.9 mL/Hr) IV Continuous <Continuous>  pantoprazole Infusion 8 mG/Hr (10 mL/Hr) IV Continuous <Continuous>  sodium chloride 0.9%. 1000 milliLiter(s) (10 mL/Hr) IV Continuous <Continuous>  vasopressin Infusion 0.05 Unit(s)/Min (3 mL/Hr) IV Continuous <Continuous>                                    8.8    13.8  )-----------( 81       ( 20 Jul 2019 01:45 )             25.1       07-20    137  |  100  |  43<H>  ----------------------------<  165<H>  4.4   |  19<L>  |  2.63<H>    Ca    8.3<L>      20 Jul 2019 01:45  Phos  3.0     07-20  Mg     2.8     07-20    TPro  6.9  /  Alb  3.5  /  TBili  2.8<H>  /  DBili  x   /  AST  625<H>  /  ALT  217<H>  /  AlkPhos  113  07-20      PT/INR - ( 20 Jul 2019 05:47 )   PT: 17.2 sec;   INR: 1.49 ratio         PTT - ( 20 Jul 2019 05:47 )  PTT:49.4 sec        Daily     Daily       07-19 @ 07:01 - 07-20 @ 07:00  --------------------------------------------------------  IN: 1145.5 mL / OUT: 717 mL / NET: 428.5 mL    07-20 @ 07:01 - 07-20 @ 10:22  --------------------------------------------------------  IN: 585.2 mL / OUT: 5 mL / NET: 580.2 mL        Critically Ill patient  : [ ] preoperative ,   [ x] post operative    Requires :  [x ] Arterial Line   [x ] Central Line  [ ] PA catheter  [ ] IABP  [ ] ECMO  [ ] LVAD  [ ] Ventilator  [x ] pacemaker [ ] Impella.                      [ x] ABG's     [x ] Pulse Oxymetry Monitoring  Bedside evaluation , monitoring , treatment of hemodynamics , fluids , IVP/ IVCD meds.        Diagnosis:     POD 8 - CABG X 3 L     Cardiogenic Shock - post op - resolved    CHF- acute [x ]   chronic [x ]    systolic [ x]   diatolic [x ]          - Echo- EF -  30's           [ ] RV dysfunction          - Cxr-cardiomegally, edema          - Clinical-  [x ]inotropes   [ x]pressors   [ x]diuresis   [ ]IABP   [ ]ECMO   [ ]LVAD   [ ]Respiratory Failure    Hemodynamic lability,instability. Requires IVCD [x ] vasopressors [ x] inotropes  [ ] vasodilator  [ ]IVSS fluid  to maintain MAP, perfusion, C.I.     respiratory failure - resolved    Requires chest PT, pulmonary toilet,, suctioning to maintain SaO2,  patent airway and treat atelectasis.     fluid overload      Renal Failure - Acute Kidney Injury     [x ] Hemodialysis [x ] Hemofiltration:    [x ] negative fluid balance [ ] even fluid balance [ ] positive fluid balance, in a hemodynamically unstable patient.     Requires bedside physical therapy, mobilization and total detention care.     Thrombocytopenia     IVCD anticoagulation with [x ] Heparin  [ ] Argatroban for A Fib / PVD                        -                     Discussed with CT surgeon, Physician's Assistant - Nurse Practitioner- Critical care medicine team.   Dicussed at  AM / PM rounds.   Chart, labs , films reviewed.    Total Time: 30 min

## 2019-07-20 NOTE — PROGRESS NOTE ADULT - SUBJECTIVE AND OBJECTIVE BOX
Chief complaint  Patient is a 73y old  Male who presents with a chief complaint of CP (20 Jul 2019 12:41)   Review of systems  Patient in bed, looks comfortable, no fever, no hypoglycemia.    Labs and Fingersticks  CAPILLARY BLOOD GLUCOSE      POCT Blood Glucose.: 167 mg/dL (20 Jul 2019 09:14)  POCT Blood Glucose.: 159 mg/dL (19 Jul 2019 21:08)      Anion Gap, Serum: 18 <H> (07-20 @ 01:45)  Anion Gap, Serum: 19 <H> (07-19 @ 17:53)  Anion Gap, Serum: 16 (07-19 @ 01:32)      Calcium, Total Serum: 8.3 <L> (07-20 @ 01:45)  Calcium, Total Serum: 8.1 <L> (07-19 @ 17:53)  Calcium, Total Serum: 8.6 (07-19 @ 01:32)  Albumin, Serum: 3.5 (07-20 @ 01:45)  Albumin, Serum: 3.7 (07-19 @ 01:32)    Alanine Aminotransferase (ALT/SGPT): 217 <H> (07-20 @ 01:45)  Alanine Aminotransferase (ALT/SGPT): 123 <H> (07-19 @ 01:32)  Alkaline Phosphatase, Serum: 113 (07-20 @ 01:45)  Alkaline Phosphatase, Serum: 101 (07-19 @ 01:32)  Aspartate Aminotransferase (AST/SGOT): 625 <H> (07-20 @ 01:45)  Aspartate Aminotransferase (AST/SGOT): 423 <H> (07-19 @ 01:32)        07-20    137  |  100  |  43<H>  ----------------------------<  165<H>  4.4   |  19<L>  |  2.63<H>    Ca    8.3<L>      20 Jul 2019 01:45  Phos  3.0     07-20  Mg     2.8     07-20    TPro  6.9  /  Alb  3.5  /  TBili  2.8<H>  /  DBili  x   /  AST  625<H>  /  ALT  217<H>  /  AlkPhos  113  07-20                        8.8    13.8  )-----------( 81       ( 20 Jul 2019 01:45 )             25.1     Medications  MEDICATIONS  (STANDING):  aspirin  chewable 81 milliGRAM(s) Oral daily  buMETAnide Injectable 2 milliGRAM(s) IV Push two times a day  chlorhexidine 4% Liquid 1 Application(s) Topical <User Schedule>  dextrose 50% Injectable 50 milliLiter(s) IV Push every 15 minutes  dextrose 50% Injectable 25 milliLiter(s) IV Push every 15 minutes  dextrose 50% Injectable 50 milliLiter(s) IV Push every 15 minutes  dextrose 50% Injectable 25 milliLiter(s) IV Push every 15 minutes  DOBUTamine Infusion 2.5 MICROgram(s)/kG/Min (5.52 mL/Hr) IV Continuous <Continuous>  heparin  Infusion 900 Unit(s)/Hr (9 mL/Hr) IV Continuous <Continuous>  insulin lispro (HumaLOG) corrective regimen sliding scale   SubCutaneous three times a day before meals  insulin lispro (HumaLOG) corrective regimen sliding scale   SubCutaneous at bedtime  levothyroxine 50 MICROGram(s) Oral daily  milrinone Infusion 0.2 MICROgram(s)/kG/Min (4.416 mL/Hr) IV Continuous <Continuous>  norepinephrine Infusion 0.05 MICROgram(s)/kG/Min (6.9 mL/Hr) IV Continuous <Continuous>  pantoprazole Infusion 8 mG/Hr (10 mL/Hr) IV Continuous <Continuous>  sodium chloride 0.9%. 1000 milliLiter(s) (10 mL/Hr) IV Continuous <Continuous>  vasopressin Infusion 0.05 Unit(s)/Min (3 mL/Hr) IV Continuous <Continuous>      Physical Exam  General: Patient comfortable in bed  Vital Signs Last 12 Hrs  T(F): 97.9 (07-20-19 @ 14:46), Max: 100 (07-20-19 @ 04:00)  HR: 91 (07-20-19 @ 14:46) (82 - 100)  BP: --  BP(mean): --  RR: 23 (07-20-19 @ 14:46) (7 - 36)  SpO2: 99% (07-20-19 @ 14:46) (93% - 99%)  Neck: No palpable thyroid nodules.  CVS: S1S2, No murmurs  Respiratory: No wheezing, no crepitations  GI: Abdomen soft, bowel sounds positive  Musculoskeletal:  edema lower extremities.   Skin: No skin rashes, no ecchymosis    Diagnostics    Free Thyroxine, Serum: AM Sched. Collection: 17-Jul-2019 06:00 (07-16 @ 06:29)  Thyroid Stimulating Hormone, Serum: AM Sched. Collection: 17-Jul-2019 06:00 (07-16 @ 06:29)

## 2019-07-21 LAB
ALBUMIN SERPL ELPH-MCNC: 3.4 G/DL — SIGNIFICANT CHANGE UP (ref 3.3–5)
ALP SERPL-CCNC: 136 U/L — HIGH (ref 40–120)
ALT FLD-CCNC: 195 U/L — HIGH (ref 10–45)
ANION GAP SERPL CALC-SCNC: 17 MMOL/L — SIGNIFICANT CHANGE UP (ref 5–17)
APTT BLD: 46 SEC — HIGH (ref 27.5–36.3)
APTT BLD: 50.3 SEC — HIGH (ref 27.5–36.3)
AST SERPL-CCNC: 429 U/L — HIGH (ref 10–40)
BASE EXCESS BLDV CALC-SCNC: 0.2 MMOL/L — SIGNIFICANT CHANGE UP (ref -2–2)
BILIRUB SERPL-MCNC: 3.2 MG/DL — HIGH (ref 0.2–1.2)
BUN SERPL-MCNC: 44 MG/DL — HIGH (ref 7–23)
CA-I SERPL-SCNC: 1.16 MMOL/L — SIGNIFICANT CHANGE UP (ref 1.12–1.3)
CALCIUM SERPL-MCNC: 8.5 MG/DL — SIGNIFICANT CHANGE UP (ref 8.4–10.5)
CHLORIDE BLDV-SCNC: 103 MMOL/L — SIGNIFICANT CHANGE UP (ref 96–108)
CHLORIDE SERPL-SCNC: 98 MMOL/L — SIGNIFICANT CHANGE UP (ref 96–108)
CO2 BLDV-SCNC: 26 MMOL/L — SIGNIFICANT CHANGE UP (ref 22–30)
CO2 SERPL-SCNC: 20 MMOL/L — LOW (ref 22–31)
CREAT SERPL-MCNC: 3.15 MG/DL — HIGH (ref 0.5–1.3)
GAS PNL BLDA: SIGNIFICANT CHANGE UP
GAS PNL BLDV: 135 MMOL/L — SIGNIFICANT CHANGE UP (ref 135–145)
GAS PNL BLDV: SIGNIFICANT CHANGE UP
GAS PNL BLDV: SIGNIFICANT CHANGE UP
GLUCOSE BLDC GLUCOMTR-MCNC: 147 MG/DL — HIGH (ref 70–99)
GLUCOSE BLDC GLUCOMTR-MCNC: 149 MG/DL — HIGH (ref 70–99)
GLUCOSE BLDC GLUCOMTR-MCNC: 179 MG/DL — HIGH (ref 70–99)
GLUCOSE BLDC GLUCOMTR-MCNC: 190 MG/DL — HIGH (ref 70–99)
GLUCOSE BLDV-MCNC: 168 MG/DL — HIGH (ref 70–99)
GLUCOSE SERPL-MCNC: 176 MG/DL — HIGH (ref 70–99)
HCO3 BLDV-SCNC: 25 MMOL/L — SIGNIFICANT CHANGE UP (ref 21–29)
HCT VFR BLD CALC: 27.7 % — LOW (ref 39–50)
HCT VFR BLDA CALC: 28 % — LOW (ref 39–50)
HGB BLD CALC-MCNC: 9.1 G/DL — LOW (ref 13–17)
HGB BLD-MCNC: 9.3 G/DL — LOW (ref 13–17)
HOROWITZ INDEX BLDV+IHG-RTO: 36 — SIGNIFICANT CHANGE UP
INR BLD: 1.24 RATIO — HIGH (ref 0.88–1.16)
LACTATE BLDV-MCNC: 1.4 MMOL/L — SIGNIFICANT CHANGE UP (ref 0.7–2)
MAGNESIUM SERPL-MCNC: 2.5 MG/DL — SIGNIFICANT CHANGE UP (ref 1.6–2.6)
MCHC RBC-ENTMCNC: 31.6 PG — SIGNIFICANT CHANGE UP (ref 27–34)
MCHC RBC-ENTMCNC: 33.8 GM/DL — SIGNIFICANT CHANGE UP (ref 32–36)
MCV RBC AUTO: 93.5 FL — SIGNIFICANT CHANGE UP (ref 80–100)
OTHER CELLS CSF MANUAL: 7 ML/DL — LOW (ref 18–22)
PCO2 BLDV: 44 MMHG — SIGNIFICANT CHANGE UP (ref 35–50)
PH BLDV: 7.38 — SIGNIFICANT CHANGE UP (ref 7.35–7.45)
PHOSPHATE SERPL-MCNC: 2.7 MG/DL — SIGNIFICANT CHANGE UP (ref 2.5–4.5)
PLATELET # BLD AUTO: 78 K/UL — LOW (ref 150–400)
PO2 BLDV: 30 MMHG — SIGNIFICANT CHANGE UP (ref 25–45)
POTASSIUM BLDV-SCNC: 3.5 MMOL/L — SIGNIFICANT CHANGE UP (ref 3.5–5.3)
POTASSIUM SERPL-MCNC: 3.8 MMOL/L — SIGNIFICANT CHANGE UP (ref 3.5–5.3)
POTASSIUM SERPL-SCNC: 3.8 MMOL/L — SIGNIFICANT CHANGE UP (ref 3.5–5.3)
PREALB SERPL-MCNC: 9 MG/DL — LOW (ref 20–40)
PROT SERPL-MCNC: 7 G/DL — SIGNIFICANT CHANGE UP (ref 6–8.3)
PROTHROM AB SERPL-ACNC: 14.2 SEC — HIGH (ref 10–12.9)
RBC # BLD: 2.96 M/UL — LOW (ref 4.2–5.8)
RBC # FLD: 13.8 % — SIGNIFICANT CHANGE UP (ref 10.3–14.5)
SAO2 % BLDV: 53 % — LOW (ref 67–88)
SODIUM SERPL-SCNC: 135 MMOL/L — SIGNIFICANT CHANGE UP (ref 135–145)
WBC # BLD: 17.7 K/UL — HIGH (ref 3.8–10.5)
WBC # FLD AUTO: 17.7 K/UL — HIGH (ref 3.8–10.5)

## 2019-07-21 PROCEDURE — 93306 TTE W/DOPPLER COMPLETE: CPT | Mod: 26

## 2019-07-21 PROCEDURE — 76937 US GUIDE VASCULAR ACCESS: CPT | Mod: 26

## 2019-07-21 PROCEDURE — 99291 CRITICAL CARE FIRST HOUR: CPT

## 2019-07-21 PROCEDURE — 71045 X-RAY EXAM CHEST 1 VIEW: CPT | Mod: 26,76

## 2019-07-21 PROCEDURE — 36556 INSERT NON-TUNNEL CV CATH: CPT | Mod: 78

## 2019-07-21 PROCEDURE — 36620 INSERTION CATHETER ARTERY: CPT

## 2019-07-21 RX ORDER — MEROPENEM 1 G/30ML
500 INJECTION INTRAVENOUS ONCE
Refills: 0 | Status: COMPLETED | OUTPATIENT
Start: 2019-07-21 | End: 2019-07-21

## 2019-07-21 RX ORDER — FENTANYL CITRATE 50 UG/ML
25 INJECTION INTRAVENOUS ONCE
Refills: 0 | Status: DISCONTINUED | OUTPATIENT
Start: 2019-07-21 | End: 2019-07-21

## 2019-07-21 RX ORDER — VANCOMYCIN HCL 1 G
1000 VIAL (EA) INTRAVENOUS ONCE
Refills: 0 | Status: COMPLETED | OUTPATIENT
Start: 2019-07-21 | End: 2019-07-21

## 2019-07-21 RX ORDER — CHLORHEXIDINE GLUCONATE 213 G/1000ML
1 SOLUTION TOPICAL
Refills: 0 | Status: DISCONTINUED | OUTPATIENT
Start: 2019-07-21 | End: 2019-08-03

## 2019-07-21 RX ORDER — MEROPENEM 1 G/30ML
500 INJECTION INTRAVENOUS EVERY 12 HOURS
Refills: 0 | Status: DISCONTINUED | OUTPATIENT
Start: 2019-07-21 | End: 2019-07-23

## 2019-07-21 RX ORDER — SODIUM CHLORIDE 9 MG/ML
10 INJECTION INTRAMUSCULAR; INTRAVENOUS; SUBCUTANEOUS
Refills: 0 | Status: DISCONTINUED | OUTPATIENT
Start: 2019-07-21 | End: 2019-08-03

## 2019-07-21 RX ORDER — POTASSIUM CHLORIDE 20 MEQ
10 PACKET (EA) ORAL ONCE
Refills: 0 | Status: COMPLETED | OUTPATIENT
Start: 2019-07-21 | End: 2019-07-21

## 2019-07-21 RX ADMIN — MEROPENEM 100 MILLIGRAM(S): 1 INJECTION INTRAVENOUS at 06:57

## 2019-07-21 RX ADMIN — Medication 250 MILLIGRAM(S): at 07:58

## 2019-07-21 RX ADMIN — MIDODRINE HYDROCHLORIDE 10 MILLIGRAM(S): 2.5 TABLET ORAL at 06:58

## 2019-07-21 RX ADMIN — Medication 5.52 MICROGRAM(S)/KG/MIN: at 17:34

## 2019-07-21 RX ADMIN — CHLORHEXIDINE GLUCONATE 1 APPLICATION(S): 213 SOLUTION TOPICAL at 06:58

## 2019-07-21 RX ADMIN — Medication 1: at 12:27

## 2019-07-21 RX ADMIN — MEROPENEM 100 MILLIGRAM(S): 1 INJECTION INTRAVENOUS at 17:20

## 2019-07-21 RX ADMIN — PANTOPRAZOLE SODIUM 40 MILLIGRAM(S): 20 TABLET, DELAYED RELEASE ORAL at 06:58

## 2019-07-21 RX ADMIN — Medication 50 MILLIEQUIVALENT(S): at 17:21

## 2019-07-21 RX ADMIN — FENTANYL CITRATE 25 MICROGRAM(S): 50 INJECTION INTRAVENOUS at 05:03

## 2019-07-21 RX ADMIN — HEPARIN SODIUM 10.5 UNIT(S)/HR: 5000 INJECTION INTRAVENOUS; SUBCUTANEOUS at 17:35

## 2019-07-21 RX ADMIN — PANTOPRAZOLE SODIUM 40 MILLIGRAM(S): 20 TABLET, DELAYED RELEASE ORAL at 17:20

## 2019-07-21 RX ADMIN — HEPARIN SODIUM 10.5 UNIT(S)/HR: 5000 INJECTION INTRAVENOUS; SUBCUTANEOUS at 21:47

## 2019-07-21 RX ADMIN — MIDODRINE HYDROCHLORIDE 10 MILLIGRAM(S): 2.5 TABLET ORAL at 21:45

## 2019-07-21 RX ADMIN — Medication 1: at 08:57

## 2019-07-21 RX ADMIN — Medication 81 MILLIGRAM(S): at 13:02

## 2019-07-21 RX ADMIN — Medication 5.52 MICROGRAM(S)/KG/MIN: at 21:47

## 2019-07-21 RX ADMIN — MIDODRINE HYDROCHLORIDE 10 MILLIGRAM(S): 2.5 TABLET ORAL at 13:11

## 2019-07-21 RX ADMIN — Medication 50 MICROGRAM(S): at 06:58

## 2019-07-21 RX ADMIN — FENTANYL CITRATE 25 MICROGRAM(S): 50 INJECTION INTRAVENOUS at 04:48

## 2019-07-21 RX ADMIN — VASOPRESSIN 3 UNIT(S)/MIN: 20 INJECTION INTRAVENOUS at 21:45

## 2019-07-21 RX ADMIN — VASOPRESSIN 3 UNIT(S)/MIN: 20 INJECTION INTRAVENOUS at 17:34

## 2019-07-21 NOTE — PROGRESS NOTE ADULT - SUBJECTIVE AND OBJECTIVE BOX
SYLVIA ROSA  MRN#:  [id number]    The patient is a 73y Male without prior medical care who presented with SOB and LE edema, found to have newly diagnosed HFrEF (EF 30%) and RALPH vs CKD, also PVD and claudication with extensive LE arterial disease on doppler, reports heavy alcohol use, further work up revealed multivessel CAD, now s/p C3L today, severe biventricular dysfunction on echocardiogram with some improvement after bypass was seen, evaluated, & examined with the CTICU staff on rounds and later in the evening with a multidisciplinary care plan formulated & implemented.  All available clinical, laboratory, radiographic, pharmacologic, and electrocardiographic data were reviewed & analyzed.      The patient was in the CTICU in critical condition secondary to persistent cardiopulmonary dysfunction, hemodynamically significant anemia/hypovolemia-shock, persistent cardiovascular dysfunction, acute renal Failure, on HD now,  & hyperglycemia.      Respiratory status required supplemental O2, close monitoring of respiratory rate and breathing pattern, the following of ABG’s with A-line monitoring, and continuous pulse oximetry monitoring for support & to evaluate for & prevent further decompensation secondary to persistent cardiopulmonary dysfunction.     Invasive hemodynamic monitoring with a central venous catheter & an A-line were required for the continuous central venous and MAP/BP monitoring to ensure adequate cardiovascular support and to evaluate for & help prevent decompensation while receiving intermittent volume expansion, external epicardial pacing, blood transfusion, ARF on CVVH kimmy transitioned to Intermittent HD, an IV Vasopressin drip, an IV Dobutamine drip, an IV primacor drip, and an IV Heparin drip secondary to hemodynamically significant anemia/anemia due to acute blood loss, hypovolemia-shock, cardiovascular dysfunction, A Fib, ARF,  and combined systolic and diastolic biventricular failure. Thrombocytopenia, HIT - x2, Wean off primacor.     Metabolic stability, stress hyperglycemia/suspected untreated type 2 diabetes, & infection prophylaxis required an IV regular Insulin drip & the following of serial glucose levels to help achieve & maintain euglycemia.      Patient required acute postoperative critical care management and I provided 30 minutes of non-continuous care to the patient. I reviewed and assessed all available clinical, laboratory, radiographic, pharmacologic, and electrocardiographic data in order to decide on maintenance or adjustment of medications, ventilator settings/management of respiratory status, and fluid management.  Discussed at length with the CTICU staff and helped coordinate care.

## 2019-07-21 NOTE — PROGRESS NOTE ADULT - ASSESSMENT
Assessment  DMT2: 73y Male with newly diagnosed DM T2 with hyperglycemia, on low dose Humalog covearge, blood sugars in acceptable range,  no hypoglycemic episode, eating partial meals, in CTICU.  CAD: s/P CABG, on medications, stable, monitored.  HTN: Controlled,  on antihypertensive medications.      Tammie Magdaleno MD  Cell: [phone]  Office: [phone]

## 2019-07-21 NOTE — PROGRESS NOTE ADULT - SUBJECTIVE AND OBJECTIVE BOX
Chief complaint  Patient is a 73y old  Male who presents with a chief complaint of SOB (21 Jul 2019 12:35)   Review of systems  Patient in bed, looks comfortable, no fever, no hypoglycemia.    Labs and Fingersticks  CAPILLARY BLOOD GLUCOSE      POCT Blood Glucose.: 190 mg/dL (21 Jul 2019 12:26)  POCT Blood Glucose.: 179 mg/dL (21 Jul 2019 08:54)  POCT Blood Glucose.: 182 mg/dL (20 Jul 2019 23:01)  POCT Blood Glucose.: 201 mg/dL (20 Jul 2019 17:11)      Anion Gap, Serum: 17 (07-21 @ 02:45)  Anion Gap, Serum: 18 <H> (07-20 @ 01:45)  Anion Gap, Serum: 19 <H> (07-19 @ 17:53)      Calcium, Total Serum: 8.5 (07-21 @ 02:45)  Calcium, Total Serum: 8.3 <L> (07-20 @ 01:45)  Calcium, Total Serum: 8.1 <L> (07-19 @ 17:53)  Albumin, Serum: 3.4 (07-21 @ 02:45)  Albumin, Serum: 3.5 (07-20 @ 01:45)    Alanine Aminotransferase (ALT/SGPT): 195 <H> (07-21 @ 02:45)  Alanine Aminotransferase (ALT/SGPT): 217 <H> (07-20 @ 01:45)  Alkaline Phosphatase, Serum: 136 <H> (07-21 @ 02:45)  Alkaline Phosphatase, Serum: 113 (07-20 @ 01:45)  Aspartate Aminotransferase (AST/SGOT): 429 <H> (07-21 @ 02:45)  Aspartate Aminotransferase (AST/SGOT): 625 <H> (07-20 @ 01:45)        07-21    135  |  98  |  44<H>  ----------------------------<  176<H>  3.8   |  20<L>  |  3.15<H>    Ca    8.5      21 Jul 2019 02:45  Phos  2.7     07-21  Mg     2.5     07-21    TPro  7.0  /  Alb  3.4  /  TBili  3.2<H>  /  DBili  x   /  AST  429<H>  /  ALT  195<H>  /  AlkPhos  136<H>  07-21                        9.3    17.7  )-----------( 78       ( 21 Jul 2019 02:45 )             27.7     Medications  MEDICATIONS  (STANDING):  aspirin  chewable 81 milliGRAM(s) Oral daily  chlorhexidine 4% Liquid 1 Application(s) Topical <User Schedule>  dextrose 50% Injectable 50 milliLiter(s) IV Push every 15 minutes  dextrose 50% Injectable 25 milliLiter(s) IV Push every 15 minutes  dextrose 50% Injectable 50 milliLiter(s) IV Push every 15 minutes  dextrose 50% Injectable 25 milliLiter(s) IV Push every 15 minutes  DOBUTamine Infusion 2.5 MICROgram(s)/kG/Min (5.52 mL/Hr) IV Continuous <Continuous>  heparin  Infusion 900 Unit(s)/Hr (10 mL/Hr) IV Continuous <Continuous>  insulin lispro (HumaLOG) corrective regimen sliding scale   SubCutaneous three times a day before meals  insulin lispro (HumaLOG) corrective regimen sliding scale   SubCutaneous at bedtime  levothyroxine 50 MICROGram(s) Oral daily  meropenem  IVPB 500 milliGRAM(s) IV Intermittent every 12 hours  midodrine 10 milliGRAM(s) Oral every 8 hours  pantoprazole  Injectable 40 milliGRAM(s) IV Push two times a day  sodium chloride 0.9%. 1000 milliLiter(s) (10 mL/Hr) IV Continuous <Continuous>  vasopressin Infusion 0.05 Unit(s)/Min (3 mL/Hr) IV Continuous <Continuous>      Physical Exam  General: Patient comfortable in bed  Vital Signs Last 12 Hrs  T(F): 97 (07-21-19 @ 08:00), Max: 98.2 (07-21-19 @ 04:00)  HR: 73 (07-21-19 @ 14:05) (69 - 79)  BP: --  BP(mean): --  RR: 20 (07-21-19 @ 14:05) (7 - 31)  SpO2: 97% (07-21-19 @ 14:05) (77% - 100%)  Neck: No palpable thyroid nodules.  CVS: S1S2, No murmurs  Respiratory: No wheezing, no crepitations  GI: Abdomen soft, bowel sounds positive  Musculoskeletal:  edema lower extremities.   Skin: No skin rashes, no ecchymosis    Diagnostics    Free Thyroxine, Serum: AM Sched. Collection: 17-Jul-2019 06:00 (07-16 @ 06:29)  Thyroid Stimulating Hormone, Serum: AM Sched. Collection: 17-Jul-2019 06:00 (07-16 @ 06:29)

## 2019-07-21 NOTE — PROGRESS NOTE ADULT - SUBJECTIVE AND OBJECTIVE BOX
Patient seen and examined  Tolerated HD yesterday with 1L UF   remains on vaso gtt  Vent dependent     REVIEW OF SYSTEMS:  As per HPI, otherwise 8 full 10 ROS were unremarkable    MEDICATIONS  (STANDING):  aspirin  chewable 81 milliGRAM(s) Oral daily  chlorhexidine 4% Liquid 1 Application(s) Topical <User Schedule>  dextrose 50% Injectable 50 milliLiter(s) IV Push every 15 minutes  dextrose 50% Injectable 25 milliLiter(s) IV Push every 15 minutes  dextrose 50% Injectable 50 milliLiter(s) IV Push every 15 minutes  dextrose 50% Injectable 25 milliLiter(s) IV Push every 15 minutes  DOBUTamine Infusion 2.5 MICROgram(s)/kG/Min (5.52 mL/Hr) IV Continuous <Continuous>  heparin  Infusion 900 Unit(s)/Hr (10 mL/Hr) IV Continuous <Continuous>  insulin lispro (HumaLOG) corrective regimen sliding scale   SubCutaneous three times a day before meals  insulin lispro (HumaLOG) corrective regimen sliding scale   SubCutaneous at bedtime  levothyroxine 50 MICROGram(s) Oral daily  meropenem  IVPB 500 milliGRAM(s) IV Intermittent every 12 hours  midodrine 10 milliGRAM(s) Oral every 8 hours  pantoprazole  Injectable 40 milliGRAM(s) IV Push two times a day  sodium chloride 0.9%. 1000 milliLiter(s) (10 mL/Hr) IV Continuous <Continuous>  vasopressin Infusion 0.05 Unit(s)/Min (3 mL/Hr) IV Continuous <Continuous>      VITAL:  T(C): , Max: 36.9 (07-20-19 @ 20:00)  T(F): , Max: 98.5 (07-20-19 @ 20:00)  HR: 75 (07-21-19 @ 11:15)  BP: --  BP(mean): --  RR: 21 (07-21-19 @ 11:15)  SpO2: 99% (07-21-19 @ 11:15)  Wt(kg): --    I and O's:    07-20 @ 07:01  -  07-21 @ 07:00  --------------------------------------------------------  IN: 2339 mL / OUT: 1610 mL / NET: 729 mL    07-21 @ 07:01  -  07-21 @ 12:35  --------------------------------------------------------  IN: 185.5 mL / OUT: 0 mL / NET: 185.5 mL          PHYSICAL EXAM:    Constitutional: NAD  Neck:  No JVD  Respiratory: poor effort  Cardiovascular: S1 and S2  Gastrointestinal: BS+, soft, NT/ND  Extremities: No peripheral edema  Neurological: A/O x 3, no focal deficits  Psychiatric: Normal mood, normal affect  : No Chávez  Skin: No rashes  Access: shiley removed     LABS:                        9.3    17.7  )-----------( 78       ( 21 Jul 2019 02:45 )             27.7     07-21    135  |  98  |  44<H>  ----------------------------<  176<H>  3.8   |  20<L>  |  3.15<H>    Ca    8.5      21 Jul 2019 02:45  Phos  2.7     07-21  Mg     2.5     07-21    TPro  7.0  /  Alb  3.4  /  TBili  3.2<H>  /  DBili  x   /  AST  429<H>  /  ALT  195<H>  /  AlkPhos  136<H>  07-21      Urine Studies:          Assessment and Plan:   · Assessment		  The patient is a 73y Male with CAD, HFrEF and PVD s/p CABGx3 followed by AFib, resp failure and acute on chronic kidney injury.    - CKD: stage TBD with limited baseline data available (poor medical follow-up).   - RALPH: oliguric.   Now on dialysis.  - Appears volume overloaded  - Hypotension on vaso gtt and midodrine 10 tid       RECOMMENDATIONS  - Will need new shiley in AM  - HD likely in AM  - Defer diuretics given tenuous BP  - Wean pressors as able  - please dose any new medications for a CrCl of 10-15 ml/min         Case d/w CTU team.    Alvin Arango MD  [phone]

## 2019-07-21 NOTE — PROVIDER CONTACT NOTE (EICU) - SITUATION
Pt appears more confused, and more lethargic. Disoriented to place now, when previously knew he was in hospital. Continues to have R hand weakness.

## 2019-07-22 LAB
ALBUMIN SERPL ELPH-MCNC: 3.2 G/DL — LOW (ref 3.3–5)
ALP SERPL-CCNC: 157 U/L — HIGH (ref 40–120)
ALT FLD-CCNC: 176 U/L — HIGH (ref 10–45)
AMMONIA BLD-MCNC: 40 UMOL/L — SIGNIFICANT CHANGE UP (ref 11–55)
AMMONIA BLD-MCNC: 40 UMOL/L — SIGNIFICANT CHANGE UP (ref 11–55)
ANION GAP SERPL CALC-SCNC: 17 MMOL/L — SIGNIFICANT CHANGE UP (ref 5–17)
APPEARANCE UR: ABNORMAL
APTT BLD: 30.2 SEC — SIGNIFICANT CHANGE UP (ref 27.5–36.3)
APTT BLD: 42.4 SEC — HIGH (ref 27.5–36.3)
AST SERPL-CCNC: 307 U/L — HIGH (ref 10–40)
BASE EXCESS BLDV CALC-SCNC: -3.1 MMOL/L — LOW (ref -2–2)
BILIRUB SERPL-MCNC: 3.7 MG/DL — HIGH (ref 0.2–1.2)
BILIRUB UR-MCNC: SIGNIFICANT CHANGE UP
BUN SERPL-MCNC: 60 MG/DL — HIGH (ref 7–23)
C DIFF GDH STL QL: SIGNIFICANT CHANGE UP
C DIFF GDH STL QL: SIGNIFICANT CHANGE UP
CA-I SERPL-SCNC: 1.16 MMOL/L — SIGNIFICANT CHANGE UP (ref 1.12–1.3)
CALCIUM SERPL-MCNC: 8.5 MG/DL — SIGNIFICANT CHANGE UP (ref 8.4–10.5)
CHLORIDE BLDV-SCNC: 104 MMOL/L — SIGNIFICANT CHANGE UP (ref 96–108)
CHLORIDE SERPL-SCNC: 96 MMOL/L — SIGNIFICANT CHANGE UP (ref 96–108)
CO2 BLDV-SCNC: 23 MMOL/L — SIGNIFICANT CHANGE UP (ref 22–30)
CO2 SERPL-SCNC: 19 MMOL/L — LOW (ref 22–31)
COLOR SPEC: SIGNIFICANT CHANGE UP
CREAT SERPL-MCNC: 4.11 MG/DL — HIGH (ref 0.5–1.3)
DIFF PNL FLD: SIGNIFICANT CHANGE UP
GAS PNL BLDA: SIGNIFICANT CHANGE UP
GAS PNL BLDV: 132 MMOL/L — LOW (ref 135–145)
GAS PNL BLDV: SIGNIFICANT CHANGE UP
GAS PNL BLDV: SIGNIFICANT CHANGE UP
GLUCOSE BLDC GLUCOMTR-MCNC: 122 MG/DL — HIGH (ref 70–99)
GLUCOSE BLDC GLUCOMTR-MCNC: 127 MG/DL — HIGH (ref 70–99)
GLUCOSE BLDV-MCNC: 141 MG/DL — HIGH (ref 70–99)
GLUCOSE SERPL-MCNC: 151 MG/DL — HIGH (ref 70–99)
GLUCOSE UR QL: SIGNIFICANT CHANGE UP
HCO3 BLDV-SCNC: 22 MMOL/L — SIGNIFICANT CHANGE UP (ref 21–29)
HCT VFR BLD CALC: 26.2 % — LOW (ref 39–50)
HCT VFR BLDA CALC: 29 % — LOW (ref 39–50)
HGB BLD CALC-MCNC: 9.3 G/DL — LOW (ref 13–17)
HGB BLD-MCNC: 9.3 G/DL — LOW (ref 13–17)
HOROWITZ INDEX BLDV+IHG-RTO: 36 — SIGNIFICANT CHANGE UP
INR BLD: 1.31 RATIO — HIGH (ref 0.88–1.16)
KETONES UR-MCNC: SIGNIFICANT CHANGE UP
LACTATE BLDV-MCNC: 1.1 MMOL/L — SIGNIFICANT CHANGE UP (ref 0.7–2)
LEUKOCYTE ESTERASE UR-ACNC: SIGNIFICANT CHANGE UP
MAGNESIUM SERPL-MCNC: 2.7 MG/DL — HIGH (ref 1.6–2.6)
MCHC RBC-ENTMCNC: 33.2 PG — SIGNIFICANT CHANGE UP (ref 27–34)
MCHC RBC-ENTMCNC: 35.4 GM/DL — SIGNIFICANT CHANGE UP (ref 32–36)
MCV RBC AUTO: 93.7 FL — SIGNIFICANT CHANGE UP (ref 80–100)
NITRITE UR-MCNC: SIGNIFICANT CHANGE UP
OTHER CELLS CSF MANUAL: 7 ML/DL — LOW (ref 18–22)
PCO2 BLDV: 40 MMHG — SIGNIFICANT CHANGE UP (ref 35–50)
PH BLDV: 7.35 — SIGNIFICANT CHANGE UP (ref 7.35–7.45)
PH UR: SIGNIFICANT CHANGE UP (ref 5–8)
PHOSPHATE SERPL-MCNC: 4.2 MG/DL — SIGNIFICANT CHANGE UP (ref 2.5–4.5)
PLATELET # BLD AUTO: 76 K/UL — LOW (ref 150–400)
PO2 BLDV: 33 MMHG — SIGNIFICANT CHANGE UP (ref 25–45)
POTASSIUM BLDV-SCNC: 3.3 MMOL/L — LOW (ref 3.5–5.3)
POTASSIUM SERPL-MCNC: 3.4 MMOL/L — LOW (ref 3.5–5.3)
POTASSIUM SERPL-SCNC: 3.4 MMOL/L — LOW (ref 3.5–5.3)
PROT SERPL-MCNC: 6.8 G/DL — SIGNIFICANT CHANGE UP (ref 6–8.3)
PROT UR-MCNC: SIGNIFICANT CHANGE UP
PROTHROM AB SERPL-ACNC: 15 SEC — HIGH (ref 10–12.9)
RBC # BLD: 2.79 M/UL — LOW (ref 4.2–5.8)
RBC # FLD: 14.1 % — SIGNIFICANT CHANGE UP (ref 10.3–14.5)
SAO2 % BLDV: 57 % — LOW (ref 67–88)
SODIUM SERPL-SCNC: 132 MMOL/L — LOW (ref 135–145)
SP GR SPEC: 1.04 — HIGH (ref 1.01–1.02)
UROBILINOGEN FLD QL: SIGNIFICANT CHANGE UP
VANCOMYCIN TROUGH SERPL-MCNC: 12.5 UG/ML — SIGNIFICANT CHANGE UP (ref 10–20)
WBC # BLD: 22 K/UL — HIGH (ref 3.8–10.5)
WBC # FLD AUTO: 22 K/UL — HIGH (ref 3.8–10.5)

## 2019-07-22 PROCEDURE — 70450 CT HEAD/BRAIN W/O DYE: CPT | Mod: 26

## 2019-07-22 PROCEDURE — 99291 CRITICAL CARE FIRST HOUR: CPT | Mod: 25

## 2019-07-22 PROCEDURE — 71045 X-RAY EXAM CHEST 1 VIEW: CPT | Mod: 26

## 2019-07-22 PROCEDURE — 99292 CRITICAL CARE ADDL 30 MIN: CPT | Mod: 57

## 2019-07-22 PROCEDURE — 36800 INSERTION OF CANNULA: CPT

## 2019-07-22 RX ORDER — VANCOMYCIN HCL 1 G
1000 VIAL (EA) INTRAVENOUS ONCE
Refills: 0 | Status: COMPLETED | OUTPATIENT
Start: 2019-07-22 | End: 2019-07-22

## 2019-07-22 RX ORDER — VANCOMYCIN HCL 1 G
500 VIAL (EA) INTRAVENOUS EVERY 6 HOURS
Refills: 0 | Status: DISCONTINUED | OUTPATIENT
Start: 2019-07-22 | End: 2019-08-03

## 2019-07-22 RX ORDER — NITROGLYCERIN 6.5 MG
1 CAPSULE, EXTENDED RELEASE ORAL ONCE
Refills: 0 | Status: COMPLETED | OUTPATIENT
Start: 2019-07-22 | End: 2019-07-22

## 2019-07-22 RX ORDER — NOREPINEPHRINE BITARTRATE/D5W 8 MG/250ML
0.01 PLASTIC BAG, INJECTION (ML) INTRAVENOUS
Qty: 8 | Refills: 0 | Status: DISCONTINUED | OUTPATIENT
Start: 2019-07-22 | End: 2019-07-28

## 2019-07-22 RX ORDER — SODIUM CHLORIDE 9 MG/ML
10 INJECTION INTRAMUSCULAR; INTRAVENOUS; SUBCUTANEOUS
Refills: 0 | Status: DISCONTINUED | OUTPATIENT
Start: 2019-07-22 | End: 2019-07-22

## 2019-07-22 RX ORDER — ATORVASTATIN CALCIUM 80 MG/1
80 TABLET, FILM COATED ORAL AT BEDTIME
Refills: 0 | Status: DISCONTINUED | OUTPATIENT
Start: 2019-07-22 | End: 2019-08-02

## 2019-07-22 RX ORDER — FENTANYL CITRATE 50 UG/ML
25 INJECTION INTRAVENOUS ONCE
Refills: 0 | Status: DISCONTINUED | OUTPATIENT
Start: 2019-07-22 | End: 2019-07-22

## 2019-07-22 RX ORDER — ARGATROBAN 50 MG/50ML
1.6 INJECTION, SOLUTION INTRAVENOUS
Qty: 250 | Refills: 0 | Status: DISCONTINUED | OUTPATIENT
Start: 2019-07-22 | End: 2019-07-23

## 2019-07-22 RX ORDER — CHLORHEXIDINE GLUCONATE 213 G/1000ML
1 SOLUTION TOPICAL
Refills: 0 | Status: DISCONTINUED | OUTPATIENT
Start: 2019-07-22 | End: 2019-07-23

## 2019-07-22 RX ADMIN — PANTOPRAZOLE SODIUM 40 MILLIGRAM(S): 20 TABLET, DELAYED RELEASE ORAL at 17:55

## 2019-07-22 RX ADMIN — Medication 50 MICROGRAM(S): at 15:57

## 2019-07-22 RX ADMIN — MEROPENEM 100 MILLIGRAM(S): 1 INJECTION INTRAVENOUS at 07:41

## 2019-07-22 RX ADMIN — Medication 500 MILLIGRAM(S): at 21:56

## 2019-07-22 RX ADMIN — ATORVASTATIN CALCIUM 80 MILLIGRAM(S): 80 TABLET, FILM COATED ORAL at 21:56

## 2019-07-22 RX ADMIN — Medication 81 MILLIGRAM(S): at 15:57

## 2019-07-22 RX ADMIN — Medication 1 INCH(S): at 21:57

## 2019-07-22 RX ADMIN — Medication 500 MILLIGRAM(S): at 15:57

## 2019-07-22 RX ADMIN — MEROPENEM 100 MILLIGRAM(S): 1 INJECTION INTRAVENOUS at 21:56

## 2019-07-22 RX ADMIN — FENTANYL CITRATE 25 MICROGRAM(S): 50 INJECTION INTRAVENOUS at 06:17

## 2019-07-22 RX ADMIN — HEPARIN SODIUM 11 UNIT(S)/HR: 5000 INJECTION INTRAVENOUS; SUBCUTANEOUS at 15:57

## 2019-07-22 RX ADMIN — FENTANYL CITRATE 25 MICROGRAM(S): 50 INJECTION INTRAVENOUS at 06:37

## 2019-07-22 RX ADMIN — CHLORHEXIDINE GLUCONATE 1 APPLICATION(S): 213 SOLUTION TOPICAL at 07:10

## 2019-07-22 RX ADMIN — Medication 5.52 MICROGRAM(S)/KG/MIN: at 15:58

## 2019-07-22 RX ADMIN — Medication 2.07 MICROGRAM(S)/KG/MIN: at 12:00

## 2019-07-22 RX ADMIN — PANTOPRAZOLE SODIUM 40 MILLIGRAM(S): 20 TABLET, DELAYED RELEASE ORAL at 07:03

## 2019-07-22 RX ADMIN — VASOPRESSIN 3 UNIT(S)/MIN: 20 INJECTION INTRAVENOUS at 15:58

## 2019-07-22 RX ADMIN — MIDODRINE HYDROCHLORIDE 10 MILLIGRAM(S): 2.5 TABLET ORAL at 15:57

## 2019-07-22 RX ADMIN — MIDODRINE HYDROCHLORIDE 10 MILLIGRAM(S): 2.5 TABLET ORAL at 21:56

## 2019-07-22 RX ADMIN — Medication 250 MILLIGRAM(S): at 07:02

## 2019-07-22 NOTE — PROGRESS NOTE ADULT - SUBJECTIVE AND OBJECTIVE BOX
SYLVIA ROSA   MRN#: [id number]     The patient is a 73y Male who was seen, evaluated, & examined with the CTICU staff post-operatively with a multidisciplinary care plan formulated & implemented.  All available clinical, laboratory, radiographic, pharmacologic, and electrocardiographic data were reviewed & analyzed.      The patient was in the CTICU in critical condition secondary to:     persistent cardiopulmonary dysfunction  cardiogenic shock-cardiovascular dysfunction    For support and evaluation & prevention of further decompensation secondary to persistent cardiopulmonary dysfunction and cardiogenic shock-cardiovascular dysfunction, respiratory status required:     supplemental oxygen with nasal cannula  continuous pulse oximetry monitoring  following ABGs with A-line monitoring    Invasive hemodynamic monitoring with an A-line was required for continuous MAP/BP monitoring to ensure adequate cardiovascular support and to evaluate for & help prevent decompensation while receiving     IV Levophed infusion  IV Vasopressin infusion  IV Dobutamine infusion    secondary to cardiogenic shock-cardiovascular dysfunction    In addition:  Cardiogenic shock, on Dobutamine 2.5   Also Levo/Vaso with unclear etiology of pressor requirement - ? if vasodilated from both surgery and dialysis  Loss of distal pulses in R upper extremity despite nitro paste but does have brachial pulse and lactate is normal - BPs in both arms relatively similar  Vascular consulted, requesting CTA of arm and upper chest which will be done tonight  Now in intermittent HD, 1L off today  Delirious, on 1:1, Psych called as he is talking about harming himself (but especially with constant observation and ongoing delirium he is unlikely to be able to carry out any plan to do so)    The patient required critical care management and I personally provided 30 minutes of non-continuous care to the patient, excluding separate procedures, in addition to  discussing the patient and plan at length with the CTICU staff and helping coordinate care.

## 2019-07-22 NOTE — CHART NOTE - NSCHARTNOTEFT_GEN_A_CORE
Vascular Surgery    Called for concern that patient cooler hand without doppler signals on right side.   Patient extubated, but still on Dobutamine, Vasopressin and Norepi gtts. He does not complain of any pain.  On exam hand is cold to touch with sluggish capillary refill and no dopplerable signals or palpable pulses in radial and ulnar arteries. No arch signals. Palpable brachial pulse.  Left upper extremity with brachial pulse and radial and ulnar signals. Capillary refill less than 2 seconds. Sensation intact but motor slightly diminished on right side.    Recommend stat RUE CTA. D/w vascular fellow, CTICU NP and intensivist  DIPESH Amanda PGY 4  [id number]

## 2019-07-22 NOTE — CHART NOTE - NSCHARTNOTEFT_GEN_A_CORE
Nutrition Follow Up Note  Patient seen for: Malnutrition follow up     Source: RN, Medical record CTU rounds,     Chart reviewed, events noted: 73 year old male with no PMHx and poor medical follow up. Admitted to [facility] for SOB and LE edema. Newly Dx HF, and RALPH vs CKD and PVD. S/p cardiac cath found with multi vessel disease. Transferred to [facility] and is now s/p CABG x3. Required CVVHD, however held. Plan for HD today. Pt with mental status changes, s/p CT head negative. Increase in BM's noted, C-Diff pending.     Diet : mechanical soft    Patient reports: Pt seen, is aware, with difficulty communicating due to mental status changes. Had jami on a mechanical soft diet, however has been requiring feeding assistance. Per discussion with team, plan to obtain a speech and swallow evaluation and insert na NGT for nutrition support .     PO intake: 40% of meals.      Source for PO intake: RN documentation       Daily Weight in k.1 (-), Weight in k.9 (), Weight in k.9 (-), Weight in k.9 (-20), Weight in k.5 (-19), Weight in k (-18), Weight in k.8 (-17)    % Weight Change: weight fluctuating, likely related to fluid fluctuations as well as low PO intake     Pertinent Medications: MEDICATIONS  (STANDING):  aspirin  chewable 81 milliGRAM(s) Oral daily  atorvastatin 80 milliGRAM(s) Oral at bedtime  chlorhexidine 4% Liquid 1 Application(s) Topical <User Schedule>  dextrose 50% Injectable 50 milliLiter(s) IV Push every 15 minutes  dextrose 50% Injectable 25 milliLiter(s) IV Push every 15 minutes  dextrose 50% Injectable 50 milliLiter(s) IV Push every 15 minutes  dextrose 50% Injectable 25 milliLiter(s) IV Push every 15 minutes  DOBUTamine Infusion 2.5 MICROgram(s)/kG/Min (5.52 mL/Hr) IV Continuous <Continuous>  heparin  Infusion 900 Unit(s)/Hr (10.5 mL/Hr) IV Continuous <Continuous>  insulin lispro (HumaLOG) corrective regimen sliding scale   SubCutaneous three times a day before meals  insulin lispro (HumaLOG) corrective regimen sliding scale   SubCutaneous at bedtime  levothyroxine 50 MICROGram(s) Oral daily  meropenem  IVPB 500 milliGRAM(s) IV Intermittent <User Schedule>  midodrine 10 milliGRAM(s) Oral every 8 hours  pantoprazole  Injectable 40 milliGRAM(s) IV Push two times a day  sodium chloride 0.9%. 1000 milliLiter(s) (10 mL/Hr) IV Continuous <Continuous>  vancomycin    Solution 500 milliGRAM(s) Oral every 6 hours  vasopressin Infusion 0.05 Unit(s)/Min (3 mL/Hr) IV Continuous <Continuous>    MEDICATIONS  (PRN):  sodium chloride 0.9% lock flush 10 milliLiter(s) IV Push every 1 hour PRN Pre/post blood products, medications, blood draw, and to maintain line patency    Pertinent Labs:  @ 02:23: Na 132<L>, BUN 60<H>, Cr 4.11<H>, <H>, K+ 3.4<L>, Phos 4.2, Mg 2.7<H>, Alk Phos 157<H>, ALT/SGPT 176<H>, AST/SGOT 307<H>, HbA1c --    Finger Sticks:  POCT Blood Glucose.: 149 mg/dL ( @ 21:41)  POCT Blood Glucose.: 147 mg/dL ( @ 17:27)  POCT Blood Glucose.: 190 mg/dL ( @ 12:26)      Skin per nursing documentation: intact  Edema: +1 generalized edema     Estimated Needs:   [ X] no change since previous assessment  [ ] recalculated:     Previous Nutrition Diagnosis: Increased nutrient needs and moderate malnutrition   Nutrition Diagnosis is: on going, plan to address with EN      Interventions:     Recommend  1. Per discussion with team. plan to initiate Nepro @ 15ml/hr and increase by 10ml/hr q46hrs as tolerated by Pt to goal rate of 45ml/[id number]. Goal rate would provides 1944kcals and 87gm protein (28.9kcals/kg and 1.3gm protein/kg based on IBW: 67.2kg)   2. Add Nasir active x2 daily   3. Trend GI tolerance   5. Trend BG levels, renal indices. LFT's and electrolytes     Monitoring and Evaluation:     Continue to monitor Nutritional intake, Tolerance to diet prescription, weights, labs, skin integrity    RD remains available upon request and will follow up per protocol  Sarah Siegler RD, RDN, [facility] Pager #[phone]

## 2019-07-22 NOTE — PROGRESS NOTE ADULT - SUBJECTIVE AND OBJECTIVE BOX
NEPHROLOGY-[facility] [phone]        Patient seen and examined in bed.  He was on 4 liters NC  He remains in /vaso gtt at present        MEDICATIONS  (STANDING):  aspirin  chewable 81 milliGRAM(s) Oral daily  chlorhexidine 4% Liquid 1 Application(s) Topical <User Schedule>  dextrose 50% Injectable 50 milliLiter(s) IV Push every 15 minutes  dextrose 50% Injectable 25 milliLiter(s) IV Push every 15 minutes  dextrose 50% Injectable 50 milliLiter(s) IV Push every 15 minutes  dextrose 50% Injectable 25 milliLiter(s) IV Push every 15 minutes  DOBUTamine Infusion 2.5 MICROgram(s)/kG/Min (5.52 mL/Hr) IV Continuous <Continuous>  heparin  Infusion 900 Unit(s)/Hr (10.5 mL/Hr) IV Continuous <Continuous>  insulin lispro (HumaLOG) corrective regimen sliding scale   SubCutaneous three times a day before meals  insulin lispro (HumaLOG) corrective regimen sliding scale   SubCutaneous at bedtime  levothyroxine 50 MICROGram(s) Oral daily  meropenem  IVPB 500 milliGRAM(s) IV Intermittent every 12 hours  midodrine 10 milliGRAM(s) Oral every 8 hours  pantoprazole  Injectable 40 milliGRAM(s) IV Push two times a day  sodium chloride 0.9%. 1000 milliLiter(s) (10 mL/Hr) IV Continuous <Continuous>  vasopressin Infusion 0.05 Unit(s)/Min (3 mL/Hr) IV Continuous <Continuous>      VITAL:  T(C): , Max: 36.3 (19 @ 20:00)  T(F): , Max: 97.4 (19 @ 20:00)  HR: 80 (19 @ 07:00)  BP: --  BP(mean): --  RR: 48 (19 @ 07:00)  SpO2: 88% (19 @ 07:00)  Wt(kg): --    I and O's:     @ 07:01  -   @ 07:00  --------------------------------------------------------  IN: 923.5 mL / OUT: 0 mL / NET: 923.5 mL          PHYSICAL EXAM:    Constitutional: nad  Neck:  No JVD  Respiratory: CTAB/L  Cardiovascular: S1 and S2  Gastrointestinal: BS+, soft, NT/ND  Extremities: No peripheral edema  Neurological: A/O x 3, no focal deficits  Psychiatric: Normal mood, normal affect  : No Chávez  Skin: No rashes  Access: right IJ    LABS:                        9.3    22.0  )-----------( 76       ( 2019 02:23 )             26.2         132<L>  |  96  |  60<H>  ----------------------------<  151<H>  3.4<L>   |  19<L>  |  4.11<H>    Ca    8.5      2019 02:23  Phos  4.2       Mg     2.7         TPro  6.8  /  Alb  3.2<L>  /  TBili  3.7<H>  /  DBili  x   /  AST  307<H>  /  ALT  176<H>  /  AlkPhos  157<H>            Urine Studies:          RADIOLOGY & ADDITIONAL STUDIES:          < from: Xray Chest 1 View- PORTABLE-Routine (19 @ 01:57) >    EXAM:  XR CHEST PORTABLE ROUTINE 1V                            PROCEDURE DATE:  2019            INTERPRETATION:  A single chest x-ray was obtained on 2019.    Indication: Status post cardiac surgery.    Impression:    The heart is enlarged. The left costophrenic angle is not included in   this study. Small right pleural effusion. No acute consolidation could be   identified. A central line seen on the left and the tip in superior vena   cava. No pneumothorax. All other life supporting devices were removed.   Status post sternotomy. No pneumothorax.                    RITA ORTEGA M.D., ATTENDING RADIOLOGIST  This document has been electronically signed. 2019  8:15AM                < end of copied text >

## 2019-07-22 NOTE — PROGRESS NOTE ADULT - SUBJECTIVE AND OBJECTIVE BOX
SLYVIA ROSA  MRN#:  [id number]    The patient is a 73y Male without prior medical care who presented with SOB and LE edema, found to have newly diagnosed HFrEF (EF 30%) and RALPH vs CKD, also PVD and claudication with extensive LE arterial disease on doppler, reports heavy alcohol use, further work up revealed multivessel CAD, now s/p C3L today, severe biventricular dysfunction on echocardiogram with some improvement after bypass was seen, evaluated, & examined with the CTICU staff on rounds and later in the evening with a multidisciplinary care plan formulated & implemented.  All available clinical, laboratory, radiographic, pharmacologic, and electrocardiographic data were reviewed & analyzed.      The patient was in the CTICU in critical condition secondary to persistent cardiopulmonary dysfunction, hemodynamically significant anemia/hypovolemia-shock, persistent cardiovascular dysfunction, acute renal Failure, on HD now,  & hyperglycemia.      Respiratory status required supplemental O2, close monitoring of respiratory rate and breathing pattern, the following of ABG’s with A-line monitoring, and continuous pulse oximetry monitoring for support & to evaluate for & prevent further decompensation secondary to persistent cardiopulmonary dysfunction.     Invasive hemodynamic monitoring with a central venous catheter & an A-line were required for the continuous central venous and MAP/BP monitoring to ensure adequate cardiovascular support and to evaluate for & help prevent decompensation while receiving intermittent volume expansion, external epicardial pacing, ARF on HD, an IV Vasopressin drip, an IV Dobutamine drip, an IV Primacor drip, and an IV Heparin drip secondary to hemodynamically significant anemia/anemia due to acute blood loss, hypovolemia-shock, cardiovascular dysfunction, A Fib, ARF,  and combined systolic and diastolic biventricular failure.    Patient required acute postoperative critical care management and I provided 80 minutes of non-continuous care to the patient. I reviewed and assessed all available clinical, laboratory, radiographic, pharmacologic, and electrocardiographic data in order to decide on maintenance or adjustment of medications, and fluid management.  Discussed at length with the CTICU staff and helped coordinate care.

## 2019-07-22 NOTE — PROGRESS NOTE ADULT - SUBJECTIVE AND OBJECTIVE BOX
Chief complaint  Patient is a 73y old  Male who presents with a chief complaint of SOB (21 Jul 2019 12:35)   Review of systems  Patient in bed, looks comfortable, no fever,  no hypoglycemia.    Labs and Fingersticks  CAPILLARY BLOOD GLUCOSE  146 (21 Jul 2019 22:00)      POCT Blood Glucose.: 149 mg/dL (21 Jul 2019 21:41)  POCT Blood Glucose.: 147 mg/dL (21 Jul 2019 17:27)  POCT Blood Glucose.: 190 mg/dL (21 Jul 2019 12:26)  POCT Blood Glucose.: 179 mg/dL (21 Jul 2019 08:54)      Anion Gap, Serum: 17 (07-22 @ 02:23)  Anion Gap, Serum: 17 (07-21 @ 02:45)      Calcium, Total Serum: 8.5 (07-22 @ 02:23)  Calcium, Total Serum: 8.5 (07-21 @ 02:45)  Albumin, Serum: 3.2 <L> (07-22 @ 02:23)  Albumin, Serum: 3.4 (07-21 @ 02:45)    Alanine Aminotransferase (ALT/SGPT): 176 <H> (07-22 @ 02:23)  Alanine Aminotransferase (ALT/SGPT): 195 <H> (07-21 @ 02:45)  Alkaline Phosphatase, Serum: 157 <H> (07-22 @ 02:23)  Alkaline Phosphatase, Serum: 136 <H> (07-21 @ 02:45)  Aspartate Aminotransferase (AST/SGOT): 307 <H> (07-22 @ 02:23)  Aspartate Aminotransferase (AST/SGOT): 429 <H> (07-21 @ 02:45)        07-22    132<L>  |  96  |  60<H>  ----------------------------<  151<H>  3.4<L>   |  19<L>  |  4.11<H>    Ca    8.5      22 Jul 2019 02:23  Phos  4.2     07-22  Mg     2.7     07-22    TPro  6.8  /  Alb  3.2<L>  /  TBili  3.7<H>  /  DBili  x   /  AST  307<H>  /  ALT  176<H>  /  AlkPhos  157<H>  07-22                        9.3    22.0  )-----------( 76       ( 22 Jul 2019 02:23 )             26.2     Medications  MEDICATIONS  (STANDING):  aspirin  chewable 81 milliGRAM(s) Oral daily  chlorhexidine 4% Liquid 1 Application(s) Topical <User Schedule>  dextrose 50% Injectable 50 milliLiter(s) IV Push every 15 minutes  dextrose 50% Injectable 25 milliLiter(s) IV Push every 15 minutes  dextrose 50% Injectable 50 milliLiter(s) IV Push every 15 minutes  dextrose 50% Injectable 25 milliLiter(s) IV Push every 15 minutes  DOBUTamine Infusion 2.5 MICROgram(s)/kG/Min (5.52 mL/Hr) IV Continuous <Continuous>  heparin  Infusion 900 Unit(s)/Hr (10.5 mL/Hr) IV Continuous <Continuous>  insulin lispro (HumaLOG) corrective regimen sliding scale   SubCutaneous three times a day before meals  insulin lispro (HumaLOG) corrective regimen sliding scale   SubCutaneous at bedtime  levothyroxine 50 MICROGram(s) Oral daily  meropenem  IVPB 500 milliGRAM(s) IV Intermittent every 12 hours  midodrine 10 milliGRAM(s) Oral every 8 hours  pantoprazole  Injectable 40 milliGRAM(s) IV Push two times a day  sodium chloride 0.9%. 1000 milliLiter(s) (10 mL/Hr) IV Continuous <Continuous>  vancomycin  IVPB 1000 milliGRAM(s) IV Intermittent once  vasopressin Infusion 0.05 Unit(s)/Min (3 mL/Hr) IV Continuous <Continuous>      Physical Exam  General: Patient comfortable in bed  Vital Signs Last 12 Hrs  T(F): 95.5 (07-22-19 @ 00:00), Max: 97.4 (07-21-19 @ 20:00)  HR: 77 (07-22-19 @ 06:15) (69 - 84)  BP: --  BP(mean): --  RR: 16 (07-22-19 @ 06:15) (10 - 46)  SpO2: 99% (07-22-19 @ 06:15) (85% - 100%)  Neck: No palpable thyroid nodules.  CVS: S1S2, No murmurs  Respiratory: No wheezing, no crepitations  GI: Abdomen soft, bowel sounds positive  Musculoskeletal:  edema lower extremities.   Skin: No skin rashes, no ecchymosis    Diagnostics    Free Thyroxine, Serum: AM Sched. Collection: 17-Jul-2019 06:00 (07-16 @ 06:29)  Thyroid Stimulating Hormone, Serum: AM Sched. Collection: 17-Jul-2019 06:00 (07-16 @ 06:29)

## 2019-07-22 NOTE — PROGRESS NOTE ADULT - ASSESSMENT
The patient is a 73y Male with CAD, HFrEF and PVD s/p CABGx3 followed by AFib, resp failure and acute on chronic kidney injury.    - CKD: stage TBD with limited baseline data available (poor medical follow-up).   - RALPH: anuric.   Now on dialysis.  - Hypotension on vaso gtt and midodrine 10 tid       RECOMMENDATIONS  - HD this  AM and aim for 1 kg removal   - Wean pressors as able  - please dose any new medications for a CrCl of 10-15 ml/min   -Avoid hypotension given the PVD present

## 2019-07-23 LAB
APTT BLD: 97 SEC — HIGH (ref 27.5–36.3)
C DIFF BY PCR RESULT: DETECTED
C DIFF TOX GENS STL QL NAA+PROBE: SIGNIFICANT CHANGE UP
CULTURE RESULTS: NO GROWTH — SIGNIFICANT CHANGE UP
GAS PNL BLDA: SIGNIFICANT CHANGE UP
GLUCOSE BLDC GLUCOMTR-MCNC: 162 MG/DL — HIGH (ref 70–99)
GLUCOSE BLDC GLUCOMTR-MCNC: 247 MG/DL — HIGH (ref 70–99)
SPECIMEN SOURCE: SIGNIFICANT CHANGE UP

## 2019-07-23 PROCEDURE — 93308 TTE F-UP OR LMTD: CPT | Mod: 26

## 2019-07-23 PROCEDURE — 99292 CRITICAL CARE ADDL 30 MIN: CPT

## 2019-07-23 PROCEDURE — 99223 1ST HOSP IP/OBS HIGH 75: CPT

## 2019-07-23 PROCEDURE — 73206 CT ANGIO UPR EXTRM W/O&W/DYE: CPT | Mod: 26,RT

## 2019-07-23 PROCEDURE — 99291 CRITICAL CARE FIRST HOUR: CPT

## 2019-07-23 PROCEDURE — 93321 DOPPLER ECHO F-UP/LMTD STD: CPT | Mod: 26

## 2019-07-23 PROCEDURE — 71045 X-RAY EXAM CHEST 1 VIEW: CPT | Mod: 26

## 2019-07-23 RX ORDER — INSULIN LISPRO 100/ML
VIAL (ML) SUBCUTANEOUS EVERY 6 HOURS
Refills: 0 | Status: DISCONTINUED | OUTPATIENT
Start: 2019-07-23 | End: 2019-08-03

## 2019-07-23 RX ORDER — ARGATROBAN 50 MG/50ML
0.4 INJECTION, SOLUTION INTRAVENOUS
Qty: 250 | Refills: 0 | Status: DISCONTINUED | OUTPATIENT
Start: 2019-07-23 | End: 2019-07-27

## 2019-07-23 RX ORDER — POTASSIUM CHLORIDE 20 MEQ
10 PACKET (EA) ORAL ONCE
Refills: 0 | Status: COMPLETED | OUTPATIENT
Start: 2019-07-23 | End: 2019-07-23

## 2019-07-23 RX ORDER — POTASSIUM CHLORIDE 20 MEQ
40 PACKET (EA) ORAL ONCE
Refills: 0 | Status: COMPLETED | OUTPATIENT
Start: 2019-07-23 | End: 2019-07-23

## 2019-07-23 RX ORDER — POTASSIUM CHLORIDE 20 MEQ
20 PACKET (EA) ORAL ONCE
Refills: 0 | Status: COMPLETED | OUTPATIENT
Start: 2019-07-23 | End: 2019-07-23

## 2019-07-23 RX ADMIN — Medication 500 MILLIGRAM(S): at 11:41

## 2019-07-23 RX ADMIN — Medication 20 MILLIEQUIVALENT(S): at 20:00

## 2019-07-23 RX ADMIN — Medication 500 MILLIGRAM(S): at 17:37

## 2019-07-23 RX ADMIN — Medication 50 MILLIEQUIVALENT(S): at 16:50

## 2019-07-23 RX ADMIN — PANTOPRAZOLE SODIUM 40 MILLIGRAM(S): 20 TABLET, DELAYED RELEASE ORAL at 06:28

## 2019-07-23 RX ADMIN — VASOPRESSIN 3 UNIT(S)/MIN: 20 INJECTION INTRAVENOUS at 13:04

## 2019-07-23 RX ADMIN — Medication 500 MILLIGRAM(S): at 06:28

## 2019-07-23 RX ADMIN — Medication 5.52 MICROGRAM(S)/KG/MIN: at 13:04

## 2019-07-23 RX ADMIN — Medication 81 MILLIGRAM(S): at 11:41

## 2019-07-23 RX ADMIN — Medication 1 INCH(S): at 09:00

## 2019-07-23 RX ADMIN — Medication 50 MICROGRAM(S): at 06:29

## 2019-07-23 RX ADMIN — Medication 2.07 MICROGRAM(S)/KG/MIN: at 08:00

## 2019-07-23 RX ADMIN — ATORVASTATIN CALCIUM 80 MILLIGRAM(S): 80 TABLET, FILM COATED ORAL at 21:11

## 2019-07-23 RX ADMIN — Medication 40 MILLIEQUIVALENT(S): at 22:45

## 2019-07-23 RX ADMIN — CHLORHEXIDINE GLUCONATE 1 APPLICATION(S): 213 SOLUTION TOPICAL at 07:04

## 2019-07-23 RX ADMIN — Medication 50 MILLIEQUIVALENT(S): at 12:31

## 2019-07-23 RX ADMIN — PANTOPRAZOLE SODIUM 40 MILLIGRAM(S): 20 TABLET, DELAYED RELEASE ORAL at 17:37

## 2019-07-23 RX ADMIN — MIDODRINE HYDROCHLORIDE 10 MILLIGRAM(S): 2.5 TABLET ORAL at 06:29

## 2019-07-23 RX ADMIN — Medication 4: at 11:47

## 2019-07-23 RX ADMIN — MIDODRINE HYDROCHLORIDE 10 MILLIGRAM(S): 2.5 TABLET ORAL at 13:02

## 2019-07-23 RX ADMIN — Medication 2: at 17:50

## 2019-07-23 RX ADMIN — Medication 500 MILLIGRAM(S): at 23:57

## 2019-07-23 RX ADMIN — MIDODRINE HYDROCHLORIDE 10 MILLIGRAM(S): 2.5 TABLET ORAL at 21:11

## 2019-07-23 RX ADMIN — Medication 500 MILLIGRAM(S): at 06:29

## 2019-07-23 NOTE — CONSULT NOTE ADULT - SUBJECTIVE AND OBJECTIVE BOX
Patient is a 73y old  Male who presents with a chief complaint of CABG (2019 23:00)      HPI:  73 year old Black male h/o no implantable devices who prior to his admission to [facility] had not been followed by a doctor regularly (on no meds at home), presented to [facility] on 19 with shortness of breath and LE edema found to have newly diagnosed HFrEF (EF 30%) and RALPH vs CKD, also PVD and claudication with extensive LE arterial disease on doppler. 19 abd US: small amount of perihepatic ascited. 19 TTE: EF 30%, mild LVH, multiple regional WMA's, mod MR/TR. 7/3/19 Nuclear ST: moderate focal apical/inferolat/aterolat ischemia. 19: h/h 12.9/39.5, platelets 180, Bun/Cr 28/1.9, eGFR 42. Pt was diuresed at [facility] treated with lasix/hydralzine/nitrates/statin/ASA. No beta blockade 2/2 low blood pressures. Per [facility] discharge note, pt will need  vascular intervention d/t extensive lower extremity vascular disease. Pt transferred to [facility] today for left heart cath to rule out ischemia. (10 Jul 2019 12:52)     pt underwent C3L , AFib.  Pt's hospital course was then been c/b  A-fib, acute on chronic renal failure with fluid overload requiring CVVH, cardiogenic shock (on Milrinone, Dobutamine, and Vasopressin gtts), and respiratory failure requiring intubation (extubated on ). Patient's CBC was WNL on day of presentation to [facility]. His platelet count has been steadily downtrending since .       PAST MEDICAL & SURGICAL HISTORY:  Arterial disease: peripheral  TR (tricuspid regurgitation)  MR (mitral regurgitation)  RALPH (acute kidney injury)  HFrEF (heart failure with reduced ejection fraction)  No significant past surgical history      Social history:     Marital Status:   Occupation: retired - former    Lives with: pt unable to answer ( A & O x 1)    Substance Use : pt unable to answer  Tobacco Usage:  (   ) never smoked   ( x  ) former smoker   (   ) current smoker  (     ) pack year  (        ) last tobacco use date  Alcohol Usage: unable to answer          FAMILY HISTORY:  Family history of cancer (Father)      REVIEW OF SYSTEMS  Pt unable to give clear history  pt with watery bowel mvmt  not coughing much  confused      Allergies    No Known Allergies    Intolerances        Antimicrobials:       MEDICATIONS  (prior antimicrobials ):    cefuroxime  IVPB   100 mL/Hr IV Intermittent (19 @ 07:36)   100 mL/Hr IV Intermittent (19 @ 00:30)   100 mL/Hr IV Intermittent (19 @ 16:11)    meropenem  IVPB   100 mL/Hr IV Intermittent (19 @ 06:57)    meropenem  IVPB   100 mL/Hr IV Intermittent (19 @ 21:56)   100 mL/Hr IV Intermittent (19 @ 07:41)   100 mL/Hr IV Intermittent (19 @ 17:20)    vancomycin    Solution   500 milliGRAM(s) Oral (19 @ 06:29)   500 milliGRAM(s) Oral (19 @ 06:28)   500 milliGRAM(s) Oral (19 @ 21:56)   500 milliGRAM(s) Oral (19 @ 15:57)    vancomycin  IVPB   250 mL/Hr IV Intermittent (19 @ 07:02)    vancomycin  IVPB   250 mL/Hr IV Intermittent (19 @ 07:58)             vancomycin    Solution 500 milliGRAM(s) Oral every 6 hours      MEDICATIONS  (STANDING):  argatroban Infusion 2 MICROgram(s)/kG/Min (8.832 mL/Hr) IV Continuous <Continuous>  aspirin  chewable 81 milliGRAM(s) Oral daily  atorvastatin 80 milliGRAM(s) Oral at bedtime  chlorhexidine 4% Liquid 1 Application(s) Topical <User Schedule>  dextrose 50% Injectable 50 milliLiter(s) IV Push every 15 minutes  dextrose 50% Injectable 25 milliLiter(s) IV Push every 15 minutes  dextrose 50% Injectable 50 milliLiter(s) IV Push every 15 minutes  dextrose 50% Injectable 25 milliLiter(s) IV Push every 15 minutes  DOBUTamine Infusion 2.5 MICROgram(s)/kG/Min (5.52 mL/Hr) IV Continuous <Continuous>  insulin lispro (HumaLOG) corrective regimen sliding scale   SubCutaneous every 6 hours  levothyroxine 50 MICROGram(s) Oral daily  midodrine 10 milliGRAM(s) Oral every 8 hours  norepinephrine Infusion 0.015 MICROgram(s)/kG/Min (2.07 mL/Hr) IV Continuous <Continuous>  pantoprazole  Injectable 40 milliGRAM(s) IV Push two times a day  sodium chloride 0.9%. 1000 milliLiter(s) (10 mL/Hr) IV Continuous <Continuous>  vancomycin    Solution 500 milliGRAM(s) Oral every 6 hours  vasopressin Infusion 0.05 Unit(s)/Min (3 mL/Hr) IV Continuous <Continuous>    MEDICATIONS  (PRN):  sodium chloride 0.9% lock flush 10 milliLiter(s) IV Push every 1 hour PRN Pre/post blood products, medications, blood draw, and to maintain line patency        Vital Signs Last 24 Hrs  T(C): 36.8 (2019 08:00), Max: 37 (2019 16:00)  T(F): 98.2 (2019 08:00), Max: 98.6 (2019 16:00)  HR: 82 (2019 11:00) (71 - 91)  BP: 141/65 (2019 21:45) (129/56 - 141/65)  BP(mean): 94 (2019 21:45) (84 - 94)  RR: 33 (2019 11:00) (15 - 59)  SpO2: 77% (2019 11:00) (77% - 100%)    PHYSICAL EXAM:Pleasant patient in no acute distress.      Constitutional:Comfortable.Awake and alert  No cachexia     Eyes:PERRL EOMI.NO discharge or conjunctival injection    ENMT:No sinus tenderness.No thrush.No pharyngeal exudate or erythema.Fair dental hygiene    Neck:Supple,No LN,no JVD      Respiratory:Good air entry bilaterally,CTA    Cardiovascular:S1 S2 wnl, No murmurs,rub or gallops    Gastrointestinal:Soft BS(+) no tenderness no masses ,No rebound or guarding    Genitourinary:No CVA tendereness     Rectal:    Extremities:No cyanosis,clubbing or edema.    Vascular:peripheral pulses felt    Neurological:AAO X 3,No grossly focal deficits    Skin:No rash     Lymph Nodes:No palpable LNs    Musculoskeletal:No joint swelling or LOM    Psychiatric:Affect normal.                                8.8    20.7  )-----------( 72       ( 2019 02:12 )             26.4     LIVER FUNCTIONS - ( 2019 02:12 )  Alb: 3.2 g/dL / Pro: 6.8 g/dL / ALK PHOS: 152 U/L / ALT: 142 U/L / AST: 237 U/L / GGT: x                 137  |  97  |  53<H>  ----------------------------<  182<H>  3.3<L>   |  20<L>  |  3.76<H>    Ca    8.7      2019 02:12  Phos  4.3       Mg     2.5         TPro  6.8  /  Alb  3.2<L>  /  TBili  3.2<H>  /  DBili  x   /  AST  237<H>  /  ALT  142<H>  /  AlkPhos  152<H>        Vancomycin Level, Trough: 12.5 ug/mL ( @ 04:33)      Urinalysis Basic - ( 2019 14:17 )    Color: brown / Appearance: Turbid / S.038 / pH: x  Gluc: x / Ketone: see note  / Bili: see note / Urobili: see note   Blood: x / Protein: see note / Nitrite: see note   Leuk Esterase: see note / RBC: >50 /hpf / WBC 10 /HPF   Sq Epi: x / Non Sq Epi: 2 / Bacteria: Negative        RECENT CULTURES:   @ 09:59  .Blood  --  --  --    No growth to date.  --   @ 10:03  .Blood  --  --  --    No growth to date.  --   @ 09:56  .Blood  --  --  --    No growth to date.  --      MICROBIOLOGY:  Culture Results:   No growth to date. ( @ 09:59)  Culture Results:   No growth to date. ( @ 10:03)  Culture Results:   No growth to date. ( @ 09:56)        blood culture  --    No growth to date.  blood culture gram stain  --  surgical site  --  culture urine  --  culture tissue  --      blood culture  --    No growth to date.  blood culture gram stain  --  surgical site  --  culture urine  --  culture tissue  --      blood culture  --    No growth to date.  blood culture gram stain  --  surgical site  --  culture urine  --  culture tissue  --        Radiology: Patient is a 73y old  Male who presents with a chief complaint of CABG (2019 23:00)      HPI:  73 year old Black male h/o no implantable devices who prior to his admission to [facility] had not been followed by a doctor regularly (on no meds at home), presented to [facility] on 19 with shortness of breath and LE edema found to have newly diagnosed HFrEF (EF 30%) and RALPH vs CKD, also PVD and claudication with extensive LE arterial disease on doppler. 19 abd US: small amount of perihepatic ascited. 19 TTE: EF 30%, mild LVH, multiple regional WMA's, mod MR/TR. 7/3/19 Nuclear ST: moderate focal apical/inferolat/aterolat ischemia. 19: h/h 12.9/39.5, platelets 180, Bun/Cr 28/1.9, eGFR 42. Pt was diuresed at [facility] treated with lasix/hydralzine/nitrates/statin/ASA. No beta blockade 2/2 low blood pressures. Per [facility] discharge note, pt will need  vascular intervention d/t extensive lower extremity vascular disease. Pt transferred to [facility] today for left heart cath to rule out ischemia. (10 Jul 2019 12:52)     pt underwent C3L , AFib.  Pt's hospital course was then been c/b  A-fib, acute on chronic renal failure with fluid overload requiring CVVH, cardiogenic shock (on Milrinone, Dobutamine, and Vasopressin gtts), and respiratory failure requiring intubation (extubated on ). Patient's CBC was WNL on day of presentation to [facility]. His platelet count has been steadily downtrending since .       PAST MEDICAL & SURGICAL HISTORY:  Arterial disease: peripheral  TR (tricuspid regurgitation)  MR (mitral regurgitation)  RALPH (acute kidney injury)  HFrEF (heart failure with reduced ejection fraction)  No significant past surgical history      Social history:     Marital Status:   Occupation: retired - former    Lives with: pt unable to answer ( A & O x 1)    Substance Use : pt unable to answer  Tobacco Usage:  (   ) never smoked   ( x  ) former smoker   (   ) current smoker  (     ) pack year  (        ) last tobacco use date  Alcohol Usage: unable to answer          FAMILY HISTORY:  Family history of cancer (Father)      REVIEW OF SYSTEMS  Pt unable to give clear history  pt with watery bowel mvmt  not coughing much  confused      Allergies    No Known Allergies    Intolerances        Antimicrobials:       MEDICATIONS  (prior antimicrobials ):    cefuroxime  IVPB   100 mL/Hr IV Intermittent (19 @ 07:36)   100 mL/Hr IV Intermittent (19 @ 00:30)   100 mL/Hr IV Intermittent (19 @ 16:11)    meropenem  IVPB   100 mL/Hr IV Intermittent (19 @ 06:57)    meropenem  IVPB   100 mL/Hr IV Intermittent (19 @ 21:56)   100 mL/Hr IV Intermittent (19 @ 07:41)   100 mL/Hr IV Intermittent (19 @ 17:20)    vancomycin    Solution   500 milliGRAM(s) Oral (19 @ 06:29)   500 milliGRAM(s) Oral (19 @ 06:28)   500 milliGRAM(s) Oral (19 @ 21:56)   500 milliGRAM(s) Oral (19 @ 15:57)    vancomycin  IVPB   250 mL/Hr IV Intermittent (19 @ 07:02)    vancomycin  IVPB   250 mL/Hr IV Intermittent (19 @ 07:58)             vancomycin    Solution 500 milliGRAM(s) Oral every 6 hours      MEDICATIONS  (STANDING):  argatroban Infusion 2 MICROgram(s)/kG/Min (8.832 mL/Hr) IV Continuous <Continuous>  aspirin  chewable 81 milliGRAM(s) Oral daily  atorvastatin 80 milliGRAM(s) Oral at bedtime  chlorhexidine 4% Liquid 1 Application(s) Topical <User Schedule>  dextrose 50% Injectable 50 milliLiter(s) IV Push every 15 minutes  dextrose 50% Injectable 25 milliLiter(s) IV Push every 15 minutes  dextrose 50% Injectable 50 milliLiter(s) IV Push every 15 minutes  dextrose 50% Injectable 25 milliLiter(s) IV Push every 15 minutes  DOBUTamine Infusion 2.5 MICROgram(s)/kG/Min (5.52 mL/Hr) IV Continuous <Continuous>  insulin lispro (HumaLOG) corrective regimen sliding scale   SubCutaneous every 6 hours  levothyroxine 50 MICROGram(s) Oral daily  midodrine 10 milliGRAM(s) Oral every 8 hours  norepinephrine Infusion 0.015 MICROgram(s)/kG/Min (2.07 mL/Hr) IV Continuous <Continuous>  pantoprazole  Injectable 40 milliGRAM(s) IV Push two times a day  sodium chloride 0.9%. 1000 milliLiter(s) (10 mL/Hr) IV Continuous <Continuous>  vancomycin    Solution 500 milliGRAM(s) Oral every 6 hours  vasopressin Infusion 0.05 Unit(s)/Min (3 mL/Hr) IV Continuous <Continuous>    MEDICATIONS  (PRN):  sodium chloride 0.9% lock flush 10 milliLiter(s) IV Push every 1 hour PRN Pre/post blood products, medications, blood draw, and to maintain line patency        Vital Signs Last 24 Hrs  T(C): 36.8 (2019 08:00), Max: 37 (2019 16:00)  T(F): 98.2 (2019 08:00), Max: 98.6 (2019 16:00)  HR: 82 (2019 11:00) (71 - 91)  BP: 141/65 (2019 21:45) (129/56 - 141/65)  BP(mean): 94 (2019 21:45) (84 - 94)  RR: 33 (2019 11:00) (15 - 59)  SpO2: 77% (2019 11:00) (77% - 100%)    PHYSICAL EXAM:Pleasant patient in no acute distress.      Constitutional pt confused,     Eyes:PERRL EOMI.NO discharge or conjunctival injection    ENMT:No sinus tenderness.No thrush.No pharyngeal exudate or erythema.Fair dental hygiene    Neck:Supple,No LN,no JVD      Respiratory: clear ant    Cardiovascular:S1 S2 wnl,   sternal wound dressed    Gastrointestinal:Soft BS(+) no tenderness      Extremities: RUE, some discoloration warm    Neurological confused  moves all four    Skin:No rash     Lymph Nodes:No palpable LNs    Musculoskeletal:No joint swelling or LOM    Psychiatric:Affect normal.                                8.8    20.7  )-----------( 72       ( 2019 02:12 )             26.4     LIVER FUNCTIONS - ( 2019 02:12 )  Alb: 3.2 g/dL / Pro: 6.8 g/dL / ALK PHOS: 152 U/L / ALT: 142 U/L / AST: 237 U/L / GGT: x                 137  |  97  |  53<H>  ----------------------------<  182<H>  3.3<L>   |  20<L>  |  3.76<H>    Ca    8.7      2019 02:12  Phos  4.3       Mg     2.5         TPro  6.8  /  Alb  3.2<L>  /  TBili  3.2<H>  /  DBili  x   /  AST  237<H>  /  ALT  142<H>  /  AlkPhos  152<H>        Vancomycin Level, Trough: 12.5 ug/mL ( @ 04:33)      Urinalysis Basic - ( 2019 14:17 )    Color: brown / Appearance: Turbid / S.038 / pH: x  Gluc: x / Ketone: see note  / Bili: see note / Urobili: see note   Blood: x / Protein: see note / Nitrite: see note   Leuk Esterase: see note / RBC: >50 /hpf / WBC 10 /HPF   Sq Epi: x / Non Sq Epi: 2 / Bacteria: Negative      < from: Xray Chest 1 View- PORTABLE-Routine (19 @ 02:37) >        INTERPRETATION:  A single chest x-ray was obtained on 2019.    Indication: Status post cardiac surgery.    Impression:    The heart is markedly enlarged. Improving pulmonary vascular congestion.   All life supporting devices are in good position and unchanged when   compared to previous study done 2019. No pleural effusion. No   pneumothorax. Status post sternotomy      < end of copied text >      < from: CT Angio Upper Extremity w/ IV Cont, Right (19 @ 00:41) >  EXAM:  CT ANGIO UPR EXT (W)AW IC RT                            PROCEDURE DATE:  2019            INTERPRETATION:  CLINICAL INFORMATION: Cold right arm.    TECHNIQUE: A CT angiogram through the right upper extremity, right chest,   and right abdomen and pelvis was performed. Administration of Omnipaque   350 was uneventful. 90 mL was administered. 10 mL discarded. 3-D and MIP   reformats.    COMPARISON: None    FINDINGS:  The right radial and ulnar arteries are visualized to the mid forearm.   The arterial system of the right upper extremity is otherwise widely   patent.    The patient is status post sternotomy and CABG. There is marked   cardiomegaly with moderate pericardial effusion.    There are small bilateral pleural effusions. There is mild pulmonary   edema.    Small mediastinal lymph nodes. A right IJ line with tip in the SVC.    The central airways are patent.    Moderate ascites.    The visualized solid organs are unremarkable.    Nasogastric tube with the tip in the stomach.    Bilateral internal iliac artery critical stenosis or occlusion. The right   superior facial femoral artery is occluded. There is stenosis of the   lower deep femoral artery with multiple collaterals visualized. Severe   stenosis of the left superficial femoral artery and distal common femoral   artery is noted.    IMPRESSION:  The right radial and ulnar arteries are visualized to the mid forearm.    Advanced atherosclerosis of the visualized lower extremities.    Moderate pericardial effusion. Marked cardiomegaly.    < end of copied text >        < from: Transthoracic Echocardiogram (19 @ 08:35) >  --------------------------------------------------  Conclusions:  Estimated ejection fraction 40-45%. Diffuse mild  hypokinesis with no regional variation.  Moderate right ventricular enlargement with at least  moderate right ventricular systolic dysfunction.  Moderate functional mitral regurgitation.  Mild pulmonary hypertension.  ------------------------------------------------------------------------  Confirmed on  2019 - 12:36:16 Tennille Milligan M.D.  ------------------------------------------------------------------------    < end of copied text >          Radiology:      C. difficile GDH &amp; toxins A/B by EIA . (19 @ 18:04)    Clostridium difficile GDH Toxins A&amp;B, EIA:   Indeterminate    Clostridium difficile GDH Interpretation: This specimen is positive for C. Difficile glutamate dehydrogenase (GDH)  antigen and negative for C. Difficile Toxins A & B, by EIA.  GDH is a  highly sensitive screening marker for C. Difficile that is produced in  large amounts by all C. Difficilestrains, both toxigenic and  nontoxigenic.  Specimens that are GDH positive and toxin negative will be  tested further by PCR to detect toxin gene sequences associated with  toxin producing C. Difficle.      Culture - Urine (19 @ 17:24)    Specimen Source: .Urine    Culture Results:   No growth      Culture - Blood (19 @ 09:59)    Specimen Source: .Blood    Culture Results:   No growth to date.    Culture - Blood (19 @ 09:56)    Specimen Source: .Blood    Culture Results:   No growth to date.

## 2019-07-23 NOTE — CONSULT NOTE ADULT - ASSESSMENT
73 year old Black male h/o no implantable devices who prior to his admission to [facility] had not been followed by a doctor regularly (on no meds at home), presented to [facility] on 7/1/19 with shortness of breath and LE edema found to have newly diagnosed HFrEF (EF 30%) and RALPH vs CKD, also PVD and claudication with extensive LE arterial disease on doppler. 7/2/19 abd US: small amount of perihepatic ascited. 7/2/19 TTE: EF 30%, mild LVH, multiple regional WMA's, mod MR/TR. 7/3/19 Nuclear ST: moderate focal apical/inferolat/aterolat ischemia. 7/9/19: h/h 12.9/39.5, platelets 180, Bun/Cr 28/1.9, eGFR 42. Pt was diuresed at [facility] treated with lasix/hydralzine/nitrates/statin/ASA. No beta blockade 2/2 low blood pressures. Per [facility] discharge note, pt will need  vascular intervention d/t extensive lower extremity vascular disease. Pt transferred to [facility] today for left heart cath to rule out ischemia. (10 Jul 2019 12:52)    7/12 pt underwent C3L , AFib.  Pt's hospital course was then been c/b  A-fib, acute on chronic renal failure with fluid overload requiring CVVH, cardiogenic shock (on Milrinone, Dobutamine, and Vasopressin gtts), and respiratory failure requiring intubation (extubated on 7/16). Patient's CBC was WNL on day of presentation to [facility]. His platelet count has been steadily downtrending since 7/12.     Pt now with elevated WBC, and diarrhea  stool is indeterminate for C diff, PCR is pending  I am unsure it patient received abs at [facility]. He only received perioperative abs here until the 21st  pt started on empiric treatment for c diff    pt with low plts, heme is following  now on agatobran  pt with low plts , renal failure and altered mental status- could this be TTP. Unlikely renal and hematology are seeing and can address    pt with elevated LFTs, low EF- some is congestion  also on statin, consider holding  would check hepatitis screen ( so far negative) and CMV      CTA noted moderate pericardial effusion- suggest repeat echo

## 2019-07-23 NOTE — PROGRESS NOTE ADULT - SUBJECTIVE AND OBJECTIVE BOX
SYLVIA ROSA  MRN#:  [id number]    The patient is a 73y Male without prior medical care who presented with SOB and LE edema, found to have newly diagnosed HFrEF (EF 30%) and RALPH vs CKD, also PVD and claudication with extensive LE arterial disease on doppler, reports heavy alcohol use, further work up revealed multivessel CAD, now s/p C3L today, severe biventricular dysfunction on echocardiogram with some improvement after bypass was seen, evaluated, & examined with the CTICU staff on rounds and later in the evening with a multidisciplinary care plan formulated & implemented.  All available clinical, laboratory, radiographic, pharmacologic, and electrocardiographic data were reviewed & analyzed.      The patient was in the CTICU in critical condition secondary to persistent cardiopulmonary dysfunction, probable sepsis from C difficile, hemodynamically significant anemia/hypovolemia-shock, persistent cardiovascular dysfunction, and acute on chromic postoperative renal failure, & hyperglycemia.      Respiratory status required supplemental oxygen, the following of ABG’s with A-line monitoring, and continuous pulse oximetry monitoring for support & to evaluate for & prevent further decompensation secondary to persistent cardiopulmonary dysfunction.     Invasive hemodynamic monitoring with a central venous catheter & an A-line were required for the continuous central venous and MAP/BP monitoring to ensure adequate cardiovascular support and to evaluate for & help prevent decompensation while receiving an IV Vasopressin drip, an IV Dobutamine drip, PRBC, and an IV Argatroban drip secondary to hemodynamically significant anemia/anemia due to acute blood loss, hypovolemia-shock, cardiovascular dysfunction, A Fib, ARF, and combined systolic and diastolic biventricular failure.    Patient required acute postoperative critical care management and I provided 80 minutes of non-continuous care to the patient. I reviewed and assessed all available clinical, laboratory, radiographic, pharmacologic, and electrocardiographic data in order to decide on maintenance or adjustment of medications, and fluid management.  Discussed at length with the CTICU staff and helped coordinate care. SYLVIA ROSA  MRN#:  [id number]    The patient is a 73y Male without prior medical care who presented with SOB and LE edema, found to have newly diagnosed HFrEF (EF 30%) and RALPH vs CKD, also PVD and claudication with extensive LE arterial disease on doppler, reports heavy alcohol use, further work up revealed multivessel CAD, now s/p C3L today, severe biventricular dysfunction on echocardiogram with some improvement after bypass was seen, evaluated, & examined with the CTICU staff on rounds and later in the evening with a multidisciplinary care plan formulated & implemented.  All available clinical, laboratory, radiographic, pharmacologic, and electrocardiographic data were reviewed & analyzed.      The patient was in the CTICU in critical condition secondary to persistent cardiopulmonary dysfunction, probable sepsis from C difficile, hemodynamically significant anemia/hypovolemia-shock, persistent cardiovascular dysfunction, and acute on chromic postoperative renal failure, & hyperglycemia.      Respiratory status required supplemental oxygen, the following of ABG’s with A-line monitoring, and continuous pulse oximetry monitoring for support & to evaluate for & prevent further decompensation secondary to persistent cardiopulmonary dysfunction.     Invasive hemodynamic monitoring with a central venous catheter & an A-line were required for the continuous central venous and MAP/BP monitoring to ensure adequate cardiovascular support and to evaluate for & help prevent decompensation while receiving an IV Vasopressin drip, an IV Dobutamine drip, PRBC, and an IV Argatroban drip secondary to hemodynamically significant anemia/anemia due to acute blood loss, hypovolemia-shock, cardiovascular dysfunction, A Fib, ARF, and combined systolic and diastolic biventricular failure.    Patient required acute postoperative critical care management and I provided 30 minutes of non-continuous care to the patient. I reviewed and assessed all available clinical, laboratory, radiographic, pharmacologic, and electrocardiographic data in order to decide on maintenance or adjustment of medications, and fluid management.  Discussed at length with the CTICU staff and helped coordinate care.

## 2019-07-23 NOTE — PROGRESS NOTE ADULT - SUBJECTIVE AND OBJECTIVE BOX
NEPHROLOGY-[facility] [phone]        Patient seen and examined in chair.  He was in confused  On isolation at present  On Levo gtt        MEDICATIONS  (STANDING):  argatroban Infusion 2 MICROgram(s)/kG/Min (8.832 mL/Hr) IV Continuous <Continuous>  aspirin  chewable 81 milliGRAM(s) Oral daily  atorvastatin 80 milliGRAM(s) Oral at bedtime  chlorhexidine 4% Liquid 1 Application(s) Topical <User Schedule>  chlorhexidine 4% Liquid 1 Application(s) Topical <User Schedule>  dextrose 50% Injectable 50 milliLiter(s) IV Push every 15 minutes  dextrose 50% Injectable 25 milliLiter(s) IV Push every 15 minutes  dextrose 50% Injectable 50 milliLiter(s) IV Push every 15 minutes  dextrose 50% Injectable 25 milliLiter(s) IV Push every 15 minutes  DOBUTamine Infusion 2.5 MICROgram(s)/kG/Min (5.52 mL/Hr) IV Continuous <Continuous>  insulin lispro (HumaLOG) corrective regimen sliding scale   SubCutaneous three times a day before meals  insulin lispro (HumaLOG) corrective regimen sliding scale   SubCutaneous at bedtime  levothyroxine 50 MICROGram(s) Oral daily  meropenem  IVPB 500 milliGRAM(s) IV Intermittent <User Schedule>  midodrine 10 milliGRAM(s) Oral every 8 hours  norepinephrine Infusion 0.015 MICROgram(s)/kG/Min (2.07 mL/Hr) IV Continuous <Continuous>  pantoprazole  Injectable 40 milliGRAM(s) IV Push two times a day  sodium chloride 0.9%. 1000 milliLiter(s) (10 mL/Hr) IV Continuous <Continuous>  vancomycin    Solution 500 milliGRAM(s) Oral every 6 hours  vasopressin Infusion 0.05 Unit(s)/Min (3 mL/Hr) IV Continuous <Continuous>      VITAL:  T(C): , Max: 37 (19 @ 16:00)  T(F): , Max: 98.6 (19 @ 16:00)  HR: 81 (19 @ 08:15)  BP: 141/65 (19 @ 21:45)  BP(mean): 94 (19 @ 21:45)  RR: 22 (19 @ 08:15)  SpO2: 93% (19 @ 08:15)  Wt(kg): --    I and O's:     @ 07:  -   @ 07:00  --------------------------------------------------------  IN: 2020.2 mL / OUT: 1710 mL / NET: 310.2 mL     @ 07:  -   @ 08:35  --------------------------------------------------------  IN: 81.3 mL / OUT: 0 mL / NET: 81.3 mL          PHYSICAL EXAM:    Constitutional: NAD  Neck:  No JVD  Respiratory: poor effort   Cardiovascular: S1 and S2  Gastrointestinal: BS+, soft, NT/ND  Extremities: No peripheral edema  Neurological:  no focal deficits  Psychiatric: unable   : No Chávez  Skin: No rashes  Access: right IJ [facility]     LABS:                        8.8    20.7  )-----------( 72       ( 2019 02:12 )             26.4         137  |  97  |  53<H>  ----------------------------<  182<H>  3.3<L>   |  20<L>  |  3.76<H>    Ca    8.7      2019 02:12  Phos  4.3       Mg     2.5         TPro  6.8  /  Alb  3.2<L>  /  TBili  3.2<H>  /  DBili  x   /  AST  237<H>  /  ALT  142<H>  /  AlkPhos  152<H>            Urine Studies:  Urinalysis Basic - ( 2019 14:17 )    Color: brown / Appearance: Turbid / S.038 / pH: x  Gluc: x / Ketone: see note  / Bili: see note / Urobili: see note   Blood: x / Protein: see note / Nitrite: see note   Leuk Esterase: see note / RBC: >50 /hpf / WBC 10 /HPF   Sq Epi: x / Non Sq Epi: 2 / Bacteria: Negative            RADIOLOGY & ADDITIONAL STUDIES:    < from: CT Angio Upper Extremity w/ IV Cont, Right (19 @ 00:41) >    ******PRELIMINARY REPORT******    ******PRELIMINARY REPORT******          EXAM:  CT ANGIO UPR EXT (W)AW IC RT                            PROCEDURE DATE:  2019      ******PRELIMINARY REPORT******    ******PRELIMINARY REPORT******              INTERPRETATION:  [facility] RADIOLOGIST PRELIMINARY REPORT    EXAM:    CTA Right Upper Extremity With Contrast     EXAM DATE/TIME:    2019 12:25 AM     CLINICAL HISTORY:    73 years old, male; Other: Cold RT arm     TECHNIQUE:    Imaging protocol: Axial CTA of the Right upper extremity with   intravenous   contrast material, including non-contrast images if performed. Coronal   and   sagittal reformatted images were created and reviewed.    3D rendering: MIP reconstructed images were created and reviewed.     COMPARISON:    No relevant prior studies available.     FINDINGS:    Right subclavian artery: No acute findings. No occlusion or significant   stenosis.    Right axillary artery: No acute findings. No occlusion or significant   stenosis.    Right brachial artery: No acute findings. No occlusion or significant   stenosis.    Right radial artery: Contrast in the radial artery is visualized to the   distal   forearm.     Right ulnar artery:  Contrast in the ulnar artery is visualized to the   mid   forearm.      Lungs: Pericardial effusion.    Soft tissues: Unremarkable.    Other findings: Cardiomegaly. Ascites.     IMPRESSION:   1. Contrast in the radial artery is visualized to the distal forearm.   2. Contrast in the ulnar artery isvisualized to the mid forearm.   3. Difficult to exclude extrinsic compression of the forearm arteries   versus   scan artifact.  4. Pericardial effusion.    5. Ascites.    6.Cardiomegaly.              ******PRELIMINARY REPORT******    ******PRELIMINARY REPORT******              CIARA VÁSQUEZ                   < end of copied text >

## 2019-07-23 NOTE — PROGRESS NOTE ADULT - SUBJECTIVE AND OBJECTIVE BOX
SYLVIA ROSA   MRN#: [id number]     The patient is a 73y Male who was seen, evaluated, & examined with the CTICU staff post-operatively with a multidisciplinary care plan formulated & implemented.  All available clinical, laboratory, radiographic, pharmacologic, and electrocardiographic data were reviewed & analyzed.      The patient was in the CTICU in critical condition secondary to:     persistent cardiopulmonary dysfunction  cardiogenic shock-cardiovascular dysfunction    For support and evaluation & prevention of further decompensation secondary to persistent cardiopulmonary dysfunction and cardiogenic shock-cardiovascular dysfunction, respiratory status required:     supplemental oxygen with nasal cannula  continuous pulse oximetry monitoring  following ABGs with A-line monitoring    Invasive hemodynamic monitoring with an A-line was required for continuous MAP/BP monitoring to ensure adequate cardiovascular support and to evaluate for & help prevent decompensation while receiving     IV Levophed infusion  IV Vasopressin infusion  IV Dobutamine infusion    secondary to cardiogenic shock-cardiovascular dysfunction    In addition:  Cardiogenic shock, on Dobutamine 2.5   Also Levo/Vaso with unclear etiology of pressor requirement - ? if vasodilated from both surgery and dialysis  Loss of distal pulses in R upper extremity despite nitro paste, returned after heparin was switched to Argatroban - HIT negative but will continue Argatroban - vascular following  Now on intermittent HD, 1L off yesterday  Delirious, on 1:1, Psych called as he is talking about harming himself (but especially with constant observation and ongoing delirium he is unlikely to be able to carry out any plan to do so)  C diff positive, off empiric antibiotics, on PO Vancomycin    The patient required critical care management and I personally provided 45 minutes of non-continuous care to the patient, excluding separate procedures, in addition to  discussing the patient and plan at length with the CTICU staff and helping coordinate care.

## 2019-07-23 NOTE — PROGRESS NOTE ADULT - ASSESSMENT
The patient is a 73y Male with CAD, HFrEF and PVD s/p CABGx3 followed by AFib, resp failure and acute on chronic kidney injury.    - CKD:  Suspect underlying CKD stage 3  - RALPH: anuric.   Now on dialysis.  - Hypotension on vaso gtt/Levo gtt and midodrine 10 tid   -PVD  -Confusion   -Diarrhea    RECOMMENDATIONS  - HD likely in am.  Pt did get contrast but he is anuric and the contrast will not contribute to worsening renal failure  - Wean pressors as able. Can the shiley be removed for a holiday?   - please dose any new medications for a CrCl of 10-15 ml/min   -Avoid hypotension given the PVD.  Jesus Manuel frias noted   -Agatroban gtt at present  -1:1 at present   -Awaiting stool for C diff

## 2019-07-23 NOTE — PROGRESS NOTE ADULT - ASSESSMENT
Assessment  DMT2: 73y Male with newly diagnosed DM T2 with hyperglycemia, on insulin,  blood sugars in acceptable range,  no hypoglycemic episode, NPO on NGT feeds.  CAD: s/P CABG, on medications, stable, monitored.  HTN: Controlled,  on antihypertensive medications.      Tammie Magdaleno MD  Cell: [phone]  Office: [phone]

## 2019-07-23 NOTE — PROVIDER CONTACT NOTE (CHANGE IN STATUS NOTIFICATION) - SITUATION
Upon 1900 assessment, R. hand extremity cold in temperature, dusky nailbed with nondopplerable radial and ulnar pulses. Difference in assessment from day report. Tenderness noted at site of old radial arterial line. Currently on heparin gtt at 1100 units/hr.

## 2019-07-23 NOTE — CHART NOTE - NSCHARTNOTEFT_GEN_A_CORE
Vascular Surgery Resident    Patient now s/p CTA which shows proximal vasculature patent but limited by artifact more distally. He has been taken off of his norephinephrine gtt and started on an argatroban gtt.   On reexamination his hand has become warmer with improvement in capillary refill. He now has a doppler signal in his ulnar artery and a doppler signal in his sandy arch.  He still has decreased motor and sensation to the right hand.  - Recommend continuing argatroban gtt and continuing monitoring neuro exam  - Seen and examined with fellow, Dr. Osborn   - D/w CTICU team    DIPESH Amanda PGY 4  [id number]

## 2019-07-23 NOTE — PROGRESS NOTE ADULT - SUBJECTIVE AND OBJECTIVE BOX
Chief complaint  Patient is a 73y old  Male who presents with a chief complaint of CABG (22 Jul 2019 23:00)   Review of systems  Patient in bed, looks comfortable, no fever, no hypoglycemia.    Labs and Fingersticks  CAPILLARY BLOOD GLUCOSE      POCT Blood Glucose.: 127 mg/dL (22 Jul 2019 16:20)  POCT Blood Glucose.: 122 mg/dL (22 Jul 2019 12:53)      Anion Gap, Serum: 20 <H> (07-23 @ 02:12)  Anion Gap, Serum: 17 (07-22 @ 02:23)      Calcium, Total Serum: 8.7 (07-23 @ 02:12)  Calcium, Total Serum: 8.5 (07-22 @ 02:23)  Albumin, Serum: 3.2 <L> (07-23 @ 02:12)  Albumin, Serum: 3.2 <L> (07-22 @ 02:23)    Alanine Aminotransferase (ALT/SGPT): 142 <H> (07-23 @ 02:12)  Alanine Aminotransferase (ALT/SGPT): 176 <H> (07-22 @ 02:23)  Alkaline Phosphatase, Serum: 152 <H> (07-23 @ 02:12)  Alkaline Phosphatase, Serum: 157 <H> (07-22 @ 02:23)  Aspartate Aminotransferase (AST/SGOT): 237 <H> (07-23 @ 02:12)  Aspartate Aminotransferase (AST/SGOT): 307 <H> (07-22 @ 02:23)        07-23    137  |  97  |  53<H>  ----------------------------<  182<H>  3.3<L>   |  20<L>  |  3.76<H>    Ca    8.7      23 Jul 2019 02:12  Phos  4.3     07-23  Mg     2.5     07-23    TPro  6.8  /  Alb  3.2<L>  /  TBili  3.2<H>  /  DBili  x   /  AST  237<H>  /  ALT  142<H>  /  AlkPhos  152<H>  07-23                        8.8    20.7  )-----------( 72       ( 23 Jul 2019 02:12 )             26.4     Medications  MEDICATIONS  (STANDING):  argatroban Infusion 2 MICROgram(s)/kG/Min (8.832 mL/Hr) IV Continuous <Continuous>  aspirin  chewable 81 milliGRAM(s) Oral daily  atorvastatin 80 milliGRAM(s) Oral at bedtime  chlorhexidine 4% Liquid 1 Application(s) Topical <User Schedule>  chlorhexidine 4% Liquid 1 Application(s) Topical <User Schedule>  dextrose 50% Injectable 50 milliLiter(s) IV Push every 15 minutes  dextrose 50% Injectable 25 milliLiter(s) IV Push every 15 minutes  dextrose 50% Injectable 50 milliLiter(s) IV Push every 15 minutes  dextrose 50% Injectable 25 milliLiter(s) IV Push every 15 minutes  DOBUTamine Infusion 2.5 MICROgram(s)/kG/Min (5.52 mL/Hr) IV Continuous <Continuous>  insulin lispro (HumaLOG) corrective regimen sliding scale   SubCutaneous three times a day before meals  insulin lispro (HumaLOG) corrective regimen sliding scale   SubCutaneous at bedtime  levothyroxine 50 MICROGram(s) Oral daily  meropenem  IVPB 500 milliGRAM(s) IV Intermittent <User Schedule>  midodrine 10 milliGRAM(s) Oral every 8 hours  norepinephrine Infusion 0.015 MICROgram(s)/kG/Min (2.07 mL/Hr) IV Continuous <Continuous>  pantoprazole  Injectable 40 milliGRAM(s) IV Push two times a day  sodium chloride 0.9%. 1000 milliLiter(s) (10 mL/Hr) IV Continuous <Continuous>  vancomycin    Solution 500 milliGRAM(s) Oral every 6 hours  vasopressin Infusion 0.05 Unit(s)/Min (3 mL/Hr) IV Continuous <Continuous>      Physical Exam  General: Patient comfortable in bed  Vital Signs Last 12 Hrs  T(F): 98.2 (07-23-19 @ 08:00), Max: 98.4 (07-23-19 @ 04:00)  HR: 81 (07-23-19 @ 08:15) (71 - 86)  BP: 141/65 (07-22-19 @ 21:45) (129/56 - 141/65)  BP(mean): 94 (07-22-19 @ 21:45) (84 - 94)  RR: 22 (07-23-19 @ 08:15) (15 - 59)  SpO2: 93% (07-23-19 @ 08:15) (83% - 100%)  Neck: No palpable thyroid nodules.  CVS: S1S2, No murmurs  Respiratory: No wheezing, no crepitations  GI: Abdomen soft, bowel sounds positive  Musculoskeletal:  edema lower extremities.   Skin: No skin rashes, no ecchymosis    Diagnostics    Free Thyroxine, Serum: AM Sched. Collection: 17-Jul-2019 06:00 (07-16 @ 06:29)  Thyroid Stimulating Hormone, Serum: AM Sched. Collection: 17-Jul-2019 06:00 (07-16 @ 06:29)

## 2019-07-23 NOTE — PROVIDER CONTACT NOTE (CHANGE IN STATUS NOTIFICATION) - ACTION/TREATMENT ORDERED:
Hot packs and nitro paste ordered and applied. Heparin gtt d/c'd for argatroban as per MD Hernandez. Vascular service consulted due to previous consult. Vacular assess pt at bedside and consult with senior resident. CT chest and R. upper extremity CT order.   [id number], [id number], Q1 pulses and extremity assessment to follow.

## 2019-07-23 NOTE — PROVIDER CONTACT NOTE (CHANGE IN STATUS NOTIFICATION) - ASSESSMENT
Cold temperature. Nondopplerable pulses of radial and ulnar pulses. Dusky nailbeds with no signal on pulse ox.

## 2019-07-24 DIAGNOSIS — R41.0 DISORIENTATION, UNSPECIFIED: ICD-10-CM

## 2019-07-24 LAB
ALBUMIN SERPL ELPH-MCNC: 3.1 G/DL — LOW (ref 3.3–5)
ALP SERPL-CCNC: 166 U/L — HIGH (ref 40–120)
ALT FLD-CCNC: 123 U/L — HIGH (ref 10–45)
ANION GAP SERPL CALC-SCNC: 19 MMOL/L — HIGH (ref 5–17)
APTT BLD: 103.1 SEC — HIGH (ref 27.5–36.3)
APTT BLD: 105.5 SEC — HIGH (ref 27.5–36.3)
APTT BLD: 107.3 SEC — HIGH (ref 27.5–36.3)
APTT BLD: 108 SEC — HIGH (ref 27.5–36.3)
APTT BLD: 89.3 SEC — HIGH (ref 27.5–36.3)
APTT BLD: 92.7 SEC — HIGH (ref 27.5–36.3)
APTT BLD: 95.9 SEC — HIGH (ref 27.5–36.3)
AST SERPL-CCNC: 217 U/L — HIGH (ref 10–40)
BILIRUB SERPL-MCNC: 2.5 MG/DL — HIGH (ref 0.2–1.2)
BUN SERPL-MCNC: 71 MG/DL — HIGH (ref 7–23)
CALCIUM SERPL-MCNC: 8.6 MG/DL — SIGNIFICANT CHANGE UP (ref 8.4–10.5)
CHLORIDE SERPL-SCNC: 99 MMOL/L — SIGNIFICANT CHANGE UP (ref 96–108)
CO2 SERPL-SCNC: 18 MMOL/L — LOW (ref 22–31)
CREAT SERPL-MCNC: 4.83 MG/DL — HIGH (ref 0.5–1.3)
GAS PNL BLDA: SIGNIFICANT CHANGE UP
GAS PNL BLDA: SIGNIFICANT CHANGE UP
GLUCOSE BLDC GLUCOMTR-MCNC: 155 MG/DL — HIGH (ref 70–99)
GLUCOSE BLDC GLUCOMTR-MCNC: 162 MG/DL — HIGH (ref 70–99)
GLUCOSE BLDC GLUCOMTR-MCNC: 171 MG/DL — HIGH (ref 70–99)
GLUCOSE BLDC GLUCOMTR-MCNC: 218 MG/DL — HIGH (ref 70–99)
GLUCOSE SERPL-MCNC: 183 MG/DL — HIGH (ref 70–99)
HCT VFR BLD CALC: 26.8 % — LOW (ref 39–50)
HEPARIN-PF4 AB RESULT: 4.2 U/ML — HIGH (ref 0–0.9)
HGB BLD-MCNC: 9.6 G/DL — LOW (ref 13–17)
INR BLD: 4.39 RATIO — HIGH (ref 0.88–1.16)
INR BLD: 4.67 RATIO — HIGH (ref 0.88–1.16)
INR BLD: 4.8 RATIO — HIGH (ref 0.88–1.16)
INR BLD: 5.06 RATIO — CRITICAL HIGH (ref 0.88–1.16)
INR BLD: 5.06 RATIO — CRITICAL HIGH (ref 0.88–1.16)
LDH SERPL L TO P-CCNC: 619 U/L — HIGH (ref 50–242)
MAGNESIUM SERPL-MCNC: 2.6 MG/DL — SIGNIFICANT CHANGE UP (ref 1.6–2.6)
MCHC RBC-ENTMCNC: 32.9 PG — SIGNIFICANT CHANGE UP (ref 27–34)
MCHC RBC-ENTMCNC: 35.6 GM/DL — SIGNIFICANT CHANGE UP (ref 32–36)
MCV RBC AUTO: 92.3 FL — SIGNIFICANT CHANGE UP (ref 80–100)
PF4 HEPARIN CMPLX AB SER-ACNC: POSITIVE
PHOSPHATE SERPL-MCNC: 3.4 MG/DL — SIGNIFICANT CHANGE UP (ref 2.5–4.5)
PLATELET # BLD AUTO: 86 K/UL — LOW (ref 150–400)
POTASSIUM SERPL-MCNC: 3.6 MMOL/L — SIGNIFICANT CHANGE UP (ref 3.5–5.3)
POTASSIUM SERPL-SCNC: 3.6 MMOL/L — SIGNIFICANT CHANGE UP (ref 3.5–5.3)
PROT SERPL-MCNC: 6.9 G/DL — SIGNIFICANT CHANGE UP (ref 6–8.3)
PROTHROM AB SERPL-ACNC: 52.8 SEC — HIGH (ref 10–12.9)
PROTHROM AB SERPL-ACNC: 56.3 SEC — HIGH (ref 10–12.9)
PROTHROM AB SERPL-ACNC: 57.4 SEC — HIGH (ref 10–12.9)
PROTHROM AB SERPL-ACNC: 60.6 SEC — HIGH (ref 10–12.9)
PROTHROM AB SERPL-ACNC: 60.7 SEC — HIGH (ref 10–12.9)
RBC # BLD: 2.91 M/UL — LOW (ref 4.2–5.8)
RBC # FLD: 14.5 % — SIGNIFICANT CHANGE UP (ref 10.3–14.5)
SODIUM SERPL-SCNC: 136 MMOL/L — SIGNIFICANT CHANGE UP (ref 135–145)
WBC # BLD: 22 K/UL — HIGH (ref 3.8–10.5)
WBC # FLD AUTO: 22 K/UL — HIGH (ref 3.8–10.5)

## 2019-07-24 PROCEDURE — 99291 CRITICAL CARE FIRST HOUR: CPT

## 2019-07-24 PROCEDURE — 99232 SBSQ HOSP IP/OBS MODERATE 35: CPT

## 2019-07-24 PROCEDURE — 99232 SBSQ HOSP IP/OBS MODERATE 35: CPT | Mod: GC

## 2019-07-24 PROCEDURE — 99223 1ST HOSP IP/OBS HIGH 75: CPT | Mod: GC

## 2019-07-24 PROCEDURE — 71045 X-RAY EXAM CHEST 1 VIEW: CPT | Mod: 26,76

## 2019-07-24 RX ORDER — NYSTATIN CREAM 100000 [USP'U]/G
1 CREAM TOPICAL
Refills: 0 | Status: DISCONTINUED | OUTPATIENT
Start: 2019-07-24 | End: 2019-08-03

## 2019-07-24 RX ORDER — ZINC OXIDE 200 MG/G
1 OINTMENT TOPICAL
Refills: 0 | Status: DISCONTINUED | OUTPATIENT
Start: 2019-07-24 | End: 2019-07-30

## 2019-07-24 RX ORDER — METRONIDAZOLE 500 MG
500 TABLET ORAL EVERY 8 HOURS
Refills: 0 | Status: DISCONTINUED | OUTPATIENT
Start: 2019-07-24 | End: 2019-07-30

## 2019-07-24 RX ORDER — HALOPERIDOL DECANOATE 100 MG/ML
2.5 INJECTION INTRAMUSCULAR ONCE
Refills: 0 | Status: COMPLETED | OUTPATIENT
Start: 2019-07-24 | End: 2019-07-24

## 2019-07-24 RX ORDER — METRONIDAZOLE 500 MG
TABLET ORAL
Refills: 0 | Status: DISCONTINUED | OUTPATIENT
Start: 2019-07-24 | End: 2019-07-24

## 2019-07-24 RX ORDER — HALOPERIDOL DECANOATE 100 MG/ML
0.5 INJECTION INTRAMUSCULAR ONCE
Refills: 0 | Status: DISCONTINUED | OUTPATIENT
Start: 2019-07-24 | End: 2019-07-24

## 2019-07-24 RX ADMIN — CHLORHEXIDINE GLUCONATE 1 APPLICATION(S): 213 SOLUTION TOPICAL at 05:44

## 2019-07-24 RX ADMIN — Medication 2.07 MICROGRAM(S)/KG/MIN: at 01:17

## 2019-07-24 RX ADMIN — HALOPERIDOL DECANOATE 2.5 MILLIGRAM(S): 100 INJECTION INTRAMUSCULAR at 15:00

## 2019-07-24 RX ADMIN — NYSTATIN CREAM 1 APPLICATION(S): 100000 CREAM TOPICAL at 17:47

## 2019-07-24 RX ADMIN — Medication 100 MILLIGRAM(S): at 10:59

## 2019-07-24 RX ADMIN — Medication 500 MILLIGRAM(S): at 05:43

## 2019-07-24 RX ADMIN — ARGATROBAN 3.09 MICROGRAM(S)/KG/MIN: 50 INJECTION, SOLUTION INTRAVENOUS at 06:20

## 2019-07-24 RX ADMIN — Medication 100 MILLIGRAM(S): at 21:49

## 2019-07-24 RX ADMIN — Medication 5.52 MICROGRAM(S)/KG/MIN: at 01:17

## 2019-07-24 RX ADMIN — MIDODRINE HYDROCHLORIDE 10 MILLIGRAM(S): 2.5 TABLET ORAL at 21:49

## 2019-07-24 RX ADMIN — VASOPRESSIN 3 UNIT(S)/MIN: 20 INJECTION INTRAVENOUS at 01:16

## 2019-07-24 RX ADMIN — ATORVASTATIN CALCIUM 80 MILLIGRAM(S): 80 TABLET, FILM COATED ORAL at 21:49

## 2019-07-24 RX ADMIN — Medication 81 MILLIGRAM(S): at 11:04

## 2019-07-24 RX ADMIN — MIDODRINE HYDROCHLORIDE 10 MILLIGRAM(S): 2.5 TABLET ORAL at 13:45

## 2019-07-24 RX ADMIN — Medication 2: at 11:31

## 2019-07-24 RX ADMIN — Medication 500 MILLIGRAM(S): at 17:54

## 2019-07-24 RX ADMIN — Medication 4: at 00:12

## 2019-07-24 RX ADMIN — Medication 2: at 05:47

## 2019-07-24 RX ADMIN — Medication 2: at 17:46

## 2019-07-24 RX ADMIN — NYSTATIN CREAM 1 APPLICATION(S): 100000 CREAM TOPICAL at 08:49

## 2019-07-24 RX ADMIN — ZINC OXIDE 1 APPLICATION(S): 200 OINTMENT TOPICAL at 17:47

## 2019-07-24 RX ADMIN — MIDODRINE HYDROCHLORIDE 10 MILLIGRAM(S): 2.5 TABLET ORAL at 05:43

## 2019-07-24 RX ADMIN — Medication 500 MILLIGRAM(S): at 11:04

## 2019-07-24 RX ADMIN — PANTOPRAZOLE SODIUM 40 MILLIGRAM(S): 20 TABLET, DELAYED RELEASE ORAL at 17:46

## 2019-07-24 RX ADMIN — PANTOPRAZOLE SODIUM 40 MILLIGRAM(S): 20 TABLET, DELAYED RELEASE ORAL at 05:42

## 2019-07-24 RX ADMIN — Medication 50 MICROGRAM(S): at 06:20

## 2019-07-24 NOTE — BEHAVIORAL HEALTH ASSESSMENT NOTE - SUMMARY
The patient is a 73y Male, history of heavy alcohol use, no known psychiatric history, without prior medical care who presented with SOB and LE edema, found to have newly diagnosed HFrEF (EF 30%) and RALPH vs CKD, also PVD and claudication with extensive LE arterial disease on doppler, admitted for bypass, currently s/p bypass, psychiatry consulted because patient stated last night that he wanted to kill himself and patient has been "out of it."    Patient oriented only to self, otherwise disoriented. Per team, patient has not been at his baseline cognitive level since his surgery. When asked if he wanted to kill himself, patient stated, "Kill myself? where did you get that idea!"    Consider Haldol 0.5mg oral qHS, melatonin 3mg oral qHS.

## 2019-07-24 NOTE — PROGRESS NOTE ADULT - SUBJECTIVE AND OBJECTIVE BOX
Chief complaint  Patient is a 73y old  Male who presents with a chief complaint of PVD, S/p CABG (24 Jul 2019 12:18)   Review of systems  Patient in bed, looks comfortable, no fever, no hypoglycemia.    Labs and Fingersticks  CAPILLARY BLOOD GLUCOSE  218 (24 Jul 2019 00:00)      POCT Blood Glucose.: 162 mg/dL (24 Jul 2019 11:30)  POCT Blood Glucose.: 171 mg/dL (24 Jul 2019 04:44)  POCT Blood Glucose.: 218 mg/dL (23 Jul 2019 23:53)  POCT Blood Glucose.: 162 mg/dL (23 Jul 2019 17:47)      Anion Gap, Serum: 19 <H> (07-24 @ 04:52)  Anion Gap, Serum: 20 <H> (07-23 @ 02:12)      Calcium, Total Serum: 8.6 (07-24 @ 04:52)  Calcium, Total Serum: 8.7 (07-23 @ 02:12)  Albumin, Serum: 3.1 <L> (07-24 @ 04:52)  Albumin, Serum: 3.2 <L> (07-23 @ 02:12)    Alanine Aminotransferase (ALT/SGPT): 123 <H> (07-24 @ 04:52)  Alanine Aminotransferase (ALT/SGPT): 142 <H> (07-23 @ 02:12)  Alkaline Phosphatase, Serum: 166 <H> (07-24 @ 04:52)  Alkaline Phosphatase, Serum: 152 <H> (07-23 @ 02:12)  Aspartate Aminotransferase (AST/SGOT): 217 <H> (07-24 @ 04:52)  Aspartate Aminotransferase (AST/SGOT): 237 <H> (07-23 @ 02:12)        07-24    136  |  99  |  71<H>  ----------------------------<  183<H>  3.6   |  18<L>  |  4.83<H>    Ca    8.6      24 Jul 2019 04:52  Phos  3.4     07-24  Mg     2.6     07-24    TPro  6.9  /  Alb  3.1<L>  /  TBili  2.5<H>  /  DBili  x   /  AST  217<H>  /  ALT  123<H>  /  AlkPhos  166<H>  07-24                        9.6    22.0  )-----------( 86       ( 24 Jul 2019 04:52 )             26.8     Medications  MEDICATIONS  (STANDING):  argatroban Infusion 0.4 MICROgram(s)/kG/Min (1.766 mL/Hr) IV Continuous <Continuous>  aspirin  chewable 81 milliGRAM(s) Oral daily  atorvastatin 80 milliGRAM(s) Oral at bedtime  chlorhexidine 4% Liquid 1 Application(s) Topical <User Schedule>  dextrose 50% Injectable 50 milliLiter(s) IV Push every 15 minutes  dextrose 50% Injectable 25 milliLiter(s) IV Push every 15 minutes  dextrose 50% Injectable 50 milliLiter(s) IV Push every 15 minutes  dextrose 50% Injectable 25 milliLiter(s) IV Push every 15 minutes  DOBUTamine Infusion 2.5 MICROgram(s)/kG/Min (5.52 mL/Hr) IV Continuous <Continuous>  insulin lispro (HumaLOG) corrective regimen sliding scale   SubCutaneous every 6 hours  levothyroxine 50 MICROGram(s) Oral daily  metroNIDAZOLE  IVPB 500 milliGRAM(s) IV Intermittent every 8 hours  midodrine 10 milliGRAM(s) Oral every 8 hours  norepinephrine Infusion 0.015 MICROgram(s)/kG/Min (2.07 mL/Hr) IV Continuous <Continuous>  nystatin Powder 1 Application(s) Topical two times a day  pantoprazole  Injectable 40 milliGRAM(s) IV Push two times a day  sodium chloride 0.9%. 1000 milliLiter(s) (10 mL/Hr) IV Continuous <Continuous>  vancomycin    Solution 500 milliGRAM(s) Oral every 6 hours  vasopressin Infusion 0.05 Unit(s)/Min (3 mL/Hr) IV Continuous <Continuous>  zinc oxide 40% Ointment 1 Application(s) Topical two times a day      Physical Exam  General: Patient comfortable in bed  Vital Signs Last 12 Hrs  T(F): 96.9 (07-24-19 @ 16:00), Max: 96.9 (07-24-19 @ 16:00)  HR: 73 (07-24-19 @ 16:30) (72 - 83)  BP: --  BP(mean): --  RR: 20 (07-24-19 @ 16:30) (16 - 57)  SpO2: 96% (07-24-19 @ 16:30) (76% - 100%)  Neck: No palpable thyroid nodules.  CVS: S1S2, No murmurs  Respiratory: No wheezing, no crepitations  GI: Abdomen soft, bowel sounds positive  Musculoskeletal:  edema lower extremities.   Skin: No skin rashes, no ecchymosis    Diagnostics    Free Thyroxine, Serum: AM Sched. Collection: 17-Jul-2019 06:00 (07-16 @ 06:29)  Thyroid Stimulating Hormone, Serum: AM Sched. Collection: 17-Jul-2019 06:00 (07-16 @ 06:29)

## 2019-07-24 NOTE — PROGRESS NOTE ADULT - SUBJECTIVE AND OBJECTIVE BOX
Patient is a 73y old  Male who presents with a chief complaint of PVD, S/p CABG (24 Jul 2019 12:18)    Being followed by ID for        Interval history:  No other acute events      ROS:  No cough,SOB,CP  No N/V/D  No abd pain  No urinary complaints  No HA  No joint or limb pain  No other complaints    PAST MEDICAL & SURGICAL HISTORY:  Arterial disease: peripheral  TR (tricuspid regurgitation)  MR (mitral regurgitation)  RALPH (acute kidney injury)  HFrEF (heart failure with reduced ejection fraction)  No significant past surgical history    Allergies    No Known Allergies    Intolerances      Antimicrobials:    metroNIDAZOLE  IVPB 500 milliGRAM(s) IV Intermittent every 8 hours  vancomycin    Solution 500 milliGRAM(s) Oral every 6 hours    MEDICATIONS  (STANDING):  argatroban Infusion 0.4 MICROgram(s)/kG/Min (1.766 mL/Hr) IV Continuous <Continuous>  aspirin  chewable 81 milliGRAM(s) Oral daily  atorvastatin 80 milliGRAM(s) Oral at bedtime  chlorhexidine 4% Liquid 1 Application(s) Topical <User Schedule>  dextrose 50% Injectable 50 milliLiter(s) IV Push every 15 minutes  dextrose 50% Injectable 25 milliLiter(s) IV Push every 15 minutes  dextrose 50% Injectable 50 milliLiter(s) IV Push every 15 minutes  dextrose 50% Injectable 25 milliLiter(s) IV Push every 15 minutes  DOBUTamine Infusion 2.5 MICROgram(s)/kG/Min (5.52 mL/Hr) IV Continuous <Continuous>  insulin lispro (HumaLOG) corrective regimen sliding scale   SubCutaneous every 6 hours  levothyroxine 50 MICROGram(s) Oral daily  metroNIDAZOLE  IVPB 500 milliGRAM(s) IV Intermittent every 8 hours  midodrine 10 milliGRAM(s) Oral every 8 hours  norepinephrine Infusion 0.015 MICROgram(s)/kG/Min (2.07 mL/Hr) IV Continuous <Continuous>  nystatin Powder 1 Application(s) Topical two times a day  pantoprazole  Injectable 40 milliGRAM(s) IV Push two times a day  sodium chloride 0.9%. 1000 milliLiter(s) (10 mL/Hr) IV Continuous <Continuous>  vancomycin    Solution 500 milliGRAM(s) Oral every 6 hours  vasopressin Infusion 0.05 Unit(s)/Min (3 mL/Hr) IV Continuous <Continuous>  zinc oxide 40% Ointment 1 Application(s) Topical two times a day      Vital Signs Last 24 Hrs  T(C): 36.1 (07-24-19 @ 16:00), Max: 36.7 (07-24-19 @ 04:15)  T(F): 96.9 (07-24-19 @ 16:00), Max: 98.1 (07-24-19 @ 04:15)  HR: 78 (07-24-19 @ 18:00) (70 - 83)  BP: --  BP(mean): --  RR: 45 (07-24-19 @ 18:00) (16 - 62)  SpO2: 94% (07-24-19 @ 18:00) (76% - 100%)    Physical Exam:    Constitutional well preserved,comfortable,pleasant    HEENT PERRLA EOMI,No pallor or icterus    No oral exudate or erythema    Neck supple no JVD or LN    Chest Good AE,CTA    CVS RRR S1 S2 WNl No murmur or rub or gallop    Abd soft BS normal No tenderness no masses    Ext No cyanosis clubbing or edema    IV site no erythema tenderness or discharge    Joints no swelling or LOM    CNS AAO X 3 no focal    Lab Data:                          9.6    22.0  )-----------( 86       ( 24 Jul 2019 04:52 )             26.8       07-24    136  |  99  |  71<H>  ----------------------------<  183<H>  3.6   |  18<L>  |  4.83<H>    Ca    8.6      24 Jul 2019 04:52  Phos  3.4     07-24  Mg     2.6     07-24    TPro  6.9  /  Alb  3.1<L>  /  TBili  2.5<H>  /  DBili  x   /  AST  217<H>  /  ALT  123<H>  /  AlkPhos  166<H>  07-24          .Urine  07-22-19   No growth  --  --      .Blood  07-22-19   No growth to date.  --  --      .Blood  07-21-19   No growth to date.  --  --      .Blood  07-21-19   No growth to date.  --  --            C Diff by PCR Result: Detected (07-23-19 @ 02:32)          WBC Count: 22.0 (07-24-19 @ 04:52)  WBC Count: 20.7 (07-23-19 @ 02:12)  WBC Count: 22.0 (07-22-19 @ 02:23)  WBC Count: 17.7 (07-21-19 @ 02:45)  WBC Count: 13.8 (07-20-19 @ 01:45)  WBC Count: 12.9 (07-19-19 @ 17:53)  WBC Count: 12.6 (07-19-19 @ 01:32)  WBC Count: 13.0 (07-18-19 @ 03:07) Patient is a 73y old  Male who presents with a chief complaint of PVD, S/p CABG (24 Jul 2019 12:18)    Being followed by ID for        Interval history:  pt calmer  rectal tube  remains confused somewhat  No other acute events          PAST MEDICAL & SURGICAL HISTORY:  Arterial disease: peripheral  TR (tricuspid regurgitation)  MR (mitral regurgitation)  RALPH (acute kidney injury)  HFrEF (heart failure with reduced ejection fraction)  No significant past surgical history    Allergies    No Known Allergies    Intolerances      Antimicrobials:    metroNIDAZOLE  IVPB 500 milliGRAM(s) IV Intermittent every 8 hours  vancomycin    Solution 500 milliGRAM(s) Oral every 6 hours    MEDICATIONS  (STANDING):  argatroban Infusion 0.4 MICROgram(s)/kG/Min (1.766 mL/Hr) IV Continuous <Continuous>  aspirin  chewable 81 milliGRAM(s) Oral daily  atorvastatin 80 milliGRAM(s) Oral at bedtime  chlorhexidine 4% Liquid 1 Application(s) Topical <User Schedule>  dextrose 50% Injectable 50 milliLiter(s) IV Push every 15 minutes  dextrose 50% Injectable 25 milliLiter(s) IV Push every 15 minutes  dextrose 50% Injectable 50 milliLiter(s) IV Push every 15 minutes  dextrose 50% Injectable 25 milliLiter(s) IV Push every 15 minutes  DOBUTamine Infusion 2.5 MICROgram(s)/kG/Min (5.52 mL/Hr) IV Continuous <Continuous>  insulin lispro (HumaLOG) corrective regimen sliding scale   SubCutaneous every 6 hours  levothyroxine 50 MICROGram(s) Oral daily  metroNIDAZOLE  IVPB 500 milliGRAM(s) IV Intermittent every 8 hours  midodrine 10 milliGRAM(s) Oral every 8 hours  norepinephrine Infusion 0.015 MICROgram(s)/kG/Min (2.07 mL/Hr) IV Continuous <Continuous>  nystatin Powder 1 Application(s) Topical two times a day  pantoprazole  Injectable 40 milliGRAM(s) IV Push two times a day  sodium chloride 0.9%. 1000 milliLiter(s) (10 mL/Hr) IV Continuous <Continuous>  vancomycin    Solution 500 milliGRAM(s) Oral every 6 hours  vasopressin Infusion 0.05 Unit(s)/Min (3 mL/Hr) IV Continuous <Continuous>  zinc oxide 40% Ointment 1 Application(s) Topical two times a day      Vital Signs Last 24 Hrs  T(C): 36.1 (07-24-19 @ 16:00), Max: 36.7 (07-24-19 @ 04:15)  T(F): 96.9 (07-24-19 @ 16:00), Max: 98.1 (07-24-19 @ 04:15)  HR: 78 (07-24-19 @ 18:00) (70 - 83)  BP: --  BP(mean): --  RR: 45 (07-24-19 @ 18:00) (16 - 62)  SpO2: 94% (07-24-19 @ 18:00) (76% - 100%)    Physical Exam:    Constitutional  pt being fed    HEENT PERRLA EOMI,No pallor or icterus    dentures    Neck supple no JVD or LN    Chest Good AE,CTA    CVS RRR S1 S2 WNl     Abd soft BS normal No tenderness     Ext No cyanosis clubbing or edema    IV site no erythema tenderness or discharge    Joints no swelling or LOM        Lab Data:                          9.6    22.0  )-----------( 86       ( 24 Jul 2019 04:52 )             26.8       07-24    136  |  99  |  71<H>  ----------------------------<  183<H>  3.6   |  18<L>  |  4.83<H>    Ca    8.6      24 Jul 2019 04:52  Phos  3.4     07-24  Mg     2.6     07-24    TPro  6.9  /  Alb  3.1<L>  /  TBili  2.5<H>  /  DBili  x   /  AST  217<H>  /  ALT  123<H>  /  AlkPhos  166<H>  07-24          .Urine  07-22-19   No growth  --  --      .Blood  07-22-19   No growth to date.  --  --      .Blood  07-21-19   No growth to date.  --  --      .Blood  07-21-19   No growth to date.  --  --            C Diff by PCR Result: Detected (07-23-19 @ 02:32)    < from: Limited Transthoracic Echo (07.23.19 @ 12:00) >  Conclusions:  Estimated ejection fraction 40-45%. Diffuse mild  hypokinesis with no regional variation.  Moderate right ventricular enlargement with moderate right  ventricular systolic dysfunction.  Small pericardial effusion.  *** Compared with echocardiogram of 7/21/2019, no  significant changes noted.    < end of copied text >        WBC Count: 22.0 (07-24-19 @ 04:52)  WBC Count: 20.7 (07-23-19 @ 02:12)  WBC Count: 22.0 (07-22-19 @ 02:23)  WBC Count: 17.7 (07-21-19 @ 02:45)  WBC Count: 13.8 (07-20-19 @ 01:45)  WBC Count: 12.9 (07-19-19 @ 17:53)  WBC Count: 12.6 (07-19-19 @ 01:32)  WBC Count: 13.0 (07-18-19 @ 03:07)

## 2019-07-24 NOTE — PROGRESS NOTE ADULT - SUBJECTIVE AND OBJECTIVE BOX
Patient is a 73y old  Male who presents with a chief complaint of CABG (2019 21:34)    SUBJECTIVE / OVERNIGHT EVENTS:  Patient remains on intermittent HD and he was also found to test positive for C. diff. Patient remains on a Dobutamine, Norepinpehrine, and Vasopressin gtt. He had been on a Heparin gtt recently, but this was switched to an Argatroban gtt on  as pulses in the RUE were unable to be appreciated. Patient seen and examined. He is calm and cooperative, but is confused. He denies any complaints of chest pain, shortness of breath, or abdominal pain currently. He was afebrile overnight.    MEDICATIONS  (STANDING):  argatroban Infusion 0.5 MICROgram(s)/kG/Min (2.208 mL/Hr) IV Continuous <Continuous>  aspirin  chewable 81 milliGRAM(s) Oral daily  atorvastatin 80 milliGRAM(s) Oral at bedtime  chlorhexidine 4% Liquid 1 Application(s) Topical <User Schedule>  dextrose 50% Injectable 50 milliLiter(s) IV Push every 15 minutes  dextrose 50% Injectable 25 milliLiter(s) IV Push every 15 minutes  dextrose 50% Injectable 50 milliLiter(s) IV Push every 15 minutes  dextrose 50% Injectable 25 milliLiter(s) IV Push every 15 minutes  DOBUTamine Infusion 2.5 MICROgram(s)/kG/Min (5.52 mL/Hr) IV Continuous <Continuous>  insulin lispro (HumaLOG) corrective regimen sliding scale   SubCutaneous every 6 hours  levothyroxine 50 MICROGram(s) Oral daily  metroNIDAZOLE  IVPB 500 milliGRAM(s) IV Intermittent every 8 hours  midodrine 10 milliGRAM(s) Oral every 8 hours  norepinephrine Infusion 0.015 MICROgram(s)/kG/Min (2.07 mL/Hr) IV Continuous <Continuous>  nystatin Powder 1 Application(s) Topical two times a day  pantoprazole  Injectable 40 milliGRAM(s) IV Push two times a day  sodium chloride 0.9%. 1000 milliLiter(s) (10 mL/Hr) IV Continuous <Continuous>  vancomycin    Solution 500 milliGRAM(s) Oral every 6 hours  vasopressin Infusion 0.05 Unit(s)/Min (3 mL/Hr) IV Continuous <Continuous>  zinc oxide 40% Ointment 1 Application(s) Topical two times a day    MEDICATIONS  (PRN):  sodium chloride 0.9% lock flush 10 milliLiter(s) IV Push every 1 hour PRN Pre/post blood products, medications, blood draw, and to maintain line patency        CAPILLARY BLOOD GLUCOSE  218 (2019 00:00)      POCT Blood Glucose.: 162 mg/dL (2019 11:30)  POCT Blood Glucose.: 171 mg/dL (2019 04:44)  POCT Blood Glucose.: 218 mg/dL (2019 23:53)  POCT Blood Glucose.: 162 mg/dL (2019 17:47)    I&O's Summary    2019 07:01  -  2019 07:00  --------------------------------------------------------  IN: 1792.3 mL / OUT: 0 mL / NET: 1792.3 mL    2019 07:01  -  2019 12:18  --------------------------------------------------------  IN: 218.6 mL / OUT: 0 mL / NET: 218.6 mL    Vital Signs Last 24 Hrs  T(C): 36.7 (2019 04:15), Max: 36.7 (2019 04:15)  T(F): 98.1 (2019 04:15), Max: 98.1 (2019 04:15)  HR: 77 (2019 10:30) (68 - 83)  BP: --  BP(mean): --  RR: 20 (2019 10:30) (13 - 62)  SpO2: 99% (2019 10:30) (76% - 100%)    PHYSICAL EXAM:  GENERAL: NAD, Laying in bed  HEENT: NC/AT, Slightly dry mucous membrnaes  NECK: Supple  CHEST/LUNG: Decreased at bases, Otherwise grossly clear, No wheeze appreciated  HEART: RRR; +S1/S2  ABDOMEN: +BS, Soft, NT, Rectal tube in place  EXTREMITIES: No LE edema  NEUROLOGY: A+Ox1-2 (oriented to name and year), Follows a few commands  SKIN: Warm and dry  PSYCH: Calm and cooperative    LABS:                        9.6    22.0  )-----------( 86       ( 2019 04:52 )             26.8     -    136  |  99  |  71<H>  ----------------------------<  183<H>  3.6   |  18<L>  |  4.83<H>    Ca    8.6      2019 04:52  Phos  3.4     -  Mg     2.6         TPro  6.9  /  Alb  3.1<L>  /  TBili  2.5<H>  /  DBili  x   /  AST  217<H>  /  ALT  123<H>  /  AlkPhos  166<H>      PT/INR - ( 2019 08:41 )   PT: 57.4 sec;   INR: 4.80 ratio         PTT - ( 2019 08:41 )  PTT:105.5 sec      Urinalysis Basic - ( 2019 14:17 )    Color: brown / Appearance: Turbid / S.038 / pH: x  Gluc: x / Ketone: see note  / Bili: see note / Urobili: see note   Blood: x / Protein: see note / Nitrite: see note   Leuk Esterase: see note / RBC: >50 /hpf / WBC 10 /HPF   Sq Epi: x / Non Sq Epi: 2 / Bacteria: Negative        RADIOLOGY & ADDITIONAL TESTS:  Studies reviewed. Patient is a 73y old  Male who presents with a chief complaint of CABG (2019 21:34)    SUBJECTIVE / OVERNIGHT EVENTS:  Patient remains on intermittent HD and he was also found to test positive for C. diff. Patient remains on a Dobutamine, Norepinpehrine, and Vasopressin gtt. He had been on a Heparin gtt recently, but this was switched to an Argatroban gtt on  as pulses in the RUE were unable to be appreciated. Patient seen and examined. He is calm and cooperative, but is confused. He denies any complaints of chest pain, shortness of breath, or abdominal pain currently. He was afebrile overnight.    MEDICATIONS  (STANDING):  argatroban Infusion 0.5 MICROgram(s)/kG/Min (2.208 mL/Hr) IV Continuous <Continuous>  aspirin  chewable 81 milliGRAM(s) Oral daily  atorvastatin 80 milliGRAM(s) Oral at bedtime  chlorhexidine 4% Liquid 1 Application(s) Topical <User Schedule>  dextrose 50% Injectable 50 milliLiter(s) IV Push every 15 minutes  dextrose 50% Injectable 25 milliLiter(s) IV Push every 15 minutes  dextrose 50% Injectable 50 milliLiter(s) IV Push every 15 minutes  dextrose 50% Injectable 25 milliLiter(s) IV Push every 15 minutes  DOBUTamine Infusion 2.5 MICROgram(s)/kG/Min (5.52 mL/Hr) IV Continuous <Continuous>  insulin lispro (HumaLOG) corrective regimen sliding scale   SubCutaneous every 6 hours  levothyroxine 50 MICROGram(s) Oral daily  metroNIDAZOLE  IVPB 500 milliGRAM(s) IV Intermittent every 8 hours  midodrine 10 milliGRAM(s) Oral every 8 hours  norepinephrine Infusion 0.015 MICROgram(s)/kG/Min (2.07 mL/Hr) IV Continuous <Continuous>  nystatin Powder 1 Application(s) Topical two times a day  pantoprazole  Injectable 40 milliGRAM(s) IV Push two times a day  sodium chloride 0.9%. 1000 milliLiter(s) (10 mL/Hr) IV Continuous <Continuous>  vancomycin    Solution 500 milliGRAM(s) Oral every 6 hours  vasopressin Infusion 0.05 Unit(s)/Min (3 mL/Hr) IV Continuous <Continuous>  zinc oxide 40% Ointment 1 Application(s) Topical two times a day    MEDICATIONS  (PRN):  sodium chloride 0.9% lock flush 10 milliLiter(s) IV Push every 1 hour PRN Pre/post blood products, medications, blood draw, and to maintain line patency        CAPILLARY BLOOD GLUCOSE  218 (2019 00:00)      POCT Blood Glucose.: 162 mg/dL (2019 11:30)  POCT Blood Glucose.: 171 mg/dL (2019 04:44)  POCT Blood Glucose.: 218 mg/dL (2019 23:53)  POCT Blood Glucose.: 162 mg/dL (2019 17:47)    I&O's Summary    2019 07:01  -  2019 07:00  --------------------------------------------------------  IN: 1792.3 mL / OUT: 0 mL / NET: 1792.3 mL    2019 07:01  -  2019 12:18  --------------------------------------------------------  IN: 218.6 mL / OUT: 0 mL / NET: 218.6 mL    Vital Signs Last 24 Hrs  T(C): 36.7 (2019 04:15), Max: 36.7 (2019 04:15)  T(F): 98.1 (2019 04:15), Max: 98.1 (2019 04:15)  HR: 77 (2019 10:30) (68 - 83)  BP: --  BP(mean): --  RR: 20 (2019 10:30) (13 - 62)  SpO2: 99% (2019 10:30) (76% - 100%)    PHYSICAL EXAM:  GENERAL: NAD, Laying in bed  HEENT: NC/AT, Slightly dry mucous membrnaes  NECK: Supple  CHEST/LUNG: Decreased at bases, Otherwise grossly clear, No wheeze appreciated  HEART: RRR; +S1/S2  ABDOMEN: +BS, Soft, NT, Rectal tube in place  EXTREMITIES: No LE edema  NEUROLOGY: A+Ox1-2 (oriented to name and year), Follows a few commands  SKIN: Warm and dry  PSYCH: Calm and cooperative    LABS:                        9.6    22.0  )-----------( 86       ( 2019 04:52 )             26.8     -    136  |  99  |  71<H>  ----------------------------<  183<H>  3.6   |  18<L>  |  4.83<H>    Ca    8.6      2019 04:52  Phos  3.4     -  Mg     2.6         TPro  6.9  /  Alb  3.1<L>  /  TBili  2.5<H>  /  DBili  x   /  AST  217<H>  /  ALT  123<H>  /  AlkPhos  166<H>      PT/INR - ( 2019 08:41 )   PT: 57.4 sec;   INR: 4.80 ratio         PTT - ( 2019 08:41 )  PTT:105.5 sec      Urinalysis Basic - ( 2019 14:17 )    Color: brown / Appearance: Turbid / S.038 / pH: x  Gluc: x / Ketone: see note  / Bili: see note / Urobili: see note   Blood: x / Protein: see note / Nitrite: see note   Leuk Esterase: see note / RBC: >50 /hpf / WBC 10 /HPF   Sq Epi: x / Non Sq Epi: 2 / Bacteria: Negative        RADIOLOGY & ADDITIONAL TESTS:  Studies reviewed.    Peripheral smear: Toxic PMNs seen, Target cells, Occasional schistocyte

## 2019-07-24 NOTE — PROGRESS NOTE ADULT - ASSESSMENT
73 year old Black male h/o no implantable devices who prior to his admission to [facility] had not been followed by a doctor regularly (on no meds at home), presented to [facility] on 7/1/19 with shortness of breath and LE edema found to have newly diagnosed HFrEF (EF 30%) and RALPH vs CKD, also PVD and claudication with extensive LE arterial disease on doppler. 7/2/19 abd US: small amount of perihepatic ascited. 7/2/19 TTE: EF 30%, mild LVH, multiple regional WMA's, mod MR/TR. 7/3/19 Nuclear ST: moderate focal apical/inferolat/aterolat ischemia. 7/9/19: h/h 12.9/39.5, platelets 180, Bun/Cr 28/1.9, eGFR 42. Pt was diuresed at [facility] treated with lasix/hydralzine/nitrates/statin/ASA. No beta blockade 2/2 low blood pressures. Per [facility] discharge note, pt will need  vascular intervention d/t extensive lower extremity vascular disease. Pt transferred to [facility] today for left heart cath to rule out ischemia. (10 Jul 2019 12:52)    7/12 pt underwent C3L , AFib.  Pt's hospital course was then been c/b  A-fib, acute on chronic renal failure with fluid overload requiring CVVH, cardiogenic shock (on Milrinone, Dobutamine, and Vasopressin gtts), and respiratory failure requiring intubation (extubated on 7/16). Patient's CBC was WNL on day of presentation to [facility]. His platelet count has been steadily downtrending since 7/12.     Pt now with elevated WBC, and diarrhea  stool is indeterminate for C diff, PCR is pending  I am unsure it patient received abs at [facility]. He only received perioperative abs here until the 21st  pt started on empiric treatment for c diff    pt with low plts, heme is following  now on agatobran  pt with low plts , renal failure and altered mental status- could this be TTP. Unlikely renal and hematology are seeing and can address    pt with elevated LFTs, low EF- some is congestion  also on statin, consider holding  would check hepatitis screen ( so far negative) and CMV      flagyl added to po vanco for c diff  seems to be responding

## 2019-07-24 NOTE — BEHAVIORAL HEALTH ASSESSMENT NOTE - CASE SUMMARY
The patient is a 73y Male, history of heavy alcohol use, no known psychiatric history, without prior medical care who presented with SOB and LE edema, found to have newly diagnosed HFrEF (EF 30%) and RALPH vs CKD, also PVD and claudication with extensive LE arterial disease on doppler, admitted for bypass, currently s/p bypass, psychiatry consulted because patient stated last night that he wanted to kill himself and patient has been "out of it."    Patient presents with clouding of sensorium and confusion indicative of delirium. I have seen and evaluated this patient myself. Chart, labs, meds reviewed. I agree with resident's assessment and plan.

## 2019-07-24 NOTE — PROGRESS NOTE ADULT - ASSESSMENT
Assessment  DMT2: 73y Male with newly diagnosed DM T2 with hyperglycemia, on low dose insulin, blood sugars in acceptable range, no hypoglycemic episode, started eating partial meals.  CAD: s/P CABG, on medications, stable, monitored.  HTN: Controlled,  on antihypertensive medications.      Tammie Magdaleno MD  Cell: [phone]  Office: [phone]

## 2019-07-24 NOTE — SWALLOW BEDSIDE ASSESSMENT ADULT - SLP PRECAUTIONS/LIMITATIONS: VISION
impaired/Partially impaired: cannot see medication labels or newsprint, but can see obstacles in path, and the surrounding layout; can count fingers at arm's length;

## 2019-07-24 NOTE — PROGRESS NOTE ADULT - ASSESSMENT
The patient is a 73y Male with CAD, HFrEF and PVD s/p CABGx3 followed by AFib, resp failure and acute on chronic kidney injury.    - CKD:  Suspect underlying CKD stage 3  - RALPH: anuric.   Now on dialysis.  - Hypotension on vaso gtt/Levo gtt and midodrine 10 tid   -PVD  -Confusion   -Diarrhea    RECOMMENDATIONS  - Hold off on HD this am.  No access and still on pressors and having active diarrhea.  CXR with improved congestion   - Wean pressors as able.   - please dose any new medications for a CrCl of 10-15 ml/min   -Avoid hypotension given the PVD.  Vasc eval noted   -Agatroban gtt at present.  Plt are stable   -1:1 at present   -C diff positive at present     Will dw cticu

## 2019-07-24 NOTE — BEHAVIORAL HEALTH ASSESSMENT NOTE - RISK ASSESSMENT
Risk factors: medical problem    Protective factors: no current SIIP/HIIP, no h/o SA/SIB, no h/o psych admissions, no access to weapons, no psychosis, social supports    Overall, pt is a low risk of harm to self/others and does not require psychiatric 1:1 for safety and stabilization.

## 2019-07-24 NOTE — BEHAVIORAL HEALTH ASSESSMENT NOTE - HPI (INCLUDE ILLNESS QUALITY, SEVERITY, DURATION, TIMING, CONTEXT, MODIFYING FACTORS, ASSOCIATED SIGNS AND SYMPTOMS)
The patient is a 73y Male, history of heavy alcohol use, no known psychiatric history, without prior medical care who presented with SOB and LE edema, found to have newly diagnosed HFrEF (EF 30%) and RALPH vs CKD, also PVD and claudication with extensive LE arterial disease on doppler, admitted for bypass, currently s/p bypass, psychiatry consulted because patient stated last night that he wanted to kill himself and patient has been "out of it."    Patient evaluated at bedside, was lying in bed, oriented to self. When asked the date, patient did not respond, when asked where he was, patient stated he was at home. Patient disoriented to time, place and situation. When asked if he remembers telling someone he wanted to kill himself, patient stated "no." When asked if he wants to kill himself, patient stated, "kill himself? where did you get that idea!" Per team, patient has been disoriented since his surgery. At baseline, patient is able to communicate well and is A+Ox4, per patient's family.

## 2019-07-24 NOTE — PROGRESS NOTE ADULT - SUBJECTIVE AND OBJECTIVE BOX
[facility] [phone]        Patient seen and examined in bed.  He was pleasantly confused   On isolation and has rectal tube  Some skin breakdown as well   gtt and Vaso gtt     ros-unable as confusion         MEDICATIONS  (STANDING):  argatroban Infusion 0.7 MICROgram(s)/kG/Min (3.091 mL/Hr) IV Continuous <Continuous>  aspirin  chewable 81 milliGRAM(s) Oral daily  atorvastatin 80 milliGRAM(s) Oral at bedtime  chlorhexidine 4% Liquid 1 Application(s) Topical <User Schedule>  dextrose 50% Injectable 50 milliLiter(s) IV Push every 15 minutes  dextrose 50% Injectable 25 milliLiter(s) IV Push every 15 minutes  dextrose 50% Injectable 50 milliLiter(s) IV Push every 15 minutes  dextrose 50% Injectable 25 milliLiter(s) IV Push every 15 minutes  DOBUTamine Infusion 2.5 MICROgram(s)/kG/Min (5.52 mL/Hr) IV Continuous <Continuous>  insulin lispro (HumaLOG) corrective regimen sliding scale   SubCutaneous every 6 hours  levothyroxine 50 MICROGram(s) Oral daily  midodrine 10 milliGRAM(s) Oral every 8 hours  norepinephrine Infusion 0.015 MICROgram(s)/kG/Min (2.07 mL/Hr) IV Continuous <Continuous>  nystatin Powder 1 Application(s) Topical two times a day  pantoprazole  Injectable 40 milliGRAM(s) IV Push two times a day  sodium chloride 0.9%. 1000 milliLiter(s) (10 mL/Hr) IV Continuous <Continuous>  vancomycin    Solution 500 milliGRAM(s) Oral every 6 hours  vasopressin Infusion 0.05 Unit(s)/Min (3 mL/Hr) IV Continuous <Continuous>  zinc oxide 40% Ointment 1 Application(s) Topical two times a day      VITAL:  T(C): , Max: 36.9 (19 @ 12:00)  T(F): , Max: 98.5 (19 @ 12:00)  HR: 82 (19 @ 07:00)  BP: --  BP(mean): --  RR: 20 (19 @ 07:00)  SpO2: 98% (19 @ 07:00)  Wt(kg): --    I and O's:     @ 07: @ 07:00  --------------------------------------------------------  IN: 1792.3 mL / OUT: 0 mL / NET: 1792.3 mL          PHYSICAL EXAM:    Constitutional: NAD  Neck:  No JVD  Respiratory: poor effort   Cardiovascular: S1 and S2  Gastrointestinal: BS+, soft, NT/ND  Extremities: No peripheral edema  Neurological:  , no focal deficits  Psychiatric: confused   :   Kyler  Skin: No rashes  Access: Not applicable    LABS:                        9.6    22.0  )-----------( 86       ( 2019 04:52 )             26.8         136  |  99  |  71<H>  ----------------------------<  183<H>  3.6   |  18<L>  |  4.83<H>    Ca    8.6      2019 04:52  Phos  3.4       Mg     2.6         TPro  6.9  /  Alb  3.1<L>  /  TBili  2.5<H>  /  DBili  x   /  AST  217<H>  /  ALT  123<H>  /  AlkPhos  166<H>            Urine Studies:  Urinalysis Basic - ( 2019 14:17 )    Color: brown / Appearance: Turbid / S.038 / pH: x  Gluc: x / Ketone: see note  / Bili: see note / Urobili: see note   Blood: x / Protein: see note / Nitrite: see note   Leuk Esterase: see note / RBC: >50 /hpf / WBC 10 /HPF   Sq Epi: x / Non Sq Epi: 2 / Bacteria: Negative            RADIOLOGY & ADDITIONAL STUDIES:    < from: Xray Chest 1 View- PORTABLE-Routine (19 @ 02:37) >    EXAM:  XR CHEST PORTABLE ROUTINE 1V                            PROCEDURE DATE:  2019            INTERPRETATION:  A single chest x-ray was obtained on 2019.    Indication: Status post cardiac surgery.    Impression:    The heart is markedly enlarged. Improving pulmonary vascular congestion.   All life supporting devices are in good position and unchanged when   compared to previous study done 2019. No pleural effusion. No   pneumothorax. Status post sternotomy                      RITA ORTEGA M.D., ATTENDING RADIOLOGIST  This document has been electronically signed. 2019  8:37AM                < end of copied text >

## 2019-07-24 NOTE — BEHAVIORAL HEALTH ASSESSMENT NOTE - NSBHCHARTREVIEWLAB_PSY_A_CORE FT
9.6    22.0  )-----------( 86       ( 2019 04:52 )             26.8     -    136  |  99  |  71<H>  ----------------------------<  183<H>  3.6   |  18<L>  |  4.83<H>    Ca    8.6      2019 04:52  Phos  3.4       Mg     2.6         TPro  6.9  /  Alb  3.1<L>  /  TBili  2.5<H>  /  DBili  x   /  AST  217<H>  /  ALT  123<H>  /  AlkPhos  166<H>      Urinalysis Basic - ( 2019 14:17 )    Color: brown / Appearance: Turbid / S.038 / pH: x  Gluc: x / Ketone: see note  / Bili: see note / Urobili: see note   Blood: x / Protein: see note / Nitrite: see note   Leuk Esterase: see note / RBC: >50 /hpf / WBC 10 /HPF   Sq Epi: x / Non Sq Epi: 2 / Bacteria: Negative

## 2019-07-24 NOTE — BEHAVIORAL HEALTH ASSESSMENT NOTE - NSBHCONSULTOBSREASON_PSY_A_CORE FT
Patient may exhibit unpredictable behavior due to confusion, does not require 1:1 on psychiatric grounds.

## 2019-07-24 NOTE — SWALLOW BEDSIDE ASSESSMENT ADULT - CONSISTENCIES ADMINISTERED
ice chips presented to labial surface - Poor orientation to feeding process with absent response to thermal tactile stimuli.

## 2019-07-24 NOTE — SWALLOW BEDSIDE ASSESSMENT ADULT - SWALLOW EVAL: DIAGNOSIS
Pt encountered bedside, AA&Ox1 (person only). Pt unable to follow simple 1-step directives with overall suboptimal arousal to participate in PO trials at this time. Pt provided with oral care via toothette, followed by thermal tactile cues upon lingual surface via small ice chip. Pt demonstrated absent attempt to open oral cavity in response to thermal stimuli and ultimately SLP cleared labial surface with napkin. Due to poor orientation to feeding/process and current state of ?delirium, Pt not deemed appropriate for PO at this time.

## 2019-07-24 NOTE — BEHAVIORAL HEALTH ASSESSMENT NOTE - NSBHCHARTREVIEWIMAGING_PSY_A_CORE FT
< from: Xray Chest 1 View- PORTABLE-Urgent (07.24.19 @ 09:37) >    Impression:    The heart is enlarged.Mild pulmonary vascular congestion. No   pneumothorax is seen on the current study. All life supporting devices   are in good position and unchanged when compared to previous study done   the same date at 3:07 AM. Status post sternotomy.                    RITA ORTEGA M.D., ATTENDING RADIOLOGIST  This document has been electronically signed. Jul 24 2019  9:50AM    < end of copied text >

## 2019-07-24 NOTE — PROGRESS NOTE ADULT - ASSESSMENT
*** NOTE INCOMPLETE *** Patient is a 72 y/o M who was initially admitted to [facility] on 7/1 for newly diagnosed HFrEF (EF 30%), RALPH vs CKD, and PVD, transferred to [facility] for ischemic evaluation, found to have multivessel disease s/p CABG w/ hospital course c/b post-op A-fib, acute on chronic renal failure with fluid overload requiring CVVH, cardiogenic shock (on Norepinephrine, Dobutamine, and Vasopressin gtts), respiratory failure requiring intubation (now extubated), + C. diff infection, and thrombocytopenia. Hematology consulted for further evaluation of thrombocytopenia.     # Thrombocytopenia  - Platelet count initially noted to decline after 7/12 which was the date of patient's CABG. It has remained relatively stable over the past week (PLT = 70-80k). Suspect thrombocytopenia during hospitalization has been multifactorial in the setting of consumptive process post cardiac procedure in addition to underlying active issues, including cardiogenic shock, acute on chronic renal failure, and C. diff infection. Patient now also found to be HIT Ab + and so HIT is a possibility as well.    - C/w Argatroban gtt per CTICU team. Given + HIT Ab, repeat STUART testing.    - Peripheral smear reviewed: Toxic PMNs, Target cells, and an occasional schistocyte seen   - Monitor CBC w/ Diff daily and transfuse for Hgb < 7, PLT < 10 (if not bleeding), or PLT < 50 (if bleeding)   - C/w supportive management per CTICU team  - Will continue to follow    Jerry Shin, PGY4  Hematology-Oncology Fellow  Pager: [phone]

## 2019-07-24 NOTE — SWALLOW BEDSIDE ASSESSMENT ADULT - COMMENTS
Continued: 7/17: Now on CVVHD and pressor support. Per endocrinology, newly diagnosed DM T2 with hyperglycemia, on insulin, blood sugars in acceptable range no hypoglycemic episode.   7/17 CXR: the lungs appear to be clear  7/18: CXR: Bilateral reticular opacification more prominent in left upper lung, unchanged from prior.  7/19: on mechanical soft diet, poor PO intake per daughter at bedside. Agreed to try Nepro as supplement. Nutrition reports: As evidenced by: 10lb weight loss during admission, visual fast and muscle wasting, Consume 40% of meals currently.  7/21: RN note: “Pt appears more confused, and more lethargic. Disoriented to place now, when previously knew he was in hospital. Continues to have R hand weakness”  7/22: RN note: “Upon reassessment patient unresponsive to verbal command and nonverbal. Patient withdraws from pain. Increasingly lethargic and pupils sluggish bilaterally. /52 MAP 77 HR 81 CVP 20 SpO2 100% on 4L NC”   CT Head: CT Brain 7/22: Impression: No evidence of acute infarction or hemorrhage. MRI with diffusion-weighted imaging is a more sensitive modality in the evaluation of early infarction. Mild chronic microvascular ischemic disease, as described above. Chronic lacunar infarction in the right basal ganglia.  7/22 CXR: Small right pleural effusion.  Nutrition note: “Pt seen, is aware, with difficulty communicating due to mental status changes. Had jami on a mechanical soft diet, however has been requiring feeding assistance. Per discussion with team, plan to obtain a speech and swallow evaluation and insert na NGT for nutrition support.”  PO intake: 40% of meals. 7/22: MD note: “Delirious, on 1:1, Psych called as he is talking about harming himself”  7/23: R. hand extremity cold in temperature, dusky nailbed with nondopplerable radial and ulnar pulses. – seen by vascular – taken off norephinephrine gtt and started  argatroban gtt.  +C-Diff Continued: 7/17: Now on CVVHD and pressor support. Per endocrinology, newly diagnosed DM T2 with hyperglycemia, on insulin, blood sugars in acceptable range no hypoglycemic episode.   7/17 CXR: the lungs appear to be clear  7/18: CXR: Bilateral reticular opacification more prominent in left upper lung, unchanged from prior.  7/19: on mechanical soft diet, poor PO intake per daughter at bedside. Agreed to try Nepro as supplement. Nutrition reports: As evidenced by: 10lb weight loss during admission, visual fast and muscle wasting, Consume 40% of meals currently.  7/21: RN note: “Pt appears more confused, and more lethargic. Disoriented to place now, when previously knew he was in hospital. Continues to have R hand weakness”  7/22: RN note: “Upon reassessment patient unresponsive to verbal command and nonverbal. Patient withdraws from pain. Increasingly lethargic and pupils sluggish bilaterally. /52 MAP 77 HR 81 CVP 20 SpO2 100% on 4L NC”   CT Head: CT Brain 7/22: Impression: No evidence of acute infarction or hemorrhage. MRI with diffusion-weighted imaging is a more sensitive modality in the evaluation of early infarction. Mild chronic microvascular ischemic disease, as described above. Chronic lacunar infarction in the right basal ganglia.  7/22 CXR: Small right pleural effusion.  Nutrition note: “Pt seen, is aware, with difficulty communicating due to mental status changes. Had jami on a mechanical soft diet, however has been requiring feeding assistance. Per discussion with team, plan to obtain a speech and swallow evaluation and insert na NGT for nutrition support.”  PO intake: 40% of meals. 7/22: MD note: “Delirious, on 1:1, Psych called as he is talking about harming himself”  7/23: R. hand extremity cold in temperature, dusky nailbed with nondopplerable radial and ulnar pulses. – seen by vascular – taken off norephinephrine gtt and started  argatroban gtt.  +C-Diff. 7/22: Made NPO - failed dysphagia screen. (previously on mechanical soft diet).

## 2019-07-24 NOTE — SWALLOW BEDSIDE ASSESSMENT ADULT - SLP GENERAL OBSERVATIONS
Encountered bedside, AA&Ox1 (person only). +NGT. +6Lo2 via NC; SPO2 99%, HR 71, RR 23. VSS. Pt unable to follow simple 1-step directives or reliably answer simple biographical yes/no questions. Pt noted with fluctuating ability to remain with eyes open as well. +occasional nonsensical utterances. Overall poor mentation/AMS as per PCA, ROBER Sherman and NANNETTE Bennett. Pt not deemed safe for PO trials due to current mental status.

## 2019-07-24 NOTE — BEHAVIORAL HEALTH ASSESSMENT NOTE - NSBHCHARTREVIEWVS_PSY_A_CORE FT
Vital Signs Last 24 Hrs  T(C): 36.7 (24 Jul 2019 04:15), Max: 36.9 (23 Jul 2019 12:00)  T(F): 98.1 (24 Jul 2019 04:15), Max: 98.5 (23 Jul 2019 12:00)  HR: 77 (24 Jul 2019 10:30) (68 - 83)  BP: --  BP(mean): --  RR: 20 (24 Jul 2019 10:30) (13 - 62)  SpO2: 99% (24 Jul 2019 10:30) (76% - 100%)

## 2019-07-24 NOTE — SWALLOW BEDSIDE ASSESSMENT ADULT - SLP PERTINENT HISTORY OF CURRENT PROBLEM
73 year old male, ex-alcoholic drinker and tobacco smoker with severe peripheral vascular disease and coronary disease.  H/o no implantable devices who prior to his admission to [facility] had not been followed by a doctor regularly (on no meds at home), presented to [facility] on 7/1/19 with shortness of breath and LE edema found to have newly diagnosed HFrEF (EF 30%) and RALPH vs CKD, also PVD and claudication with extensive LE arterial disease on doppler. Pt transferred to [facility] today for left heart cath to rule out ischemia.  7/12: CABG, Extubated after procedure.  7/14: Re-intubated due to change in status: pt oliguric and hyperkalemic on ABGs/mixed. Rec: CVVHD. +persistent cardiopulmonary dysfunction & cardiogenic shock-cardiovascular dysfunction.  7/15: Respiratory failure, renal failure, fluid overload, difficult weaning process as per critical care team. 7/16: Extubated and placed on high flow nasal canula. Placed on epinephrine gtt, dobutamine gtt, vasopressin gtt and milirinone gtt..

## 2019-07-24 NOTE — PROGRESS NOTE ADULT - SUBJECTIVE AND OBJECTIVE BOX
SYLVIA ROSA  MRN#:  [id number]    The patient is a 73y Male without prior medical care who presented with SOB and LE edema, found to have newly diagnosed HFrEF (EF 30%) and RALPH vs CKD, also PVD and claudication with extensive LE arterial disease on doppler, reports heavy alcohol use, further work up revealed multivessel CAD, now s/p C3L today, severe biventricular dysfunction on echocardiogram with some improvement after bypass was seen, evaluated, & examined with the CTICU staff on rounds and later in the evening with a multidisciplinary care plan formulated & implemented.  All available clinical, laboratory, radiographic, pharmacologic, and electrocardiographic data were reviewed & analyzed.      The patient was in the CTICU in critical condition secondary to persistent cardiopulmonary dysfunction, probable sepsis from C difficile, persistent cardiovascular dysfunction, and acute on chromic postoperative renal failure, & hyperglycemia.      Respiratory status required supplemental oxygen, the following of ABG’s with A-line monitoring, and continuous pulse oximetry monitoring for support & to evaluate for & prevent further decompensation secondary to persistent cardiopulmonary dysfunction.     Invasive hemodynamic monitoring with a central venous catheter & an A-line were required for the continuous central venous and MAP/BP monitoring to ensure adequate cardiovascular support and to evaluate for & help prevent decompensation while receiving an IV Vasopressin drip, an IV Dobutamine drip, and an IV Argatroban drip secondary to septic shock from C diff colitis, cardiovascular dysfunction, A Fib, ARF, and combined systolic and diastolic biventricular failure.    Added IV Flagy in additon to entral Vancomycin for progressive C diff sentic shock    Patient required acute postoperative critical care management and I provided 30 minutes of non-continuous care to the patient. I reviewed and assessed all available clinical, laboratory, radiographic, pharmacologic, and electrocardiographic data in order to decide on maintenance or adjustment of medications, and fluid management.  Discussed at length with the CTICU staff and helped coordinate care.

## 2019-07-24 NOTE — BEHAVIORAL HEALTH ASSESSMENT NOTE - NSBHCHARTREVIEWINVESTIGATE_PSY_A_CORE FT
< from: 12 Lead ECG (07.16.19 @ 11:00) >      Ventricular Rate 67 BPM    Atrial Rate 288 BPM    QRS Duration 108 ms    Q-T Interval 392 ms    QTC Calculation(Bezet) 414 ms    R Axis -55 degrees    T Axis 90 degrees    Diagnosis Line ATRIAL FIBRILLATION  LEFT AXIS DEVIATION  NONSPECIFIC T WAVE ABNORMALITY  ABNORMAL ECG    Confirmed by MD CLOTILDE, JOSE C ([id number]) on 7/17/2019 5:13:06 PM    < end of copied text >

## 2019-07-25 LAB
ALBUMIN SERPL ELPH-MCNC: 2.9 G/DL — LOW (ref 3.3–5)
ALP SERPL-CCNC: 160 U/L — HIGH (ref 40–120)
ALT FLD-CCNC: 99 U/L — HIGH (ref 10–45)
AMMONIA BLD-MCNC: 24 UMOL/L — SIGNIFICANT CHANGE UP (ref 11–55)
ANION GAP SERPL CALC-SCNC: 20 MMOL/L — HIGH (ref 5–17)
APTT BLD: 73.5 SEC — HIGH (ref 27.5–36.3)
APTT BLD: 86.5 SEC — HIGH (ref 27.5–36.3)
APTT BLD: 86.5 SEC — HIGH (ref 27.5–36.3)
AST SERPL-CCNC: 182 U/L — HIGH (ref 10–40)
BILIRUB SERPL-MCNC: 2.6 MG/DL — HIGH (ref 0.2–1.2)
BLD GP AB SCN SERPL QL: NEGATIVE — SIGNIFICANT CHANGE UP
BUN SERPL-MCNC: 83 MG/DL — HIGH (ref 7–23)
CALCIUM SERPL-MCNC: 8.6 MG/DL — SIGNIFICANT CHANGE UP (ref 8.4–10.5)
CHLORIDE SERPL-SCNC: 99 MMOL/L — SIGNIFICANT CHANGE UP (ref 96–108)
CO2 SERPL-SCNC: 16 MMOL/L — LOW (ref 22–31)
CREAT SERPL-MCNC: 5.52 MG/DL — HIGH (ref 0.5–1.3)
GAS PNL BLDA: SIGNIFICANT CHANGE UP
GLUCOSE SERPL-MCNC: 148 MG/DL — HIGH (ref 70–99)
HCT VFR BLD CALC: 24.9 % — LOW (ref 39–50)
HGB BLD-MCNC: 8.5 G/DL — LOW (ref 13–17)
INR BLD: 2.91 RATIO — HIGH (ref 0.88–1.16)
MAGNESIUM SERPL-MCNC: 2.5 MG/DL — SIGNIFICANT CHANGE UP (ref 1.6–2.6)
MCHC RBC-ENTMCNC: 32.2 PG — SIGNIFICANT CHANGE UP (ref 27–34)
MCHC RBC-ENTMCNC: 34.3 GM/DL — SIGNIFICANT CHANGE UP (ref 32–36)
MCV RBC AUTO: 93.9 FL — SIGNIFICANT CHANGE UP (ref 80–100)
PHOSPHATE SERPL-MCNC: 3.4 MG/DL — SIGNIFICANT CHANGE UP (ref 2.5–4.5)
PLATELET # BLD AUTO: 87 K/UL — LOW (ref 150–400)
POTASSIUM SERPL-MCNC: 3.6 MMOL/L — SIGNIFICANT CHANGE UP (ref 3.5–5.3)
POTASSIUM SERPL-SCNC: 3.6 MMOL/L — SIGNIFICANT CHANGE UP (ref 3.5–5.3)
PROT SERPL-MCNC: 6.5 G/DL — SIGNIFICANT CHANGE UP (ref 6–8.3)
PROTHROM AB SERPL-ACNC: 34.3 SEC — HIGH (ref 10–12.9)
RBC # BLD: 2.65 M/UL — LOW (ref 4.2–5.8)
RBC # FLD: 14.8 % — HIGH (ref 10.3–14.5)
RH IG SCN BLD-IMP: POSITIVE — SIGNIFICANT CHANGE UP
SODIUM SERPL-SCNC: 135 MMOL/L — SIGNIFICANT CHANGE UP (ref 135–145)
WBC # BLD: 19.6 K/UL — HIGH (ref 3.8–10.5)
WBC # FLD AUTO: 19.6 K/UL — HIGH (ref 3.8–10.5)

## 2019-07-25 PROCEDURE — 99232 SBSQ HOSP IP/OBS MODERATE 35: CPT

## 2019-07-25 PROCEDURE — 76937 US GUIDE VASCULAR ACCESS: CPT | Mod: 26

## 2019-07-25 PROCEDURE — 71045 X-RAY EXAM CHEST 1 VIEW: CPT | Mod: 26,77

## 2019-07-25 PROCEDURE — 71045 X-RAY EXAM CHEST 1 VIEW: CPT | Mod: 26

## 2019-07-25 PROCEDURE — 99291 CRITICAL CARE FIRST HOUR: CPT

## 2019-07-25 PROCEDURE — 36800 INSERTION OF CANNULA: CPT | Mod: 78

## 2019-07-25 PROCEDURE — 76700 US EXAM ABDOM COMPLETE: CPT | Mod: 26

## 2019-07-25 RX ORDER — LIDOCAINE 4 G/100G
1 CREAM TOPICAL EVERY 8 HOURS
Refills: 0 | Status: DISCONTINUED | OUTPATIENT
Start: 2019-07-25 | End: 2019-07-30

## 2019-07-25 RX ORDER — CHLORHEXIDINE GLUCONATE 213 G/1000ML
1 SOLUTION TOPICAL
Refills: 0 | Status: DISCONTINUED | OUTPATIENT
Start: 2019-07-25 | End: 2019-07-30

## 2019-07-25 RX ORDER — HYDROMORPHONE HYDROCHLORIDE 2 MG/ML
0.5 INJECTION INTRAMUSCULAR; INTRAVENOUS; SUBCUTANEOUS ONCE
Refills: 0 | Status: DISCONTINUED | OUTPATIENT
Start: 2019-07-25 | End: 2019-07-25

## 2019-07-25 RX ADMIN — Medication 50 MICROGRAM(S): at 05:57

## 2019-07-25 RX ADMIN — Medication 100 MILLIGRAM(S): at 05:57

## 2019-07-25 RX ADMIN — MIDODRINE HYDROCHLORIDE 10 MILLIGRAM(S): 2.5 TABLET ORAL at 05:57

## 2019-07-25 RX ADMIN — PANTOPRAZOLE SODIUM 40 MILLIGRAM(S): 20 TABLET, DELAYED RELEASE ORAL at 18:45

## 2019-07-25 RX ADMIN — HYDROMORPHONE HYDROCHLORIDE 0.5 MILLIGRAM(S): 2 INJECTION INTRAMUSCULAR; INTRAVENOUS; SUBCUTANEOUS at 12:23

## 2019-07-25 RX ADMIN — NYSTATIN CREAM 1 APPLICATION(S): 100000 CREAM TOPICAL at 18:45

## 2019-07-25 RX ADMIN — Medication 500 MILLIGRAM(S): at 18:47

## 2019-07-25 RX ADMIN — Medication 100 MILLIGRAM(S): at 21:06

## 2019-07-25 RX ADMIN — VASOPRESSIN 3 UNIT(S)/MIN: 20 INJECTION INTRAVENOUS at 00:24

## 2019-07-25 RX ADMIN — CHLORHEXIDINE GLUCONATE 1 APPLICATION(S): 213 SOLUTION TOPICAL at 05:56

## 2019-07-25 RX ADMIN — Medication 100 MILLIGRAM(S): at 13:00

## 2019-07-25 RX ADMIN — HYDROMORPHONE HYDROCHLORIDE 0.5 MILLIGRAM(S): 2 INJECTION INTRAMUSCULAR; INTRAVENOUS; SUBCUTANEOUS at 12:08

## 2019-07-25 RX ADMIN — Medication 500 MILLIGRAM(S): at 00:24

## 2019-07-25 RX ADMIN — Medication 5.52 MICROGRAM(S)/KG/MIN: at 05:56

## 2019-07-25 RX ADMIN — NYSTATIN CREAM 1 APPLICATION(S): 100000 CREAM TOPICAL at 05:58

## 2019-07-25 RX ADMIN — ZINC OXIDE 1 APPLICATION(S): 200 OINTMENT TOPICAL at 18:47

## 2019-07-25 RX ADMIN — MIDODRINE HYDROCHLORIDE 10 MILLIGRAM(S): 2.5 TABLET ORAL at 21:06

## 2019-07-25 RX ADMIN — LIDOCAINE 1 APPLICATION(S): 4 CREAM TOPICAL at 23:57

## 2019-07-25 RX ADMIN — Medication 500 MILLIGRAM(S): at 13:00

## 2019-07-25 RX ADMIN — PANTOPRAZOLE SODIUM 40 MILLIGRAM(S): 20 TABLET, DELAYED RELEASE ORAL at 05:57

## 2019-07-25 RX ADMIN — ARGATROBAN 1.77 MICROGRAM(S)/KG/MIN: 50 INJECTION, SOLUTION INTRAVENOUS at 05:55

## 2019-07-25 RX ADMIN — ATORVASTATIN CALCIUM 80 MILLIGRAM(S): 80 TABLET, FILM COATED ORAL at 21:06

## 2019-07-25 RX ADMIN — Medication 500 MILLIGRAM(S): at 05:57

## 2019-07-25 RX ADMIN — Medication 81 MILLIGRAM(S): at 13:00

## 2019-07-25 RX ADMIN — ZINC OXIDE 1 APPLICATION(S): 200 OINTMENT TOPICAL at 05:59

## 2019-07-25 RX ADMIN — MIDODRINE HYDROCHLORIDE 10 MILLIGRAM(S): 2.5 TABLET ORAL at 13:00

## 2019-07-25 NOTE — PROGRESS NOTE ADULT - SUBJECTIVE AND OBJECTIVE BOX
NEPHROLOGY[facility] [phone]        Patient seen and examined in bed.  He is pleasantly confused  Abd seems more distended   On  gtt at present       ros-unable         MEDICATIONS  (STANDING):  argatroban Infusion 0.4 MICROgram(s)/kG/Min (1.766 mL/Hr) IV Continuous <Continuous>  aspirin  chewable 81 milliGRAM(s) Oral daily  atorvastatin 80 milliGRAM(s) Oral at bedtime  chlorhexidine 4% Liquid 1 Application(s) Topical <User Schedule>  dextrose 50% Injectable 50 milliLiter(s) IV Push every 15 minutes  dextrose 50% Injectable 25 milliLiter(s) IV Push every 15 minutes  dextrose 50% Injectable 50 milliLiter(s) IV Push every 15 minutes  dextrose 50% Injectable 25 milliLiter(s) IV Push every 15 minutes  DOBUTamine Infusion 2.5 MICROgram(s)/kG/Min (5.52 mL/Hr) IV Continuous <Continuous>  insulin lispro (HumaLOG) corrective regimen sliding scale   SubCutaneous every 6 hours  levothyroxine 50 MICROGram(s) Oral daily  metroNIDAZOLE  IVPB 500 milliGRAM(s) IV Intermittent every 8 hours  midodrine 10 milliGRAM(s) Oral every 8 hours  norepinephrine Infusion 0.015 MICROgram(s)/kG/Min (2.07 mL/Hr) IV Continuous <Continuous>  nystatin Powder 1 Application(s) Topical two times a day  pantoprazole  Injectable 40 milliGRAM(s) IV Push two times a day  sodium chloride 0.9%. 1000 milliLiter(s) (10 mL/Hr) IV Continuous <Continuous>  vancomycin    Solution 500 milliGRAM(s) Oral every 6 hours  vasopressin Infusion 0.05 Unit(s)/Min (3 mL/Hr) IV Continuous <Continuous>  zinc oxide 40% Ointment 1 Application(s) Topical two times a day      VITAL:  T(C): , Max: 36.2 (19 @ 20:00)  T(F): , Max: 97.2 (19 @ 04:00)  HR: 81 (19 @ 07:00)  BP: --  BP(mean): --  RR: 28 (19 @ 07:00)  SpO2: 100% (19 @ 07:00)  Wt(kg): --    I and O's:     @ 07:  -   @ 07:00  --------------------------------------------------------  IN: 1034.4 mL / OUT: 400 mL / NET: 634.4 mL          PHYSICAL EXAM:    Constitutional: NAD  Neck:  No JVD  Respiratory: poor effort  Cardiovascular: S1 and S2  Gastrointestinal: BS+, soft, distended abd   Extremities: No peripheral edema  Neurological:   no focal deficits  Psychiatric: unable   : No Chávez  Skin: No rashes  Access: Not applicable    LABS:                        8.5    19.6  )-----------( 87       ( 2019 00:58 )             24.9         135  |  99  |  83<H>  ----------------------------<  148<H>  3.6   |  16<L>  |  5.52<H>    Ca    8.6      2019 00:58  Phos  3.4       Mg     2.5         TPro  6.5  /  Alb  2.9<L>  /  TBili  2.6<H>  /  DBili  x   /  AST  182<H>  /  ALT  99<H>  /  AlkPhos  160<H>            Urine Studies:          RADIOLOGY & ADDITIONAL STUDIES:            < from: Xray Chest 1 View- PORTABLE-Urgent (19 @ 09:37) >    EXAM:  XR CHEST PORTABLE URGENT 1V                            PROCEDURE DATE:  2019            INTERPRETATION:  A single chest x-ray was obtained on 2019.    Indication: Rule out pneumothorax.    Impression:    The heart is enlarged.Mild pulmonary vascular congestion. No   pneumothorax is seen on the current study. All life supporting devices   are in good position and unchanged when compared to previous study done   the same date at 3:07 AM. Status post sternotomy.                    RITA ORTEGA M.D., ATTENDING RADIOLOGIST  This document has been electronically signed. 2019  9:50AM                < end of copied text >

## 2019-07-25 NOTE — PROGRESS NOTE ADULT - ASSESSMENT
Assessment  DMT2: 73y Male with newly diagnosed DM T2 with hyperglycemia, on low dose insulin, blood sugars in acceptable range, no hypoglycemic episode, started eating partial meals.  CAD: s/P CABG, on medications, stable, monitored.  HTN: Controlled,  on antihypertensive medications.      Tammie Magdaleno MD  Cell: [phone]  Office: [phone]

## 2019-07-25 NOTE — OCCUPATIONAL THERAPY INITIAL EVALUATION ADULT - IMPAIRMENTS CONTRIBUTING IMPAIRED BED MOBILITY, REHAB EVAL
decreased flexibility/decreased strength/cognition/impaired coordination/impaired postural control/impaired balance

## 2019-07-25 NOTE — CHART NOTE - NSCHARTNOTEFT_GEN_A_CORE
Nutrition Follow Up Note  Patient seen for: Malnutrition follow up     Source: RN, Medical record, CTU team.     Chart reviewed, events noted: 73 year old male with no PMHx and poor medical follow up. Admitted to [facility] for SOB and LE edema. Newly Dx HF, and RALPH vs CKD and PVD. S/p cardiac cath found with multi vessel disease. Transferred to [facility] and is now s/p CABG x3. Required CVVHD, however held. Plan for HD today. Pt with mental status changes, s/p CT head negative. C-Diff +. S/p swallow evaluation on , recommends NPO. Pt restarted on EN today due to lethargy.     Diet: NPO with EN via NGT  Nepro @ 15ml/hr and increase by 10ml/hr q46hrs as tolerated by Pt to goal rate of 45ml/bsn45xbs. Goal rate would provides 1944kcals and 87gm protein (28.9kcals/kg and 1.3gm protein/kg based on IBW: 67.2kg)  EN provision:   : initated  : 98%  : 58%  : Feeds are reinitiating today     Patient reports: Pt seen, lethargic. Per NP, plan to restart EN in the setting of lethargy. Will follow up       PO intake : 0%    GI; Pt c-diff +. Has a rectal tube with out put of 100ml thus far today and 300ml total yesterday.     Daily Weight in k.9 (), Weight in k.5 (), Weight in k.8 (), Weight in k.1 (), Weight in k.1 (), Weight in k.1 (), Weight in k.9 ()  % Weight Change    Pertinent Medications: MEDICATIONS  (STANDING):  argatroban Infusion 0.4 MICROgram(s)/kG/Min (1.766 mL/Hr) IV Continuous <Continuous>  aspirin  chewable 81 milliGRAM(s) Oral daily  atorvastatin 80 milliGRAM(s) Oral at bedtime  chlorhexidine 2% Cloths 1 Application(s) Topical <User Schedule>  chlorhexidine 4% Liquid 1 Application(s) Topical <User Schedule>  dextrose 50% Injectable 50 milliLiter(s) IV Push every 15 minutes  dextrose 50% Injectable 25 milliLiter(s) IV Push every 15 minutes  dextrose 50% Injectable 50 milliLiter(s) IV Push every 15 minutes  dextrose 50% Injectable 25 milliLiter(s) IV Push every 15 minutes  DOBUTamine Infusion 2.5 MICROgram(s)/kG/Min (5.52 mL/Hr) IV Continuous <Continuous>  insulin lispro (HumaLOG) corrective regimen sliding scale   SubCutaneous every 6 hours  levothyroxine 50 MICROGram(s) Oral daily  metroNIDAZOLE  IVPB 500 milliGRAM(s) IV Intermittent every 8 hours  midodrine 10 milliGRAM(s) Oral every 8 hours  norepinephrine Infusion 0.015 MICROgram(s)/kG/Min (2.07 mL/Hr) IV Continuous <Continuous>  nystatin Powder 1 Application(s) Topical two times a day  pantoprazole  Injectable 40 milliGRAM(s) IV Push two times a day  sodium chloride 0.9%. 1000 milliLiter(s) (10 mL/Hr) IV Continuous <Continuous>  vancomycin    Solution 500 milliGRAM(s) Oral every 6 hours  vasopressin Infusion 0.05 Unit(s)/Min (3 mL/Hr) IV Continuous <Continuous>  zinc oxide 40% Ointment 1 Application(s) Topical two times a day    MEDICATIONS  (PRN):  sodium chloride 0.9% lock flush 10 milliLiter(s) IV Push every 1 hour PRN Pre/post blood products, medications, blood draw, and to maintain line patency    Pertinent Labs:  @ 00:58: Na 135, BUN 83<H>, Cr 5.52<H>, <H>, K+ 3.6, Phos 3.4, Mg 2.5, Alk Phos 160<H>, ALT/SGPT 99<H>, AST/SGOT 182<H>, HbA1c --    Finger Sticks:  POCT Blood Glucose.: 155 mg/dL ( @ 17:44)      Skin per nursing documentation: intact  Edema: +1 generalized and +2 right hand, wrist and arm edema     Estimated Needs:   [X ] no change since previous assessment  [ ] recalculated:     Previous Nutrition Diagnosis: Increased nutrient needs and moderate malnutrition   Nutrition Diagnosis is: on going, plan to address with EN      Interventions:     Recommend  1. Per discussion with team. plan to initiate Nepro @ 15ml/hr and increase by 10ml/hr q46hrs as tolerated by Pt to goal rate of 45ml/mwc05qpq. Goal rate would provides 1944kcals and 87gm protein (28.9kcals/kg and 1.3gm protein/kg based on IBW: 67.2kg)   2. Continue Nasir active x2 daily   3. Trend GI tolerance   5. Trend BG levels, renal indices. LFT's and electrolytes       Monitoring and Evaluation:     Continue to monitor Nutritional intake, Tolerance to diet prescription, weights, labs, skin integrity    RD remains available upon request and will follow up per protocol  Sarah Siegler RD, RDN, [facility] Pager #[phone]

## 2019-07-25 NOTE — PROGRESS NOTE ADULT - SUBJECTIVE AND OBJECTIVE BOX
Chief complaint  Patient is a 73y old  Male who presents with a chief complaint of PVD, S/p CABG (24 Jul 2019 12:18)   Review of systems  Patient in bed, looks comfortable, no fever,  no hypoglycemia.    Labs and Fingersticks  CAPILLARY BLOOD GLUCOSE      POCT Blood Glucose.: 155 mg/dL (24 Jul 2019 17:44)  POCT Blood Glucose.: 162 mg/dL (24 Jul 2019 11:30)      Anion Gap, Serum: 20 <H> (07-25 @ 00:58)  Anion Gap, Serum: 19 <H> (07-24 @ 04:52)      Calcium, Total Serum: 8.6 (07-25 @ 00:58)  Calcium, Total Serum: 8.6 (07-24 @ 04:52)  Albumin, Serum: 2.9 <L> (07-25 @ 00:58)  Albumin, Serum: 3.1 <L> (07-24 @ 04:52)    Alanine Aminotransferase (ALT/SGPT): 99 <H> (07-25 @ 00:58)  Alanine Aminotransferase (ALT/SGPT): 123 <H> (07-24 @ 04:52)  Alkaline Phosphatase, Serum: 160 <H> (07-25 @ 00:58)  Alkaline Phosphatase, Serum: 166 <H> (07-24 @ 04:52)  Aspartate Aminotransferase (AST/SGOT): 182 <H> (07-25 @ 00:58)  Aspartate Aminotransferase (AST/SGOT): 217 <H> (07-24 @ 04:52)        07-25    135  |  99  |  83<H>  ----------------------------<  148<H>  3.6   |  16<L>  |  5.52<H>    Ca    8.6      25 Jul 2019 00:58  Phos  3.4     07-25  Mg     2.5     07-25    TPro  6.5  /  Alb  2.9<L>  /  TBili  2.6<H>  /  DBili  x   /  AST  182<H>  /  ALT  99<H>  /  AlkPhos  160<H>  07-25                        8.5    19.6  )-----------( 87       ( 25 Jul 2019 00:58 )             24.9     Medications  MEDICATIONS  (STANDING):  argatroban Infusion 0.4 MICROgram(s)/kG/Min (1.766 mL/Hr) IV Continuous <Continuous>  aspirin  chewable 81 milliGRAM(s) Oral daily  atorvastatin 80 milliGRAM(s) Oral at bedtime  chlorhexidine 4% Liquid 1 Application(s) Topical <User Schedule>  dextrose 50% Injectable 50 milliLiter(s) IV Push every 15 minutes  dextrose 50% Injectable 25 milliLiter(s) IV Push every 15 minutes  dextrose 50% Injectable 50 milliLiter(s) IV Push every 15 minutes  dextrose 50% Injectable 25 milliLiter(s) IV Push every 15 minutes  DOBUTamine Infusion 2.5 MICROgram(s)/kG/Min (5.52 mL/Hr) IV Continuous <Continuous>  insulin lispro (HumaLOG) corrective regimen sliding scale   SubCutaneous every 6 hours  levothyroxine 50 MICROGram(s) Oral daily  metroNIDAZOLE  IVPB 500 milliGRAM(s) IV Intermittent every 8 hours  midodrine 10 milliGRAM(s) Oral every 8 hours  norepinephrine Infusion 0.015 MICROgram(s)/kG/Min (2.07 mL/Hr) IV Continuous <Continuous>  nystatin Powder 1 Application(s) Topical two times a day  pantoprazole  Injectable 40 milliGRAM(s) IV Push two times a day  sodium chloride 0.9%. 1000 milliLiter(s) (10 mL/Hr) IV Continuous <Continuous>  vancomycin    Solution 500 milliGRAM(s) Oral every 6 hours  vasopressin Infusion 0.05 Unit(s)/Min (3 mL/Hr) IV Continuous <Continuous>  zinc oxide 40% Ointment 1 Application(s) Topical two times a day      Physical Exam  General: Patient comfortable in bed  Vital Signs Last 12 Hrs  T(F): 96.9 (07-25-19 @ 08:00), Max: 97.2 (07-25-19 @ 04:00)  HR: 81 (07-25-19 @ 09:45) (66 - 82)  BP: --  BP(mean): --  RR: 50 (07-25-19 @ 09:45) (17 - 77)  SpO2: 77% (07-25-19 @ 09:45) (69% - 100%)  Neck: No palpable thyroid nodules.  CVS: S1S2, No murmurs  Respiratory: No wheezing, no crepitations  GI: Abdomen soft, bowel sounds positive  Musculoskeletal:  edema lower extremities.   Skin: No skin rashes, no ecchymosis    Diagnostics    Free Thyroxine, Serum: AM Sched. Collection: 17-Jul-2019 06:00 (07-16 @ 06:29)  Thyroid Stimulating Hormone, Serum: AM Sched. Collection: 17-Jul-2019 06:00 (07-16 @ 06:29)

## 2019-07-25 NOTE — OCCUPATIONAL THERAPY INITIAL EVALUATION ADULT - STRENGTHENING, PT EVAL
GOAL: Pt will increase BUE/BLE strength to 4/5 to increase participation in functional tasks in 4 weeks

## 2019-07-25 NOTE — OCCUPATIONAL THERAPY INITIAL EVALUATION ADULT - COGNITIVE, VISUAL PERCEPTUAL, OT EVAL
GOAL: Pt will follow 100% of single step commands/50% of multistep commands to increase participation in functional tasks in 4 weeks

## 2019-07-25 NOTE — PROGRESS NOTE ADULT - SUBJECTIVE AND OBJECTIVE BOX
SYLVIA ROSA  MRN#:  [id number]    The patient is a 73y Male without prior medical care who presented with SOB and LE edema, found to have newly diagnosed HFrEF (EF 30%) and RALPH vs CKD, also PVD and claudication with extensive LE arterial disease on doppler, reports heavy alcohol use, further work up revealed multivessel CAD, now s/p C3L today, severe biventricular dysfunction on echocardiogram with some improvement after bypass was seen, evaluated, & examined with the CTICU staff on rounds and later in the evening with a multidisciplinary care plan formulated & implemented.  All available clinical, laboratory, radiographic, pharmacologic, and electrocardiographic data were reviewed & analyzed.      The patient was in the CTICU in critical condition secondary to persistent cardiopulmonary dysfunction, probable sepsis from C difficile, persistent cardiovascular dysfunction, and acute on chromic postoperative renal failure, & hyperglycemia.      Respiratory status required supplemental oxygen, the following of ABG’s with A-line monitoring, and continuous pulse oximetry monitoring for support & to evaluate for & prevent further decompensation secondary to persistent cardiopulmonary dysfunction.     Invasive hemodynamic monitoring with a central venous catheter & an A-line were required for the continuous central venous and MAP/BP monitoring to ensure adequate cardiovascular support and to evaluate for & help prevent decompensation while receiving an IV Vasopressin drip, an IV Dobutamine drip, and an IV Argatroban drip secondary to septic shock from C diff colitis, cardiovascular dysfunction, A Fib, ARF, and combined systolic and diastolic biventricular failure.    IV Flagy in additon to entral Vancomycin for progressive C diff sentic shock    Patient required acute postoperative critical care management and I provided 30 minutes of non-continuous care to the patient. I reviewed and assessed all available clinical, laboratory, radiographic, pharmacologic, and electrocardiographic data in order to decide on maintenance or adjustment of medications, and fluid management.  Discussed at length with the CTICU staff and helped coordinate care.

## 2019-07-25 NOTE — PROGRESS NOTE ADULT - SUBJECTIVE AND OBJECTIVE BOX
Patient is a 73y old  Male who presents with a chief complaint of PVD, S/p CABG (24 Jul 2019 12:18)    Being followed by ID for        Interval history:  pt more lethargic today  about to get dialysis   still with rectal tube  No other acute events        PAST MEDICAL & SURGICAL HISTORY:  Arterial disease: peripheral  TR (tricuspid regurgitation)  MR (mitral regurgitation)  RALPH (acute kidney injury)  HFrEF (heart failure with reduced ejection fraction)  No significant past surgical history    Allergies    No Known Allergies    Intolerances      Antimicrobials:    metroNIDAZOLE  IVPB 500 milliGRAM(s) IV Intermittent every 8 hours  vancomycin    Solution 500 milliGRAM(s) Oral every 6 hours    MEDICATIONS  (STANDING):  argatroban Infusion 0.4 MICROgram(s)/kG/Min (1.766 mL/Hr) IV Continuous <Continuous>  aspirin  chewable 81 milliGRAM(s) Oral daily  atorvastatin 80 milliGRAM(s) Oral at bedtime  chlorhexidine 2% Cloths 1 Application(s) Topical <User Schedule>  chlorhexidine 4% Liquid 1 Application(s) Topical <User Schedule>  dextrose 50% Injectable 50 milliLiter(s) IV Push every 15 minutes  dextrose 50% Injectable 25 milliLiter(s) IV Push every 15 minutes  dextrose 50% Injectable 50 milliLiter(s) IV Push every 15 minutes  dextrose 50% Injectable 25 milliLiter(s) IV Push every 15 minutes  DOBUTamine Infusion 2.5 MICROgram(s)/kG/Min (5.52 mL/Hr) IV Continuous <Continuous>  insulin lispro (HumaLOG) corrective regimen sliding scale   SubCutaneous every 6 hours  levothyroxine 50 MICROGram(s) Oral daily  metroNIDAZOLE  IVPB 500 milliGRAM(s) IV Intermittent every 8 hours  midodrine 10 milliGRAM(s) Oral every 8 hours  norepinephrine Infusion 0.015 MICROgram(s)/kG/Min (2.07 mL/Hr) IV Continuous <Continuous>  nystatin Powder 1 Application(s) Topical two times a day  pantoprazole  Injectable 40 milliGRAM(s) IV Push two times a day  sodium chloride 0.9%. 1000 milliLiter(s) (10 mL/Hr) IV Continuous <Continuous>  vancomycin    Solution 500 milliGRAM(s) Oral every 6 hours  vasopressin Infusion 0.05 Unit(s)/Min (3 mL/Hr) IV Continuous <Continuous>  zinc oxide 40% Ointment 1 Application(s) Topical two times a day      Vital Signs Last 24 Hrs  T(C): 36.3 (07-25-19 @ 12:00), Max: 36.3 (07-25-19 @ 12:00)  T(F): 97.4 (07-25-19 @ 12:00), Max: 97.4 (07-25-19 @ 12:00)  HR: 80 (07-25-19 @ 15:00) (62 - 83)  BP: --  BP(mean): --  RR: 53 (07-25-19 @ 15:00) (17 - 77)  SpO2: 98% (07-25-19 @ 15:00) (69% - 100%)    Physical Exam:    Constitutional lethargic    HEENT PERRLA EOMI,No pallor or icterus    No oral exudate or erythema    Neck supple no JVD or LN    Chest Good AE,CTA    CVS RRR S1 S2 WNl     Abd disended nontender    Ext No cyanosis clubbing or edema    IV site no erythema tenderness or discharge    Joints no swelling or LOM    CNS AAO X 3 no focal    Lab Data:                          8.5    19.6  )-----------( 87       ( 25 Jul 2019 00:58 )             24.9       07-25    135  |  99  |  83<H>  ----------------------------<  148<H>  3.6   |  16<L>  |  5.52<H>    Ca    8.6      25 Jul 2019 00:58  Phos  3.4     07-25  Mg     2.5     07-25    TPro  6.5  /  Alb  2.9<L>  /  TBili  2.6<H>  /  DBili  x   /  AST  182<H>  /  ALT  99<H>  /  AlkPhos  160<H>  07-25        .Urine  07-22-19   No growth  --  --      .Blood  07-22-19   No growth to date.  --  --      .Blood  07-21-19   No growth to date.  --  --      .Blood  07-21-19   No growth to date.  --  --                    WBC Count: 19.6 (07-25-19 @ 00:58)  WBC Count: 22.0 (07-24-19 @ 04:52)  WBC Count: 20.7 (07-23-19 @ 02:12)  WBC Count: 22.0 (07-22-19 @ 02:23)  WBC Count: 17.7 (07-21-19 @ 02:45)  WBC Count: 13.8 (07-20-19 @ 01:45)  WBC Count: 12.9 (07-19-19 @ 17:53)  WBC Count: 12.6 (07-19-19 @ 01:32)

## 2019-07-25 NOTE — OCCUPATIONAL THERAPY INITIAL EVALUATION ADULT - ADDITIONAL COMMENTS
CABG, 1 arterial and 2 venous 13-Jul-2019. CT Head (7/22): Mild chronic microvascular ischemic disease, as described above. Chronic lacunar infarction in the right basal ganglia. DVT (7/11): There is moderate stenosis of the left common femoral artery with occlusion of the left superficial femoral artery with reconstitution of the distal left superficial femoral artery.

## 2019-07-25 NOTE — OCCUPATIONAL THERAPY INITIAL EVALUATION ADULT - LIVES WITH, PROFILE
Pt lives with a friend in a pvt home, a flight [place]EL and no steps inside. Pt I in all ADLs and functional mobility prior.

## 2019-07-25 NOTE — OCCUPATIONAL THERAPY INITIAL EVALUATION ADULT - PERTINENT HX OF CURRENT PROBLEM, REHAB EVAL
73M shortness of breath and LE edema found to have newly diagnosed HFrEF (EF 30%) and RALPH vs CKD, also PVD and claudication with extensive LE arterial disease on doppler. No beta blockade 2/2 low blood pressures. Per [facility] discharge note, pt will need  vascular intervention d/t extensive lower extremity vascular disease. Pt transferred to [facility] today for left heart cath to rule out ischemia.

## 2019-07-25 NOTE — OCCUPATIONAL THERAPY INITIAL EVALUATION ADULT - IMPAIRED TRANSFERS: BED/CHAIR, REHAB EVAL
impaired coordination/impaired balance/cognition/decreased strength/impaired postural control/decreased flexibility

## 2019-07-25 NOTE — OCCUPATIONAL THERAPY INITIAL EVALUATION ADULT - PLANNED THERAPY INTERVENTIONS, OT EVAL
transfer training/cognitive, visual perceptual/ROM/ADL retraining/bed mobility training/strengthening/balance training/motor coordination training

## 2019-07-25 NOTE — OCCUPATIONAL THERAPY INITIAL EVALUATION ADULT - DIAGNOSIS, OT EVAL
Pt p/w decreased ADLs and functional mobility due to decreased strength, balance, coordination, cognition, and endurance.

## 2019-07-25 NOTE — OCCUPATIONAL THERAPY INITIAL EVALUATION ADULT - BALANCE TRAINING, PT EVAL
GOAL: Pt will increase dynamic sitting balance to good (-) to increase participation in ADLs in 4 weeks

## 2019-07-25 NOTE — PROGRESS NOTE ADULT - ASSESSMENT
73 year old Black male h/o no implantable devices who prior to his admission to [facility] had not been followed by a doctor regularly (on no meds at home), presented to [facility] on 7/1/19 with shortness of breath and LE edema found to have newly diagnosed HFrEF (EF 30%) and RALPH vs CKD, also PVD and claudication with extensive LE arterial disease on doppler. 7/2/19 abd US: small amount of perihepatic ascited. 7/2/19 TTE: EF 30%, mild LVH, multiple regional WMA's, mod MR/TR. 7/3/19 Nuclear ST: moderate focal apical/inferolat/aterolat ischemia. 7/9/19: h/h 12.9/39.5, platelets 180, Bun/Cr 28/1.9, eGFR 42. Pt was diuresed at [facility] treated with lasix/hydralzine/nitrates/statin/ASA. No beta blockade 2/2 low blood pressures. Per [facility] discharge note, pt will need  vascular intervention d/t extensive lower extremity vascular disease. Pt transferred to [facility] today for left heart cath to rule out ischemia. (10 Jul 2019 12:52)    7/12 pt underwent C3L , AFib.  Pt's hospital course was then been c/b  A-fib, acute on chronic renal failure with fluid overload requiring CVVH, cardiogenic shock (on Milrinone, Dobutamine, and Vasopressin gtts), and respiratory failure requiring intubation (extubated on 7/16). Patient's CBC was WNL on day of presentation to [facility]. His platelet count has been steadily downtrending since 7/12.     Pt now with elevated WBC, and diarrhea  stool is indeterminate for C diff, PCR is pending  I am unsure it patient received abs at [facility]. He only received perioperative abs here until the 21st  pt started on empiric treatment for c diff    pt with low plts, heme is following  now on agatobran  plts low but  stable, on argatoban, Hep AB positve    pt with elevated LFTs, low EF- some is congestion  also on statin, consider holding  Lfts are slightly improving  would check hepatitis screen ( so far negative) and CMV      flagyl added to po vanco for c diff

## 2019-07-25 NOTE — PROGRESS NOTE ADULT - ASSESSMENT
The patient is a 73y Male with CAD, HFrEF and PVD s/p CABGx3 followed by AFib, resp failure and acute on chronic kidney injury.    - CKD:  Suspect underlying CKD stage 3  - RALPH: anuric.   Now on dialysis.  - Hypotension on vaso gtt/Levo gtt and midodrine 10 tid   -PVD  -Confusion   -Diarrhea  -Distended abd     RECOMMENDATIONS  -  HD this am.Will aim to remove 1.5 kg  - Abd sono please.  He seemed to have a fluid shift this am.  Assess contour of the liver    - please dose any new medications for a CrCl of 10-15 ml/min   -Avoid hypotension given the PVD.  Vasc eval noted   -Agatroban gtt at present.  Plt are stable   -1:1 at present   -C diff positive at present .  Cont abx

## 2019-07-26 LAB
ALBUMIN SERPL ELPH-MCNC: 3.1 G/DL — LOW (ref 3.3–5)
ALP SERPL-CCNC: 173 U/L — HIGH (ref 40–120)
ALT FLD-CCNC: 88 U/L — HIGH (ref 10–45)
ANION GAP SERPL CALC-SCNC: 20 MMOL/L — HIGH (ref 5–17)
APTT BLD: 73.5 SEC — HIGH (ref 27.5–36.3)
APTT BLD: 78.6 SEC — HIGH (ref 27.5–36.3)
AST SERPL-CCNC: 184 U/L — HIGH (ref 10–40)
BILIRUB SERPL-MCNC: 3.3 MG/DL — HIGH (ref 0.2–1.2)
BUN SERPL-MCNC: 56 MG/DL — HIGH (ref 7–23)
CALCIUM SERPL-MCNC: 8.6 MG/DL — SIGNIFICANT CHANGE UP (ref 8.4–10.5)
CHLORIDE SERPL-SCNC: 102 MMOL/L — SIGNIFICANT CHANGE UP (ref 96–108)
CO2 SERPL-SCNC: 19 MMOL/L — LOW (ref 22–31)
CREAT SERPL-MCNC: 4.24 MG/DL — HIGH (ref 0.5–1.3)
CULTURE RESULTS: SIGNIFICANT CHANGE UP
CULTURE RESULTS: SIGNIFICANT CHANGE UP
GAS PNL BLDA: SIGNIFICANT CHANGE UP
GLUCOSE BLDC GLUCOMTR-MCNC: 124 MG/DL — HIGH (ref 70–99)
GLUCOSE SERPL-MCNC: 138 MG/DL — HIGH (ref 70–99)
HCT VFR BLD CALC: 26.7 % — LOW (ref 39–50)
HGB BLD-MCNC: 9.4 G/DL — LOW (ref 13–17)
INR BLD: 3.11 RATIO — HIGH (ref 0.88–1.16)
MAGNESIUM SERPL-MCNC: 2.4 MG/DL — SIGNIFICANT CHANGE UP (ref 1.6–2.6)
MCHC RBC-ENTMCNC: 32.3 PG — SIGNIFICANT CHANGE UP (ref 27–34)
MCHC RBC-ENTMCNC: 35.3 GM/DL — SIGNIFICANT CHANGE UP (ref 32–36)
MCV RBC AUTO: 91.5 FL — SIGNIFICANT CHANGE UP (ref 80–100)
PHOSPHATE SERPL-MCNC: 3.4 MG/DL — SIGNIFICANT CHANGE UP (ref 2.5–4.5)
PLATELET # BLD AUTO: 91 K/UL — LOW (ref 150–400)
POTASSIUM SERPL-MCNC: 3.8 MMOL/L — SIGNIFICANT CHANGE UP (ref 3.5–5.3)
POTASSIUM SERPL-SCNC: 3.8 MMOL/L — SIGNIFICANT CHANGE UP (ref 3.5–5.3)
PROT SERPL-MCNC: 7.3 G/DL — SIGNIFICANT CHANGE UP (ref 6–8.3)
PROTHROM AB SERPL-ACNC: 36.7 SEC — HIGH (ref 10–12.9)
RBC # BLD: 2.91 M/UL — LOW (ref 4.2–5.8)
RBC # FLD: 14.6 % — HIGH (ref 10.3–14.5)
SODIUM SERPL-SCNC: 141 MMOL/L — SIGNIFICANT CHANGE UP (ref 135–145)
SPECIMEN SOURCE: SIGNIFICANT CHANGE UP
SPECIMEN SOURCE: SIGNIFICANT CHANGE UP
SRA INTERP SER-IMP: SIGNIFICANT CHANGE UP
WBC # BLD: 16.7 K/UL — HIGH (ref 3.8–10.5)
WBC # FLD AUTO: 16.7 K/UL — HIGH (ref 3.8–10.5)

## 2019-07-26 PROCEDURE — 71045 X-RAY EXAM CHEST 1 VIEW: CPT | Mod: 26

## 2019-07-26 PROCEDURE — 99232 SBSQ HOSP IP/OBS MODERATE 35: CPT

## 2019-07-26 PROCEDURE — 99291 CRITICAL CARE FIRST HOUR: CPT

## 2019-07-26 PROCEDURE — 71045 X-RAY EXAM CHEST 1 VIEW: CPT | Mod: 26,77

## 2019-07-26 PROCEDURE — 74018 RADEX ABDOMEN 1 VIEW: CPT | Mod: 26

## 2019-07-26 RX ORDER — LEVOTHYROXINE SODIUM 125 MCG
25 TABLET ORAL AT BEDTIME
Refills: 0 | Status: DISCONTINUED | OUTPATIENT
Start: 2019-07-26 | End: 2019-08-03

## 2019-07-26 RX ADMIN — ZINC OXIDE 1 APPLICATION(S): 200 OINTMENT TOPICAL at 17:13

## 2019-07-26 RX ADMIN — Medication 50 MICROGRAM(S): at 05:27

## 2019-07-26 RX ADMIN — MIDODRINE HYDROCHLORIDE 10 MILLIGRAM(S): 2.5 TABLET ORAL at 05:31

## 2019-07-26 RX ADMIN — Medication 100 MILLIGRAM(S): at 21:22

## 2019-07-26 RX ADMIN — Medication 500 MILLIGRAM(S): at 13:08

## 2019-07-26 RX ADMIN — CHLORHEXIDINE GLUCONATE 1 APPLICATION(S): 213 SOLUTION TOPICAL at 04:26

## 2019-07-26 RX ADMIN — LIDOCAINE 1 APPLICATION(S): 4 CREAM TOPICAL at 05:29

## 2019-07-26 RX ADMIN — Medication 500 MILLIGRAM(S): at 17:07

## 2019-07-26 RX ADMIN — Medication 100 MILLIGRAM(S): at 05:29

## 2019-07-26 RX ADMIN — ATORVASTATIN CALCIUM 80 MILLIGRAM(S): 80 TABLET, FILM COATED ORAL at 21:21

## 2019-07-26 RX ADMIN — Medication 100 MILLIGRAM(S): at 13:10

## 2019-07-26 RX ADMIN — Medication 2: at 18:49

## 2019-07-26 RX ADMIN — Medication 2: at 13:37

## 2019-07-26 RX ADMIN — PANTOPRAZOLE SODIUM 40 MILLIGRAM(S): 20 TABLET, DELAYED RELEASE ORAL at 05:31

## 2019-07-26 RX ADMIN — LIDOCAINE 1 APPLICATION(S): 4 CREAM TOPICAL at 21:23

## 2019-07-26 RX ADMIN — MIDODRINE HYDROCHLORIDE 10 MILLIGRAM(S): 2.5 TABLET ORAL at 13:08

## 2019-07-26 RX ADMIN — Medication 81 MILLIGRAM(S): at 13:08

## 2019-07-26 RX ADMIN — Medication 500 MILLIGRAM(S): at 00:07

## 2019-07-26 RX ADMIN — CHLORHEXIDINE GLUCONATE 1 APPLICATION(S): 213 SOLUTION TOPICAL at 04:25

## 2019-07-26 RX ADMIN — Medication 500 MILLIGRAM(S): at 05:31

## 2019-07-26 RX ADMIN — PANTOPRAZOLE SODIUM 40 MILLIGRAM(S): 20 TABLET, DELAYED RELEASE ORAL at 17:07

## 2019-07-26 RX ADMIN — MIDODRINE HYDROCHLORIDE 10 MILLIGRAM(S): 2.5 TABLET ORAL at 21:21

## 2019-07-26 RX ADMIN — ARGATROBAN 1.77 MICROGRAM(S)/KG/MIN: 50 INJECTION, SOLUTION INTRAVENOUS at 07:00

## 2019-07-26 RX ADMIN — LIDOCAINE 1 APPLICATION(S): 4 CREAM TOPICAL at 13:10

## 2019-07-26 RX ADMIN — ZINC OXIDE 1 APPLICATION(S): 200 OINTMENT TOPICAL at 05:00

## 2019-07-26 RX ADMIN — Medication 25 MICROGRAM(S): at 22:35

## 2019-07-26 RX ADMIN — VASOPRESSIN 3 UNIT(S)/MIN: 20 INJECTION INTRAVENOUS at 07:00

## 2019-07-26 RX ADMIN — Medication 5.52 MICROGRAM(S)/KG/MIN: at 07:00

## 2019-07-26 NOTE — PROGRESS NOTE ADULT - SUBJECTIVE AND OBJECTIVE BOX
Endocrinology Attending Covering for Dr. Magdaleno      Chief complaint  Patient is a 73y old  Male who presents with a chief complaint of PVD, S/p CABG (24 Jul 2019 12:18)   Review of systems  Patient in bed, looks comfortable, no fever,  had no hypoglycemia.    Labs and Fingersticks  CAPILLARY BLOOD GLUCOSE      POCT Blood Glucose.: 124 mg/dL (26 Jul 2019 05:26)      Anion Gap, Serum: 20 <H> (07-26 @ 02:04)  Anion Gap, Serum: 20 <H> (07-25 @ 00:58)      Calcium, Total Serum: 8.6 (07-26 @ 02:04)  Calcium, Total Serum: 8.6 (07-25 @ 00:58)  Albumin, Serum: 3.1 <L> (07-26 @ 02:04)  Albumin, Serum: 2.9 <L> (07-25 @ 00:58)    Alanine Aminotransferase (ALT/SGPT): 88 <H> (07-26 @ 02:04)  Alanine Aminotransferase (ALT/SGPT): 99 <H> (07-25 @ 00:58)  Alkaline Phosphatase, Serum: 173 <H> (07-26 @ 02:04)  Alkaline Phosphatase, Serum: 160 <H> (07-25 @ 00:58)  Aspartate Aminotransferase (AST/SGOT): 184 <H> (07-26 @ 02:04)  Aspartate Aminotransferase (AST/SGOT): 182 <H> (07-25 @ 00:58)        07-26    141  |  102  |  56<H>  ----------------------------<  138<H>  3.8   |  19<L>  |  4.24<H>    Ca    8.6      26 Jul 2019 02:04  Phos  3.4     07-26  Mg     2.4     07-26    TPro  7.3  /  Alb  3.1<L>  /  TBili  3.3<H>  /  DBili  x   /  AST  184<H>  /  ALT  88<H>  /  AlkPhos  173<H>  07-26                        9.4    16.7  )-----------( 91       ( 26 Jul 2019 02:04 )             26.7     Medications  MEDICATIONS  (STANDING):  argatroban Infusion 0.4 MICROgram(s)/kG/Min (1.766 mL/Hr) IV Continuous <Continuous>  aspirin  chewable 81 milliGRAM(s) Oral daily  atorvastatin 80 milliGRAM(s) Oral at bedtime  chlorhexidine 2% Cloths 1 Application(s) Topical <User Schedule>  chlorhexidine 4% Liquid 1 Application(s) Topical <User Schedule>  dextrose 50% Injectable 50 milliLiter(s) IV Push every 15 minutes  dextrose 50% Injectable 25 milliLiter(s) IV Push every 15 minutes  dextrose 50% Injectable 50 milliLiter(s) IV Push every 15 minutes  dextrose 50% Injectable 25 milliLiter(s) IV Push every 15 minutes  DOBUTamine Infusion 2.5 MICROgram(s)/kG/Min (5.52 mL/Hr) IV Continuous <Continuous>  insulin lispro (HumaLOG) corrective regimen sliding scale   SubCutaneous every 6 hours  levothyroxine Injectable 25 MICROGram(s) IV Push at bedtime  lidocaine 2% Gel 1 Application(s) Topical every 8 hours  metroNIDAZOLE  IVPB 500 milliGRAM(s) IV Intermittent every 8 hours  midodrine 10 milliGRAM(s) Oral every 8 hours  norepinephrine Infusion 0.015 MICROgram(s)/kG/Min (2.07 mL/Hr) IV Continuous <Continuous>  nystatin Powder 1 Application(s) Topical two times a day  pantoprazole  Injectable 40 milliGRAM(s) IV Push two times a day  sodium chloride 0.9%. 1000 milliLiter(s) (10 mL/Hr) IV Continuous <Continuous>  vancomycin    Solution 500 milliGRAM(s) Oral every 6 hours  vasopressin Infusion 0.05 Unit(s)/Min (3 mL/Hr) IV Continuous <Continuous>  zinc oxide 40% Ointment 1 Application(s) Topical two times a day      Physical Exam  General: Patient comfortable in bed  Vital Signs Last 12 Hrs  T(F): 97.4 (07-26-19 @ 12:00), Max: 98.2 (07-26-19 @ 04:00)  HR: 71 (07-26-19 @ 13:15) (66 - 86)  BP: --  BP(mean): --  RR: 24 (07-26-19 @ 13:15) (19 - 46)  SpO2: 100% (07-26-19 @ 13:15) (89% - 100%)  Neck: No palpable thyroid nodules.  CVS: S1S2, No murmurs  Respiratory: No wheezing, no crepitations  GI: Abdomen soft, bowel sounds positive  Musculoskeletal:  edema lower extremities.   Skin: No skin rashes, no ecchymosis    Diagnostics

## 2019-07-26 NOTE — PROGRESS NOTE ADULT - ASSESSMENT
The patient is a 73y Male with CAD, HFrEF and PVD s/p CABGx3 followed by AFib, resp failure and acute on chronic kidney injury.    - CKD:  Suspect underlying CKD stage 3  - RALPH: anuric.   Now on dialysis.  - Hypotensive on  and Vaso   -PVD  -Confusion   -Diarrhea  -Distended abd and evidence of ascites and no evidence of cirrhosis    RECOMMENDATIONS  - UF for 1.5 kg and assess breathing.   remains on 4 liters oxygen   - Bladder scan please   - please dose any new medications for a CrCl of 10-15 ml/min   -Avoid hypotension given the PVD.  Vasc eval noted   -Agatroban gtt at present.  Plt are stable   -C diff positive at present .  Cont abx.  Diarrhea amount has reduced

## 2019-07-26 NOTE — PROGRESS NOTE ADULT - ASSESSMENT
73 year old Black male h/o no implantable devices who prior to his admission to [facility] had not been followed by a doctor regularly (on no meds at home), presented to [facility] on 7/1/19 with shortness of breath and LE edema found to have newly diagnosed HFrEF (EF 30%) and RALPH vs CKD, also PVD and claudication with extensive LE arterial disease on doppler. 7/2/19 abd US: small amount of perihepatic ascited. 7/2/19 TTE: EF 30%, mild LVH, multiple regional WMA's, mod MR/TR. 7/3/19 Nuclear ST: moderate focal apical/inferolat/aterolat ischemia. 7/9/19: h/h 12.9/39.5, platelets 180, Bun/Cr 28/1.9, eGFR 42. Pt was diuresed at [facility] treated with lasix/hydralzine/nitrates/statin/ASA. No beta blockade 2/2 low blood pressures. Per [facility] discharge note, pt will need  vascular intervention d/t extensive lower extremity vascular disease. Pt transferred to [facility] today for left heart cath to rule out ischemia. (10 Jul 2019 12:52)    7/12 pt underwent C3L , AFib.  Pt's hospital course was then been c/b  A-fib, acute on chronic renal failure with fluid overload requiring CVVH, cardiogenic shock (on Milrinone, Dobutamine, and Vasopressin gtts), and respiratory failure requiring intubation (extubated on 7/16). Patient's CBC was WNL on day of presentation to [facility]. His platelet count has been steadily downtrending since 7/12.     Pt now with elevated WBC, and diarrhea  stool is indeterminate for C diff, PCR is pending  I am unsure it patient received abs at [facility]. He only received perioperative abs here until the 21st  pt started on empiric treatment for c diff    pt with low plts, heme is following  now on agatobran HIT positve      pt with elevated LFTs, low EF- some is congestion  also on statin, consider holding  LFTs slightly improved  would check hepatitis screen ( so far negative) and CMV      flagyl added to po vanco for c diff  seems to be responding      ID service will be covering over the weekend. Please call for acute issues or questions. [phone]

## 2019-07-26 NOTE — PROGRESS NOTE ADULT - SUBJECTIVE AND OBJECTIVE BOX
NEPHROLOGY-[facility] [phone]        Patient seen and examined in bed.  He is about the same.  On 4 liters of oxygen  No law   Rectal tube but output has decreased    ros-unable         MEDICATIONS  (STANDING):  argatroban Infusion 0.4 MICROgram(s)/kG/Min (1.766 mL/Hr) IV Continuous <Continuous>  aspirin  chewable 81 milliGRAM(s) Oral daily  atorvastatin 80 milliGRAM(s) Oral at bedtime  chlorhexidine 2% Cloths 1 Application(s) Topical <User Schedule>  chlorhexidine 4% Liquid 1 Application(s) Topical <User Schedule>  dextrose 50% Injectable 50 milliLiter(s) IV Push every 15 minutes  dextrose 50% Injectable 25 milliLiter(s) IV Push every 15 minutes  dextrose 50% Injectable 50 milliLiter(s) IV Push every 15 minutes  dextrose 50% Injectable 25 milliLiter(s) IV Push every 15 minutes  DOBUTamine Infusion 2.5 MICROgram(s)/kG/Min (5.52 mL/Hr) IV Continuous <Continuous>  insulin lispro (HumaLOG) corrective regimen sliding scale   SubCutaneous every 6 hours  levothyroxine 50 MICROGram(s) Oral daily  lidocaine 2% Gel 1 Application(s) Topical every 8 hours  metroNIDAZOLE  IVPB 500 milliGRAM(s) IV Intermittent every 8 hours  midodrine 10 milliGRAM(s) Oral every 8 hours  norepinephrine Infusion 0.015 MICROgram(s)/kG/Min (2.07 mL/Hr) IV Continuous <Continuous>  nystatin Powder 1 Application(s) Topical two times a day  pantoprazole  Injectable 40 milliGRAM(s) IV Push two times a day  sodium chloride 0.9%. 1000 milliLiter(s) (10 mL/Hr) IV Continuous <Continuous>  vancomycin    Solution 500 milliGRAM(s) Oral every 6 hours  vasopressin Infusion 0.05 Unit(s)/Min (3 mL/Hr) IV Continuous <Continuous>  zinc oxide 40% Ointment 1 Application(s) Topical two times a day      VITAL:  T(C): , Max: 36.8 (07-26-19 @ 04:00)  T(F): , Max: 98.2 (07-26-19 @ 04:00)  HR: 83 (07-26-19 @ 07:00)  BP: --  BP(mean): --  RR: 26 (07-26-19 @ 07:00)  SpO2: 95% (07-26-19 @ 07:00)  Wt(kg): --    I and O's:    07-25 @ 07:01  -  07-26 @ 07:00  --------------------------------------------------------  IN: 2552.9 mL / OUT: 2550 mL / NET: 2.9 mL          PHYSICAL EXAM:    Constitutional: NAD  Neck:  No JVD  Respiratory: poor effort   Cardiovascular: S1 and S2  Gastrointestinal: BS+, soft, NT/ND  Extremities: No peripheral edema  Neurological:   no focal deficits  Psychiatric: unable   : No Law  Skin: No rashes  Access: [facility]     LABS:                        9.4    16.7  )-----------( 91       ( 26 Jul 2019 02:04 )             26.7     07-26    141  |  102  |  56<H>  ----------------------------<  138<H>  3.8   |  19<L>  |  4.24<H>    Ca    8.6      26 Jul 2019 02:04  Phos  3.4     07-26  Mg     2.4     07-26    TPro  7.3  /  Alb  3.1<L>  /  TBili  3.3<H>  /  DBili  x   /  AST  184<H>  /  ALT  88<H>  /  AlkPhos  173<H>  07-26          Urine Studies:          RADIOLOGY & ADDITIONAL STUDIES:  < from: US Abdomen Complete (07.25.19 @ 11:41) >    EXAM:  US ABDOMEN COMPLETE                            PROCEDURE DATE:  07/25/2019            INTERPRETATION:  CLINICAL INFORMATION: Ascites, elevated liver enzymes.    COMPARISON: Ultrasound 7/11/2019 and CT abdomen pelvis 7/10/2019    TECHNIQUE: Sonography of the abdomen.     FINDINGS:    Liver: Mildly elevated right hepatic lobe measuring 18 cm. No focal   lesion. Smooth contour.    Bile ducts: Normal caliber. Common bile duct measures 7.5 mm, normal for   patient's age.     Gallbladder: Echogenic sludge seen within the gallbladder lumen, which   appears unchanged from prior exam        Pancreas: Visualized portions are within normal limits.    Spleen: 6.6 cm. Within normal limits.    Right kidney: 10.4 cm. Echogenic parenchyma. No hydronephrosis.    Left kidney: 11.7 cm. Echogenic parenchyma. No hydronephrosis.    Ascites: Mild to moderate ascites    Aorta and IVC: Visualized portions are within normal limits.    Bilateral pleural effusions.    IMPRESSION:     Echogenic material within the gallbladder lumen, likely represent   gallbladder sludge. No evidence of acute cholecystitis.    No hydronephrosis.    Borderline hepatomegaly.    Bilateral echogenic renal parenchyma, suggestive of medical renal disease.    Bilateral pleural effusions.                KHADRA SILVERIO MD,RADIOLOGY FELLOW  This document has been electronically signed.  CR BONILLA M.D., ATTENDING RADIOLOGIST  This document has been electronically signed. Jul 25 2019  2:42PM

## 2019-07-26 NOTE — PROGRESS NOTE ADULT - SUBJECTIVE AND OBJECTIVE BOX
Patient is a 73y old  Male who presents with a chief complaint of PVD, S/p CABG (24 Jul 2019 12:18)    Being followed by ID for        Interval history:  pt resting quietly  still with diarrhea  No other acute events          PAST MEDICAL & SURGICAL HISTORY:  Arterial disease: peripheral  TR (tricuspid regurgitation)  MR (mitral regurgitation)  RALPH (acute kidney injury)  HFrEF (heart failure with reduced ejection fraction)  No significant past surgical history    Allergies    No Known Allergies    Intolerances      Antimicrobials:    metroNIDAZOLE  IVPB 500 milliGRAM(s) IV Intermittent every 8 hours  vancomycin    Solution 500 milliGRAM(s) Oral every 6 hours    MEDICATIONS  (STANDING):  argatroban Infusion 0.4 MICROgram(s)/kG/Min (1.766 mL/Hr) IV Continuous <Continuous>  aspirin  chewable 81 milliGRAM(s) Oral daily  atorvastatin 80 milliGRAM(s) Oral at bedtime  chlorhexidine 2% Cloths 1 Application(s) Topical <User Schedule>  chlorhexidine 4% Liquid 1 Application(s) Topical <User Schedule>  dextrose 50% Injectable 50 milliLiter(s) IV Push every 15 minutes  dextrose 50% Injectable 25 milliLiter(s) IV Push every 15 minutes  dextrose 50% Injectable 50 milliLiter(s) IV Push every 15 minutes  dextrose 50% Injectable 25 milliLiter(s) IV Push every 15 minutes  DOBUTamine Infusion 2.5 MICROgram(s)/kG/Min (5.52 mL/Hr) IV Continuous <Continuous>  insulin lispro (HumaLOG) corrective regimen sliding scale   SubCutaneous every 6 hours  levothyroxine Injectable 25 MICROGram(s) IV Push at bedtime  lidocaine 2% Gel 1 Application(s) Topical every 8 hours  metroNIDAZOLE  IVPB 500 milliGRAM(s) IV Intermittent every 8 hours  midodrine 10 milliGRAM(s) Oral every 8 hours  norepinephrine Infusion 0.015 MICROgram(s)/kG/Min (2.07 mL/Hr) IV Continuous <Continuous>  nystatin Powder 1 Application(s) Topical two times a day  pantoprazole  Injectable 40 milliGRAM(s) IV Push two times a day  sodium chloride 0.9%. 1000 milliLiter(s) (10 mL/Hr) IV Continuous <Continuous>  vancomycin    Solution 500 milliGRAM(s) Oral every 6 hours  vasopressin Infusion 0.05 Unit(s)/Min (3 mL/Hr) IV Continuous <Continuous>  zinc oxide 40% Ointment 1 Application(s) Topical two times a day      Vital Signs Last 24 Hrs  T(C): 36.3 (07-26-19 @ 12:00), Max: 36.8 (07-26-19 @ 04:00)  T(F): 97.4 (07-26-19 @ 12:00), Max: 98.2 (07-26-19 @ 04:00)  HR: 71 (07-26-19 @ 13:15) (66 - 86)  BP: --  BP(mean): --  RR: 24 (07-26-19 @ 13:15) (16 - 76)  SpO2: 100% (07-26-19 @ 13:15) (70% - 100%)    Physical Exam:    Constitutional  sleepy    HEENT PERRLA EOMI,No pallor or icterus    No oral exudate or erythema    Neck supple no JVD or LN    Chest Good AE,CTA    CVS RRR S1 S2 WNl     Abd soft BS normal No tenderness     Ext No cyanosis clubbing or edema    IV site no erythema tenderness or discharge    Joints no swelling or LOM      Lab Data:                          9.4    16.7  )-----------( 91       ( 26 Jul 2019 02:04 )             26.7       07-26    141  |  102  |  56<H>  ----------------------------<  138<H>  3.8   |  19<L>  |  4.24<H>    Ca    8.6      26 Jul 2019 02:04  Phos  3.4     07-26  Mg     2.4     07-26    TPro  7.3  /  Alb  3.1<L>  /  TBili  3.3<H>  /  DBili  x   /  AST  184<H>  /  ALT  88<H>  /  AlkPhos  173<H>  07-26          .Urine  07-22-19   No growth  --  --      .Blood  07-22-19   No growth to date.  --  --      .Blood  07-21-19   No growth at 5 days.  --  --      .Blood  07-21-19   No growth at 5 days.  --  --                    WBC Count: 16.7 (07-26-19 @ 02:04)  WBC Count: 19.6 (07-25-19 @ 00:58)  WBC Count: 22.0 (07-24-19 @ 04:52)  WBC Count: 20.7 (07-23-19 @ 02:12)  WBC Count: 22.0 (07-22-19 @ 02:23)  WBC Count: 17.7 (07-21-19 @ 02:45)  WBC Count: 13.8 (07-20-19 @ 01:45)  WBC Count: 12.9 (07-19-19 @ 17:53)

## 2019-07-26 NOTE — PROGRESS NOTE ADULT - SUBJECTIVE AND OBJECTIVE BOX
SYLVIA ROSA  MRN#:  [id number]    The patient is a 73y Male without prior medical care who presented with SOB and LE edema, found to have newly diagnosed HFrEF (EF 30%) and RALPH vs CKD, also PVD and claudication with extensive LE arterial disease on doppler, reports heavy alcohol use, further work up revealed multivessel CAD, now s/p C3L today, severe biventricular dysfunction on echocardiogram with some improvement after bypass was seen, evaluated, & examined with the CTICU staff on rounds and later in the evening with a multidisciplinary care plan formulated & implemented.  All available clinical, laboratory, radiographic, pharmacologic, and electrocardiographic data were reviewed & analyzed.      The patient was in the CTICU in critical condition secondary to persistent cardiopulmonary dysfunction, probable sepsis from C difficile, persistent cardiovascular dysfunction, and acute on chromic postoperative renal failure, & hyperglycemia.      Respiratory status required supplemental oxygen, the following of ABG’s with A-line monitoring, and continuous pulse oximetry monitoring for support & to evaluate for & prevent further decompensation secondary to persistent cardiopulmonary dysfunction.     Invasive hemodynamic monitoring with a central venous catheter & an A-line were required for the continuous central venous and MAP/BP monitoring to ensure adequate cardiovascular support and to evaluate for & help prevent decompensation while receiving an IV Vasopressin drip, an IV Dobutamine drip, and an IV Argatroban drip secondary to septic shock from C diff colitis, cardiovascular dysfunction, A Fib, ARF, and combined systolic and diastolic biventricular failure.    IV Flagy in additon to entral Vancomycin for progressive C diff sentic shock.    Patient required acute postoperative critical care management and I provided 30 minutes of non-continuous care to the patient. I reviewed and assessed all available clinical, laboratory, radiographic, pharmacologic, and electrocardiographic data in order to decide on maintenance or adjustment of medications, and fluid management.  Discussed at length with the CTICU staff and helped coordinate care.

## 2019-07-27 LAB
ALBUMIN SERPL ELPH-MCNC: 3 G/DL — LOW (ref 3.3–5)
ALP SERPL-CCNC: 183 U/L — HIGH (ref 40–120)
ALT FLD-CCNC: 76 U/L — HIGH (ref 10–45)
AMMONIA BLD-MCNC: 16 UMOL/L — SIGNIFICANT CHANGE UP (ref 11–55)
ANION GAP SERPL CALC-SCNC: 20 MMOL/L — HIGH (ref 5–17)
APTT BLD: 167.1 SEC — CRITICAL HIGH (ref 27.5–36.3)
APTT BLD: 67.6 SEC — HIGH (ref 27.5–36.3)
APTT BLD: 81.2 SEC — HIGH (ref 27.5–36.3)
APTT BLD: 88.7 SEC — HIGH (ref 27.5–36.3)
AST SERPL-CCNC: 187 U/L — HIGH (ref 10–40)
BASE EXCESS BLDV CALC-SCNC: -4.4 MMOL/L — LOW (ref -2–2)
BILIRUB SERPL-MCNC: 3.1 MG/DL — HIGH (ref 0.2–1.2)
BUN SERPL-MCNC: 79 MG/DL — HIGH (ref 7–23)
CALCIUM SERPL-MCNC: 8.3 MG/DL — LOW (ref 8.4–10.5)
CHLORIDE SERPL-SCNC: 100 MMOL/L — SIGNIFICANT CHANGE UP (ref 96–108)
CO2 BLDV-SCNC: 22 MMOL/L — SIGNIFICANT CHANGE UP (ref 22–30)
CO2 SERPL-SCNC: 17 MMOL/L — LOW (ref 22–31)
CREAT SERPL-MCNC: 5.23 MG/DL — HIGH (ref 0.5–1.3)
CULTURE RESULTS: SIGNIFICANT CHANGE UP
GAS PNL BLDA: SIGNIFICANT CHANGE UP
GAS PNL BLDV: SIGNIFICANT CHANGE UP
GAS PNL BLDV: SIGNIFICANT CHANGE UP
GLUCOSE BLDC GLUCOMTR-MCNC: 115 MG/DL — HIGH (ref 70–99)
GLUCOSE BLDC GLUCOMTR-MCNC: 129 MG/DL — HIGH (ref 70–99)
GLUCOSE BLDC GLUCOMTR-MCNC: 135 MG/DL — HIGH (ref 70–99)
GLUCOSE SERPL-MCNC: 248 MG/DL — HIGH (ref 70–99)
HCO3 BLDV-SCNC: 21 MMOL/L — SIGNIFICANT CHANGE UP (ref 21–29)
HCT VFR BLD CALC: 27.1 % — LOW (ref 39–50)
HGB BLD-MCNC: 8.8 G/DL — LOW (ref 13–17)
HOROWITZ INDEX BLDV+IHG-RTO: 40 — SIGNIFICANT CHANGE UP
INR BLD: 3.89 RATIO — HIGH (ref 0.88–1.16)
INR BLD: 4.11 RATIO — HIGH (ref 0.88–1.16)
INR BLD: 4.32 RATIO — HIGH (ref 0.88–1.16)
MAGNESIUM SERPL-MCNC: 2.4 MG/DL — SIGNIFICANT CHANGE UP (ref 1.6–2.6)
MCHC RBC-ENTMCNC: 30.3 PG — SIGNIFICANT CHANGE UP (ref 27–34)
MCHC RBC-ENTMCNC: 32.6 GM/DL — SIGNIFICANT CHANGE UP (ref 32–36)
MCV RBC AUTO: 93 FL — SIGNIFICANT CHANGE UP (ref 80–100)
PCO2 BLDV: 42 MMHG — SIGNIFICANT CHANGE UP (ref 35–50)
PH BLDV: 7.32 — LOW (ref 7.35–7.45)
PHOSPHATE SERPL-MCNC: 3.6 MG/DL — SIGNIFICANT CHANGE UP (ref 2.5–4.5)
PLATELET # BLD AUTO: 116 K/UL — LOW (ref 150–400)
PO2 BLDV: 28 MMHG — SIGNIFICANT CHANGE UP (ref 25–45)
POTASSIUM SERPL-MCNC: 3.6 MMOL/L — SIGNIFICANT CHANGE UP (ref 3.5–5.3)
POTASSIUM SERPL-SCNC: 3.6 MMOL/L — SIGNIFICANT CHANGE UP (ref 3.5–5.3)
PROT SERPL-MCNC: 7 G/DL — SIGNIFICANT CHANGE UP (ref 6–8.3)
PROTHROM AB SERPL-ACNC: 46.2 SEC — HIGH (ref 10–12.9)
PROTHROM AB SERPL-ACNC: 49.4 SEC — HIGH (ref 10–12.9)
PROTHROM AB SERPL-ACNC: 51.5 SEC — HIGH (ref 10–12.9)
RBC # BLD: 2.91 M/UL — LOW (ref 4.2–5.8)
RBC # FLD: 15.1 % — HIGH (ref 10.3–14.5)
SAO2 % BLDV: 40 % — LOW (ref 67–88)
SODIUM SERPL-SCNC: 137 MMOL/L — SIGNIFICANT CHANGE UP (ref 135–145)
SPECIMEN SOURCE: SIGNIFICANT CHANGE UP
WBC # BLD: 16.5 K/UL — HIGH (ref 3.8–10.5)
WBC # FLD AUTO: 16.5 K/UL — HIGH (ref 3.8–10.5)

## 2019-07-27 PROCEDURE — 31500 INSERT EMERGENCY AIRWAY: CPT

## 2019-07-27 PROCEDURE — 99291 CRITICAL CARE FIRST HOUR: CPT | Mod: 25

## 2019-07-27 PROCEDURE — 71045 X-RAY EXAM CHEST 1 VIEW: CPT | Mod: 26

## 2019-07-27 RX ORDER — VECURONIUM BROMIDE 20 MG/1
5 INJECTION, POWDER, FOR SOLUTION INTRAVENOUS ONCE
Refills: 0 | Status: COMPLETED | OUTPATIENT
Start: 2019-07-27 | End: 2019-07-27

## 2019-07-27 RX ORDER — HYDROMORPHONE HYDROCHLORIDE 2 MG/ML
0.5 INJECTION INTRAMUSCULAR; INTRAVENOUS; SUBCUTANEOUS ONCE
Refills: 0 | Status: DISCONTINUED | OUTPATIENT
Start: 2019-07-27 | End: 2019-07-27

## 2019-07-27 RX ORDER — POTASSIUM CHLORIDE 20 MEQ
20 PACKET (EA) ORAL ONCE
Refills: 0 | Status: COMPLETED | OUTPATIENT
Start: 2019-07-27 | End: 2019-07-27

## 2019-07-27 RX ORDER — PROPOFOL 10 MG/ML
10 INJECTION, EMULSION INTRAVENOUS
Qty: 500 | Refills: 0 | Status: DISCONTINUED | OUTPATIENT
Start: 2019-07-27 | End: 2019-07-30

## 2019-07-27 RX ORDER — CHLORHEXIDINE GLUCONATE 213 G/1000ML
15 SOLUTION TOPICAL EVERY 12 HOURS
Refills: 0 | Status: DISCONTINUED | OUTPATIENT
Start: 2019-07-27 | End: 2019-08-03

## 2019-07-27 RX ORDER — HEPARIN SODIUM 5000 [USP'U]/ML
1000 INJECTION INTRAVENOUS; SUBCUTANEOUS
Qty: 25000 | Refills: 0 | Status: DISCONTINUED | OUTPATIENT
Start: 2019-07-27 | End: 2019-07-28

## 2019-07-27 RX ORDER — ETOMIDATE 2 MG/ML
20 INJECTION INTRAVENOUS ONCE
Refills: 0 | Status: COMPLETED | OUTPATIENT
Start: 2019-07-27 | End: 2019-07-27

## 2019-07-27 RX ADMIN — VASOPRESSIN 3 UNIT(S)/MIN: 20 INJECTION INTRAVENOUS at 05:06

## 2019-07-27 RX ADMIN — ETOMIDATE 20 MILLIGRAM(S): 2 INJECTION INTRAVENOUS at 06:30

## 2019-07-27 RX ADMIN — MIDODRINE HYDROCHLORIDE 10 MILLIGRAM(S): 2.5 TABLET ORAL at 14:13

## 2019-07-27 RX ADMIN — Medication 11.04 MICROGRAM(S)/KG/MIN: at 05:06

## 2019-07-27 RX ADMIN — MIDODRINE HYDROCHLORIDE 10 MILLIGRAM(S): 2.5 TABLET ORAL at 21:19

## 2019-07-27 RX ADMIN — PROPOFOL 4.42 MICROGRAM(S)/KG/MIN: 10 INJECTION, EMULSION INTRAVENOUS at 06:53

## 2019-07-27 RX ADMIN — VECURONIUM BROMIDE 5 MILLIGRAM(S): 20 INJECTION, POWDER, FOR SOLUTION INTRAVENOUS at 06:30

## 2019-07-27 RX ADMIN — Medication 2: at 06:53

## 2019-07-27 RX ADMIN — CHLORHEXIDINE GLUCONATE 1 APPLICATION(S): 213 SOLUTION TOPICAL at 05:03

## 2019-07-27 RX ADMIN — HYDROMORPHONE HYDROCHLORIDE 0.5 MILLIGRAM(S): 2 INJECTION INTRAMUSCULAR; INTRAVENOUS; SUBCUTANEOUS at 17:00

## 2019-07-27 RX ADMIN — CHLORHEXIDINE GLUCONATE 15 MILLILITER(S): 213 SOLUTION TOPICAL at 17:00

## 2019-07-27 RX ADMIN — LIDOCAINE 1 APPLICATION(S): 4 CREAM TOPICAL at 16:44

## 2019-07-27 RX ADMIN — ARGATROBAN 1.77 MICROGRAM(S)/KG/MIN: 50 INJECTION, SOLUTION INTRAVENOUS at 05:06

## 2019-07-27 RX ADMIN — Medication 25 MICROGRAM(S): at 21:20

## 2019-07-27 RX ADMIN — Medication 100 MILLIGRAM(S): at 14:13

## 2019-07-27 RX ADMIN — PANTOPRAZOLE SODIUM 40 MILLIGRAM(S): 20 TABLET, DELAYED RELEASE ORAL at 17:00

## 2019-07-27 RX ADMIN — ZINC OXIDE 1 APPLICATION(S): 200 OINTMENT TOPICAL at 17:00

## 2019-07-27 RX ADMIN — NYSTATIN CREAM 1 APPLICATION(S): 100000 CREAM TOPICAL at 17:01

## 2019-07-27 RX ADMIN — Medication 500 MILLIGRAM(S): at 20:21

## 2019-07-27 RX ADMIN — PANTOPRAZOLE SODIUM 40 MILLIGRAM(S): 20 TABLET, DELAYED RELEASE ORAL at 05:03

## 2019-07-27 RX ADMIN — Medication 11.04 MICROGRAM(S)/KG/MIN: at 16:46

## 2019-07-27 RX ADMIN — Medication 100 MILLIGRAM(S): at 05:03

## 2019-07-27 RX ADMIN — MIDODRINE HYDROCHLORIDE 10 MILLIGRAM(S): 2.5 TABLET ORAL at 05:03

## 2019-07-27 RX ADMIN — HEPARIN SODIUM 10 UNIT(S)/HR: 5000 INJECTION INTRAVENOUS; SUBCUTANEOUS at 13:18

## 2019-07-27 RX ADMIN — Medication 100 MILLIGRAM(S): at 21:20

## 2019-07-27 RX ADMIN — Medication 500 MILLIGRAM(S): at 08:23

## 2019-07-27 RX ADMIN — ATORVASTATIN CALCIUM 80 MILLIGRAM(S): 80 TABLET, FILM COATED ORAL at 21:19

## 2019-07-27 RX ADMIN — ZINC OXIDE 1 APPLICATION(S): 200 OINTMENT TOPICAL at 05:07

## 2019-07-27 RX ADMIN — HYDROMORPHONE HYDROCHLORIDE 0.5 MILLIGRAM(S): 2 INJECTION INTRAMUSCULAR; INTRAVENOUS; SUBCUTANEOUS at 16:46

## 2019-07-27 RX ADMIN — Medication 81 MILLIGRAM(S): at 12:12

## 2019-07-27 RX ADMIN — Medication 500 MILLIGRAM(S): at 14:13

## 2019-07-27 RX ADMIN — Medication 4: at 02:32

## 2019-07-27 RX ADMIN — Medication 500 MILLIGRAM(S): at 02:01

## 2019-07-27 RX ADMIN — CHLORHEXIDINE GLUCONATE 1 APPLICATION(S): 213 SOLUTION TOPICAL at 05:02

## 2019-07-27 RX ADMIN — Medication 20 MILLIEQUIVALENT(S): at 17:00

## 2019-07-27 RX ADMIN — LIDOCAINE 1 APPLICATION(S): 4 CREAM TOPICAL at 21:20

## 2019-07-27 RX ADMIN — LIDOCAINE 1 APPLICATION(S): 4 CREAM TOPICAL at 05:06

## 2019-07-27 NOTE — PROGRESS NOTE ADULT - SUBJECTIVE AND OBJECTIVE BOX
CRITICAL CARE ATTENDING - CTICU    MEDICATIONS  (STANDING):  aspirin  chewable 81 milliGRAM(s) Oral daily  atorvastatin 80 milliGRAM(s) Oral at bedtime  chlorhexidine 0.12% Liquid 15 milliLiter(s) Oral Mucosa every 12 hours  chlorhexidine 2% Cloths 1 Application(s) Topical <User Schedule>  chlorhexidine 4% Liquid 1 Application(s) Topical <User Schedule>  dextrose 50% Injectable 50 milliLiter(s) IV Push every 15 minutes  dextrose 50% Injectable 25 milliLiter(s) IV Push every 15 minutes  dextrose 50% Injectable 50 milliLiter(s) IV Push every 15 minutes  dextrose 50% Injectable 25 milliLiter(s) IV Push every 15 minutes  DOBUTamine Infusion 5 MICROgram(s)/kG/Min (11.04 mL/Hr) IV Continuous <Continuous>  heparin  Infusion 1000 Unit(s)/Hr (10 mL/Hr) IV Continuous <Continuous>  insulin lispro (HumaLOG) corrective regimen sliding scale   SubCutaneous every 6 hours  levothyroxine Injectable 25 MICROGram(s) IV Push at bedtime  lidocaine 2% Gel 1 Application(s) Topical every 8 hours  metroNIDAZOLE  IVPB 500 milliGRAM(s) IV Intermittent every 8 hours  midodrine 10 milliGRAM(s) Oral every 8 hours  norepinephrine Infusion 0.015 MICROgram(s)/kG/Min (2.07 mL/Hr) IV Continuous <Continuous>  nystatin Powder 1 Application(s) Topical two times a day  pantoprazole  Injectable 40 milliGRAM(s) IV Push two times a day  propofol Infusion 10 MICROgram(s)/kG/Min (4.416 mL/Hr) IV Continuous <Continuous>  sodium chloride 0.9%. 1000 milliLiter(s) (10 mL/Hr) IV Continuous <Continuous>  vancomycin    Solution 500 milliGRAM(s) Oral every 6 hours  vasopressin Infusion 0.05 Unit(s)/Min (3 mL/Hr) IV Continuous <Continuous>  zinc oxide 40% Ointment 1 Application(s) Topical two times a day                                    8.8    16.5  )-----------( 116      ( 2019 02:31 )             27.1       07-27    137  |  100  |  79<H>  ----------------------------<  248<H>  3.6   |  17<L>  |  5.23<H>    Ca    8.3<L>      2019 02:31  Phos  3.6       Mg     2.4         TPro  7.0  /  Alb  3.0<L>  /  TBili  3.1<H>  /  DBili  x   /  AST  187<H>  /  ALT  76<H>  /  AlkPhos  183<H>        PT/INR - ( 2019 09:28 )   PT: 51.5 sec;   INR: 4.32 ratio         PTT - ( 2019 09:28 )  PTT:88.7 sec    Mode: AC/ CMV (Assist Control/ Continuous Mandatory Ventilation)  RR (machine): 14  TV (machine): 500  FiO2: 60  PEEP: 5  ITime: 1  MAP: 10  PIP: 29      Daily     Daily Weight in k.8 (2019 09:20)       @ : @ 07:00  --------------------------------------------------------  IN: 1253.7 mL / OUT: 0 mL / NET: 1253.7 mL     @ : @ 11:13  --------------------------------------------------------  IN: 74 mL / OUT: 0 mL / NET: 74 mL        Critically Ill patient  : [ ] preoperative ,   [x ] post operative    Requires :  [x ] Arterial Line   [x ] Central Line  [ ] PA catheter  [ ] IABP  [ ] ECMO  [ ] LVAD  x[ ] Ventilator  [ ] pacemaker [ ] Impella.                      [ x] ABG's     [x ] Pulse Oxymetry Monitoring  Bedside evaluation , monitoring , treatment of hemodynamics , fluids , IVP/ IVCD meds.        Diagnosis:     POD 7/12 - CABG X 3 L    Cardiogenic Shock     CHF- acute [x ]   chronic [x ]    systolic [x ]   diatolic [ ]          - Echo- EF -  30's           [x ] RV dysfunction          - Cxr-cardiomegally, edema          - Clinical-  [ x]inotropes   [x ]pressors   [ ]diuresis   [ ]IABP   [ ]ECMO   [ ]LVAD   [ x]Respiratory Failure    Hemodynamic lability,instability. Requires IVCD [ x] vasopressors [x ] inotropes  [ ] vasodilator  [ ]IVSS fluid  to maintain MAP, perfusion, C.I.     respiratory failure     Requires chest PT, pulmonary toilet, ambu bagging, suctioning to maintain SaO2,  patent airway and treat atelectasis.     Ventilator Management:  [x ]AC-rest    [ ]CPAP-PS Wean    [x ]Trach Collar     [ ]Extubate    [ ] T-Piece  [ ]peep>5     Difficult weaning process - multiple organ system involvement in critically ill patient     fluid overload      Renal Failure - Acute Kidney Injury     [x ] Hemodialysis [x ] Hemofiltration:    [x ] negative fluid balance [ ] even fluid balance [ ] positive fluid balance, in a hemodynamically unstable patient.     C Diff     PVD    Thrombocytopenia     Tolerates NG / NJ feeds at [ ] goal rate    [ ] trophic rate    [x ] 1/4      rate                             -                     Discussed with CT surgeon, Physician's Assistant - Nurse Practitioner- Critical care medicine team.   Dicussed at  AM / PM rounds.   Chart, labs , films reviewed.    Total Time: 30 min

## 2019-07-27 NOTE — PROGRESS NOTE ADULT - ASSESSMENT
The patient is a 73y Male with CAD, HFrEF and PVD s/p CABGx3 followed by AFib, resp failure and acute on chronic kidney injury.    - CKD:  Suspect underlying CKD stage 3  - RALPH: anuric.   Now on dialysis.  - Hypotensive on  and Vaso   - Respiratory distress  - Confusion   - Diarrhea  - Distended abd and evidence of ascites and no evidence of cirrhosis    RECOMMENDATIONS  - HD  for 1.5 kg and assess breathing.     - Bladder scan per protocol   - please dose any new medications for a CrCl of 10-15 ml/min   -C diff positive at present .  Cont abx.  Diarrhea amount has reduced   -CPAP p HD?

## 2019-07-27 NOTE — PROGRESS NOTE ADULT - ASSESSMENT
Assessment  DMT2: 73y Male with newly diagnosed DM T2 with hyperglycemia, on low dose insulin, coverage blood sugars trending up 248   no hypoglycemic episode, on tube feeding, intubated  CAD: s/P CABG, on medications, stable, monitored.  HTN: Controlled,  on antihypertensive medications.      cata Turner MD  Cell: [phone]

## 2019-07-27 NOTE — PROGRESS NOTE ADULT - SUBJECTIVE AND OBJECTIVE BOX
NEPHROLOGY-[facility] [phone]        Patient seen and examined in bed.  He had a bronch last night and intubated     ros unable         MEDICATIONS  (STANDING):  argatroban Infusion 0.4 MICROgram(s)/kG/Min (1.766 mL/Hr) IV Continuous <Continuous>  aspirin  chewable 81 milliGRAM(s) Oral daily  atorvastatin 80 milliGRAM(s) Oral at bedtime  chlorhexidine 0.12% Liquid 15 milliLiter(s) Oral Mucosa every 12 hours  chlorhexidine 2% Cloths 1 Application(s) Topical <User Schedule>  chlorhexidine 4% Liquid 1 Application(s) Topical <User Schedule>  dextrose 50% Injectable 50 milliLiter(s) IV Push every 15 minutes  dextrose 50% Injectable 25 milliLiter(s) IV Push every 15 minutes  dextrose 50% Injectable 50 milliLiter(s) IV Push every 15 minutes  dextrose 50% Injectable 25 milliLiter(s) IV Push every 15 minutes  DOBUTamine Infusion 5 MICROgram(s)/kG/Min (11.04 mL/Hr) IV Continuous <Continuous>  insulin lispro (HumaLOG) corrective regimen sliding scale   SubCutaneous every 6 hours  levothyroxine Injectable 25 MICROGram(s) IV Push at bedtime  lidocaine 2% Gel 1 Application(s) Topical every 8 hours  metroNIDAZOLE  IVPB 500 milliGRAM(s) IV Intermittent every 8 hours  midodrine 10 milliGRAM(s) Oral every 8 hours  norepinephrine Infusion 0.015 MICROgram(s)/kG/Min (2.07 mL/Hr) IV Continuous <Continuous>  nystatin Powder 1 Application(s) Topical two times a day  pantoprazole  Injectable 40 milliGRAM(s) IV Push two times a day  propofol Infusion 10 MICROgram(s)/kG/Min (4.416 mL/Hr) IV Continuous <Continuous>  sodium chloride 0.9%. 1000 milliLiter(s) (10 mL/Hr) IV Continuous <Continuous>  vancomycin    Solution 500 milliGRAM(s) Oral every 6 hours  vasopressin Infusion 0.05 Unit(s)/Min (3 mL/Hr) IV Continuous <Continuous>  zinc oxide 40% Ointment 1 Application(s) Topical two times a day      VITAL:  T(C): , Max: 36.7 (07-26-19 @ 20:00)  T(F): , Max: 98 (07-26-19 @ 20:00)  HR: 72 (07-27-19 @ 07:00)  BP: --  BP(mean): --  RR: 32 (07-27-19 @ 07:00)  SpO2: 100% (07-27-19 @ 07:00)  Wt(kg): --    I and O's:    07-26 @ 07:01  -  07-27 @ 07:00  --------------------------------------------------------  IN: 1253.7 mL / OUT: 0 mL / NET: 1253.7 mL          PHYSICAL EXAM:    Constitutional: intubated   Neck:  No JVD  Respiratory: transmitted upper   Cardiovascular: S1 and S2  Gastrointestinal: BS+, soft, NT/ND + fluid shift   Extremities: No peripheral edema  Neurological: unable   : No Chávez  Skin: No rashes  Access: [facility]     LABS:                        8.8    16.5  )-----------( 116      ( 27 Jul 2019 02:31 )             27.1     07-27    137  |  100  |  79<H>  ----------------------------<  248<H>  3.6   |  17<L>  |  5.23<H>    Ca    8.3<L>      27 Jul 2019 02:31  Phos  3.6     07-27  Mg     2.4     07-27    TPro  7.0  /  Alb  3.0<L>  /  TBili  3.1<H>  /  DBili  x   /  AST  187<H>  /  ALT  76<H>  /  AlkPhos  183<H>  07-27          Urine Studies:          RADIOLOGY & ADDITIONAL STUDIES:      < from: Xray Chest 1 View- PORTABLE-Urgent (07.27.19 @ 06:29) >    EXAM:  XR CHEST PORTABLE URGENT 1V                            PROCEDURE DATE:  07/27/2019            INTERPRETATION:  A single chest x-ray was obtained on July 27, 2019.    Indication: Status post intubation.    Impression:    The heart is enlarged. Pulmonary vascular congestion. Endotracheal tube   is above the addy. NG tube is in the stomach. Bilateral central lines   are present in both are in the superior vena cava. Status post   sternotomy. No pneumothorax.                    RITA ORTEGA M.D., ATTENDING RADIOLOGIST  This document has been electronically signed. Jul 27 2019  7:02AM                < end of copied text >

## 2019-07-27 NOTE — PROGRESS NOTE ADULT - SUBJECTIVE AND OBJECTIVE BOX
Endocrinology Attending Covering for Dr. Magdaleno      Chief complaint  Patient is a 73y old  Male who presents with a chief complaint of SOB (26 Jul 2019 14:24)   patient was intubated this am, on Ng tube feeding that is on hold  blood sugars trending up    Labs and Fingersticks  CAPILLARY BLOOD GLUCOSE          Anion Gap, Serum: 20 <H> (07-27 @ 02:31)  Anion Gap, Serum: 20 <H> (07-26 @ 02:04)      Calcium, Total Serum: 8.3 <L> (07-27 @ 02:31)  Calcium, Total Serum: 8.6 (07-26 @ 02:04)  Albumin, Serum: 3.0 <L> (07-27 @ 02:31)  Albumin, Serum: 3.1 <L> (07-26 @ 02:04)    Alanine Aminotransferase (ALT/SGPT): 76 <H> (07-27 @ 02:31)  Alanine Aminotransferase (ALT/SGPT): 88 <H> (07-26 @ 02:04)  Alkaline Phosphatase, Serum: 183 <H> (07-27 @ 02:31)  Alkaline Phosphatase, Serum: 173 <H> (07-26 @ 02:04)  Aspartate Aminotransferase (AST/SGOT): 187 <H> (07-27 @ 02:31)  Aspartate Aminotransferase (AST/SGOT): 184 <H> (07-26 @ 02:04)        07-27    137  |  100  |  79<H>  ----------------------------<  248<H>  3.6   |  17<L>  |  5.23<H>    Ca    8.3<L>      27 Jul 2019 02:31  Phos  3.6     07-27  Mg     2.4     07-27    TPro  7.0  /  Alb  3.0<L>  /  TBili  3.1<H>  /  DBili  x   /  AST  187<H>  /  ALT  76<H>  /  AlkPhos  183<H>  07-27                        8.8    16.5  )-----------( 116      ( 27 Jul 2019 02:31 )             27.1     Medications  MEDICATIONS  (STANDING):  aspirin  chewable 81 milliGRAM(s) Oral daily  atorvastatin 80 milliGRAM(s) Oral at bedtime  chlorhexidine 0.12% Liquid 15 milliLiter(s) Oral Mucosa every 12 hours  chlorhexidine 2% Cloths 1 Application(s) Topical <User Schedule>  chlorhexidine 4% Liquid 1 Application(s) Topical <User Schedule>  dextrose 50% Injectable 50 milliLiter(s) IV Push every 15 minutes  dextrose 50% Injectable 25 milliLiter(s) IV Push every 15 minutes  dextrose 50% Injectable 50 milliLiter(s) IV Push every 15 minutes  dextrose 50% Injectable 25 milliLiter(s) IV Push every 15 minutes  DOBUTamine Infusion 5 MICROgram(s)/kG/Min (11.04 mL/Hr) IV Continuous <Continuous>  heparin  Infusion 1000 Unit(s)/Hr (10 mL/Hr) IV Continuous <Continuous>  insulin lispro (HumaLOG) corrective regimen sliding scale   SubCutaneous every 6 hours  levothyroxine Injectable 25 MICROGram(s) IV Push at bedtime  lidocaine 2% Gel 1 Application(s) Topical every 8 hours  metroNIDAZOLE  IVPB 500 milliGRAM(s) IV Intermittent every 8 hours  midodrine 10 milliGRAM(s) Oral every 8 hours  norepinephrine Infusion 0.015 MICROgram(s)/kG/Min (2.07 mL/Hr) IV Continuous <Continuous>  nystatin Powder 1 Application(s) Topical two times a day  pantoprazole  Injectable 40 milliGRAM(s) IV Push two times a day  propofol Infusion 10 MICROgram(s)/kG/Min (4.416 mL/Hr) IV Continuous <Continuous>  sodium chloride 0.9%. 1000 milliLiter(s) (10 mL/Hr) IV Continuous <Continuous>  vancomycin    Solution 500 milliGRAM(s) Oral every 6 hours  vasopressin Infusion 0.05 Unit(s)/Min (3 mL/Hr) IV Continuous <Continuous>  zinc oxide 40% Ointment 1 Application(s) Topical two times a day      Physical Exam  General: Patient comfortable in bed  Vital Signs Last 12 Hrs  T(F): 97.5 (07-27-19 @ 00:00), Max: 97.5 (07-27-19 @ 00:00)  HR: 72 (07-27-19 @ 07:00) (69 - 78)  BP: --  BP(mean): --  RR: 32 (07-27-19 @ 07:00) (22 - 33)  SpO2: 100% (07-27-19 @ 07:00) (91% - 100%)  Neck: No palpable thyroid nodules.  CVS: S1S2, No murmurs  Respiratory: No wheezing, no crepitations  GI: Abdomen soft, bowel sounds positive  Musculoskeletal:  edema lower extremities.   Skin: No skin rashes, no ecchymosis    Diagnostics

## 2019-07-27 NOTE — PROCEDURE NOTE - NSTRACHPOSTINTU_RESP_A_CORE
Chest X-Ray/Appropriate capnography/Breath sounds bilateral/Breath sounds equal/Positive end tidal Co2 noted/Chest excursion noted
Appropriate capnography/Chest X-Ray/Breath sounds bilateral/Breath sounds equal/Chest excursion noted/Positive end tidal Co2 noted

## 2019-07-28 LAB
ALBUMIN SERPL ELPH-MCNC: 2.6 G/DL — LOW (ref 3.3–5)
ALP SERPL-CCNC: 169 U/L — HIGH (ref 40–120)
ALT FLD-CCNC: 58 U/L — HIGH (ref 10–45)
ANION GAP SERPL CALC-SCNC: 18 MMOL/L — HIGH (ref 5–17)
APTT BLD: 59.3 SEC — HIGH (ref 27.5–36.3)
APTT BLD: 59.5 SEC — HIGH (ref 27.5–36.3)
APTT BLD: 69.2 SEC — HIGH (ref 27.5–36.3)
APTT BLD: 99.1 SEC — HIGH (ref 27.5–36.3)
AST SERPL-CCNC: 155 U/L — HIGH (ref 10–40)
BASE EXCESS BLDV CALC-SCNC: -1.8 MMOL/L — SIGNIFICANT CHANGE UP (ref -2–2)
BASE EXCESS BLDV CALC-SCNC: -2.5 MMOL/L — LOW (ref -2–2)
BILIRUB SERPL-MCNC: 3.4 MG/DL — HIGH (ref 0.2–1.2)
BUN SERPL-MCNC: 49 MG/DL — HIGH (ref 7–23)
CALCIUM SERPL-MCNC: 8.1 MG/DL — LOW (ref 8.4–10.5)
CHLORIDE SERPL-SCNC: 102 MMOL/L — SIGNIFICANT CHANGE UP (ref 96–108)
CO2 BLDV-SCNC: 24 MMOL/L — SIGNIFICANT CHANGE UP (ref 22–30)
CO2 BLDV-SCNC: 24 MMOL/L — SIGNIFICANT CHANGE UP (ref 22–30)
CO2 SERPL-SCNC: 19 MMOL/L — LOW (ref 22–31)
CREAT SERPL-MCNC: 3.95 MG/DL — HIGH (ref 0.5–1.3)
GAS PNL BLDA: SIGNIFICANT CHANGE UP
GLUCOSE BLDC GLUCOMTR-MCNC: 131 MG/DL — HIGH (ref 70–99)
GLUCOSE BLDC GLUCOMTR-MCNC: 147 MG/DL — HIGH (ref 70–99)
GLUCOSE SERPL-MCNC: 137 MG/DL — HIGH (ref 70–99)
HCO3 BLDV-SCNC: 22 MMOL/L — SIGNIFICANT CHANGE UP (ref 21–29)
HCO3 BLDV-SCNC: 23 MMOL/L — SIGNIFICANT CHANGE UP (ref 21–29)
HCT VFR BLD CALC: 23.6 % — LOW (ref 39–50)
HGB BLD-MCNC: 8.3 G/DL — LOW (ref 13–17)
HOROWITZ INDEX BLDV+IHG-RTO: 40 — SIGNIFICANT CHANGE UP
HOROWITZ INDEX BLDV+IHG-RTO: 40 — SIGNIFICANT CHANGE UP
INR BLD: 2.57 RATIO — HIGH (ref 0.88–1.16)
INR BLD: 2.89 RATIO — HIGH (ref 0.88–1.16)
INR BLD: 3.16 RATIO — HIGH (ref 0.88–1.16)
INR BLD: 3.38 RATIO — HIGH (ref 0.88–1.16)
MAGNESIUM SERPL-MCNC: 2.1 MG/DL — SIGNIFICANT CHANGE UP (ref 1.6–2.6)
MCHC RBC-ENTMCNC: 32.6 PG — SIGNIFICANT CHANGE UP (ref 27–34)
MCHC RBC-ENTMCNC: 35.1 GM/DL — SIGNIFICANT CHANGE UP (ref 32–36)
MCV RBC AUTO: 92.8 FL — SIGNIFICANT CHANGE UP (ref 80–100)
PCO2 BLDV: 41 MMHG — SIGNIFICANT CHANGE UP (ref 35–50)
PCO2 BLDV: 43 MMHG — SIGNIFICANT CHANGE UP (ref 35–50)
PH BLDV: 7.35 — SIGNIFICANT CHANGE UP (ref 7.35–7.45)
PH BLDV: 7.36 — SIGNIFICANT CHANGE UP (ref 7.35–7.45)
PHOSPHATE SERPL-MCNC: 2.8 MG/DL — SIGNIFICANT CHANGE UP (ref 2.5–4.5)
PLATELET # BLD AUTO: 106 K/UL — LOW (ref 150–400)
PO2 BLDV: 37 MMHG — SIGNIFICANT CHANGE UP (ref 25–45)
PO2 BLDV: 39 MMHG — SIGNIFICANT CHANGE UP (ref 25–45)
POTASSIUM SERPL-MCNC: 3.2 MMOL/L — LOW (ref 3.5–5.3)
POTASSIUM SERPL-SCNC: 3.2 MMOL/L — LOW (ref 3.5–5.3)
PROT SERPL-MCNC: 6.6 G/DL — SIGNIFICANT CHANGE UP (ref 6–8.3)
PROTHROM AB SERPL-ACNC: 30.4 SEC — HIGH (ref 10–12.9)
PROTHROM AB SERPL-ACNC: 34.4 SEC — HIGH (ref 10–12.9)
PROTHROM AB SERPL-ACNC: 37.3 SEC — HIGH (ref 10–12.9)
PROTHROM AB SERPL-ACNC: 40 SEC — HIGH (ref 10–12.9)
RBC # BLD: 2.54 M/UL — LOW (ref 4.2–5.8)
RBC # FLD: 14.6 % — HIGH (ref 10.3–14.5)
SAO2 % BLDV: 67 % — SIGNIFICANT CHANGE UP (ref 67–88)
SAO2 % BLDV: 69 % — SIGNIFICANT CHANGE UP (ref 67–88)
SODIUM SERPL-SCNC: 139 MMOL/L — SIGNIFICANT CHANGE UP (ref 135–145)
WBC # BLD: 15.3 K/UL — HIGH (ref 3.8–10.5)
WBC # FLD AUTO: 15.3 K/UL — HIGH (ref 3.8–10.5)

## 2019-07-28 PROCEDURE — 99291 CRITICAL CARE FIRST HOUR: CPT

## 2019-07-28 PROCEDURE — 71045 X-RAY EXAM CHEST 1 VIEW: CPT | Mod: 26

## 2019-07-28 RX ORDER — NOREPINEPHRINE BITARTRATE/D5W 8 MG/250ML
0.04 PLASTIC BAG, INJECTION (ML) INTRAVENOUS
Qty: 8 | Refills: 0 | Status: DISCONTINUED | OUTPATIENT
Start: 2019-07-28 | End: 2019-08-02

## 2019-07-28 RX ORDER — DIPHENHYDRAMINE HCL 50 MG
25 CAPSULE ORAL EVERY 4 HOURS
Refills: 0 | Status: DISCONTINUED | OUTPATIENT
Start: 2019-07-28 | End: 2019-07-29

## 2019-07-28 RX ADMIN — PROPOFOL 4.42 MICROGRAM(S)/KG/MIN: 10 INJECTION, EMULSION INTRAVENOUS at 17:45

## 2019-07-28 RX ADMIN — CHLORHEXIDINE GLUCONATE 15 MILLILITER(S): 213 SOLUTION TOPICAL at 05:34

## 2019-07-28 RX ADMIN — Medication 11.04 MICROGRAM(S)/KG/MIN: at 17:45

## 2019-07-28 RX ADMIN — Medication 100 MILLIGRAM(S): at 13:32

## 2019-07-28 RX ADMIN — Medication 500 MILLIGRAM(S): at 20:30

## 2019-07-28 RX ADMIN — ZINC OXIDE 1 APPLICATION(S): 200 OINTMENT TOPICAL at 17:44

## 2019-07-28 RX ADMIN — Medication 2: at 17:43

## 2019-07-28 RX ADMIN — PROPOFOL 4.42 MICROGRAM(S)/KG/MIN: 10 INJECTION, EMULSION INTRAVENOUS at 20:31

## 2019-07-28 RX ADMIN — MIDODRINE HYDROCHLORIDE 10 MILLIGRAM(S): 2.5 TABLET ORAL at 21:21

## 2019-07-28 RX ADMIN — VASOPRESSIN 3 UNIT(S)/MIN: 20 INJECTION INTRAVENOUS at 20:31

## 2019-07-28 RX ADMIN — CHLORHEXIDINE GLUCONATE 1 APPLICATION(S): 213 SOLUTION TOPICAL at 05:36

## 2019-07-28 RX ADMIN — Medication 500 MILLIGRAM(S): at 13:32

## 2019-07-28 RX ADMIN — Medication 500 MILLIGRAM(S): at 09:04

## 2019-07-28 RX ADMIN — Medication 25 MICROGRAM(S): at 21:21

## 2019-07-28 RX ADMIN — MIDODRINE HYDROCHLORIDE 10 MILLIGRAM(S): 2.5 TABLET ORAL at 13:32

## 2019-07-28 RX ADMIN — ATORVASTATIN CALCIUM 80 MILLIGRAM(S): 80 TABLET, FILM COATED ORAL at 21:21

## 2019-07-28 RX ADMIN — Medication 100 MILLIGRAM(S): at 05:35

## 2019-07-28 RX ADMIN — LIDOCAINE 1 APPLICATION(S): 4 CREAM TOPICAL at 13:38

## 2019-07-28 RX ADMIN — NYSTATIN CREAM 1 APPLICATION(S): 100000 CREAM TOPICAL at 05:36

## 2019-07-28 RX ADMIN — LIDOCAINE 1 APPLICATION(S): 4 CREAM TOPICAL at 21:22

## 2019-07-28 RX ADMIN — VASOPRESSIN 3 UNIT(S)/MIN: 20 INJECTION INTRAVENOUS at 17:45

## 2019-07-28 RX ADMIN — Medication 81 MILLIGRAM(S): at 11:58

## 2019-07-28 RX ADMIN — MIDODRINE HYDROCHLORIDE 10 MILLIGRAM(S): 2.5 TABLET ORAL at 05:34

## 2019-07-28 RX ADMIN — NYSTATIN CREAM 1 APPLICATION(S): 100000 CREAM TOPICAL at 17:44

## 2019-07-28 RX ADMIN — CHLORHEXIDINE GLUCONATE 15 MILLILITER(S): 213 SOLUTION TOPICAL at 17:43

## 2019-07-28 RX ADMIN — Medication 11.04 MICROGRAM(S)/KG/MIN: at 20:32

## 2019-07-28 RX ADMIN — PANTOPRAZOLE SODIUM 40 MILLIGRAM(S): 20 TABLET, DELAYED RELEASE ORAL at 17:43

## 2019-07-28 RX ADMIN — Medication 500 MILLIGRAM(S): at 02:00

## 2019-07-28 RX ADMIN — Medication 100 MILLIGRAM(S): at 21:21

## 2019-07-28 RX ADMIN — LIDOCAINE 1 APPLICATION(S): 4 CREAM TOPICAL at 05:36

## 2019-07-28 RX ADMIN — Medication 6 MICROGRAM(S)/KG/MIN: at 17:46

## 2019-07-28 RX ADMIN — Medication 25 MILLIGRAM(S): at 17:43

## 2019-07-28 RX ADMIN — Medication 6 MICROGRAM(S)/KG/MIN: at 20:32

## 2019-07-28 RX ADMIN — ZINC OXIDE 1 APPLICATION(S): 200 OINTMENT TOPICAL at 05:35

## 2019-07-28 RX ADMIN — PANTOPRAZOLE SODIUM 40 MILLIGRAM(S): 20 TABLET, DELAYED RELEASE ORAL at 05:34

## 2019-07-28 NOTE — PROGRESS NOTE ADULT - ASSESSMENT
Assessment  DMT2: 73y Male with newly diagnosed DM T2 with hyperglycemia, on low dose insulin, coverage blood sugar today 137   no hypoglycemic episode, on tube feeding, intubated  CAD: s/P CABG, on medications, stable, monitored.  HTN: Controlled,  on antihypertensive medications.      cata Turner MD  Cell: [phone]

## 2019-07-28 NOTE — PROGRESS NOTE ADULT - SUBJECTIVE AND OBJECTIVE BOX
Endocrinology Attending Covering for Dr. Magdaleno      Chief complaint  Patient is a 73y old  Male who presents with a chief complaint of cabg (27 Jul 2019 08:59)   Review of systems  Patient in bed, looks comfortable, no fever,  had no hypoglycemia.    Labs and Fingersticks  CAPILLARY BLOOD GLUCOSE      POCT Blood Glucose.: 131 mg/dL (28 Jul 2019 06:25)  POCT Blood Glucose.: 135 mg/dL (27 Jul 2019 23:29)  POCT Blood Glucose.: 129 mg/dL (27 Jul 2019 16:55)  POCT Blood Glucose.: 115 mg/dL (27 Jul 2019 12:15)      Anion Gap, Serum: 18 <H> (07-28 @ 00:24)  Anion Gap, Serum: 20 <H> (07-27 @ 02:31)      Calcium, Total Serum: 8.1 <L> (07-28 @ 00:24)  Calcium, Total Serum: 8.3 <L> (07-27 @ 02:31)  Albumin, Serum: 2.6 <L> (07-28 @ 00:24)  Albumin, Serum: 3.0 <L> (07-27 @ 02:31)    Alanine Aminotransferase (ALT/SGPT): 58 <H> (07-28 @ 00:24)  Alanine Aminotransferase (ALT/SGPT): 76 <H> (07-27 @ 02:31)  Alkaline Phosphatase, Serum: 169 <H> (07-28 @ 00:24)  Alkaline Phosphatase, Serum: 183 <H> (07-27 @ 02:31)  Aspartate Aminotransferase (AST/SGOT): 155 <H> (07-28 @ 00:24)  Aspartate Aminotransferase (AST/SGOT): 187 <H> (07-27 @ 02:31)        07-28    139  |  102  |  49<H>  ----------------------------<  137<H>  3.2<L>   |  19<L>  |  3.95<H>    Ca    8.1<L>      28 Jul 2019 00:24  Phos  2.8     07-28  Mg     2.1     07-28    TPro  6.6  /  Alb  2.6<L>  /  TBili  3.4<H>  /  DBili  x   /  AST  155<H>  /  ALT  58<H>  /  AlkPhos  169<H>  07-28                        8.3    15.3  )-----------( 106      ( 28 Jul 2019 00:24 )             23.6     Medications  MEDICATIONS  (STANDING):  aspirin  chewable 81 milliGRAM(s) Oral daily  atorvastatin 80 milliGRAM(s) Oral at bedtime  chlorhexidine 0.12% Liquid 15 milliLiter(s) Oral Mucosa every 12 hours  chlorhexidine 2% Cloths 1 Application(s) Topical <User Schedule>  chlorhexidine 4% Liquid 1 Application(s) Topical <User Schedule>  dextrose 50% Injectable 50 milliLiter(s) IV Push every 15 minutes  dextrose 50% Injectable 25 milliLiter(s) IV Push every 15 minutes  dextrose 50% Injectable 50 milliLiter(s) IV Push every 15 minutes  dextrose 50% Injectable 25 milliLiter(s) IV Push every 15 minutes  DOBUTamine Infusion 5 MICROgram(s)/kG/Min (11.04 mL/Hr) IV Continuous <Continuous>  heparin  Infusion 1000 Unit(s)/Hr (5 mL/Hr) IV Continuous <Continuous>  insulin lispro (HumaLOG) corrective regimen sliding scale   SubCutaneous every 6 hours  levothyroxine Injectable 25 MICROGram(s) IV Push at bedtime  lidocaine 2% Gel 1 Application(s) Topical every 8 hours  metroNIDAZOLE  IVPB 500 milliGRAM(s) IV Intermittent every 8 hours  midodrine 10 milliGRAM(s) Oral every 8 hours  nystatin Powder 1 Application(s) Topical two times a day  pantoprazole  Injectable 40 milliGRAM(s) IV Push two times a day  propofol Infusion 10 MICROgram(s)/kG/Min (4.416 mL/Hr) IV Continuous <Continuous>  sodium chloride 0.9%. 1000 milliLiter(s) (10 mL/Hr) IV Continuous <Continuous>  vancomycin    Solution 500 milliGRAM(s) Oral every 6 hours  vasopressin Infusion 0.05 Unit(s)/Min (3 mL/Hr) IV Continuous <Continuous>  zinc oxide 40% Ointment 1 Application(s) Topical two times a day      Physical Exam  General: Patient comfortable in bed  Vital Signs Last 12 Hrs  T(F): 97.7 (07-28-19 @ 08:00), Max: 98.2 (07-28-19 @ 00:00)  HR: 81 (07-28-19 @ 10:15) (76 - 82)  BP: --  BP(mean): --  RR: 29 (07-28-19 @ 10:15) (9 - 29)  SpO2: 99% (07-28-19 @ 10:15) (96% - 100%)  Neck: No palpable thyroid nodules.  CVS: S1S2, No murmurs  Respiratory: No wheezing, no crepitations  GI: Abdomen soft, bowel sounds positive  Musculoskeletal:  edema lower extremities.   Skin: No skin rashes, no ecchymosis    Diagnostics

## 2019-07-28 NOTE — PROGRESS NOTE ADULT - ASSESSMENT
The patient is a 73y Male with CAD, HFrEF and PVD s/p CABGx3 followed by AFib, resp failure and acute on chronic kidney injury.    - CKD:  Suspect underlying CKD stage 3  - RALPH: anuric.   Now on dialysis.  - Hypotensive on  and Vaso   - Respiratory distress  - Confusion   - Diarrhea      RECOMMENDATIONS  - UF  for 1.5 kg and assess breathing. Possible UF     - please dose any new medications for a CrCl of 10-15 ml/min   -C diff positive at present .  Cont abx.  Diarrhea amount has reduced   -CPAP p HD?

## 2019-07-28 NOTE — PROGRESS NOTE ADULT - SUBJECTIVE AND OBJECTIVE BOX
CRITICAL CARE ATTENDING - CTICU    MEDICATIONS  (STANDING):  aspirin  chewable 81 milliGRAM(s) Oral daily  atorvastatin 80 milliGRAM(s) Oral at bedtime  chlorhexidine 0.12% Liquid 15 milliLiter(s) Oral Mucosa every 12 hours  chlorhexidine 2% Cloths 1 Application(s) Topical <User Schedule>  chlorhexidine 4% Liquid 1 Application(s) Topical <User Schedule>  dextrose 50% Injectable 50 milliLiter(s) IV Push every 15 minutes  dextrose 50% Injectable 25 milliLiter(s) IV Push every 15 minutes  dextrose 50% Injectable 50 milliLiter(s) IV Push every 15 minutes  dextrose 50% Injectable 25 milliLiter(s) IV Push every 15 minutes  DOBUTamine Infusion 5 MICROgram(s)/kG/Min (11.04 mL/Hr) IV Continuous <Continuous>  heparin  Infusion 1000 Unit(s)/Hr (5 mL/Hr) IV Continuous <Continuous>  insulin lispro (HumaLOG) corrective regimen sliding scale   SubCutaneous every 6 hours  levothyroxine Injectable 25 MICROGram(s) IV Push at bedtime  lidocaine 2% Gel 1 Application(s) Topical every 8 hours  metroNIDAZOLE  IVPB 500 milliGRAM(s) IV Intermittent every 8 hours  midodrine 10 milliGRAM(s) Oral every 8 hours  nystatin Powder 1 Application(s) Topical two times a day  pantoprazole  Injectable 40 milliGRAM(s) IV Push two times a day  propofol Infusion 10 MICROgram(s)/kG/Min (4.416 mL/Hr) IV Continuous <Continuous>  sodium chloride 0.9%. 1000 milliLiter(s) (10 mL/Hr) IV Continuous <Continuous>  vancomycin    Solution 500 milliGRAM(s) Oral every 6 hours  vasopressin Infusion 0.05 Unit(s)/Min (3 mL/Hr) IV Continuous <Continuous>  zinc oxide 40% Ointment 1 Application(s) Topical two times a day                                    8.3    15.3  )-----------( 106      ( 2019 00:24 )             23.6       -    139  |  102  |  49<H>  ----------------------------<  137<H>  3.2<L>   |  19<L>  |  3.95<H>    Ca    8.1<L>      2019 00:24  Phos  2.8     07-28  Mg     2.1         TPro  6.6  /  Alb  2.6<L>  /  TBili  3.4<H>  /  DBili  x   /  AST  155<H>  /  ALT  58<H>  /  AlkPhos  169<H>        PT/INR - ( 2019 05:15 )   PT: 37.3 sec;   INR: 3.16 ratio         PTT - ( 2019 05:15 )  PTT:69.2 sec    Mode: AC/ CMV (Assist Control/ Continuous Mandatory Ventilation)  RR (machine): 14  TV (machine): 500  FiO2: 40  PEEP: 5  ITime: 1  MAP: 12  PIP: 29      Daily     Daily Weight in k.9 (2019 00:00)       @ 07:  -   @ 07:00  --------------------------------------------------------  IN: 2880.4 mL / OUT: 2600 mL / NET: 280.4 mL        Critically Ill patient  : [ ] preoperative ,   [x ] post operative    Requires :  [x ] Arterial Line   [x ] Central Line  [ ] PA catheter  [ ] IABP  [ ] ECMO  [ ] LVAD  [x ] Ventilator  [x ] pacemaker [ ] Impella.                      [ ] ABG's     [ ] Pulse Oxymetry Monitoring  Bedside evaluation , monitoring , treatment of hemodynamics , fluids , IVP/ IVCD meds.        Diagnosis:     POD 7/12 - CABG X 3 L    Post Op Cardiogenic Shock - resolved    CHF- acute [x ]   chronic [ x]    systolic [ x]   diatolic [ ]          - Echo- EF -  30's           [ ] RV dysfunction          - Cxr-cardiomegally, edema          - Clinical-  [ x]inotropes   [ x]pressors   [ ]diuresis   [ ]IABP   [ ]ECMO   [ ]LVAD   [x ]Respiratory Failure    Hemodynamic lability,instability. Requires IVCD [ x] vasopressors [x ] inotropes  [ ] vasodilator  [ ]IVSS fluid  to maintain MAP, perfusion, C.I.     respiratory failure     Requires chest PT, pulmonary toilet, ambu bagging, suctioning to maintain SaO2,  patent airway and treat atelectasis.     Ventilator Management:  [ x]AC-rest    [x ]CPAP-PS Wean    [ ]Trach Collar     [ ]Extubate    [ ] T-Piece  [ ]peep>5     Difficult weaning process - multiple organ system involvement in critically ill patient      Renal Failure - Acute Kidney Injury     [x ] Hemodialysis [ ] Hemofiltration:    [x ] negative fluid balance [ ] even fluid balance [ ] positive fluid balance, in a hemodynamically unstable patient.     fluid overload     Tolerates NG / NJ feeds at [ x] goal rate    [ ] trophic rate    [ ]       rate     Thrombocytopenia     Hypokalemia    IVCD anticoagulation with [x ] Heparin  [ ] Argatroban for PVD / A Fib     For Tracheostomy     Requires bedside physical therapy, mobilization and total residential care.                         -                     Discussed with CT surgeon, Physician's Assistant - Nurse Practitioner- Critical care medicine team.   Dicussed at  AM / PM rounds.   Chart, labs , films reviewed.    Total Time: 30 min

## 2019-07-29 ENCOUNTER — TRANSCRIPTION ENCOUNTER (OUTPATIENT)
Age: 74
End: 2019-07-29

## 2019-07-29 LAB
ALBUMIN SERPL ELPH-MCNC: 2.3 G/DL — LOW (ref 3.3–5)
ALP SERPL-CCNC: 181 U/L — HIGH (ref 40–120)
ALT FLD-CCNC: 48 U/L — HIGH (ref 10–45)
ANION GAP SERPL CALC-SCNC: 17 MMOL/L — SIGNIFICANT CHANGE UP (ref 5–17)
APTT BLD: 51.5 SEC — HIGH (ref 27.5–36.3)
APTT BLD: 55.8 SEC — HIGH (ref 27.5–36.3)
APTT BLD: 56.6 SEC — HIGH (ref 27.5–36.3)
AST SERPL-CCNC: 155 U/L — HIGH (ref 10–40)
BASE EXCESS BLDV CALC-SCNC: -4.5 MMOL/L — LOW (ref -2–2)
BILIRUB SERPL-MCNC: 4.9 MG/DL — HIGH (ref 0.2–1.2)
BLD GP AB SCN SERPL QL: NEGATIVE — SIGNIFICANT CHANGE UP
BUN SERPL-MCNC: 62 MG/DL — HIGH (ref 7–23)
CALCIUM SERPL-MCNC: 7.9 MG/DL — LOW (ref 8.4–10.5)
CHLORIDE SERPL-SCNC: 102 MMOL/L — SIGNIFICANT CHANGE UP (ref 96–108)
CO2 BLDV-SCNC: 21 MMOL/L — LOW (ref 22–30)
CO2 SERPL-SCNC: 16 MMOL/L — LOW (ref 22–31)
CREAT SERPL-MCNC: 4.74 MG/DL — HIGH (ref 0.5–1.3)
GAS PNL BLDA: SIGNIFICANT CHANGE UP
GAS PNL BLDV: SIGNIFICANT CHANGE UP
GLUCOSE BLDC GLUCOMTR-MCNC: 127 MG/DL — HIGH (ref 70–99)
GLUCOSE SERPL-MCNC: 187 MG/DL — HIGH (ref 70–99)
HCO3 BLDV-SCNC: 20 MMOL/L — LOW (ref 21–29)
HCT VFR BLD CALC: 23.8 % — LOW (ref 39–50)
HCT VFR BLD CALC: 26.8 % — LOW (ref 39–50)
HGB BLD-MCNC: 8.4 G/DL — LOW (ref 13–17)
HGB BLD-MCNC: 9.4 G/DL — LOW (ref 13–17)
HOROWITZ INDEX BLDV+IHG-RTO: 40 — SIGNIFICANT CHANGE UP
INR BLD: 2.03 RATIO — HIGH (ref 0.88–1.16)
INR BLD: 2.27 RATIO — HIGH (ref 0.88–1.16)
INR BLD: 2.5 RATIO — HIGH (ref 0.88–1.16)
MAGNESIUM SERPL-MCNC: 2.1 MG/DL — SIGNIFICANT CHANGE UP (ref 1.6–2.6)
MCHC RBC-ENTMCNC: 32.2 PG — SIGNIFICANT CHANGE UP (ref 27–34)
MCHC RBC-ENTMCNC: 32.4 PG — SIGNIFICANT CHANGE UP (ref 27–34)
MCHC RBC-ENTMCNC: 34.9 GM/DL — SIGNIFICANT CHANGE UP (ref 32–36)
MCHC RBC-ENTMCNC: 35.2 GM/DL — SIGNIFICANT CHANGE UP (ref 32–36)
MCV RBC AUTO: 92.1 FL — SIGNIFICANT CHANGE UP (ref 80–100)
MCV RBC AUTO: 92.2 FL — SIGNIFICANT CHANGE UP (ref 80–100)
PCO2 BLDV: 35 MMHG — SIGNIFICANT CHANGE UP (ref 35–50)
PH BLDV: 7.37 — SIGNIFICANT CHANGE UP (ref 7.35–7.45)
PHOSPHATE SERPL-MCNC: 3.1 MG/DL — SIGNIFICANT CHANGE UP (ref 2.5–4.5)
PLATELET # BLD AUTO: 106 K/UL — LOW (ref 150–400)
PLATELET # BLD AUTO: 95 K/UL — LOW (ref 150–400)
PO2 BLDV: 45 MMHG — SIGNIFICANT CHANGE UP (ref 25–45)
POTASSIUM SERPL-MCNC: 3.4 MMOL/L — LOW (ref 3.5–5.3)
POTASSIUM SERPL-SCNC: 3.4 MMOL/L — LOW (ref 3.5–5.3)
PROT SERPL-MCNC: 6.9 G/DL — SIGNIFICANT CHANGE UP (ref 6–8.3)
PROTHROM AB SERPL-ACNC: 23.7 SEC — HIGH (ref 10–12.9)
PROTHROM AB SERPL-ACNC: 26.8 SEC — HIGH (ref 10–12.9)
PROTHROM AB SERPL-ACNC: 29.6 SEC — HIGH (ref 10–12.9)
RBC # BLD: 2.59 M/UL — LOW (ref 4.2–5.8)
RBC # BLD: 2.91 M/UL — LOW (ref 4.2–5.8)
RBC # FLD: 14.3 % — SIGNIFICANT CHANGE UP (ref 10.3–14.5)
RBC # FLD: 14.5 % — SIGNIFICANT CHANGE UP (ref 10.3–14.5)
RH IG SCN BLD-IMP: POSITIVE — SIGNIFICANT CHANGE UP
SAO2 % BLDV: 78 % — SIGNIFICANT CHANGE UP (ref 67–88)
SODIUM SERPL-SCNC: 135 MMOL/L — SIGNIFICANT CHANGE UP (ref 135–145)
TSH SERPL-MCNC: 2.57 UIU/ML — SIGNIFICANT CHANGE UP (ref 0.27–4.2)
WBC # BLD: 15.4 K/UL — HIGH (ref 3.8–10.5)
WBC # BLD: 17.9 K/UL — HIGH (ref 3.8–10.5)
WBC # FLD AUTO: 15.4 K/UL — HIGH (ref 3.8–10.5)
WBC # FLD AUTO: 17.9 K/UL — HIGH (ref 3.8–10.5)

## 2019-07-29 PROCEDURE — 71045 X-RAY EXAM CHEST 1 VIEW: CPT | Mod: 26

## 2019-07-29 PROCEDURE — 36800 INSERTION OF CANNULA: CPT | Mod: 78

## 2019-07-29 PROCEDURE — 36620 INSERTION CATHETER ARTERY: CPT | Mod: 78

## 2019-07-29 PROCEDURE — 99232 SBSQ HOSP IP/OBS MODERATE 35: CPT

## 2019-07-29 PROCEDURE — 99291 CRITICAL CARE FIRST HOUR: CPT | Mod: 25

## 2019-07-29 PROCEDURE — 99223 1ST HOSP IP/OBS HIGH 75: CPT | Mod: 57

## 2019-07-29 PROCEDURE — 36800 INSERTION OF CANNULA: CPT

## 2019-07-29 RX ORDER — VECURONIUM BROMIDE 20 MG/1
2 INJECTION, POWDER, FOR SOLUTION INTRAVENOUS ONCE
Refills: 0 | Status: COMPLETED | OUTPATIENT
Start: 2019-07-29 | End: 2019-07-29

## 2019-07-29 RX ORDER — POTASSIUM CHLORIDE 20 MEQ
10 PACKET (EA) ORAL ONCE
Refills: 0 | Status: COMPLETED | OUTPATIENT
Start: 2019-07-29 | End: 2019-07-29

## 2019-07-29 RX ORDER — HYDROMORPHONE HYDROCHLORIDE 2 MG/ML
0.5 INJECTION INTRAMUSCULAR; INTRAVENOUS; SUBCUTANEOUS ONCE
Refills: 0 | Status: DISCONTINUED | OUTPATIENT
Start: 2019-07-29 | End: 2019-07-29

## 2019-07-29 RX ORDER — DEXMEDETOMIDINE HYDROCHLORIDE IN 0.9% SODIUM CHLORIDE 4 UG/ML
0.5 INJECTION INTRAVENOUS
Qty: 200 | Refills: 0 | Status: DISCONTINUED | OUTPATIENT
Start: 2019-07-29 | End: 2019-07-30

## 2019-07-29 RX ORDER — CHLORHEXIDINE GLUCONATE 213 G/1000ML
1 SOLUTION TOPICAL
Refills: 0 | Status: DISCONTINUED | OUTPATIENT
Start: 2019-07-29 | End: 2019-08-03

## 2019-07-29 RX ORDER — POTASSIUM CHLORIDE 20 MEQ
20 PACKET (EA) ORAL ONCE
Refills: 0 | Status: COMPLETED | OUTPATIENT
Start: 2019-07-29 | End: 2019-07-29

## 2019-07-29 RX ORDER — CALCIUM GLUCONATE 100 MG/ML
1 VIAL (ML) INTRAVENOUS ONCE
Refills: 0 | Status: COMPLETED | OUTPATIENT
Start: 2019-07-29 | End: 2019-07-29

## 2019-07-29 RX ORDER — SODIUM CHLORIDE 9 MG/ML
10 INJECTION INTRAMUSCULAR; INTRAVENOUS; SUBCUTANEOUS
Refills: 0 | Status: DISCONTINUED | OUTPATIENT
Start: 2019-07-29 | End: 2019-08-03

## 2019-07-29 RX ADMIN — MIDODRINE HYDROCHLORIDE 10 MILLIGRAM(S): 2.5 TABLET ORAL at 06:19

## 2019-07-29 RX ADMIN — ZINC OXIDE 1 APPLICATION(S): 200 OINTMENT TOPICAL at 06:44

## 2019-07-29 RX ADMIN — Medication 100 MILLIGRAM(S): at 13:45

## 2019-07-29 RX ADMIN — Medication 20 MILLIEQUIVALENT(S): at 08:27

## 2019-07-29 RX ADMIN — Medication 100 MILLIGRAM(S): at 06:19

## 2019-07-29 RX ADMIN — Medication 500 MILLIGRAM(S): at 13:45

## 2019-07-29 RX ADMIN — Medication 25 MICROGRAM(S): at 21:34

## 2019-07-29 RX ADMIN — CHLORHEXIDINE GLUCONATE 15 MILLILITER(S): 213 SOLUTION TOPICAL at 18:10

## 2019-07-29 RX ADMIN — CHLORHEXIDINE GLUCONATE 1 APPLICATION(S): 213 SOLUTION TOPICAL at 06:19

## 2019-07-29 RX ADMIN — VECURONIUM BROMIDE 2 MILLIGRAM(S): 20 INJECTION, POWDER, FOR SOLUTION INTRAVENOUS at 04:13

## 2019-07-29 RX ADMIN — PANTOPRAZOLE SODIUM 40 MILLIGRAM(S): 20 TABLET, DELAYED RELEASE ORAL at 06:19

## 2019-07-29 RX ADMIN — LIDOCAINE 1 APPLICATION(S): 4 CREAM TOPICAL at 21:35

## 2019-07-29 RX ADMIN — Medication 11.04 MICROGRAM(S)/KG/MIN: at 08:37

## 2019-07-29 RX ADMIN — Medication 6 MICROGRAM(S)/KG/MIN: at 08:36

## 2019-07-29 RX ADMIN — Medication 200 GRAM(S): at 11:03

## 2019-07-29 RX ADMIN — MIDODRINE HYDROCHLORIDE 10 MILLIGRAM(S): 2.5 TABLET ORAL at 21:35

## 2019-07-29 RX ADMIN — NYSTATIN CREAM 1 APPLICATION(S): 100000 CREAM TOPICAL at 06:44

## 2019-07-29 RX ADMIN — Medication 2: at 00:52

## 2019-07-29 RX ADMIN — Medication 50 MILLIEQUIVALENT(S): at 15:51

## 2019-07-29 RX ADMIN — Medication 500 MILLIGRAM(S): at 08:35

## 2019-07-29 RX ADMIN — HYDROMORPHONE HYDROCHLORIDE 0.5 MILLIGRAM(S): 2 INJECTION INTRAMUSCULAR; INTRAVENOUS; SUBCUTANEOUS at 04:12

## 2019-07-29 RX ADMIN — HYDROMORPHONE HYDROCHLORIDE 0.5 MILLIGRAM(S): 2 INJECTION INTRAMUSCULAR; INTRAVENOUS; SUBCUTANEOUS at 04:30

## 2019-07-29 RX ADMIN — Medication 500 MILLIGRAM(S): at 21:34

## 2019-07-29 RX ADMIN — CHLORHEXIDINE GLUCONATE 15 MILLILITER(S): 213 SOLUTION TOPICAL at 06:19

## 2019-07-29 RX ADMIN — ATORVASTATIN CALCIUM 80 MILLIGRAM(S): 80 TABLET, FILM COATED ORAL at 21:35

## 2019-07-29 RX ADMIN — VASOPRESSIN 3 UNIT(S)/MIN: 20 INJECTION INTRAVENOUS at 08:37

## 2019-07-29 RX ADMIN — Medication 81 MILLIGRAM(S): at 11:03

## 2019-07-29 RX ADMIN — LIDOCAINE 1 APPLICATION(S): 4 CREAM TOPICAL at 06:44

## 2019-07-29 RX ADMIN — ZINC OXIDE 1 APPLICATION(S): 200 OINTMENT TOPICAL at 18:11

## 2019-07-29 RX ADMIN — CHLORHEXIDINE GLUCONATE 1 APPLICATION(S): 213 SOLUTION TOPICAL at 06:00

## 2019-07-29 RX ADMIN — PANTOPRAZOLE SODIUM 40 MILLIGRAM(S): 20 TABLET, DELAYED RELEASE ORAL at 18:12

## 2019-07-29 RX ADMIN — Medication 500 MILLIGRAM(S): at 03:02

## 2019-07-29 RX ADMIN — NYSTATIN CREAM 1 APPLICATION(S): 100000 CREAM TOPICAL at 18:10

## 2019-07-29 RX ADMIN — Medication 100 MILLIGRAM(S): at 21:35

## 2019-07-29 RX ADMIN — MIDODRINE HYDROCHLORIDE 10 MILLIGRAM(S): 2.5 TABLET ORAL at 13:45

## 2019-07-29 RX ADMIN — PROPOFOL 4.42 MICROGRAM(S)/KG/MIN: 10 INJECTION, EMULSION INTRAVENOUS at 08:37

## 2019-07-29 NOTE — CONSULT NOTE ADULT - CONSULT REQUESTED DATE/TIME
23-Jul-2019 11:37
10-Jul-2019 16:35
11-Jul-2019 17:18
16-Jul-2019 18:41
16-Jul-2019 19:34
29-Jul-2019 18:48

## 2019-07-29 NOTE — CHART NOTE - NSCHARTNOTEFT_GEN_A_CORE
Nutrition Follow Up Note    Patient seen for moderate malnutrition follow up.     Chart reviewed, events noted. Pt is a 73 year old male with no PMHx and poor medical follow up. Admitted to [facility] for SOB and LE edema. Newly Dx HF, and RALPH vs CKD and PVD. S/p cardiac cath found with multi vessel disease. Transferred to [facility] and is now s/p CABG x3. Required CVVHD, however held. Plan for HD today. Pt with mental status changes, s/p CT head negative. C-Diff +. S/p swallow evaluation on , recommends NPO. Feeds were held today for possible trach, however now plan for trach tomorrow so feeds restarted. Pt continues to be sedated with propofol.    Source: Comprehensive chart review, RN    Diet :  NPO with EN via NGT    Patient intubated and sedated at time of visit. RN reports pt tolerating EN at goal rate. Noted pt with CDiff at this time, rectal tube output noted below. RN reports she plans to provide DanActive supplement later today. Pt receiving daily HD; last HD yesterday (), 700 mL output. Pt sedated on propofol at this time; receiving 291 kcal from propofol today.     Enteral /Parenteral Nutrition: Nepro at goal rate of 45 mL/hr x 24 hours to provide 1080 mL formula, 1944 kcal, 87 grams protein. Together with propofol (291 kcal) providing 2235 kcal, 87 grams protein (meeting 33.2 kcal/kg, 1.3 g/kg protein using IBW 67.2 kg).     Enteral Nutrition Provision as per documentation:  () 630 mL - 57% - TF held for possible trach, restarted at this time   () 610 mL - 57%  () 540 mL - 50% - TF held for reintubation; brought to goal this day     Rectal Tube Output as per documentation:  () 250 mL   () 760 mL     Daily Weight in k.3 (), Weight in k.1 (), Weight in k.1 (), Weight in k.9 (), Weight in k.3 (), Weight in k.8 (), Weight in k.8 ()  Weight changes likely related to fluid shifts as pt receives HD daily.     Pertinent Medications: MEDICATIONS  (STANDING):  aspirin  chewable 81 milliGRAM(s) Oral daily  atorvastatin 80 milliGRAM(s) Oral at bedtime  chlorhexidine 0.12% Liquid 15 milliLiter(s) Oral Mucosa every 12 hours  chlorhexidine 2% Cloths 1 Application(s) Topical <User Schedule>  chlorhexidine 4% Liquid 1 Application(s) Topical <User Schedule>  dextrose 50% Injectable 50 milliLiter(s) IV Push every 15 minutes  dextrose 50% Injectable 25 milliLiter(s) IV Push every 15 minutes  dextrose 50% Injectable 50 milliLiter(s) IV Push every 15 minutes  dextrose 50% Injectable 25 milliLiter(s) IV Push every 15 minutes  DOBUTamine Infusion 5 MICROgram(s)/kG/Min (11.04 mL/Hr) IV Continuous <Continuous>  insulin lispro (HumaLOG) corrective regimen sliding scale   SubCutaneous every 6 hours  levothyroxine Injectable 25 MICROGram(s) IV Push at bedtime  lidocaine 2% Gel 1 Application(s) Topical every 8 hours  metroNIDAZOLE  IVPB 500 milliGRAM(s) IV Intermittent every 8 hours  midodrine 10 milliGRAM(s) Oral every 8 hours  norepinephrine Infusion 0.043 MICROgram(s)/kG/Min (6 mL/Hr) IV Continuous <Continuous>  nystatin Powder 1 Application(s) Topical two times a day  pantoprazole  Injectable 40 milliGRAM(s) IV Push two times a day  propofol Infusion 10 MICROgram(s)/kG/Min (4.416 mL/Hr) IV Continuous <Continuous>  sodium chloride 0.9%. 1000 milliLiter(s) (10 mL/Hr) IV Continuous <Continuous>  vancomycin    Solution 500 milliGRAM(s) Oral every 6 hours  vasopressin Infusion 0.05 Unit(s)/Min (3 mL/Hr) IV Continuous <Continuous>  zinc oxide 40% Ointment 1 Application(s) Topical two times a day    MEDICATIONS  (PRN):  sodium chloride 0.9% lock flush 10 milliLiter(s) IV Push every 1 hour PRN Pre/post blood products, medications, blood draw, and to maintain line patency    Pertinent Labs:  @ 00:41: Na 135, BUN 62<H>, Cr 4.74<H>, <H>, K+ 3.4<L>, Phos 3.1, Mg 2.1, Alk Phos 181<H>, ALT/SGPT 48<H>, AST/SGOT 155<H>, HbA1c --    Finger Sticks:  POCT Blood Glucose.: 147 mg/dL ( @ 12:09)    Skin per nursing documentation: midsternal surgical incision, wound on right medial lower leg, bilateral leg harvest sites, bilateral thigh EVH  Edema per nursing documentation: 1+ generalized, 2+ bilateral feet    Estimated Needs:   [x ] no change since previous assessment  [ ] recalculated:     Previous Nutrition Diagnosis: Increased Nutrient Needs and Moderate Malnutrition  Nutrition Diagnosis is ongoing, addressed with provision of EN    New Nutrition Diagnosis: n/a     Interventions: Spoke with RN regarding benefits/provision of DanActive supplement.     Recommend  1) Nepro at goal rate of 45 mL/hr x 24 hours to provide 1080 mL formula, 1944 kcal, 87 grams protein. Together with propofol (291 kcal) providing 2235 kcal, 87 grams protein (meeting 33.2 kcal/kg, 1.3 g/kg protein using IBW 67.2 kg).  2) Continue DanActive x2    Monitoring and Evaluation:   -Continue to monitor nutritional intake, tolerance to diet prescription, weights, labs, skin integrity  -RD remains available upon request and will follow up per protocol    Anita Kan RD    Pager# [phone]

## 2019-07-29 NOTE — PROGRESS NOTE ADULT - SUBJECTIVE AND OBJECTIVE BOX
Patient is a 73y old  Male who presents with a chief complaint of hyperglycemia (29 Jul 2019 11:43)    Being followed by ID for        Interval history:  events noted   pt is reintubated  still with diarrhea  unable to answer ROS as intubated  No other acute events        PAST MEDICAL & SURGICAL HISTORY:  Arterial disease: peripheral  TR (tricuspid regurgitation)  MR (mitral regurgitation)  RALPH (acute kidney injury)  HFrEF (heart failure with reduced ejection fraction)  No significant past surgical history    Allergies    No Known Allergies    Intolerances      Antimicrobials:    metroNIDAZOLE  IVPB 500 milliGRAM(s) IV Intermittent every 8 hours  vancomycin    Solution 500 milliGRAM(s) Oral every 6 hours    MEDICATIONS  (STANDING):  aspirin  chewable 81 milliGRAM(s) Oral daily  atorvastatin 80 milliGRAM(s) Oral at bedtime  chlorhexidine 0.12% Liquid 15 milliLiter(s) Oral Mucosa every 12 hours  chlorhexidine 2% Cloths 1 Application(s) Topical <User Schedule>  chlorhexidine 2% Cloths 1 Application(s) Topical <User Schedule>  chlorhexidine 4% Liquid 1 Application(s) Topical <User Schedule>  dexmedetomidine Infusion 0.5 MICROgram(s)/kG/Hr (9.2 mL/Hr) IV Continuous <Continuous>  dextrose 50% Injectable 50 milliLiter(s) IV Push every 15 minutes  dextrose 50% Injectable 25 milliLiter(s) IV Push every 15 minutes  dextrose 50% Injectable 50 milliLiter(s) IV Push every 15 minutes  dextrose 50% Injectable 25 milliLiter(s) IV Push every 15 minutes  DOBUTamine Infusion 5 MICROgram(s)/kG/Min (11.04 mL/Hr) IV Continuous <Continuous>  insulin lispro (HumaLOG) corrective regimen sliding scale   SubCutaneous every 6 hours  levothyroxine Injectable 25 MICROGram(s) IV Push at bedtime  lidocaine 2% Gel 1 Application(s) Topical every 8 hours  metroNIDAZOLE  IVPB 500 milliGRAM(s) IV Intermittent every 8 hours  midodrine 10 milliGRAM(s) Oral every 8 hours  norepinephrine Infusion 0.043 MICROgram(s)/kG/Min (6 mL/Hr) IV Continuous <Continuous>  nystatin Powder 1 Application(s) Topical two times a day  pantoprazole  Injectable 40 milliGRAM(s) IV Push two times a day  propofol Infusion 10 MICROgram(s)/kG/Min (4.416 mL/Hr) IV Continuous <Continuous>  sodium chloride 0.9%. 1000 milliLiter(s) (10 mL/Hr) IV Continuous <Continuous>  vancomycin    Solution 500 milliGRAM(s) Oral every 6 hours  vasopressin Infusion 0.05 Unit(s)/Min (3 mL/Hr) IV Continuous <Continuous>  zinc oxide 40% Ointment 1 Application(s) Topical two times a day      Vital Signs Last 24 Hrs  T(C): 37.2 (07-29-19 @ 16:00), Max: 37.2 (07-29-19 @ 16:00)  T(F): 98.9 (07-29-19 @ 16:00), Max: 98.9 (07-29-19 @ 16:00)  HR: 89 (07-29-19 @ 19:00) (74 - 93)  BP: 110/47 (07-29-19 @ 12:22) (72/32 - 110/47)  BP(mean): 43 (07-29-19 @ 05:15) (43 - 43)  RR: 9 (07-29-19 @ 19:00) (9 - 67)  SpO2: 100% (07-29-19 @ 19:00) (97% - 100%)    Physical Exam:    Constitutional well preserved,comfortable,pleasant    HEENT PERRLA EOMI,No pallor or icterus    No oral exudate or erythema    Neck supple no JVD or LN    Chest Good AE,CTA    CVS RRR S1 S2 WNl     Abd soft BS normal No tenderness     Ext No cyanosis clubbing or edema    IV site no erythema tenderness or discharge    Joints no swelling or LOM    CNS AAO X 3 no focal    Lab Data:                          8.4    15.4  )-----------( 95       ( 29 Jul 2019 07:09 )             23.8       07-29    135  |  102  |  62<H>  ----------------------------<  187<H>  3.4<L>   |  16<L>  |  4.74<H>    Ca    7.9<L>      29 Jul 2019 00:41  Phos  3.1     07-29  Mg     2.1     07-29    TPro  6.9  /  Alb  2.3<L>  /  TBili  4.9<H>  /  DBili  x   /  AST  155<H>  /  ALT  48<H>  /  AlkPhos  181<H>  07-29    < from: Xray Chest 1 View- PORTABLE-Routine (07.29.19 @ 06:22) >  EXAM:  XR CHEST PORTABLE ROUTINE 1V                            PROCEDURE DATE:  07/29/2019      INTERPRETATION:  A single chest x-ray was obtained on July 29, 2019.    Indication: Status post cardiac surgery.    Impression:    The heart is enlarged. Left lower lobe pneumonia and/or atelectasis.   Pulmonary vascular congestion. All life supporting devices are in good   position and unchanged when compared to previous study done July 28, 2019. No pneumothorax.      RITA ORTEGA M.D., ATTENDING RADIOLOGIST  This document has been electronically signed. Jul 29 2019 10:43AM        < end of copied text >      WBC Count: 15.4 (07-29-19 @ 07:09)  WBC Count: 17.9 (07-29-19 @ 00:41)  WBC Count: 15.3 (07-28-19 @ 00:24)  WBC Count: 16.5 (07-27-19 @ 02:31)  WBC Count: 16.7 (07-26-19 @ 02:04)  WBC Count: 19.6 (07-25-19 @ 00:58)  WBC Count: 22.0 (07-24-19 @ 04:52)  WBC Count: 20.7 (07-23-19 @ 02:12)

## 2019-07-29 NOTE — PROGRESS NOTE ADULT - ASSESSMENT
Assessment  DMT2: 73y Male with newly diagnosed DM T2 with hyperglycemia, on low dose insulin, coverage blood sugar today 137 to 187   no hypoglycemic episode, on tube feeding, intubated  CAD: s/P CABG, on medications, stable, monitored.  HTN: Controlled,  on antihypertensive medications.      cata Turner MD  Cell: [phone]

## 2019-07-29 NOTE — CONSULT NOTE ADULT - ATTENDING COMMENTS
failed extubation - plan for trach/peg. consent obtained via phone by resident. left voicemail with next of kin and several calls to discuss .
Karen Hwang M.D. ,   Pager [phone]     after 5PM/ weekends [phone]    thank you
thrombocytopenia likely due to consumptive process secondary to recent CABG surgery, cardiogenic shock, fluid overload  HIT ab neg x1  repeat HIT and STUART being sent  pt with new onset afib, agree with anticoagulation for cardiac arrythmia  suspicion for HIT is low, would not recc argatroban gtt at this time  ok to give heparin gtt  trend daily plt ct, transfuse to keep >50K if active bleeding, otherwise plt ct >20K is adequate  cont supportive care as per CTICU  Abive d/w surg PA at bedside

## 2019-07-29 NOTE — PROGRESS NOTE ADULT - SUBJECTIVE AND OBJECTIVE BOX
CRITICAL CARE ATTENDING - CTICU    MEDICATIONS  (STANDING):  aspirin  chewable 81 milliGRAM(s) Oral daily  atorvastatin 80 milliGRAM(s) Oral at bedtime  chlorhexidine 0.12% Liquid 15 milliLiter(s) Oral Mucosa every 12 hours  chlorhexidine 2% Cloths 1 Application(s) Topical <User Schedule>  chlorhexidine 4% Liquid 1 Application(s) Topical <User Schedule>  dextrose 50% Injectable 50 milliLiter(s) IV Push every 15 minutes  dextrose 50% Injectable 25 milliLiter(s) IV Push every 15 minutes  dextrose 50% Injectable 50 milliLiter(s) IV Push every 15 minutes  dextrose 50% Injectable 25 milliLiter(s) IV Push every 15 minutes  DOBUTamine Infusion 5 MICROgram(s)/kG/Min (11.04 mL/Hr) IV Continuous <Continuous>  insulin lispro (HumaLOG) corrective regimen sliding scale   SubCutaneous every 6 hours  levothyroxine Injectable 25 MICROGram(s) IV Push at bedtime  lidocaine 2% Gel 1 Application(s) Topical every 8 hours  metroNIDAZOLE  IVPB 500 milliGRAM(s) IV Intermittent every 8 hours  midodrine 10 milliGRAM(s) Oral every 8 hours  norepinephrine Infusion 0.043 MICROgram(s)/kG/Min (6 mL/Hr) IV Continuous <Continuous>  nystatin Powder 1 Application(s) Topical two times a day  pantoprazole  Injectable 40 milliGRAM(s) IV Push two times a day  propofol Infusion 10 MICROgram(s)/kG/Min (4.416 mL/Hr) IV Continuous <Continuous>  sodium chloride 0.9%. 1000 milliLiter(s) (10 mL/Hr) IV Continuous <Continuous>  vancomycin    Solution 500 milliGRAM(s) Oral every 6 hours  vasopressin Infusion 0.05 Unit(s)/Min (3 mL/Hr) IV Continuous <Continuous>  zinc oxide 40% Ointment 1 Application(s) Topical two times a day                                    8.4    15.4  )-----------( 95       ( 2019 07:09 )             23.8       07-    135  |  102  |  62<H>  ----------------------------<  187<H>  3.4<L>   |  16<L>  |  4.74<H>    Ca    7.9<L>      2019 00:41  Phos  3.1       Mg     2.1         TPro  6.9  /  Alb  2.3<L>  /  TBili  4.9<H>  /  DBili  x   /  AST  155<H>  /  ALT  48<H>  /  AlkPhos  181<H>        PT/INR - ( 2019 07:10 )   PT: 26.8 sec;   INR: 2.27 ratio         PTT - ( 2019 07:10 )  PTT:55.8 sec    Mode: AC/ CMV (Assist Control/ Continuous Mandatory Ventilation)  RR (machine): 10  TV (machine): 500  FiO2: 40  PEEP: 5  ITime: 1  MAP: 11  PIP: 24      Daily     Daily Weight in k.3 (2019 04:00)       @ 07:  -   @ 07:00  --------------------------------------------------------  IN: 3203.3 mL / OUT: 3410 mL / NET: -206.7 mL     @ 07: @ 09:16  --------------------------------------------------------  IN: 163.5 mL / OUT: 0 mL / NET: 163.5 mL        Critically Ill patient  : [ ] preoperative ,   [ x] post operative    Requires :  [x ] Arterial Line   [x ] Central Line  [ ] PA catheter  [ ] IABP  [ ] ECMO  [ ] LVAD  [x ] Ventilator  [x ] pacemaker [ ] Impella.                      [ x] ABG's     [x ] Pulse Oxymetry Monitoring  Bedside evaluation , monitoring , treatment of hemodynamics , fluids , IVP/ IVCD meds.        Diagnosis:     POD 7/12 - CABG X 3 L     CHF- acute [x ]   chronic [ x]    systolic [x ]   diatolic [ ]          - Echo- EF - 30's            [ ] RV dysfunction          - Cxr-cardiomegally, edema          - Clinical-  [x ]inotropes   [ x]pressors   [ ]diuresis   [ ]IABP   [ ]ECMO   [ ]LVAD   [ x]Respiratory Failure    respiratory failure - Tracheostomy today    Requires chest PT, pulmonary toilet, ambu bagging, suctioning to maintain SaO2,  patent airway and treat atelectasis.     Ventilator Management:  [ x]AC-rest    [ x]CPAP-PS Wean    [ ]Trach Collar     [ ]Extubate    [ ] T-Piece  [ ]peep>5     Difficult weaning process - multiple organ system involvement in critically ill patient      Renal Failure - Acute Kidney Injury     [ x] Hemodialysis [x ] Hemofiltration:    [x ] negative fluid balance [ ] even fluid balance [ ] positive fluid balance, in a hemodynamically unstable patient.     Hemodynamic lability,instability. Requires IVCD [ x] vasopressors [x ] inotropes  [ ] vasodilator  [ ]IVSS fluid  to maintain MAP, perfusion, C.I.     fluid overload     Tolerates NG / NJ feeds at [ x] goal rate    [ ] trophic rate    [ ]       rate     Thrombocytopenia     Hypothyroid    Requires bedside physical therapy, mobilization and total residential care.     Temporary pacemaker (TPM) interrogation and setting.                             -                     Discussed with CT surgeon, Physician's Assistant - Nurse Practitioner- Critical care medicine team.   Dicussed at  AM / PM rounds.   Chart, labs , films reviewed.    Total Time: 30 min

## 2019-07-29 NOTE — PROGRESS NOTE ADULT - SUBJECTIVE AND OBJECTIVE BOX
Endocrinology Attending Covering for Dr. Magdaleno      Chief complaint  Patient is a 73y old  Male who presents with a chief complaint of cabg (28 Jul 2019 10:24)   Review of systems  Patient in bed, intubated, on pressors    Labs and Fingersticks  CAPILLARY BLOOD GLUCOSE      POCT Blood Glucose.: 147 mg/dL (28 Jul 2019 12:09)      Anion Gap, Serum: 17 (07-29 @ 00:41)  Anion Gap, Serum: 18 <H> (07-28 @ 00:24)      Calcium, Total Serum: 7.9 <L> (07-29 @ 00:41)  Calcium, Total Serum: 8.1 <L> (07-28 @ 00:24)  Albumin, Serum: 2.3 <L> (07-29 @ 00:41)  Albumin, Serum: 2.6 <L> (07-28 @ 00:24)    Alanine Aminotransferase (ALT/SGPT): 48 <H> (07-29 @ 00:41)  Alanine Aminotransferase (ALT/SGPT): 58 <H> (07-28 @ 00:24)  Alkaline Phosphatase, Serum: 181 <H> (07-29 @ 00:41)  Alkaline Phosphatase, Serum: 169 <H> (07-28 @ 00:24)  Aspartate Aminotransferase (AST/SGOT): 155 <H> (07-29 @ 00:41)  Aspartate Aminotransferase (AST/SGOT): 155 <H> (07-28 @ 00:24)        07-29    135  |  102  |  62<H>  ----------------------------<  187<H>  3.4<L>   |  16<L>  |  4.74<H>    Ca    7.9<L>      29 Jul 2019 00:41  Phos  3.1     07-29  Mg     2.1     07-29    TPro  6.9  /  Alb  2.3<L>  /  TBili  4.9<H>  /  DBili  x   /  AST  155<H>  /  ALT  48<H>  /  AlkPhos  181<H>  07-29                        8.4    15.4  )-----------( 95       ( 29 Jul 2019 07:09 )             23.8     Medications  MEDICATIONS  (STANDING):  aspirin  chewable 81 milliGRAM(s) Oral daily  atorvastatin 80 milliGRAM(s) Oral at bedtime  chlorhexidine 0.12% Liquid 15 milliLiter(s) Oral Mucosa every 12 hours  chlorhexidine 2% Cloths 1 Application(s) Topical <User Schedule>  chlorhexidine 4% Liquid 1 Application(s) Topical <User Schedule>  dextrose 50% Injectable 50 milliLiter(s) IV Push every 15 minutes  dextrose 50% Injectable 25 milliLiter(s) IV Push every 15 minutes  dextrose 50% Injectable 50 milliLiter(s) IV Push every 15 minutes  dextrose 50% Injectable 25 milliLiter(s) IV Push every 15 minutes  DOBUTamine Infusion 5 MICROgram(s)/kG/Min (11.04 mL/Hr) IV Continuous <Continuous>  insulin lispro (HumaLOG) corrective regimen sliding scale   SubCutaneous every 6 hours  levothyroxine Injectable 25 MICROGram(s) IV Push at bedtime  lidocaine 2% Gel 1 Application(s) Topical every 8 hours  metroNIDAZOLE  IVPB 500 milliGRAM(s) IV Intermittent every 8 hours  midodrine 10 milliGRAM(s) Oral every 8 hours  norepinephrine Infusion 0.043 MICROgram(s)/kG/Min (6 mL/Hr) IV Continuous <Continuous>  nystatin Powder 1 Application(s) Topical two times a day  pantoprazole  Injectable 40 milliGRAM(s) IV Push two times a day  propofol Infusion 10 MICROgram(s)/kG/Min (4.416 mL/Hr) IV Continuous <Continuous>  sodium chloride 0.9%. 1000 milliLiter(s) (10 mL/Hr) IV Continuous <Continuous>  vancomycin    Solution 500 milliGRAM(s) Oral every 6 hours  vasopressin Infusion 0.05 Unit(s)/Min (3 mL/Hr) IV Continuous <Continuous>  zinc oxide 40% Ointment 1 Application(s) Topical two times a day      Physical Exam  General: Patient comfortable in bed  Vital Signs Last 12 Hrs  T(F): 95.7 (07-29-19 @ 11:20), Max: 97.1 (07-29-19 @ 00:00)  HR: 77 (07-29-19 @ 11:30) (74 - 83)  BP: 72/32 (07-29-19 @ 05:15) (72/32 - 72/32)  BP(mean): 43 (07-29-19 @ 05:15) (43 - 43)  RR: 12 (07-29-19 @ 11:30) (11 - 65)  SpO2: 99% (07-29-19 @ 11:30) (97% - 100%)  Neck: No palpable thyroid nodules.  CVS: S1S2, No murmurs  Respiratory: No wheezing, no crepitations  GI: Abdomen soft, bowel sounds positive  Musculoskeletal:  edema lower extremities.   Skin: No skin rashes, no ecchymosis    Diagnostics

## 2019-07-29 NOTE — CONSULT NOTE ADULT - SUBJECTIVE AND OBJECTIVE BOX
GENERAL SURGERY CONSULT NOTE  --------------------------------------------------------------------------------------------    Patient is a 73y old male who originally presented to [facility] with HF, and was transferred to [facility] and underwent a CABG on 7/12, which required intubation.  Patient was extubated two times and reintubated due to respiratory failure both times.  The last time was 2 days ago.  He had no hx of neck surgery or abdominal surgery.      ROS: 10-system review is otherwise negative except HPI above.      PAST MEDICAL & SURGICAL HISTORY:  Arterial disease: peripheral  TR (tricuspid regurgitation)  MR (mitral regurgitation)  RALPH (acute kidney injury)  HFrEF (heart failure with reduced ejection fraction)  No significant past surgical history    FAMILY HISTORY:  Family history of cancer (Father)      ALLERGIES: No Known Allergies      CURRENT MEDICATIONS  MEDICATIONS (STANDING): aspirin  chewable 81 milliGRAM(s) Oral daily  atorvastatin 80 milliGRAM(s) Oral at bedtime  dexmedetomidine Infusion 0.5 MICROgram(s)/kG/Hr IV Continuous <Continuous>  dextrose 50% Injectable 50 milliLiter(s) IV Push every 15 minutes  dextrose 50% Injectable 25 milliLiter(s) IV Push every 15 minutes  dextrose 50% Injectable 50 milliLiter(s) IV Push every 15 minutes  dextrose 50% Injectable 25 milliLiter(s) IV Push every 15 minutes  DOBUTamine Infusion 5 MICROgram(s)/kG/Min IV Continuous <Continuous>  insulin lispro (HumaLOG) corrective regimen sliding scale   SubCutaneous every 6 hours  levothyroxine Injectable 25 MICROGram(s) IV Push at bedtime  metroNIDAZOLE  IVPB 500 milliGRAM(s) IV Intermittent every 8 hours  midodrine 10 milliGRAM(s) Oral every 8 hours  norepinephrine Infusion 0.043 MICROgram(s)/kG/Min IV Continuous <Continuous>  pantoprazole  Injectable 40 milliGRAM(s) IV Push two times a day  propofol Infusion 10 MICROgram(s)/kG/Min IV Continuous <Continuous>  sodium chloride 0.9%. 1000 milliLiter(s) IV Continuous <Continuous>  vancomycin    Solution 500 milliGRAM(s) Oral every 6 hours  vasopressin Infusion 0.05 Unit(s)/Min IV Continuous <Continuous>    MEDICATIONS (PRN):sodium chloride 0.9% lock flush 10 milliLiter(s) IV Push every 1 hour PRN Pre/post blood products, medications, blood draw, and to maintain line patency  sodium chloride 0.9% lock flush 10 milliLiter(s) IV Push every 1 hour PRN Pre/post blood products, medications, blood draw, and to maintain line patency    --------------------------------------------------------------------------------------------    Vitals:   T(C): 37.2 (07-29-19 @ 16:00), Max: 37.9 (07-28-19 @ 19:00)  HR: 89 (07-29-19 @ 18:45) (74 - 93)  BP: 110/47 (07-29-19 @ 12:22) (72/32 - 110/47)  RR: 18 (07-29-19 @ 18:45) (11 - 67)  SpO2: 100% (07-29-19 @ 18:45) (97% - 100%)  CAPILLARY BLOOD GLUCOSE      POCT Blood Glucose.: 127 mg/dL (29 Jul 2019 13:14)    CAPILLARY BLOOD GLUCOSE      POCT Blood Glucose.: 127 mg/dL (29 Jul 2019 13:14)      07-28 @ 07:01  -  07-29 @ 07:00  --------------------------------------------------------  IN:    DOBUTamine Infusion: 264 mL    Enteral Tube Flush: 180 mL    IV PiggyBack: 200 mL    Nepro with Carb Steady: 630 mL    norepinephrine Infusion: 76 mL    Other: 700 mL    Plasma: 500 mL    propofol Infusion: 269.3 mL    sodium chloride 0.9%.: 240 mL    vasopressin Infusion: 144 mL  Total IN: 3203.3 mL    OUT:    Other: 2700 mL    Rectal Tube: 710 mL  Total OUT: 3410 mL    Total NET: -206.7 mL      07-29 @ 07:01  -  07-29 @ 18:48  --------------------------------------------------------  IN:    dexmedetomidine Infusion: 55.2 mL    DOBUTamine Infusion: 143 mL    Enteral Tube Flush: 120 mL    IV PiggyBack: 250 mL    Nepro with Carb Steady: 405 mL    norepinephrine Infusion: 78 mL    Plasma: 250 mL    propofol Infusion: 138.9 mL    sodium chloride 0.9%.: 110 mL    vasopressin Infusion: 78 mL  Total IN: 1628.1 mL    OUT:    Other: 1500 mL    Rectal Tube: 300 mL  Total OUT: 1800 mL    Total NET: -171.9 mL            PHYSICAL EXAM:  General: NAD, Lying in bed intubated  Neuro: sedated  HEENT: ETT in place  Cardio: Regular rate  Resp: on Vent  GI/Abd: Soft, NT/ND, no visible scars    --------------------------------------------------------------------------------------------    LABS  CBC (07-29 @ 07:09)                              8.4<L>                         15.4<H>  )----------------(  95<L>      --    % Neutrophils, --    % Lymphocytes, ANC: --                                  23.8<L>  CBC (07-29 @ 00:41)                              9.4<L>                         17.9<H>  )----------------(  106<L>     --    % Neutrophils, --    % Lymphocytes, ANC: --                                  26.8<L>    BMP (07-29 @ 00:41)             135     |  102     |  62<H> 		Ca++ --      Ca 7.9<L>             ---------------------------------( 187<H>		Mg 2.1                3.4<L>  |  16<L>   |  4.74<H>			Ph 3.1       LFTs (07-29 @ 00:41)      TPro 6.9 / Alb 2.3<L> / TBili 4.9<H> / DBili -- / <H> / ALT 48<H> / AlkPhos 181<H>    Coags (07-29 @ 13:26)  aPTT 51.5<H> / INR 2.03<H> / PT 23.7<H>  Coags (07-29 @ 07:10)  aPTT 55.8<H> / INR 2.27<H> / PT 26.8<H>      ABG (07-29 @ 18:10)     7.45 / 32 / 132<H> / 22 / -1.1 / 99<H>%     Lactate:    ABG (07-29 @ 15:29)     7.46<H> / 32 / 151<H> / 23 / -.4 / 99<H>%     Lactate:      VBG (07-29 @ 13:21)     7.35 / 41 / 40 / 22 / -2.7<L> / 71%     Lactate: 1.1  VBG (07-29 @ 03:19)     7.37 / 35 / 45 / 20<L> / -4.5<L> / 78%     Lactate: --    --------------------------------------------------------------------------------------------    MICROBIOLOGY    -> .Urine Culture (07-22 @ 17:24)     NG    NG    No growth    -> .Blood Culture (07-22 @ 09:59)     NG    NG    No growth at 5 days.    -> .Blood Culture (07-21 @ 10:03)     NG    NG    No growth at 5 days.    -> .Blood Culture (07-21 @ 09:56)     NG    NG    No growth at 5 days.      --------------------------------------------------------------------------------------------    IMAGING  ***    ASSESSMENT: Patient is a 73y old m with continued respiratory failure and extended ventilation.    PLAN:   - Plan for OR tomorrow for trach and PEG  - NPO after MDN  - Preop labs for AM  - Plan discussed with Attending, Dr. Strickland

## 2019-07-29 NOTE — PROGRESS NOTE ADULT - ASSESSMENT
73 year old Black male h/o no implantable devices who prior to his admission to [facility] had not been followed by a doctor regularly (on no meds at home), presented to [facility] on 7/1/19 with shortness of breath and LE edema found to have newly diagnosed HFrEF (EF 30%) and RALPH vs CKD, also PVD and claudication with extensive LE arterial disease on doppler. 7/2/19 abd US: small amount of perihepatic ascited. 7/2/19 TTE: EF 30%, mild LVH, multiple regional WMA's, mod MR/TR. 7/3/19 Nuclear ST: moderate focal apical/inferolat/aterolat ischemia. 7/9/19: h/h 12.9/39.5, platelets 180, Bun/Cr 28/1.9, eGFR 42. Pt was diuresed at [facility] treated with lasix/hydralzine/nitrates/statin/ASA. No beta blockade 2/2 low blood pressures. Per [facility] discharge note, pt will need  vascular intervention d/t extensive lower extremity vascular disease. Pt transferred to [facility] today for left heart cath to rule out ischemia. (10 Jul 2019 12:52)    7/12 pt underwent C3L , AFib.  Pt's hospital course was then been c/b  A-fib, acute on chronic renal failure with fluid overload requiring CVVH, cardiogenic shock (on Milrinone, Dobutamine, and Vasopressin gtts), and respiratory failure requiring intubation (extubated on 7/16). Patient's CBC was WNL on day of presentation to [facility]. His platelet count has been steadily downtrending since 7/12.     pt found to have C diff  I am unsure it patient received abs at [facility]. He only received perioperative abs here until the 21st  pt started on empiric treatment for c diff    pt with low plts, heme is following  now on agatobran  plts low but  stable, on argatoban, HIT  AB positive    pt with elevated LFTs, low EF- some is congestion  also on statin, consider holding  Lfts are slightly improving  would check hepatitis screen ( so far negative) and CMV      flagyl added to po vanco for c diff    now reintubated , for trach and PEG

## 2019-07-29 NOTE — PROGRESS NOTE ADULT - ASSESSMENT
The patient is a 73y Male with CAD, HFrEF and PVD s/p CABGx3 followed by AFib, resp failure and acute on chronic kidney injury.    - CKD:  Suspect underlying CKD stage 3  - RALPH: anuric.   Now on dialysis.  - Hypotensive on  and Vaso   - Respiratory distress  - Confusion   - Diarrhea      RECOMMENDATIONS  - UF/HD  for 1.5 kg and assess breathing.     - please dose any new medications for a CrCl of 10-15 ml/min   -C diff positive at present .  Cont abx.  Diarrhea amount has reduced however   -CPAP p HD?  -Taper Levo p HD.  Would not attempt till p HD though

## 2019-07-29 NOTE — PROGRESS NOTE ADULT - SUBJECTIVE AND OBJECTIVE BOX
NEPHROLOGY-[facility] [phone]        Patient seen and examined in bed.  He was intubated and  on pressors at present    gtt     ros-unable         MEDICATIONS  (STANDING):  aspirin  chewable 81 milliGRAM(s) Oral daily  atorvastatin 80 milliGRAM(s) Oral at bedtime  chlorhexidine 0.12% Liquid 15 milliLiter(s) Oral Mucosa every 12 hours  chlorhexidine 2% Cloths 1 Application(s) Topical <User Schedule>  chlorhexidine 4% Liquid 1 Application(s) Topical <User Schedule>  dextrose 50% Injectable 50 milliLiter(s) IV Push every 15 minutes  dextrose 50% Injectable 25 milliLiter(s) IV Push every 15 minutes  dextrose 50% Injectable 50 milliLiter(s) IV Push every 15 minutes  dextrose 50% Injectable 25 milliLiter(s) IV Push every 15 minutes  DOBUTamine Infusion 5 MICROgram(s)/kG/Min (11.04 mL/Hr) IV Continuous <Continuous>  insulin lispro (HumaLOG) corrective regimen sliding scale   SubCutaneous every 6 hours  levothyroxine Injectable 25 MICROGram(s) IV Push at bedtime  lidocaine 2% Gel 1 Application(s) Topical every 8 hours  metroNIDAZOLE  IVPB 500 milliGRAM(s) IV Intermittent every 8 hours  midodrine 10 milliGRAM(s) Oral every 8 hours  norepinephrine Infusion 0.043 MICROgram(s)/kG/Min (6 mL/Hr) IV Continuous <Continuous>  nystatin Powder 1 Application(s) Topical two times a day  pantoprazole  Injectable 40 milliGRAM(s) IV Push two times a day  potassium chloride   Solution 20 milliEquivalent(s) Enteral Tube once  propofol Infusion 10 MICROgram(s)/kG/Min (4.416 mL/Hr) IV Continuous <Continuous>  sodium chloride 0.9%. 1000 milliLiter(s) (10 mL/Hr) IV Continuous <Continuous>  vancomycin    Solution 500 milliGRAM(s) Oral every 6 hours  vasopressin Infusion 0.05 Unit(s)/Min (3 mL/Hr) IV Continuous <Continuous>  zinc oxide 40% Ointment 1 Application(s) Topical two times a day      VITAL:  T(C): , Max: 37.9 (19 @ 19:00)  T(F): , Max: 100.2 (19 @ 19:00)  HR: 75 (07-29-19 @ 07:50)  BP: 72/32 (19 @ 05:15)  BP(mean): 43 (19 @ 05:15)  RR: 17 (19 @ 07:15)  SpO2: 100% (19 @ 07:50)  Wt(kg): --    I and O's:     @ 07:01  -   @ 07:00  --------------------------------------------------------  IN: 3203.3 mL / OUT: 3410 mL / NET: -206.7 mL          PHYSICAL EXAM:    Constitutional: NAD; intubated and sedated   Neck:  No JVD  Respiratory: transmitted upper   Cardiovascular: S1 and S2  Gastrointestinal: BS+, soft, + fluid shift   Extremities: No peripheral edema  Neurological: intubated   : No Chávez  Skin: No rashes  [facility]     LABS:                        8.4    15.4  )-----------( x        ( 2019 07:09 )             23.8         135  |  102  |  62<H>  ----------------------------<  187<H>  3.4<L>   |  16<L>  |  4.74<H>    Ca    7.9<L>      2019 00:41  Phos  3.1       Mg     2.1         TPro  6.9  /  Alb  2.3<L>  /  TBili  4.9<H>  /  DBili  x   /  AST  155<H>  /  ALT  48<H>  /  AlkPhos  181<H>            Urine Studies:          RADIOLOGY & ADDITIONAL STUDIES:    < from: Xray Chest 1 View- PORTABLE-Routine (19 @ 03:12) >    EXAM:  XR CHEST PORTABLE ROUTINE 1V                            PROCEDURE DATE:  2019            INTERPRETATION:  CLINICAL INDICATION: Cardiothoracic surgery.    Frontal view of the chest is obtained.    Comparison is made with the chest x-rayof 2019.    IMPRESSION: The patient is status post sternotomy. ET tube has its tip   above the addy. Enteric tube courses towards the left hemidiaphragm   although the tip is not imaged. There is a left IJ line with the tip in   the superior vena cava. Right IJ line has its tip in the superior vena   cava.    There are bilateral increased interstitial markings suggestive of   pulmonary edema as on the prior study. The heart size is enlarged. Please   note that evaluation of the lungs are limited as the costophrenic angles   in the left lung base are not included on this image.                    CYRUS HOUGH M.D. ATTENDING RADIOLOGIST  This document has been electronically signed. 2019  9:43AM                < end of copied text >

## 2019-07-30 ENCOUNTER — APPOINTMENT (OUTPATIENT)
Dept: THORACIC SURGERY | Facility: HOSPITAL | Age: 74
End: 2019-07-30

## 2019-07-30 ENCOUNTER — APPOINTMENT (OUTPATIENT)
Dept: THORACIC SURGERY | Facility: HOSPITAL | Age: 74
End: 2019-07-30
Payer: MEDICARE

## 2019-07-30 LAB
ALBUMIN SERPL ELPH-MCNC: 2.4 G/DL — LOW (ref 3.3–5)
ALP SERPL-CCNC: 180 U/L — HIGH (ref 40–120)
ALT FLD-CCNC: 39 U/L — SIGNIFICANT CHANGE UP (ref 10–45)
ANION GAP SERPL CALC-SCNC: 18 MMOL/L — HIGH (ref 5–17)
APTT BLD: 47.9 SEC — HIGH (ref 27.5–36.3)
APTT BLD: 49.6 SEC — HIGH (ref 27.5–36.3)
AST SERPL-CCNC: 140 U/L — HIGH (ref 10–40)
BILIRUB SERPL-MCNC: 6.8 MG/DL — HIGH (ref 0.2–1.2)
BUN SERPL-MCNC: 50 MG/DL — HIGH (ref 7–23)
CALCIUM SERPL-MCNC: 8.1 MG/DL — LOW (ref 8.4–10.5)
CHLORIDE SERPL-SCNC: 98 MMOL/L — SIGNIFICANT CHANGE UP (ref 96–108)
CO2 SERPL-SCNC: 18 MMOL/L — LOW (ref 22–31)
CREAT SERPL-MCNC: 3.88 MG/DL — HIGH (ref 0.5–1.3)
GAS PNL BLDA: SIGNIFICANT CHANGE UP
GLUCOSE SERPL-MCNC: 145 MG/DL — HIGH (ref 70–99)
HCT VFR BLD CALC: 25.4 % — LOW (ref 39–50)
HCT VFR BLD CALC: 25.8 % — LOW (ref 39–50)
HGB BLD-MCNC: 8.7 G/DL — LOW (ref 13–17)
HGB BLD-MCNC: 8.9 G/DL — LOW (ref 13–17)
INR BLD: 1.91 RATIO — HIGH (ref 0.88–1.16)
INR BLD: 1.98 RATIO — HIGH (ref 0.88–1.16)
MAGNESIUM SERPL-MCNC: 2 MG/DL — SIGNIFICANT CHANGE UP (ref 1.6–2.6)
MCHC RBC-ENTMCNC: 31.3 PG — SIGNIFICANT CHANGE UP (ref 27–34)
MCHC RBC-ENTMCNC: 32.4 PG — SIGNIFICANT CHANGE UP (ref 27–34)
MCHC RBC-ENTMCNC: 33.7 GM/DL — SIGNIFICANT CHANGE UP (ref 32–36)
MCHC RBC-ENTMCNC: 35.1 GM/DL — SIGNIFICANT CHANGE UP (ref 32–36)
MCV RBC AUTO: 92.1 FL — SIGNIFICANT CHANGE UP (ref 80–100)
MCV RBC AUTO: 93 FL — SIGNIFICANT CHANGE UP (ref 80–100)
PHOSPHATE SERPL-MCNC: 3.1 MG/DL — SIGNIFICANT CHANGE UP (ref 2.5–4.5)
PLATELET # BLD AUTO: 104 K/UL — LOW (ref 150–400)
PLATELET # BLD AUTO: 114 K/UL — LOW (ref 150–400)
POTASSIUM SERPL-MCNC: 3.9 MMOL/L — SIGNIFICANT CHANGE UP (ref 3.5–5.3)
POTASSIUM SERPL-SCNC: 3.9 MMOL/L — SIGNIFICANT CHANGE UP (ref 3.5–5.3)
PROT SERPL-MCNC: 6.9 G/DL — SIGNIFICANT CHANGE UP (ref 6–8.3)
PROTHROM AB SERPL-ACNC: 22.4 SEC — HIGH (ref 10–12.9)
PROTHROM AB SERPL-ACNC: 23 SEC — HIGH (ref 10–12.9)
RBC # BLD: 2.76 M/UL — LOW (ref 4.2–5.8)
RBC # BLD: 2.78 M/UL — LOW (ref 4.2–5.8)
RBC # FLD: 14.7 % — HIGH (ref 10.3–14.5)
RBC # FLD: 14.7 % — HIGH (ref 10.3–14.5)
SODIUM SERPL-SCNC: 134 MMOL/L — LOW (ref 135–145)
WBC # BLD: 15.2 K/UL — HIGH (ref 3.8–10.5)
WBC # BLD: 16.3 K/UL — HIGH (ref 3.8–10.5)
WBC # FLD AUTO: 15.2 K/UL — HIGH (ref 3.8–10.5)
WBC # FLD AUTO: 16.3 K/UL — HIGH (ref 3.8–10.5)

## 2019-07-30 PROCEDURE — 31615 TRCHEOBRNCHSC EST TRACHS INC: CPT | Mod: 78

## 2019-07-30 PROCEDURE — 71045 X-RAY EXAM CHEST 1 VIEW: CPT | Mod: 26,76

## 2019-07-30 PROCEDURE — 31624 DX BRONCHOSCOPE/LAVAGE: CPT | Mod: 59,78

## 2019-07-30 PROCEDURE — 43246 EGD PLACE GASTROSTOMY TUBE: CPT | Mod: 78

## 2019-07-30 PROCEDURE — 31600 PLANNED TRACHEOSTOMY: CPT | Mod: 78

## 2019-07-30 PROCEDURE — 43246 EGD PLACE GASTROSTOMY TUBE: CPT | Mod: AS,78

## 2019-07-30 PROCEDURE — 31645 BRNCHSC W/THER ASPIR 1ST: CPT | Mod: 59,78

## 2019-07-30 PROCEDURE — 99232 SBSQ HOSP IP/OBS MODERATE 35: CPT

## 2019-07-30 PROCEDURE — 95816 EEG AWAKE AND DROWSY: CPT | Mod: 26

## 2019-07-30 PROCEDURE — 99291 CRITICAL CARE FIRST HOUR: CPT

## 2019-07-30 RX ORDER — WARFARIN SODIUM 2.5 MG/1
2 TABLET ORAL ONCE
Refills: 0 | Status: COMPLETED | OUTPATIENT
Start: 2019-07-30 | End: 2019-07-30

## 2019-07-30 RX ORDER — CALAMINE AND ZINC OXIDE AND PHENOL 160; 10 MG/ML; MG/ML
1 LOTION TOPICAL EVERY 12 HOURS
Refills: 0 | Status: DISCONTINUED | OUTPATIENT
Start: 2019-07-30 | End: 2019-07-30

## 2019-07-30 RX ORDER — VANCOMYCIN HCL 1 G
500 VIAL (EA) INTRAVENOUS ONCE
Refills: 0 | Status: COMPLETED | OUTPATIENT
Start: 2019-07-30 | End: 2019-07-30

## 2019-07-30 RX ORDER — CALCIUM GLUCONATE 100 MG/ML
2 VIAL (ML) INTRAVENOUS ONCE
Refills: 0 | Status: COMPLETED | OUTPATIENT
Start: 2019-07-30 | End: 2019-07-30

## 2019-07-30 RX ORDER — CALCIUM GLUCONATE 100 MG/ML
2 VIAL (ML) INTRAVENOUS ONCE
Refills: 0 | Status: DISCONTINUED | OUTPATIENT
Start: 2019-07-30 | End: 2019-07-30

## 2019-07-30 RX ADMIN — MIDODRINE HYDROCHLORIDE 10 MILLIGRAM(S): 2.5 TABLET ORAL at 21:34

## 2019-07-30 RX ADMIN — ZINC OXIDE 1 APPLICATION(S): 200 OINTMENT TOPICAL at 06:27

## 2019-07-30 RX ADMIN — CHLORHEXIDINE GLUCONATE 1 APPLICATION(S): 213 SOLUTION TOPICAL at 05:52

## 2019-07-30 RX ADMIN — ATORVASTATIN CALCIUM 80 MILLIGRAM(S): 80 TABLET, FILM COATED ORAL at 21:34

## 2019-07-30 RX ADMIN — Medication 500 MILLIGRAM(S): at 21:34

## 2019-07-30 RX ADMIN — Medication 500 MILLIGRAM(S): at 08:20

## 2019-07-30 RX ADMIN — Medication 100 MILLIGRAM(S): at 06:25

## 2019-07-30 RX ADMIN — Medication 500 MILLIGRAM(S): at 02:15

## 2019-07-30 RX ADMIN — Medication 11.04 MICROGRAM(S)/KG/MIN: at 06:28

## 2019-07-30 RX ADMIN — Medication 25 MICROGRAM(S): at 21:36

## 2019-07-30 RX ADMIN — Medication 2: at 06:25

## 2019-07-30 RX ADMIN — CHLORHEXIDINE GLUCONATE 15 MILLILITER(S): 213 SOLUTION TOPICAL at 06:26

## 2019-07-30 RX ADMIN — Medication 100 MILLIGRAM(S): at 15:41

## 2019-07-30 RX ADMIN — PANTOPRAZOLE SODIUM 40 MILLIGRAM(S): 20 TABLET, DELAYED RELEASE ORAL at 06:24

## 2019-07-30 RX ADMIN — MIDODRINE HYDROCHLORIDE 10 MILLIGRAM(S): 2.5 TABLET ORAL at 15:40

## 2019-07-30 RX ADMIN — DEXMEDETOMIDINE HYDROCHLORIDE IN 0.9% SODIUM CHLORIDE 9.2 MICROGRAM(S)/KG/HR: 4 INJECTION INTRAVENOUS at 06:28

## 2019-07-30 RX ADMIN — VASOPRESSIN 3 UNIT(S)/MIN: 20 INJECTION INTRAVENOUS at 06:28

## 2019-07-30 RX ADMIN — WARFARIN SODIUM 2 MILLIGRAM(S): 2.5 TABLET ORAL at 21:34

## 2019-07-30 RX ADMIN — LIDOCAINE 1 APPLICATION(S): 4 CREAM TOPICAL at 06:26

## 2019-07-30 RX ADMIN — VASOPRESSIN 3 UNIT(S)/MIN: 20 INJECTION INTRAVENOUS at 15:39

## 2019-07-30 RX ADMIN — Medication 100 MILLIGRAM(S): at 18:17

## 2019-07-30 RX ADMIN — MIDODRINE HYDROCHLORIDE 10 MILLIGRAM(S): 2.5 TABLET ORAL at 06:24

## 2019-07-30 RX ADMIN — CALAMINE AND ZINC OXIDE AND PHENOL 1 APPLICATION(S): 160; 10 LOTION TOPICAL at 06:24

## 2019-07-30 RX ADMIN — PANTOPRAZOLE SODIUM 40 MILLIGRAM(S): 20 TABLET, DELAYED RELEASE ORAL at 18:17

## 2019-07-30 RX ADMIN — Medication 6 MICROGRAM(S)/KG/MIN: at 06:28

## 2019-07-30 RX ADMIN — CHLORHEXIDINE GLUCONATE 15 MILLILITER(S): 213 SOLUTION TOPICAL at 18:17

## 2019-07-30 RX ADMIN — NYSTATIN CREAM 1 APPLICATION(S): 100000 CREAM TOPICAL at 18:17

## 2019-07-30 RX ADMIN — Medication 500 MILLIGRAM(S): at 15:39

## 2019-07-30 RX ADMIN — Medication 200 GRAM(S): at 09:31

## 2019-07-30 RX ADMIN — Medication 81 MILLIGRAM(S): at 15:40

## 2019-07-30 RX ADMIN — CHLORHEXIDINE GLUCONATE 1 APPLICATION(S): 213 SOLUTION TOPICAL at 05:51

## 2019-07-30 RX ADMIN — NYSTATIN CREAM 1 APPLICATION(S): 100000 CREAM TOPICAL at 06:27

## 2019-07-30 NOTE — PROGRESS NOTE ADULT - SUBJECTIVE AND OBJECTIVE BOX
NEPHROLOGY-[facility] [phone]        Patient seen and examined in bed.  He is NPO and awaiting trach and peg  On sedation at present  On vaso and levo        MEDICATIONS  (STANDING):  aspirin  chewable 81 milliGRAM(s) Oral daily  atorvastatin 80 milliGRAM(s) Oral at bedtime  calamine Lotion 1 Application(s) Topical every 12 hours  chlorhexidine 0.12% Liquid 15 milliLiter(s) Oral Mucosa every 12 hours  chlorhexidine 2% Cloths 1 Application(s) Topical <User Schedule>  chlorhexidine 2% Cloths 1 Application(s) Topical <User Schedule>  chlorhexidine 4% Liquid 1 Application(s) Topical <User Schedule>  dexmedetomidine Infusion 0.5 MICROgram(s)/kG/Hr (9.2 mL/Hr) IV Continuous <Continuous>  dextrose 50% Injectable 50 milliLiter(s) IV Push every 15 minutes  dextrose 50% Injectable 25 milliLiter(s) IV Push every 15 minutes  dextrose 50% Injectable 50 milliLiter(s) IV Push every 15 minutes  dextrose 50% Injectable 25 milliLiter(s) IV Push every 15 minutes  DOBUTamine Infusion 5 MICROgram(s)/kG/Min (11.04 mL/Hr) IV Continuous <Continuous>  insulin lispro (HumaLOG) corrective regimen sliding scale   SubCutaneous every 6 hours  levothyroxine Injectable 25 MICROGram(s) IV Push at bedtime  lidocaine 2% Gel 1 Application(s) Topical every 8 hours  metroNIDAZOLE  IVPB 500 milliGRAM(s) IV Intermittent every 8 hours  midodrine 10 milliGRAM(s) Oral every 8 hours  norepinephrine Infusion 0.043 MICROgram(s)/kG/Min (6 mL/Hr) IV Continuous <Continuous>  nystatin Powder 1 Application(s) Topical two times a day  pantoprazole  Injectable 40 milliGRAM(s) IV Push two times a day  propofol Infusion 10 MICROgram(s)/kG/Min (4.416 mL/Hr) IV Continuous <Continuous>  sodium chloride 0.9%. 1000 milliLiter(s) (10 mL/Hr) IV Continuous <Continuous>  vancomycin    Solution 500 milliGRAM(s) Oral every 6 hours  vasopressin Infusion 0.05 Unit(s)/Min (3 mL/Hr) IV Continuous <Continuous>  zinc oxide 40% Ointment 1 Application(s) Topical two times a day      VITAL:  T(C): , Max: 37.5 (07-30-19 @ 07:15)  T(F): , Max: 99.5 (07-30-19 @ 07:15)  HR: 86 (07-30-19 @ 08:30)  BP: 110/47 (07-29-19 @ 12:22)  BP(mean): --  RR: 0 (07-30-19 @ 08:30)  SpO2: 98% (07-30-19 @ 08:30)  Wt(kg): --    I and O's:    07-29 @ 07:01  -  07-30 @ 07:00  --------------------------------------------------------  IN: 2547.9 mL / OUT: 1800 mL / NET: 747.9 mL          PHYSICAL EXAM:    Constitutional: sedated   Neck:  No JVD  Respiratory: poor effort   Cardiovascular: S1 and S2  Gastrointestinal: BS+, soft,distended   Extremities: No peripheral edema  Neurological: sedated   : No Chávez  Skin: No rashes  Access: [facility]     LABS:                        8.9    16.3  )-----------( 104      ( 30 Jul 2019 01:41 )             25.4     07-30    134<L>  |  98  |  50<H>  ----------------------------<  145<H>  3.9   |  18<L>  |  3.88<H>    Ca    8.1<L>      30 Jul 2019 01:41  Phos  3.1     07-30  Mg     2.0     07-30    TPro  6.9  /  Alb  2.4<L>  /  TBili  6.8<H>  /  DBili  x   /  AST  140<H>  /  ALT  39  /  AlkPhos  180<H>  07-30          Urine Studies:          RADIOLOGY & ADDITIONAL STUDIES:

## 2019-07-30 NOTE — PROGRESS NOTE ADULT - ASSESSMENT
The patient is a 73y Male with CAD, HFrEF and PVD s/p CABGx3 followed by AFib, resp failure and acute on chronic kidney injury.    - CKD:  Suspect underlying CKD stage 3  - RALPH: anuric.   Now on dialysis.  - Hypotensive on  and Vaso and levo   - Respiratory distress and intubated         RECOMMENDATIONS  - No need for HD today   - please dose any new medications for a CrCl of 10-15 ml/min   -C diff positive at present .  Cont abx.  Diarrhea amount has reduced however    -Taper pressors today

## 2019-07-30 NOTE — PROGRESS NOTE ADULT - SUBJECTIVE AND OBJECTIVE BOX
CRITICAL CARE ATTENDING - CTICU    MEDICATIONS  (STANDING):  aspirin  chewable 81 milliGRAM(s) Oral daily  atorvastatin 80 milliGRAM(s) Oral at bedtime  calamine Lotion 1 Application(s) Topical every 12 hours  calcium gluconate IVPB 2 Gram(s) IV Intermittent once  chlorhexidine 0.12% Liquid 15 milliLiter(s) Oral Mucosa every 12 hours  chlorhexidine 2% Cloths 1 Application(s) Topical <User Schedule>  chlorhexidine 2% Cloths 1 Application(s) Topical <User Schedule>  chlorhexidine 4% Liquid 1 Application(s) Topical <User Schedule>  dexmedetomidine Infusion 0.5 MICROgram(s)/kG/Hr (9.2 mL/Hr) IV Continuous <Continuous>  dextrose 50% Injectable 50 milliLiter(s) IV Push every 15 minutes  dextrose 50% Injectable 25 milliLiter(s) IV Push every 15 minutes  dextrose 50% Injectable 50 milliLiter(s) IV Push every 15 minutes  dextrose 50% Injectable 25 milliLiter(s) IV Push every 15 minutes  DOBUTamine Infusion 5 MICROgram(s)/kG/Min (11.04 mL/Hr) IV Continuous <Continuous>  insulin lispro (HumaLOG) corrective regimen sliding scale   SubCutaneous every 6 hours  levothyroxine Injectable 25 MICROGram(s) IV Push at bedtime  lidocaine 2% Gel 1 Application(s) Topical every 8 hours  metroNIDAZOLE  IVPB 500 milliGRAM(s) IV Intermittent every 8 hours  midodrine 10 milliGRAM(s) Oral every 8 hours  norepinephrine Infusion 0.043 MICROgram(s)/kG/Min (6 mL/Hr) IV Continuous <Continuous>  nystatin Powder 1 Application(s) Topical two times a day  pantoprazole  Injectable 40 milliGRAM(s) IV Push two times a day  propofol Infusion 10 MICROgram(s)/kG/Min (4.416 mL/Hr) IV Continuous <Continuous>  sodium chloride 0.9%. 1000 milliLiter(s) (10 mL/Hr) IV Continuous <Continuous>  vancomycin    Solution 500 milliGRAM(s) Oral every 6 hours  vasopressin Infusion 0.05 Unit(s)/Min (3 mL/Hr) IV Continuous <Continuous>  zinc oxide 40% Ointment 1 Application(s) Topical two times a day                                    8.9    16.3  )-----------( 104       01:41 )             25.4       07-30    134<L>  |  98  |  50<H>  ----------------------------<  145<H>  3.9   |  18<L>  |  3.88<H>    Ca    8.1<L>      2019 01:41  Phos  3.1       Mg     2.0         TPro  6.9  /  Alb  2.4<L>  /  TBili  6.8<H>  /  DBili  x   /  AST  140<H>  /  ALT  39  /  AlkPhos  180<H>        PT/INR - ( 2019 01:41 )   PT: 23.0 sec;   INR: 1.98 ratio         PTT - ( 2019 01:41 )  PTT:47.9 sec    Mode: AC/ CMV (Assist Control/ Continuous Mandatory Ventilation)  RR (machine): 10  TV (machine): 500  FiO2: 40  PEEP: 5  ITime: 1  MAP: 11  PIP: 31      Daily     Daily Weight in k.1 (2019 14:55)       @ : @ 07:00  --------------------------------------------------------  IN: 2547.9 mL / OUT: 1800 mL / NET: 747.9 mL     @ : @ 09:13  --------------------------------------------------------  IN: 650.2 mL / OUT: 0 mL / NET: 650.2 mL        Critically Ill patient  : [ ] preoperative ,   [ x] post operative    Requires :  [x ] Arterial Line   [x ] Central Line  [ ] PA catheter  [ ] IABP  [ ] ECMO  [ ] LVAD  [x] Ventilator  [ x] pacemaker [ ] Impella.                      [ x] ABG's     [x ] Pulse Oxymetry Monitoring  Bedside evaluation , monitoring , treatment of hemodynamics , fluids , IVP/ IVCD meds.        Diagnosis:     POD 7/12 - CABG X 3 L    respiratory failure - Tracheostomy  today    Re intubated     Requires chest PT, pulmonary toilet, ambu bagging, suctioning to maintain SaO2,  patent airway and treat atelectasis.     Ventilator Management:  [ x]AC-rest    [ x]CPAP-PS Wean    [ ]Trach Collar     [ ]Extubate    [ ] T-Piece  [ ]peep>5     Difficult weaning process - multiple organ system involvement in critically ill patient     CHF- acute [x ]   chronic [x ]    systolic [x ]   diatolic [ ]          - Echo- EF - 30's           [ ] RV dysfunction          - Cxr-cardiomegally, edema          - Clinical-  [x ]inotropes   [ xressors   [ ]diuresis   [ ]IABP   [ ]ECMO   [ ]LVAD   [x ]Respiratory Failure    Hemodynamic lability,instability. Requires IVCD [ x] vasopressors [x ] inotropes  [ ] vasodilator  [ ]IVSS fluid  to maintain MAP, perfusion, C.I.      Renal Failure - Acute Kidney Injury     [x ] Hemodialysis [ x] Hemofiltration:    [x ] negative fluid balance [ ] even fluid balance [ ] positive fluid balance, in a hemodynamically unstable patient.     fluid overload     Tolerates NG / NJ feeds at [ x] goal rate    [ ] trophic rate    [ ]       rate  - PEG today     Thrombocytopenia     Hyponatremia     PVD     ETOH use/ abuse                             -                     Discussed with CT surgeon, Physician's Assistant - Nurse Practitioner- Critical care medicine team.   Dicussed at  AM / PM rounds.   Chart, labs , films reviewed.    Total Time: 30 min

## 2019-07-30 NOTE — PROGRESS NOTE ADULT - SUBJECTIVE AND OBJECTIVE BOX
Patient is a 73y old  Male who presents with a chief complaint of CABG (30 Jul 2019 10:39)    Being followed by ID for        Interval history:  pt s/p trach/peg  remains poorly responsive  No other acute events        PAST MEDICAL & SURGICAL HISTORY:  Arterial disease: peripheral  TR (tricuspid regurgitation)  MR (mitral regurgitation)  RALPH (acute kidney injury)  HFrEF (heart failure with reduced ejection fraction)  No significant past surgical history    Allergies    No Known Allergies    Intolerances      Antimicrobials:    metroNIDAZOLE  IVPB 500 milliGRAM(s) IV Intermittent every 8 hours  vancomycin    Solution 500 milliGRAM(s) Oral every 6 hours    MEDICATIONS  (STANDING):  aspirin  chewable 81 milliGRAM(s) Oral daily  atorvastatin 80 milliGRAM(s) Oral at bedtime  chlorhexidine 0.12% Liquid 15 milliLiter(s) Oral Mucosa every 12 hours  chlorhexidine 2% Cloths 1 Application(s) Topical <User Schedule>  chlorhexidine 4% Liquid 1 Application(s) Topical <User Schedule>  dexmedetomidine Infusion 0.5 MICROgram(s)/kG/Hr (9.2 mL/Hr) IV Continuous <Continuous>  dextrose 50% Injectable 50 milliLiter(s) IV Push every 15 minutes  dextrose 50% Injectable 25 milliLiter(s) IV Push every 15 minutes  dextrose 50% Injectable 50 milliLiter(s) IV Push every 15 minutes  dextrose 50% Injectable 25 milliLiter(s) IV Push every 15 minutes  DOBUTamine Infusion 5 MICROgram(s)/kG/Min (11.04 mL/Hr) IV Continuous <Continuous>  insulin lispro (HumaLOG) corrective regimen sliding scale   SubCutaneous every 6 hours  levothyroxine Injectable 25 MICROGram(s) IV Push at bedtime  metroNIDAZOLE  IVPB 500 milliGRAM(s) IV Intermittent every 8 hours  midodrine 10 milliGRAM(s) Oral every 8 hours  norepinephrine Infusion 0.043 MICROgram(s)/kG/Min (6 mL/Hr) IV Continuous <Continuous>  nystatin Powder 1 Application(s) Topical two times a day  pantoprazole  Injectable 40 milliGRAM(s) IV Push two times a day  sodium chloride 0.9%. 1000 milliLiter(s) (10 mL/Hr) IV Continuous <Continuous>  vancomycin    Solution 500 milliGRAM(s) Oral every 6 hours  vasopressin Infusion 0.05 Unit(s)/Min (3 mL/Hr) IV Continuous <Continuous>      Vital Signs Last 24 Hrs  T(C): 36.7 (07-30-19 @ 12:00), Max: 37.5 (07-30-19 @ 07:15)  T(F): 98 (07-30-19 @ 12:00), Max: 99.5 (07-30-19 @ 07:15)  HR: 101 (07-30-19 @ 16:50) (83 - 101)  BP: 121/45 (07-30-19 @ 03:51) (121/45 - 121/45)  BP(mean): --  RR: 12 (07-30-19 @ 16:30) (0 - 35)  SpO2: 96% (07-30-19 @ 16:50) (94% - 100%)    Physical Exam:    Constitutional unrepsonisve    HEENT PERRLA EOMI,No pallor or icterus    No oral exudate or erythema    Neck trach    Chest Good AE,CTA    CVS RRR S1 S2 WNl     Abd softly distended    recta tube    Ext  slighly cool    IV site no erythema tenderness or discharge    Joints no swelling or LOM    scattered rash on trunk and extremities    Lab Data:                          8.7    15.2  )-----------( 114      ( 30 Jul 2019 15:53 )             25.8       07-30    134<L>  |  98  |  50<H>  ----------------------------<  145<H>  3.9   |  18<L>  |  3.88<H>    Ca    8.1<L>      30 Jul 2019 01:41  Phos  3.1     07-30  Mg     2.0     07-30    TPro  6.9  /  Alb  2.4<L>  /  TBili  6.8<H>  /  DBili  x   /  AST  140<H>  /  ALT  39  /  AlkPhos  180<H>  07-30          .Blood  07-29-19   No growth to date.  --  --      .Blood  07-28-19   No growth to date.  --  --      WBC Count: 15.2 (07-30-19 @ 15:53)  WBC Count: 16.3 (07-30-19 @ 01:41)  WBC Count: 15.4 (07-29-19 @ 07:09)  WBC Count: 17.9 (07-29-19 @ 00:41)  WBC Count: 15.3 (07-28-19 @ 00:24)  WBC Count: 16.5 (07-27-19 @ 02:31)  WBC Count: 16.7 (07-26-19 @ 02:04)  WBC Count: 19.6 (07-25-19 @ 00:58)  WBC Count: 22.0 (07-24-19 @ 04:52)    < from: CT Head No Cont (07.22.19 @ 06:40) >  Impression: No evidence of acute infarction or hemorrhage. MRI with   diffusion-weighted imaging is a more sensitive modality in the evaluation   of early infarction.    Mild chronic microvascular ischemic disease, as described above. Chronic   lacunar infarction in the right basal ganglia.    < end of copied text >        < from: Xray Chest 1 View- PORTABLE-Urgent (07.30.19 @ 14:32) >  IMPRESSION:   Unchanged left lower lobe opacity. Status post tracheostomy.    < end of copied text >    < from: US Abdomen Complete (07.25.19 @ 11:41) >  IMPRESSION:     Echogenic material within the gallbladder lumen, likely represent   gallbladder sludge. No evidence of acute cholecystitis.    No hydronephrosis.    Borderline hepatomegaly.    Bilateral echogenic renal parenchyma, suggestive of medical renal disease.    Bilateral pleural effusions.      KHADRA SILVERIO MD,RADIOLOGY FELLOW    < end of copied text >

## 2019-07-30 NOTE — PROGRESS NOTE ADULT - ASSESSMENT
73 year old Black male h/o no implantable devices who prior to his admission to [facility] had not been followed by a doctor regularly (on no meds at home), presented to [facility] on 7/1/19 with shortness of breath and LE edema found to have newly diagnosed HFrEF (EF 30%) and RALPH vs CKD, also PVD and claudication with extensive LE arterial disease on doppler. 7/2/19 abd US: small amount of perihepatic ascited. 7/2/19 TTE: EF 30%, mild LVH, multiple regional WMA's, mod MR/TR. 7/3/19 Nuclear ST: moderate focal apical/inferolat/aterolat ischemia. 7/9/19: h/h 12.9/39.5, platelets 180, Bun/Cr 28/1.9, eGFR 42. Pt was diuresed at [facility] treated with lasix/hydralzine/nitrates/statin/ASA. No beta blockade 2/2 low blood pressures. Per [facility] discharge note, pt will need  vascular intervention d/t extensive lower extremity vascular disease. Pt transferred to [facility] today for left heart cath to rule out ischemia. (10 Jul 2019 12:52)    7/12 pt underwent C3L , AFib.  Pt's hospital course was then been c/b  A-fib, acute on chronic renal failure with fluid overload requiring CVVH, cardiogenic shock (on Milrinone, Dobutamine, and Vasopressin gtts), and respiratory failure requiring intubation (extubated on 7/16). Patient's CBC was WNL on day of presentation to [facility]. His platelet count has been steadily downtrending since 7/12.     pt found to have C diff  I am unsure it patient received abs at [facility]. He only received perioperative abs here until the 21st  pt started on empiric treatment for c diff    pt with low plts, heme is following  now on agatobran  plts low but  stable, on argatoban, HIT  AB positive    pt with elevated LFTs, low EF- some is congestion  also on statin, consider holding  Lfts are slightly improving  would check hepatitis screen ( so far negative) and CMV      flagyl added to po vanco for c diff    now reintubated , for trach and PEG  new rash over past two days    suggest:  d/c flagyl, diarrhea improving  CT of head/ neuro input  ? EEG   check RPR  panculture  give vancomycin x 1  lines were changed yesterday

## 2019-07-30 NOTE — PROGRESS NOTE ADULT - SUBJECTIVE AND OBJECTIVE BOX
Endocrinology Attending Covering for Dr. Magdaleno      Chief complaint  Patient is a 73y old  Male who presents with a chief complaint of hyperglycemia (29 Jul 2019 11:43)   Review of systems  Patient in bed, looks comfortable, no fever,  had no hypoglycemia.    Labs and Fingersticks  CAPILLARY BLOOD GLUCOSE      POCT Blood Glucose.: 127 mg/dL (29 Jul 2019 13:14)      Anion Gap, Serum: 18 <H> (07-30 @ 01:41)  Anion Gap, Serum: 17 (07-29 @ 00:41)      Calcium, Total Serum: 8.1 <L> (07-30 @ 01:41)  Calcium, Total Serum: 7.9 <L> (07-29 @ 00:41)  Albumin, Serum: 2.4 <L> (07-30 @ 01:41)  Albumin, Serum: 2.3 <L> (07-29 @ 00:41)    Alanine Aminotransferase (ALT/SGPT): 39 (07-30 @ 01:41)  Alanine Aminotransferase (ALT/SGPT): 48 <H> (07-29 @ 00:41)  Alkaline Phosphatase, Serum: 180 <H> (07-30 @ 01:41)  Alkaline Phosphatase, Serum: 181 <H> (07-29 @ 00:41)  Aspartate Aminotransferase (AST/SGOT): 140 <H> (07-30 @ 01:41)  Aspartate Aminotransferase (AST/SGOT): 155 <H> (07-29 @ 00:41)        07-30    134<L>  |  98  |  50<H>  ----------------------------<  145<H>  3.9   |  18<L>  |  3.88<H>    Ca    8.1<L>      30 Jul 2019 01:41  Phos  3.1     07-30  Mg     2.0     07-30    TPro  6.9  /  Alb  2.4<L>  /  TBili  6.8<H>  /  DBili  x   /  AST  140<H>  /  ALT  39  /  AlkPhos  180<H>  07-30                        8.9    16.3  )-----------( 104      ( 30 Jul 2019 01:41 )             25.4     Medications  MEDICATIONS  (STANDING):  aspirin  chewable 81 milliGRAM(s) Oral daily  atorvastatin 80 milliGRAM(s) Oral at bedtime  calamine Lotion 1 Application(s) Topical every 12 hours  chlorhexidine 0.12% Liquid 15 milliLiter(s) Oral Mucosa every 12 hours  chlorhexidine 2% Cloths 1 Application(s) Topical <User Schedule>  chlorhexidine 2% Cloths 1 Application(s) Topical <User Schedule>  chlorhexidine 4% Liquid 1 Application(s) Topical <User Schedule>  dexmedetomidine Infusion 0.5 MICROgram(s)/kG/Hr (9.2 mL/Hr) IV Continuous <Continuous>  dextrose 50% Injectable 50 milliLiter(s) IV Push every 15 minutes  dextrose 50% Injectable 25 milliLiter(s) IV Push every 15 minutes  dextrose 50% Injectable 50 milliLiter(s) IV Push every 15 minutes  dextrose 50% Injectable 25 milliLiter(s) IV Push every 15 minutes  DOBUTamine Infusion 5 MICROgram(s)/kG/Min (11.04 mL/Hr) IV Continuous <Continuous>  insulin lispro (HumaLOG) corrective regimen sliding scale   SubCutaneous every 6 hours  levothyroxine Injectable 25 MICROGram(s) IV Push at bedtime  lidocaine 2% Gel 1 Application(s) Topical every 8 hours  metroNIDAZOLE  IVPB 500 milliGRAM(s) IV Intermittent every 8 hours  midodrine 10 milliGRAM(s) Oral every 8 hours  norepinephrine Infusion 0.043 MICROgram(s)/kG/Min (6 mL/Hr) IV Continuous <Continuous>  nystatin Powder 1 Application(s) Topical two times a day  pantoprazole  Injectable 40 milliGRAM(s) IV Push two times a day  propofol Infusion 10 MICROgram(s)/kG/Min (4.416 mL/Hr) IV Continuous <Continuous>  sodium chloride 0.9%. 1000 milliLiter(s) (10 mL/Hr) IV Continuous <Continuous>  vancomycin    Solution 500 milliGRAM(s) Oral every 6 hours  vasopressin Infusion 0.05 Unit(s)/Min (3 mL/Hr) IV Continuous <Continuous>  zinc oxide 40% Ointment 1 Application(s) Topical two times a day      Physical Exam  General: Patient comfortable in bed  Vital Signs Last 12 Hrs  T(F): 99.3 (07-30-19 @ 08:00), Max: 99.5 (07-30-19 @ 07:15)  HR: 86 (07-30-19 @ 10:30) (83 - 89)  BP: --  BP(mean): --  RR: 11 (07-30-19 @ 10:30) (0 - 32)  SpO2: 100% (07-30-19 @ 10:30) (96% - 100%)  Neck: No palpable thyroid nodules.  CVS: S1S2, No murmurs  Respiratory: No wheezing, no crepitations  GI: Abdomen soft, bowel sounds positive  Musculoskeletal:  edema lower extremities.   Skin: No skin rashes, no ecchymosis    Diagnostics

## 2019-07-30 NOTE — PROGRESS NOTE ADULT - ASSESSMENT
Assessment  DMT2: 73y Male with newly diagnosed DM T2 with hyperglycemia, on low dose insulin, coverage blood sugar today 137 to 187   no hypoglycemic episode, on tube feeding, intubated  CAD: s/P CABG, with Afib,  on medications, stable, monitored.  HTN: Controlled,  on antihypertensive medications.  RALPH on CKD; F/U by Renal    cata Turner MD  Cell: [phone]

## 2019-07-31 LAB
ALBUMIN SERPL ELPH-MCNC: 2.3 G/DL — LOW (ref 3.3–5)
ALP SERPL-CCNC: 184 U/L — HIGH (ref 40–120)
ALT FLD-CCNC: 28 U/L — SIGNIFICANT CHANGE UP (ref 10–45)
AMMONIA BLD-MCNC: 24 UMOL/L — SIGNIFICANT CHANGE UP (ref 11–55)
ANION GAP SERPL CALC-SCNC: 21 MMOL/L — HIGH (ref 5–17)
APTT BLD: 52.3 SEC — HIGH (ref 27.5–36.3)
AST SERPL-CCNC: 142 U/L — HIGH (ref 10–40)
BASE EXCESS BLDV CALC-SCNC: -6.9 MMOL/L — LOW (ref -2–2)
BILIRUB SERPL-MCNC: 8.2 MG/DL — HIGH (ref 0.2–1.2)
BUN SERPL-MCNC: 62 MG/DL — HIGH (ref 7–23)
CALCIUM SERPL-MCNC: 7.8 MG/DL — LOW (ref 8.4–10.5)
CHLORIDE SERPL-SCNC: 100 MMOL/L — SIGNIFICANT CHANGE UP (ref 96–108)
CO2 BLDV-SCNC: 19 MMOL/L — LOW (ref 22–30)
CO2 SERPL-SCNC: 16 MMOL/L — LOW (ref 22–31)
CREAT SERPL-MCNC: 4.59 MG/DL — HIGH (ref 0.5–1.3)
GAS PNL BLDA: SIGNIFICANT CHANGE UP
GAS PNL BLDA: SIGNIFICANT CHANGE UP
GLUCOSE BLDC GLUCOMTR-MCNC: 79 MG/DL — SIGNIFICANT CHANGE UP (ref 70–99)
GLUCOSE BLDC GLUCOMTR-MCNC: 82 MG/DL — SIGNIFICANT CHANGE UP (ref 70–99)
GLUCOSE SERPL-MCNC: 86 MG/DL — SIGNIFICANT CHANGE UP (ref 70–99)
HCO3 BLDV-SCNC: 18 MMOL/L — LOW (ref 21–29)
HCT VFR BLD CALC: 25 % — LOW (ref 39–50)
HGB BLD-MCNC: 8.6 G/DL — LOW (ref 13–17)
HOROWITZ INDEX BLDV+IHG-RTO: 40 — SIGNIFICANT CHANGE UP
INR BLD: 2.04 RATIO — HIGH (ref 0.88–1.16)
LDH SERPL L TO P-CCNC: 492 U/L — HIGH (ref 50–242)
MAGNESIUM SERPL-MCNC: 2 MG/DL — SIGNIFICANT CHANGE UP (ref 1.6–2.6)
MCHC RBC-ENTMCNC: 32.3 PG — SIGNIFICANT CHANGE UP (ref 27–34)
MCHC RBC-ENTMCNC: 34.5 GM/DL — SIGNIFICANT CHANGE UP (ref 32–36)
MCV RBC AUTO: 93.7 FL — SIGNIFICANT CHANGE UP (ref 80–100)
NIGHT BLUE STAIN TISS: SIGNIFICANT CHANGE UP
PCO2 BLDV: 36 MMHG — SIGNIFICANT CHANGE UP (ref 35–50)
PH BLDV: 7.32 — LOW (ref 7.35–7.45)
PHOSPHATE SERPL-MCNC: 4.9 MG/DL — HIGH (ref 2.5–4.5)
PLATELET # BLD AUTO: 116 K/UL — LOW (ref 150–400)
PO2 BLDV: 41 MMHG — SIGNIFICANT CHANGE UP (ref 25–45)
POTASSIUM SERPL-MCNC: 4.2 MMOL/L — SIGNIFICANT CHANGE UP (ref 3.5–5.3)
POTASSIUM SERPL-SCNC: 4.2 MMOL/L — SIGNIFICANT CHANGE UP (ref 3.5–5.3)
PROT SERPL-MCNC: 6.7 G/DL — SIGNIFICANT CHANGE UP (ref 6–8.3)
PROTHROM AB SERPL-ACNC: 24 SEC — HIGH (ref 10–12.9)
RBC # BLD: 2.67 M/UL — LOW (ref 4.2–5.8)
RBC # FLD: 15.1 % — HIGH (ref 10.3–14.5)
SAO2 % BLDV: 71 % — SIGNIFICANT CHANGE UP (ref 67–88)
SODIUM SERPL-SCNC: 137 MMOL/L — SIGNIFICANT CHANGE UP (ref 135–145)
SPECIMEN SOURCE: SIGNIFICANT CHANGE UP
T3 SERPL-MCNC: 57 NG/DL — LOW (ref 80–200)
T4 AB SER-ACNC: 4.8 UG/DL — SIGNIFICANT CHANGE UP (ref 4.6–12)
TSH SERPL-MCNC: 0.74 UIU/ML — SIGNIFICANT CHANGE UP (ref 0.27–4.2)
WBC # BLD: 14.3 K/UL — HIGH (ref 3.8–10.5)
WBC # FLD AUTO: 14.3 K/UL — HIGH (ref 3.8–10.5)

## 2019-07-31 PROCEDURE — 99291 CRITICAL CARE FIRST HOUR: CPT

## 2019-07-31 PROCEDURE — 99233 SBSQ HOSP IP/OBS HIGH 50: CPT

## 2019-07-31 PROCEDURE — 93306 TTE W/DOPPLER COMPLETE: CPT | Mod: 26

## 2019-07-31 PROCEDURE — 71045 X-RAY EXAM CHEST 1 VIEW: CPT | Mod: 26

## 2019-07-31 PROCEDURE — 74018 RADEX ABDOMEN 1 VIEW: CPT | Mod: 26

## 2019-07-31 RX ORDER — POTASSIUM CHLORIDE 20 MEQ
10 PACKET (EA) ORAL ONCE
Refills: 0 | Status: COMPLETED | OUTPATIENT
Start: 2019-07-31 | End: 2019-07-31

## 2019-07-31 RX ORDER — MAGNESIUM SULFATE 500 MG/ML
1 VIAL (ML) INJECTION ONCE
Refills: 0 | Status: COMPLETED | OUTPATIENT
Start: 2019-07-31 | End: 2019-07-31

## 2019-07-31 RX ORDER — FENTANYL CITRATE 50 UG/ML
50 INJECTION INTRAVENOUS ONCE
Refills: 0 | Status: DISCONTINUED | OUTPATIENT
Start: 2019-07-31 | End: 2019-07-31

## 2019-07-31 RX ORDER — DEXMEDETOMIDINE HYDROCHLORIDE IN 0.9% SODIUM CHLORIDE 4 UG/ML
1 INJECTION INTRAVENOUS
Qty: 200 | Refills: 0 | Status: DISCONTINUED | OUTPATIENT
Start: 2019-07-31 | End: 2019-08-03

## 2019-07-31 RX ORDER — SODIUM BICARBONATE 1 MEQ/ML
50 SYRINGE (ML) INTRAVENOUS ONCE
Refills: 0 | Status: COMPLETED | OUTPATIENT
Start: 2019-07-31 | End: 2019-07-31

## 2019-07-31 RX ORDER — METOCLOPRAMIDE HCL 10 MG
5 TABLET ORAL EVERY 8 HOURS
Refills: 0 | Status: DISCONTINUED | OUTPATIENT
Start: 2019-07-31 | End: 2019-07-31

## 2019-07-31 RX ADMIN — PANTOPRAZOLE SODIUM 40 MILLIGRAM(S): 20 TABLET, DELAYED RELEASE ORAL at 06:39

## 2019-07-31 RX ADMIN — Medication 5 MILLIGRAM(S): at 15:52

## 2019-07-31 RX ADMIN — Medication 50 MILLIEQUIVALENT(S): at 22:28

## 2019-07-31 RX ADMIN — Medication 81 MILLIGRAM(S): at 13:57

## 2019-07-31 RX ADMIN — FENTANYL CITRATE 50 MICROGRAM(S): 50 INJECTION INTRAVENOUS at 05:45

## 2019-07-31 RX ADMIN — Medication 500 MILLIGRAM(S): at 01:36

## 2019-07-31 RX ADMIN — CHLORHEXIDINE GLUCONATE 15 MILLILITER(S): 213 SOLUTION TOPICAL at 18:15

## 2019-07-31 RX ADMIN — Medication 50 MILLIEQUIVALENT(S): at 06:01

## 2019-07-31 RX ADMIN — FENTANYL CITRATE 50 MICROGRAM(S): 50 INJECTION INTRAVENOUS at 05:30

## 2019-07-31 RX ADMIN — MIDODRINE HYDROCHLORIDE 10 MILLIGRAM(S): 2.5 TABLET ORAL at 06:39

## 2019-07-31 RX ADMIN — NYSTATIN CREAM 1 APPLICATION(S): 100000 CREAM TOPICAL at 18:16

## 2019-07-31 RX ADMIN — CHLORHEXIDINE GLUCONATE 1 APPLICATION(S): 213 SOLUTION TOPICAL at 06:35

## 2019-07-31 RX ADMIN — Medication 25 MICROGRAM(S): at 21:02

## 2019-07-31 RX ADMIN — PANTOPRAZOLE SODIUM 40 MILLIGRAM(S): 20 TABLET, DELAYED RELEASE ORAL at 18:15

## 2019-07-31 RX ADMIN — Medication 500 MILLIGRAM(S): at 18:10

## 2019-07-31 RX ADMIN — NYSTATIN CREAM 1 APPLICATION(S): 100000 CREAM TOPICAL at 06:36

## 2019-07-31 RX ADMIN — Medication 500 MILLIGRAM(S): at 10:25

## 2019-07-31 RX ADMIN — CHLORHEXIDINE GLUCONATE 15 MILLILITER(S): 213 SOLUTION TOPICAL at 06:35

## 2019-07-31 RX ADMIN — Medication 100 GRAM(S): at 11:50

## 2019-07-31 NOTE — PROGRESS NOTE ADULT - ASSESSMENT
The patient is a 73y Male with CAD, HFrEF and PVD s/p CABGx3 followed by AFib, resp failure and acute on chronic kidney injury.    - CKD:  Suspect underlying CKD stage 3 and now anuric   - Hypotensive on  and Vaso and levo            RECOMMENDATIONS  -  HD today and remove 1.5kg   - please dose any new medications for a CrCl of 10-15 ml/min   -C diff positive at present .  Cont abx.  Diarrhea amount has reduced however    -Taper pressors today as allowed by BP

## 2019-07-31 NOTE — PROGRESS NOTE ADULT - SUBJECTIVE AND OBJECTIVE BOX
Endocrinology Attending Covering for Dr. Magdaleno      Chief complaint  Patient is a 73y old  Male who presents with a chief complaint of CABG (31 Jul 2019 08:34)   Review of systems  Patient in bed, no fever,  had no hypoglycemia.    Labs and Fingersticks  CAPILLARY BLOOD GLUCOSE  79 (31 Jul 2019 07:00)      POCT Blood Glucose.: 79 mg/dL (31 Jul 2019 06:47)      Anion Gap, Serum: 21 <H> (07-31 @ 01:14)  Anion Gap, Serum: 18 <H> (07-30 @ 01:41)      Calcium, Total Serum: 7.8 <L> (07-31 @ 01:14)  Calcium, Total Serum: 8.1 <L> (07-30 @ 01:41)  Albumin, Serum: 2.3 <L> (07-31 @ 01:14)  Albumin, Serum: 2.4 <L> (07-30 @ 01:41)    Alanine Aminotransferase (ALT/SGPT): 28 (07-31 @ 01:14)  Alanine Aminotransferase (ALT/SGPT): 39 (07-30 @ 01:41)  Alkaline Phosphatase, Serum: 184 <H> (07-31 @ 01:14)  Alkaline Phosphatase, Serum: 180 <H> (07-30 @ 01:41)  Aspartate Aminotransferase (AST/SGOT): 142 <H> (07-31 @ 01:14)  Aspartate Aminotransferase (AST/SGOT): 140 <H> (07-30 @ 01:41)        07-31    137  |  100  |  62<H>  ----------------------------<  86  4.2   |  16<L>  |  4.59<H>    Ca    7.8<L>      31 Jul 2019 01:14  Phos  4.9     07-31  Mg     2.0     07-31    TPro  6.7  /  Alb  2.3<L>  /  TBili  8.2<H>  /  DBili  x   /  AST  142<H>  /  ALT  28  /  AlkPhos  184<H>  07-31                        8.6    14.3  )-----------( 116      ( 31 Jul 2019 01:14 )             25.0     Medications  MEDICATIONS  (STANDING):  aspirin  chewable 81 milliGRAM(s) Oral daily  atorvastatin 80 milliGRAM(s) Oral at bedtime  chlorhexidine 0.12% Liquid 15 milliLiter(s) Oral Mucosa every 12 hours  chlorhexidine 2% Cloths 1 Application(s) Topical <User Schedule>  chlorhexidine 4% Liquid 1 Application(s) Topical <User Schedule>  dextrose 50% Injectable 50 milliLiter(s) IV Push every 15 minutes  dextrose 50% Injectable 25 milliLiter(s) IV Push every 15 minutes  dextrose 50% Injectable 50 milliLiter(s) IV Push every 15 minutes  dextrose 50% Injectable 25 milliLiter(s) IV Push every 15 minutes  DOBUTamine Infusion 3 MICROgram(s)/kG/Min (6.624 mL/Hr) IV Continuous <Continuous>  insulin lispro (HumaLOG) corrective regimen sliding scale   SubCutaneous every 6 hours  levothyroxine Injectable 25 MICROGram(s) IV Push at bedtime  norepinephrine Infusion 0.043 MICROgram(s)/kG/Min (6 mL/Hr) IV Continuous <Continuous>  nystatin Powder 1 Application(s) Topical two times a day  pantoprazole  Injectable 40 milliGRAM(s) IV Push two times a day  sodium chloride 0.9%. 1000 milliLiter(s) (10 mL/Hr) IV Continuous <Continuous>  vancomycin    Solution 500 milliGRAM(s) Oral every 6 hours  vasopressin Infusion 0.05 Unit(s)/Min (3 mL/Hr) IV Continuous <Continuous>      Physical Exam  General: Patient comfortable in bed  Vital Signs Last 12 Hrs  T(F): 97.5 (07-31-19 @ 04:00), Max: 97.5 (07-31-19 @ 04:00)  HR: 94 (07-31-19 @ 08:20) (93 - 98)  BP: --  BP(mean): --  RR: 19 (07-31-19 @ 07:00) (16 - 57)  SpO2: 98% (07-31-19 @ 08:20) (96% - 100%)  Neck: No palpable thyroid nodules.  CVS: S1S2, No murmurs  Respiratory: No wheezing, no crepitations  GI: Abdomen soft, bowel sounds positive  Musculoskeletal:  edema lower extremities.   Skin: No skin rashes, no ecchymosis    Diagnostics

## 2019-07-31 NOTE — PROGRESS NOTE ADULT - ASSESSMENT
Assessment  DMT2: 73y Male with newly diagnosed DM T2 with hyperglycemia, on low dose insulin, coverage blood sugar today 86   no hypoglycemic episode, on tube feeding,   CAD: s/P CABG, with Afib,  on medications, stable, monitored.  HTN: Controlled,  on antihypertensive medications.  RALPH on CKD; F/U by Renal    cata Turner MD  Cell: [phone]

## 2019-07-31 NOTE — EEG REPORT - NS EEG TEXT BOX
[facility]  Report of Routine EEG with Video    [facility]: [address], [address] [place] [address], Phone: [phone]  [facility]: [address] [place] [address], Phone: [phone]  Office: [address] [facility], [place] [address], [place] [address], Phone: [phone]    Patient Name: Panda Emerson    Age: 73 year  : 1945  Patient ID: -, MRN #: [id number], Location: [facility]  Referring Physician: -  EEG #: [id number]    Study Date: 2019	    Technical Information:					  On Instrument: [id number]  Placement and Labeling of Electrodes:  The EEG was performed utilizing 20 channels referential EEG connections (coronal over temporal over parasagittal montage) using all standard 10-20 electrode placements with EKG.  Recording was at a sampling rate of 256 samples per second per channel.  Time synchronized digital video recording was done simultaneously with EEG recording.  A low light infrared camera was used for low light recording.  Ivan and seizure detection algorithms were utilized.    History:  72y/o male  P/w unresponsive,AMS, r/o seizure  H/o RALPH, arterial disease peripheral, MR, TR    Pertinent Medication	  No Data.	    Study Interpretation:    FINDINGS: The background was continuous, spontaneously variable and not reactive. No posterior dominant rhythm seen.    Background Slowing:  Diffuse theta and polymorphic delta slowing.    Focal Slowing:   None were present.    Sleep Background:  Stage II sleep transients were not recorded.    Other Non-Epileptiform Findings:  None were present.    Interictal Epileptiform Activity:   None were present.    Events:  Clinical events: None recorded.  Seizures: None recorded.    Activation Procedures:   Hyperventilation was not performed.    Photic stimulation was not performed.     Artifacts:  Intermittent myogenic and movement artifacts were noted.    ECG:  The heart rate on single channel ECG was predominantly between 70-80 BPM.    EEG Summary/Classification:  Abnormal EEG in an unresponsive patient.  - Moderate to severe generalized slowing.     EEG Impression/Clinical Correlate:  Abnormal EEG study.  1. Moderate to severe nonspecific diffuse or multifocal cerebral dysfunction.   2. No epileptiform pattern or seizure seen.  ________________________________________    Deonte Leon DO  Epilepsy Fellow, [facility] [place]    Reese Suresh MD  Attending Physician, [facility] [place]

## 2019-07-31 NOTE — PROGRESS NOTE ADULT - ASSESSMENT
73 year old Black male h/o no implantable devices who prior to his admission to [facility] had not been followed by a doctor regularly (on no meds at home), presented to [facility] on 7/1/19 with shortness of breath and LE edema found to have newly diagnosed HFrEF (EF 30%) and RALPH vs CKD, also PVD and claudication with extensive LE arterial disease on doppler. 7/2/19 abd US: small amount of perihepatic ascited. 7/2/19 TTE: EF 30%, mild LVH, multiple regional WMA's, mod MR/TR. 7/3/19 Nuclear ST: moderate focal apical/inferolat/aterolat ischemia. 7/9/19: h/h 12.9/39.5, platelets 180, Bun/Cr 28/1.9, eGFR 42. Pt was diuresed at [facility] treated with lasix/hydralzine/nitrates/statin/ASA. No beta blockade 2/2 low blood pressures. Per [facility] discharge note, pt will need  vascular intervention d/t extensive lower extremity vascular disease. Pt transferred to [facility] today for left heart cath to rule out ischemia. (10 Jul 2019 12:52)    7/12 pt underwent C3L , AFib.  Pt's hospital course was then been c/b  A-fib, acute on chronic renal failure with fluid overload requiring CVVH, cardiogenic shock (on Milrinone, Dobutamine, and Vasopressin gtts), and respiratory failure requiring intubation (extubated on 7/16). Patient's CBC was WNL on day of presentation to [facility]. His platelet count has been steadily downtrending since 7/12.     pt found to have C diff  I am unsure it patient received abs at [facility]. He only received perioperative abs here until the 21st  pt started on empiric treatment for c diff    pt with low plts, heme is following  now on agatobran  plts low but  stable, on argatoban, HIT  AB positive    pt with elevated LFTs, low EF- some is congestion  also on statin, consider holding  Lfts are slightly improving  would check hepatitis screen ( so far negative) and CMV      flagyl added to po vanco for c diff    now reintubated , for trach and PEG  new rash over past  few days    pt continues to do poorly with large pressor requirements  abd- distended - s/p PEG, check KUB  Rash- of unclear etiology  AMS- CT nondiagnostic  elevated creatinine- on dialysis   WBC is actually improving but patient continues to do poorly with high pressor requirement   pt with sacral wound- local care    check thiamine, B12, folate    Panculture  lines are new  chcek KUB  will broaden coverage if any signs/sxs of infection, but hesitant to just throw on more with the c diff that we are treating

## 2019-07-31 NOTE — PROGRESS NOTE ADULT - SUBJECTIVE AND OBJECTIVE BOX
Patient is a 73y old  Male who presents with a chief complaint of CABG (31 Jul 2019 08:34)    Being followed by ID for        Interval history:  events noted  still on pressors  unable to wean off ventilator  pt agitated when lessen sedation  still with diarrhea  started feeds today  receiving HD  No other acute events      PAST MEDICAL & SURGICAL HISTORY:  Arterial disease: peripheral  TR (tricuspid regurgitation)  MR (mitral regurgitation)  RALPH (acute kidney injury)  HFrEF (heart failure with reduced ejection fraction)  No significant past surgical history    Allergies    No Known Allergies    Intolerances      Antimicrobials:    vancomycin    Solution 500 milliGRAM(s) Oral every 6 hours    MEDICATIONS  (STANDING):  aspirin  chewable 81 milliGRAM(s) Oral daily  atorvastatin 80 milliGRAM(s) Oral at bedtime  chlorhexidine 0.12% Liquid 15 milliLiter(s) Oral Mucosa every 12 hours  chlorhexidine 2% Cloths 1 Application(s) Topical <User Schedule>  chlorhexidine 4% Liquid 1 Application(s) Topical <User Schedule>  dexmedetomidine Infusion 1 MICROgram(s)/kG/Hr (18.4 mL/Hr) IV Continuous <Continuous>  dextrose 50% Injectable 50 milliLiter(s) IV Push every 15 minutes  dextrose 50% Injectable 25 milliLiter(s) IV Push every 15 minutes  dextrose 50% Injectable 50 milliLiter(s) IV Push every 15 minutes  dextrose 50% Injectable 25 milliLiter(s) IV Push every 15 minutes  DOBUTamine Infusion 3 MICROgram(s)/kG/Min (6.624 mL/Hr) IV Continuous <Continuous>  insulin lispro (HumaLOG) corrective regimen sliding scale   SubCutaneous every 6 hours  levothyroxine Injectable 25 MICROGram(s) IV Push at bedtime  metoclopramide Injectable 5 milliGRAM(s) IV Push every 8 hours  norepinephrine Infusion 0.043 MICROgram(s)/kG/Min (6 mL/Hr) IV Continuous <Continuous>  nystatin Powder 1 Application(s) Topical two times a day  pantoprazole  Injectable 40 milliGRAM(s) IV Push two times a day  sodium chloride 0.9%. 1000 milliLiter(s) (10 mL/Hr) IV Continuous <Continuous>  vancomycin    Solution 500 milliGRAM(s) Oral every 6 hours  vasopressin Infusion 0.05 Unit(s)/Min (3 mL/Hr) IV Continuous <Continuous>      Vital Signs Last 24 Hrs  T(C): 36.5 (07-31-19 @ 16:00), Max: 36.5 (07-31-19 @ 07:00)  T(F): 97.7 (07-31-19 @ 16:00), Max: 97.7 (07-31-19 @ 07:00)  HR: 95 (07-31-19 @ 18:00) (91 - 100)  BP: --  BP(mean): --  RR: 25 (07-31-19 @ 18:00) (5 - 57)  SpO2: 98% (07-31-19 @ 18:00) (87% - 100%)    Physical Exam:    Constitutional  intubated , sedated    diffuse erythematous rash on trunk, ext     HEENT PERRLA EOMI,No pallor or icterus    No oral exudate or erythema    Neck trach    Chest  clear ant    CVS RRR S1 S2     Abd  distended , PEG site clean    Ext  cool    IV site no erythema tenderness or discharge    Joints no swelling or LOM    CNS AAO X 3 no focal    Lab Data:                          8.6    14.3  )-----------( 116      ( 31 Jul 2019 01:14 )             25.0       07-31    137  |  100  |  62<H>  ----------------------------<  86  4.2   |  16<L>  |  4.59<H>    Ca    7.8<L>      31 Jul 2019 01:14  Phos  4.9     07-31  Mg     2.0     07-31    TPro  6.7  /  Alb  2.3<L>  /  TBili  8.2<H>  /  DBili  x   /  AST  142<H>  /  ALT  28  /  AlkPhos  184<H>  07-31          .Bronchial  07-30-19   Testing in progress  --  --      .Blood  07-29-19   No growth to date.  --  --      .Blood  07-28-19   No growth to date.  --  --          WBC Count: 14.3 (07-31-19 @ 01:14)  WBC Count: 15.2 (07-30-19 @ 15:53)  WBC Count: 16.3 (07-30-19 @ 01:41)  WBC Count: 15.4 (07-29-19 @ 07:09)  WBC Count: 17.9 (07-29-19 @ 00:41)  WBC Count: 15.3 (07-28-19 @ 00:24)  WBC Count: 16.5 (07-27-19 @ 02:31)  WBC Count: 16.7 (07-26-19 @ 02:04)  WBC Count: 19.6 (07-25-19 @ 00:58)      Culture - Fungal, Bronchial (07.30.19 @ 19:11)    Specimen Source: .Bronchial    Culture Results:   Testing in progress    Culture - Acid Fast - Bronchial w/Smear (07.30.19 @ 19:11)    Specimen Source: .Bronchial    Acid Fast Bacilli Smear:   No acid fast bacilli seen by fluorochrome stain    Culture - Blood (07.28.19 @ 22:54)    Specimen Source: .Blood    Culture Results:   No growth to date.      EEG Impression/Clinical Correlate:  Abnormal EEG study.  1. Moderate to severe nonspecific diffuse or multifocal cerebral dysfunction.   2. No epileptiform pattern or seizure seen.        < from: US Abdomen Complete (07.25.19 @ 11:41) >  MPRESSION:     Echogenic material within the gallbladder lumen, likely represent   gallbladder sludge. No evidence of acute cholecystitis.    No hydronephrosis.    Borderline hepatomegaly.    Bilateral echogenic renal parenchyma, suggestive of medical renal disease.    Bilateral pleural effusions.      < end of copied text >      < from: CT Head No Cont (07.22.19 @ 06:40) >    Impression: No evidence of acute infarction or hemorrhage. MRI with   diffusion-weighted imaging is a more sensitive modality in the evaluation   of early infarction.    Mild chronic microvascular ischemic disease, as described above. Chronic   lacunar infarction in the right basal ganglia.        < end of copied text >    < from: CT Angio Upper Extremity w/ IV Cont, Right (07.23.19 @ 00:41) >    IMPRESSION:  The right radial and ulnar arteries are visualized to the mid forearm.    Advanced atherosclerosis of the visualized lower extremities.    Moderate pericardial effusion. Marked cardiomegaly.    < end of copied text >    < from: Transthoracic Echocardiogram (07.31.19 @ 05:24) >  Conclusions:  1. Moderate global left ventricular systolic dysfunction.  Paradoxical septal motion consistent with prior cardiac  surgery.  2. Moderate right ventricular enlargement with moderately  decreased right ventricular systolic function.  3. Small organized pericardial effusion. No  echocardiographic evidence of pericardial tamponade.  4. Left pleural effusion.  *** Compared with echocardiogram of 7/23/2019, results are  similar on today's study.  ------------------------------------------------------------------------  Confirmed on  7/31/2019 - 11:40:55 by Allan Mayo M.D.  ------------------------------------------------------------------------    < end of copied text >

## 2019-07-31 NOTE — PROGRESS NOTE ADULT - SUBJECTIVE AND OBJECTIVE BOX
SYLVIA ROSA   MRN#: [id number]     The patient is a 73y Male who was seen, evaluated, & examined with the CTICU staff post-operatively with a multidisciplinary care plan formulated & implemented.  All available clinical, laboratory, radiographic, pharmacologic, and electrocardiographic data were reviewed & analyzed.      The patient was in the CTICU in critical condition secondary to:     persistent cardiopulmonary dysfunction  cardiogenic shock-cardiovascular dysfunction    For support and evaluation & prevention of further decompensation secondary to persistent cardiopulmonary dysfunction, respiratory status required:     supplemental oxygen with full ventilatory support / mechanical ventilation  continuous pulse oximetry monitoring  following ABGs with A-line monitoring    Invasive hemodynamic monitoring with an A-line was required for continuous MAP/BP monitoring to ensure adequate cardiovascular support and to evaluate for & help prevent decompensation while receiving     IV Levophed infusion  IV Vasopressin infusion  IV Dobutamine infusion    secondary to     cardiogenic shock-cardiovascular dysfunction  acute on chronic postoperative kidney injury-failure    In addition:  S/p trach/PEG yesterday for ongoing respiratory failure, ? due to volume overload  Off sedation, more interactive and moving all extremities - EEG from yesterday (due to altered mental status) pending  Biventricular dysfunction requiring Dobutamine and multiple pressors - TTE done today and is pending  Volume up, will dialyze for 2L negative  Attempt CPAP wean, mental status and volume overload may be rate limiting step to successful weaning trials  Vancomycin PO for c diff  Start trophic feeds through PEG; rectal tube with high output  On Coumadin for afib - INR therapeutic - will check in PM and place on Heparin drip if INR becomes subtherapeutic  Unclear why patient has remained so vasodilated with pressor requirement post surgery - will check cultures to rule out infection (stage 4 decubitus ulcer could be source) although cultures from 7/29 were negative  OOB to chair    The patient required critical care management and I personally provided 30 minutes of non-continuous care to the patient, excluding separate procedures, in addition to  discussing the patient and plan at length with the CTICU staff and helping coordinate care. SYLVIA ROSA   MRN#: [id number]     The patient is a 73y Male who was seen, evaluated, & examined with the CTICU staff post-operatively with a multidisciplinary care plan formulated & implemented.  All available clinical, laboratory, radiographic, pharmacologic, and electrocardiographic data were reviewed & analyzed.      The patient was in the CTICU in critical condition secondary to:     persistent cardiopulmonary dysfunction  cardiogenic shock-cardiovascular dysfunction    For support and evaluation & prevention of further decompensation secondary to persistent cardiopulmonary dysfunction, respiratory status required:     supplemental oxygen with full ventilatory support / mechanical ventilation  continuous pulse oximetry monitoring  following ABGs with A-line monitoring    Invasive hemodynamic monitoring with an A-line was required for continuous MAP/BP monitoring to ensure adequate cardiovascular support and to evaluate for & help prevent decompensation while receiving     IV Levophed infusion  IV Vasopressin infusion  IV Dobutamine infusion    secondary to     cardiogenic shock-cardiovascular dysfunction  acute on chronic postoperative kidney injury-failure    In addition:  S/p trach/PEG yesterday for ongoing respiratory failure, ? due to volume overload  Off sedation, more interactive and moving all extremities - EEG from yesterday (due to altered mental status) pending  Biventricular dysfunction requiring Dobutamine and multiple pressors - TTE done today and is pending  Will stop Midodrine - it has not helped wean off of pressors and is increasing afterload in a weak ventricle  Volume up, will dialyze for 2L negative  Attempt CPAP wean, mental status and volume overload may be rate limiting step to successful weaning trials  Vancomycin PO for c diff  Start trophic feeds through PEG; rectal tube with high output  On Coumadin for afib - INR therapeutic - will check in PM and place on Heparin drip if INR becomes subtherapeutic  Unclear why patient has remained so vasodilated with pressor requirement post surgery - will check cultures to rule out infection (stage 4 decubitus ulcer could be source) although cultures from 7/29 were negative; will also check TSH  OOB to chair    The patient required critical care management and I personally provided 30 minutes of non-continuous care to the patient, excluding separate procedures, in addition to  discussing the patient and plan at length with the CTICU staff and helping coordinate care. SYLVIA ROSA   MRN#: [id number]     The patient is a 73y Male who was seen, evaluated, & examined with the CTICU staff post-operatively with a multidisciplinary care plan formulated & implemented.  All available clinical, laboratory, radiographic, pharmacologic, and electrocardiographic data were reviewed & analyzed.      The patient was in the CTICU in critical condition secondary to:     persistent cardiopulmonary dysfunction  cardiogenic shock-cardiovascular dysfunction    For support and evaluation & prevention of further decompensation secondary to persistent cardiopulmonary dysfunction, respiratory status required:     supplemental oxygen with full ventilatory support / mechanical ventilation  continuous pulse oximetry monitoring  following ABGs with A-line monitoring    Invasive hemodynamic monitoring with an A-line was required for continuous MAP/BP monitoring to ensure adequate cardiovascular support and to evaluate for & help prevent decompensation while receiving     IV Levophed infusion  IV Vasopressin infusion  IV Dobutamine infusion    secondary to     cardiogenic shock-cardiovascular dysfunction  acute on chronic postoperative kidney injury-failure    In addition:  S/p trach/PEG yesterday for ongoing respiratory failure, ? due to volume overload  Off sedation, more interactive and moving all extremities - EEG from yesterday (due to altered mental status) with no epileptiform activity  Biventricular dysfunction requiring Dobutamine and multiple pressors - TTE done today and is pending  Will stop Midodrine - it has not helped wean off of pressors and is increasing afterload in a weak ventricle  Volume up, will dialyze for 2L negative  Attempt CPAP wean, mental status and volume overload may be rate limiting step to successful weaning trials  Vancomycin PO for c diff  Start trophic feeds through PEG; rectal tube with high output  On Coumadin for afib - INR therapeutic - will check in PM and place on Heparin drip if INR becomes subtherapeutic  Unclear why patient has remained so vasodilated with pressor requirement post surgery - will check cultures to rule out infection (stage 4 decubitus ulcer could be source) although cultures from 7/29 were negative; will also check TSH  OOB to chair    The patient required critical care management and I personally provided 30 minutes of non-continuous care to the patient, excluding separate procedures, in addition to  discussing the patient and plan at length with the CTICU staff and helping coordinate care.

## 2019-07-31 NOTE — PROGRESS NOTE ADULT - SUBJECTIVE AND OBJECTIVE BOX
[facility] [phone]        Patient seen and examined in bed.  He was trached and peg  Off sedation  Remains on pressors and inotropes         MEDICATIONS  (STANDING):  aspirin  chewable 81 milliGRAM(s) Oral daily  atorvastatin 80 milliGRAM(s) Oral at bedtime  chlorhexidine 0.12% Liquid 15 milliLiter(s) Oral Mucosa every 12 hours  chlorhexidine 2% Cloths 1 Application(s) Topical <User Schedule>  chlorhexidine 4% Liquid 1 Application(s) Topical <User Schedule>  dextrose 50% Injectable 50 milliLiter(s) IV Push every 15 minutes  dextrose 50% Injectable 25 milliLiter(s) IV Push every 15 minutes  dextrose 50% Injectable 50 milliLiter(s) IV Push every 15 minutes  dextrose 50% Injectable 25 milliLiter(s) IV Push every 15 minutes  DOBUTamine Infusion 3 MICROgram(s)/kG/Min (6.624 mL/Hr) IV Continuous <Continuous>  insulin lispro (HumaLOG) corrective regimen sliding scale   SubCutaneous every 6 hours  levothyroxine Injectable 25 MICROGram(s) IV Push at bedtime  midodrine 10 milliGRAM(s) Oral every 8 hours  norepinephrine Infusion 0.043 MICROgram(s)/kG/Min (6 mL/Hr) IV Continuous <Continuous>  nystatin Powder 1 Application(s) Topical two times a day  pantoprazole  Injectable 40 milliGRAM(s) IV Push two times a day  sodium chloride 0.9%. 1000 milliLiter(s) (10 mL/Hr) IV Continuous <Continuous>  vancomycin    Solution 500 milliGRAM(s) Oral every 6 hours  vasopressin Infusion 0.05 Unit(s)/Min (3 mL/Hr) IV Continuous <Continuous>      VITAL:  T(C): , Max: 37.4 (07-30-19 @ 09:53)  T(F): , Max: 98 (07-30-19 @ 12:00)  HR: 94 (07-31-19 @ 08:20)  BP: --  BP(mean): --  RR: 19 (07-31-19 @ 07:00)  SpO2: 98% (07-31-19 @ 08:20)  Wt(kg): --    I and O's:    07-30 @ 07:01  -  07-31 @ 07:00  --------------------------------------------------------  IN: 1920.6 mL / OUT: 800 mL / NET: 1120.6 mL          PHYSICAL EXAM:    Constitutional: NAD  Neck:  No JVD + trach   Respiratory: CTAB/L  Cardiovascular: S1 and S2  Gastrointestinal: BS+, soft, NT/ND  Extremities: + flank  peripheral edema  Neurological:   no focal deficits  Psychiatric: Normal mood, normal affect  :    Skin: No rashes  Access: [facility]     LABS:                        8.6    14.3  )-----------( 116      ( 31 Jul 2019 01:14 )             25.0     07-31    137  |  100  |  62<H>  ----------------------------<  86  4.2   |  16<L>  |  4.59<H>    Ca    7.8<L>      31 Jul 2019 01:14  Phos  4.9     07-31  Mg     2.0     07-31    TPro  6.7  /  Alb  2.3<L>  /  TBili  8.2<H>  /  DBili  x   /  AST  142<H>  /  ALT  28  /  AlkPhos  184<H>  07-31          Urine Studies:          RADIOLOGY & ADDITIONAL STUDIES:        < from: Xray Chest 1 View- PORTABLE-Routine (07.31.19 @ 03:33) >    EXAM:  XR CHEST PORTABLE ROUTINE 1V                            PROCEDURE DATE:  07/31/2019            INTERPRETATION:  A single chest x-ray was obtained on July 31, 2019.    Indication: Status post tracheostomy tube placement.    Impression:    Theheart is enlarged. Left lower lobe pneumonia and/or atelectasis. The   right lung is clear. A tracheostomy tube is in good position. A central   line seen on the right and the tip is in the superior vena cava. No   pneumothorax.                    RITA ORTEGA M.D., ATTENDING RADIOLOGIST  This document has been electronically signed. Jul 31 2019  8:31AM                < end of copied text >

## 2019-08-01 LAB
ALBUMIN SERPL ELPH-MCNC: 2.2 G/DL — LOW (ref 3.3–5)
ALP SERPL-CCNC: 211 U/L — HIGH (ref 40–120)
ALT FLD-CCNC: 19 U/L — SIGNIFICANT CHANGE UP (ref 10–45)
ANION GAP SERPL CALC-SCNC: 17 MMOL/L — SIGNIFICANT CHANGE UP (ref 5–17)
APTT BLD: 53.8 SEC — HIGH (ref 27.5–36.3)
AST SERPL-CCNC: 164 U/L — HIGH (ref 10–40)
BASE EXCESS BLDV CALC-SCNC: -12 MMOL/L — LOW (ref -2–2)
BILIRUB SERPL-MCNC: 8.9 MG/DL — HIGH (ref 0.2–1.2)
BUN SERPL-MCNC: 53 MG/DL — HIGH (ref 7–23)
CALCIUM SERPL-MCNC: 7.7 MG/DL — LOW (ref 8.4–10.5)
CHLORIDE SERPL-SCNC: 101 MMOL/L — SIGNIFICANT CHANGE UP (ref 96–108)
CO2 BLDV-SCNC: 13 MMOL/L — LOW (ref 22–30)
CO2 SERPL-SCNC: 17 MMOL/L — LOW (ref 22–31)
CREAT SERPL-MCNC: 4.09 MG/DL — HIGH (ref 0.5–1.3)
FOLATE SERPL-MCNC: 12.1 NG/ML — SIGNIFICANT CHANGE UP
GAS PNL BLDA: SIGNIFICANT CHANGE UP
GAS PNL BLDV: SIGNIFICANT CHANGE UP
GLUCOSE BLDC GLUCOMTR-MCNC: 139 MG/DL — HIGH (ref 70–99)
GLUCOSE SERPL-MCNC: 74 MG/DL — SIGNIFICANT CHANGE UP (ref 70–99)
HCO3 BLDV-SCNC: 12 MMOL/L — LOW (ref 21–29)
HCT VFR BLD CALC: 24.6 % — LOW (ref 39–50)
HCT VFR BLD CALC: 28.2 % — LOW (ref 39–50)
HGB BLD-MCNC: 8.2 G/DL — LOW (ref 13–17)
HGB BLD-MCNC: 9.6 G/DL — LOW (ref 13–17)
HOROWITZ INDEX BLDV+IHG-RTO: 40 — SIGNIFICANT CHANGE UP
INR BLD: 2.35 RATIO — HIGH (ref 0.88–1.16)
INR BLD: 2.94 RATIO — HIGH (ref 0.88–1.16)
MAGNESIUM SERPL-MCNC: 2.1 MG/DL — SIGNIFICANT CHANGE UP (ref 1.6–2.6)
MCHC RBC-ENTMCNC: 30.3 PG — SIGNIFICANT CHANGE UP (ref 27–34)
MCHC RBC-ENTMCNC: 30.9 PG — SIGNIFICANT CHANGE UP (ref 27–34)
MCHC RBC-ENTMCNC: 33.5 GM/DL — SIGNIFICANT CHANGE UP (ref 32–36)
MCHC RBC-ENTMCNC: 34.1 GM/DL — SIGNIFICANT CHANGE UP (ref 32–36)
MCV RBC AUTO: 90.3 FL — SIGNIFICANT CHANGE UP (ref 80–100)
MCV RBC AUTO: 90.7 FL — SIGNIFICANT CHANGE UP (ref 80–100)
PCO2 BLDV: 22 MMHG — LOW (ref 35–50)
PH BLDV: 7.37 — SIGNIFICANT CHANGE UP (ref 7.35–7.45)
PHOSPHATE SERPL-MCNC: 4.1 MG/DL — SIGNIFICANT CHANGE UP (ref 2.5–4.5)
PLATELET # BLD AUTO: 79 K/UL — LOW (ref 150–400)
PLATELET # BLD AUTO: 88 K/UL — LOW (ref 150–400)
PO2 BLDV: 34 MMHG — SIGNIFICANT CHANGE UP (ref 25–45)
POTASSIUM SERPL-MCNC: 3.6 MMOL/L — SIGNIFICANT CHANGE UP (ref 3.5–5.3)
POTASSIUM SERPL-SCNC: 3.6 MMOL/L — SIGNIFICANT CHANGE UP (ref 3.5–5.3)
PROT SERPL-MCNC: 6.4 G/DL — SIGNIFICANT CHANGE UP (ref 6–8.3)
PROTHROM AB SERPL-ACNC: 27.5 SEC — HIGH (ref 10–12.9)
PROTHROM AB SERPL-ACNC: 34.7 SEC — HIGH (ref 10–12.9)
RBC # BLD: 2.72 M/UL — LOW (ref 4.2–5.8)
RBC # BLD: 3.1 M/UL — LOW (ref 4.2–5.8)
RBC # FLD: 14.8 % — HIGH (ref 10.3–14.5)
RBC # FLD: 14.9 % — HIGH (ref 10.3–14.5)
SAO2 % BLDV: 64 % — LOW (ref 67–88)
SODIUM SERPL-SCNC: 135 MMOL/L — SIGNIFICANT CHANGE UP (ref 135–145)
VIT B12 SERPL-MCNC: >2000 PG/ML — HIGH (ref 232–1245)
WBC # BLD: 12.9 K/UL — HIGH (ref 3.8–10.5)
WBC # BLD: 13.8 K/UL — HIGH (ref 3.8–10.5)
WBC # FLD AUTO: 12.9 K/UL — HIGH (ref 3.8–10.5)
WBC # FLD AUTO: 13.8 K/UL — HIGH (ref 3.8–10.5)

## 2019-08-01 PROCEDURE — 74018 RADEX ABDOMEN 1 VIEW: CPT | Mod: 26

## 2019-08-01 PROCEDURE — 99291 CRITICAL CARE FIRST HOUR: CPT

## 2019-08-01 PROCEDURE — 99232 SBSQ HOSP IP/OBS MODERATE 35: CPT

## 2019-08-01 PROCEDURE — 71045 X-RAY EXAM CHEST 1 VIEW: CPT | Mod: 26

## 2019-08-01 RX ORDER — METRONIDAZOLE 500 MG
500 TABLET ORAL EVERY 8 HOURS
Refills: 0 | Status: DISCONTINUED | OUTPATIENT
Start: 2019-08-01 | End: 2019-08-03

## 2019-08-01 RX ORDER — POTASSIUM CHLORIDE 20 MEQ
10 PACKET (EA) ORAL ONCE
Refills: 0 | Status: COMPLETED | OUTPATIENT
Start: 2019-08-01 | End: 2019-08-01

## 2019-08-01 RX ORDER — SODIUM CHLORIDE 9 MG/ML
1000 INJECTION, SOLUTION INTRAVENOUS
Refills: 0 | Status: DISCONTINUED | OUTPATIENT
Start: 2019-08-01 | End: 2019-08-02

## 2019-08-01 RX ORDER — CALCIUM GLUCONATE 100 MG/ML
1 VIAL (ML) INTRAVENOUS ONCE
Refills: 0 | Status: COMPLETED | OUTPATIENT
Start: 2019-08-01 | End: 2019-08-01

## 2019-08-01 RX ORDER — METRONIDAZOLE 500 MG
500 TABLET ORAL ONCE
Refills: 0 | Status: COMPLETED | OUTPATIENT
Start: 2019-08-01 | End: 2019-08-01

## 2019-08-01 RX ORDER — ALBUMIN HUMAN 25 %
250 VIAL (ML) INTRAVENOUS ONCE
Refills: 0 | Status: COMPLETED | OUTPATIENT
Start: 2019-08-01 | End: 2019-08-01

## 2019-08-01 RX ORDER — DEXTROSE 50 % IN WATER 50 %
50 SYRINGE (ML) INTRAVENOUS
Refills: 0 | Status: COMPLETED | OUTPATIENT
Start: 2019-08-01 | End: 2019-08-01

## 2019-08-01 RX ORDER — SODIUM BICARBONATE 1 MEQ/ML
50 SYRINGE (ML) INTRAVENOUS ONCE
Refills: 0 | Status: COMPLETED | OUTPATIENT
Start: 2019-08-01 | End: 2019-08-01

## 2019-08-01 RX ORDER — MIDODRINE HYDROCHLORIDE 2.5 MG/1
10 TABLET ORAL EVERY 8 HOURS
Refills: 0 | Status: DISCONTINUED | OUTPATIENT
Start: 2019-08-01 | End: 2019-08-03

## 2019-08-01 RX ORDER — CALCIUM CHLORIDE
1000 POWDER (GRAM) MISCELLANEOUS ONCE
Refills: 0 | Status: COMPLETED | OUTPATIENT
Start: 2019-08-01 | End: 2019-08-01

## 2019-08-01 RX ORDER — METRONIDAZOLE 500 MG
TABLET ORAL
Refills: 0 | Status: DISCONTINUED | OUTPATIENT
Start: 2019-08-01 | End: 2019-08-03

## 2019-08-01 RX ADMIN — Medication 11.04 MICROGRAM(S)/KG/MIN: at 22:57

## 2019-08-01 RX ADMIN — Medication 200 GRAM(S): at 06:45

## 2019-08-01 RX ADMIN — ATORVASTATIN CALCIUM 80 MILLIGRAM(S): 80 TABLET, FILM COATED ORAL at 22:56

## 2019-08-01 RX ADMIN — Medication 50 MILLIEQUIVALENT(S): at 01:30

## 2019-08-01 RX ADMIN — Medication 50 MILLILITER(S): at 04:37

## 2019-08-01 RX ADMIN — CHLORHEXIDINE GLUCONATE 1 APPLICATION(S): 213 SOLUTION TOPICAL at 05:42

## 2019-08-01 RX ADMIN — Medication 500 MILLIGRAM(S): at 12:06

## 2019-08-01 RX ADMIN — Medication 200 GRAM(S): at 14:24

## 2019-08-01 RX ADMIN — Medication 6 MICROGRAM(S)/KG/MIN: at 22:58

## 2019-08-01 RX ADMIN — NYSTATIN CREAM 1 APPLICATION(S): 100000 CREAM TOPICAL at 05:43

## 2019-08-01 RX ADMIN — DEXMEDETOMIDINE HYDROCHLORIDE IN 0.9% SODIUM CHLORIDE 18.4 MICROGRAM(S)/KG/HR: 4 INJECTION INTRAVENOUS at 00:16

## 2019-08-01 RX ADMIN — Medication 81 MILLIGRAM(S): at 12:06

## 2019-08-01 RX ADMIN — Medication 100 MILLIGRAM(S): at 22:56

## 2019-08-01 RX ADMIN — CHLORHEXIDINE GLUCONATE 15 MILLILITER(S): 213 SOLUTION TOPICAL at 05:42

## 2019-08-01 RX ADMIN — SODIUM CHLORIDE 30 MILLILITER(S): 9 INJECTION, SOLUTION INTRAVENOUS at 01:32

## 2019-08-01 RX ADMIN — Medication 50 MILLIEQUIVALENT(S): at 04:38

## 2019-08-01 RX ADMIN — MIDODRINE HYDROCHLORIDE 10 MILLIGRAM(S): 2.5 TABLET ORAL at 09:21

## 2019-08-01 RX ADMIN — Medication 500 MILLILITER(S): at 18:34

## 2019-08-01 RX ADMIN — NYSTATIN CREAM 1 APPLICATION(S): 100000 CREAM TOPICAL at 17:28

## 2019-08-01 RX ADMIN — Medication 500 MILLIGRAM(S): at 01:22

## 2019-08-01 RX ADMIN — PANTOPRAZOLE SODIUM 40 MILLIGRAM(S): 20 TABLET, DELAYED RELEASE ORAL at 05:43

## 2019-08-01 RX ADMIN — VASOPRESSIN 3 UNIT(S)/MIN: 20 INJECTION INTRAVENOUS at 22:58

## 2019-08-01 RX ADMIN — Medication 6.62 MICROGRAM(S)/KG/MIN: at 08:00

## 2019-08-01 RX ADMIN — VASOPRESSIN 3 UNIT(S)/MIN: 20 INJECTION INTRAVENOUS at 08:00

## 2019-08-01 RX ADMIN — MIDODRINE HYDROCHLORIDE 10 MILLIGRAM(S): 2.5 TABLET ORAL at 14:24

## 2019-08-01 RX ADMIN — Medication 6.62 MICROGRAM(S)/KG/MIN: at 00:15

## 2019-08-01 RX ADMIN — Medication 6 MICROGRAM(S)/KG/MIN: at 08:00

## 2019-08-01 RX ADMIN — Medication 6 MICROGRAM(S)/KG/MIN: at 00:17

## 2019-08-01 RX ADMIN — VASOPRESSIN 3 UNIT(S)/MIN: 20 INJECTION INTRAVENOUS at 00:15

## 2019-08-01 RX ADMIN — Medication 500 MILLIGRAM(S): at 05:42

## 2019-08-01 RX ADMIN — Medication 25 MICROGRAM(S): at 23:01

## 2019-08-01 RX ADMIN — PANTOPRAZOLE SODIUM 40 MILLIGRAM(S): 20 TABLET, DELAYED RELEASE ORAL at 17:24

## 2019-08-01 RX ADMIN — MIDODRINE HYDROCHLORIDE 10 MILLIGRAM(S): 2.5 TABLET ORAL at 22:56

## 2019-08-01 RX ADMIN — Medication 1000 MILLIGRAM(S): at 18:32

## 2019-08-01 RX ADMIN — DEXMEDETOMIDINE HYDROCHLORIDE IN 0.9% SODIUM CHLORIDE 18.4 MICROGRAM(S)/KG/HR: 4 INJECTION INTRAVENOUS at 08:00

## 2019-08-01 RX ADMIN — Medication 50 MILLIEQUIVALENT(S): at 18:30

## 2019-08-01 RX ADMIN — Medication 100 MILLIGRAM(S): at 12:47

## 2019-08-01 RX ADMIN — CHLORHEXIDINE GLUCONATE 15 MILLILITER(S): 213 SOLUTION TOPICAL at 17:25

## 2019-08-01 NOTE — PROGRESS NOTE ADULT - SUBJECTIVE AND OBJECTIVE BOX
SYLVIA ROSA  MRN#:  [id number]    The patient is a 73y Male without prior medical care who presented with SOB and LE edema, found to have newly diagnosed HFrEF (EF 30%) and RALPH vs CKD, also PVD and claudication with extensive LE arterial disease on doppler, reports heavy alcohol use, further work up revealed multivessel CAD, now s/p C3L today, severe biventricular dysfunction on echocardiogram with some improvement after bypass was seen, evaluated, & examined with the CTICU staff on rounds and later in the evening with a multidisciplinary care plan formulated & implemented.  All available clinical, laboratory, radiographic, pharmacologic, and electrocardiographic data were reviewed & analyzed.      The patient was in the CTICU in critical condition secondary to acute postoperative respiratory failure, probable sepsis from C difficile, persistent cardiovascular dysfunction, and acute on chromic postoperative renal failure, & hyperglycemia.      Respiratory status required full ventilatory support via tracheostomy the following of ABG’s with A-line monitoring, and continuous pulse oximetry monitoring for support & to evaluate for & prevent further decompensation secondary to persistent cardiopulmonary dysfunction and cardiogenic shock.     Invasive hemodynamic monitoring with a central venous catheter & an A-line were required for the continuous central venous and MAP/BP monitoring to ensure adequate cardiovascular support and to evaluate for & help prevent decompensation while receiving an IV Vasopressin drip, an IV Levophed drip, an IV Dobutamine drip, and Coumadin loading secondary to septic shock from C diff colitis, cardiovascular dysfunction-shock, A Fib, ARF, and combined systolic and diastolic biventricular failure.    Enteral Vancomycin for progressive C diff sentic shock.    Patient required acute postoperative critical care management and I provided 30 minutes of non-continuous care to the patient. I reviewed and assessed all available clinical, laboratory, radiographic, pharmacologic, and electrocardiographic data in order to decide on maintenance or adjustment of medications, and fluid management.  Discussed at length with the CTICU staff and helped coordinate care.

## 2019-08-01 NOTE — PROGRESS NOTE ADULT - SUBJECTIVE AND OBJECTIVE BOX
ANESTHESIA POSTOP CHECK    73y Male POSTOP DAY 1     Vital Signs Last 24 Hrs  T(C): 37.2 (01 Aug 2019 04:00), Max: 37.2 (01 Aug 2019 04:00)  T(F): 99 (01 Aug 2019 04:00), Max: 99 (01 Aug 2019 04:00)  HR: 94 (01 Aug 2019 08:00) (91 - 100)  BP: --  BP(mean): --  RR: 29 (01 Aug 2019 08:00) (5 - 43)  SpO2: 98% (01 Aug 2019 07:52) (79% - 100%)  I&O's Summary    31 Jul 2019 07:01  -  01 Aug 2019 07:00  --------------------------------------------------------  IN: 2122.7 mL / OUT: 2300 mL / NET: -177.3 mL        [x] NO APPARENT ANESTHESIA COMPLICATIONS

## 2019-08-01 NOTE — PROGRESS NOTE ADULT - ASSESSMENT
Assessment  DMT2: 73y Male with newly diagnosed DM T2 with hyperglycemia, on low dose insulin, coverage blood sugar today 74   no hypoglycemic episode, on tube feeding,   CAD: s/P CABG, with Afib,  on medications, stable, monitored.  HTN: Controlled,  on antihypertensive medications.  RALPH on CKD; F/U by Renal    cata Turner MD  Cell: [phone]

## 2019-08-01 NOTE — PROGRESS NOTE ADULT - ASSESSMENT
The patient is a 73y Male with CAD, HFrEF and PVD s/p CABGx3 followed by AFib, resp failure and acute on chronic kidney injury.    - CKD:  Suspect underlying CKD stage 3 and now anuric   - Hypotensive on  and Vaso and levo            RECOMMENDATIONS  -  Given continued pressor requirements will hold off on HD today    - please dose any new medications for a CrCl of 10-15 ml/min   -C diff positive at present .  Cont PO abx.  Diarrhea amount has reduced however;  Rectal tube    -Taper pressors today as allowed by BP  -He will need Perm cath once off pressors

## 2019-08-01 NOTE — PROGRESS NOTE ADULT - SUBJECTIVE AND OBJECTIVE BOX
Pt seen  Chart reviewed  Full consult dictation to follow    Sacral decub  Currently not a candidate for Plastic surgical intervention  Recommend wound care consult for ongoing wound care  will f/u PRN

## 2019-08-01 NOTE — PROGRESS NOTE ADULT - SUBJECTIVE AND OBJECTIVE BOX
[facility] [phone]        Patient seen and examined in bed.  He the same  Remains on pressors and inotropes         MEDICATIONS  (STANDING):  aspirin  chewable 81 milliGRAM(s) Oral daily  atorvastatin 80 milliGRAM(s) Oral at bedtime  chlorhexidine 0.12% Liquid 15 milliLiter(s) Oral Mucosa every 12 hours  chlorhexidine 2% Cloths 1 Application(s) Topical <User Schedule>  chlorhexidine 4% Liquid 1 Application(s) Topical <User Schedule>  dexmedetomidine Infusion 1 MICROgram(s)/kG/Hr (18.4 mL/Hr) IV Continuous <Continuous>  dextrose 5%. 1000 milliLiter(s) (30 mL/Hr) IV Continuous <Continuous>  dextrose 50% Injectable 50 milliLiter(s) IV Push every 15 minutes  dextrose 50% Injectable 25 milliLiter(s) IV Push every 15 minutes  dextrose 50% Injectable 50 milliLiter(s) IV Push every 15 minutes  dextrose 50% Injectable 25 milliLiter(s) IV Push every 15 minutes  DOBUTamine Infusion 3 MICROgram(s)/kG/Min (6.624 mL/Hr) IV Continuous <Continuous>  insulin lispro (HumaLOG) corrective regimen sliding scale   SubCutaneous every 6 hours  levothyroxine Injectable 25 MICROGram(s) IV Push at bedtime  midodrine 10 milliGRAM(s) Oral every 8 hours  norepinephrine Infusion 0.043 MICROgram(s)/kG/Min (6 mL/Hr) IV Continuous <Continuous>  nystatin Powder 1 Application(s) Topical two times a day  pantoprazole  Injectable 40 milliGRAM(s) IV Push two times a day  sodium chloride 0.9%. 1000 milliLiter(s) (10 mL/Hr) IV Continuous <Continuous>  vancomycin    Solution 500 milliGRAM(s) Oral every 6 hours  vasopressin Infusion 0.05 Unit(s)/Min (3 mL/Hr) IV Continuous <Continuous>      VITAL:  T(C): , Max: 37.2 (08-01-19 @ 04:00)  T(F): , Max: 99 (08-01-19 @ 04:00)  HR: 94 (08-01-19 @ 08:00)  BP: --  BP(mean): --  RR: 29 (08-01-19 @ 08:00)  SpO2: 98% (08-01-19 @ 07:52)  Wt(kg): --    I and O's:    07-31 @ 07:01  -  08-01 @ 07:00  --------------------------------------------------------  IN: 2122.7 mL / OUT: 2300 mL / NET: -177.3 mL          PHYSICAL EXAM:    Constitutional: NAD  Neck:  No JVD + trach   Respiratory: poor effort   Cardiovascular: S1 and S2  Gastrointestinal: BS+, soft, NT/ND  Extremities: + flank  peripheral edema  Neurological:unable   : No Chávez  Skin: No rashes  Access: [facility]     LABS:                        9.6    13.8  )-----------( 88       ( 01 Aug 2019 00:43 )             28.2     08-01    135  |  101  |  53<H>  ----------------------------<  74  3.6   |  17<L>  |  4.09<H>    Ca    7.7<L>      01 Aug 2019 00:43  Phos  4.1     08-01  Mg     2.1     08-01    TPro  6.4  /  Alb  2.2<L>  /  TBili  8.9<H>  /  DBili  x   /  AST  164<H>  /  ALT  19  /  AlkPhos  211<H>  08-01          Urine Studies:          RADIOLOGY & ADDITIONAL STUDIES:

## 2019-08-01 NOTE — PROGRESS NOTE ADULT - ASSESSMENT
73[facility] with shortness of breath and LE edema found to have newly diagnosed HFrEF (EF 30%) and RALPH vs CKD, also PVD and claudication with extensive LE arterial disease on doppler. 7/2/19 abd US: small amount of perihepatic ascites. 7/2/19 TTE: EF 30%, mild LVH, multiple regional WMA's, mod MR/TR. 7/3/19 Nuclear ST: moderate focal apical/inferolat/aterolat ischemia. 7/9/19: h/h 12.9/39.5, platelets 180, Bun/Cr 28/1.9, eGFR 42. Pt was diuresed at [facility] treated with lasix/hydralzine/nitrates/statin/ASA. No beta blockade 2/2 low blood pressures. Per [facility] discharge note, pt will need  vascular intervention d/t extensive lower extremity vascular disease.   Pt transferred to [facility] 7/10/19 for left heart cath to rule out ischemia    7/12 pt underwent C3L , AFib.  Pt's hospital course was then been c/b  A-fib, acute on chronic renal failure with fluid overload requiring CVVH, cardiogenic shock (on Milrinone, Dobutamine, and Vasopressin gtts), and respiratory failure requiring intubation (extubated on 7/16). Patient's CBC was WNL on day of presentation to [facility]. His platelet count has been steadily downtrending since 7/12.     pt found to have C diff on 7/23/19    pt with low plts, HIT  AB positive    pt with elevated LFTs, low EF- some is congestion  reintubated , s/p trach and PEG    Antibiotics  Cefuroxime 7/11-->13  IV Vanco 7/21, 22  Meropenem 7/21-->23  Vanco (enteral) 7/22-->  Metronidazole 7/24 -->30; 8/1-->      pt continues to do poorly with large pressor requirements  abd- distended - s/p PEG, check KUB  Rash- of unclear etiology  AMS- CT nondiagnostic  elevated creatinine- on dialysis   leukocytosis  pt with sacral wound- local care      Suggest  Continue Enteral Vanco/IV Flagyl

## 2019-08-01 NOTE — PROGRESS NOTE ADULT - SUBJECTIVE AND OBJECTIVE BOX
Follow Up:  Cdiff    Interval History/ROS: sedated on vent    Allergies  No Known Allergies    ANTIMICROBIALS:  metroNIDAZOLE  IVPB    metroNIDAZOLE  IVPB 500 every 8 hours  vancomycin    Solution 500 every 6 hours    OTHER MEDS:  MEDICATIONS  (STANDING):  aspirin  chewable 81 daily  atorvastatin 80 at bedtime  dexmedetomidine Infusion 1 <Continuous>  dextrose 50% Injectable 50 every 15 minutes  dextrose 50% Injectable 25 every 15 minutes  dextrose 50% Injectable 50 every 15 minutes  dextrose 50% Injectable 25 every 15 minutes  DOBUTamine Infusion 3 <Continuous>  insulin lispro (HumaLOG) corrective regimen sliding scale  every 6 hours  levothyroxine Injectable 25 at bedtime  midodrine 10 every 8 hours  norepinephrine Infusion 0.043 <Continuous>  pantoprazole  Injectable 40 two times a day  vasopressin Infusion 0.05 <Continuous>      Vital Signs Last 24 Hrs  T(C): 37.2 (01 Aug 2019 04:00), Max: 37.2 (01 Aug 2019 04:00)  T(F): 99 (01 Aug 2019 04:00), Max: 99 (01 Aug 2019 04:00)  HR: 94 (01 Aug 2019 12:13) (91 - 100)  BP: --  BP(mean): --  RR: 25 (01 Aug 2019 11:30) (5 - 43)  SpO2: 98% (01 Aug 2019 12:13) (53% - 100%)    PHYSICAL EXAM:  General: ill appearing  Neurology: sedated on vent  Respiratory: trach in place  CV: RRR, S1S2, stable sternum  Abdominal: Soft, Non-tender, non-distended, normal bowel sounds  Extremities: moderate generalized edema,   Line Sites: Clear sites  Skin: No rash - scattered eccymoses                        9.6    13.8  )-----------( 88       ( 01 Aug 2019 00:43 )             28.2   WBC Count: 13.8 (08-01 @ 00:43)  WBC Count: 14.3 (07-31 @ 01:14)  WBC Count: 15.2 (07-30 @ 15:53)  WBC Count: 16.3 (07-30 @ 01:41)  WBC Count: 15.4 (07-29 @ 07:09)  WBC Count: 17.9 (07-29 @ 00:41)  WBC Count: 15.3 (07-28 @ 00:24)      08-01    135  |  101  |  53<H>  ----------------------------<  74  3.6   |  17<L>  |  4.09<H>    Ca    7.7<L>      01 Aug 2019 00:43  Phos  4.1     08-01  Mg     2.1     08-01    TPro  6.4  /  Alb  2.2<L>  /  TBili  8.9<H>  /  DBili  x   /  AST  164<H>  /  ALT  19  /  AlkPhos  211<H>  08-01    MICROBIOLOGY:  .Bronchial  07-30-19   Few Yeast  --  --      .Blood  07-29-19   No growth to date.  --  --      .Blood  07-28-19   No growth to date.  --  --      .Urine  07-22-19   No growth  --  --      .Blood  07-22-19   No growth at 5 days.  --  --      .Blood  07-21-19   No growth at 5 days.  --  --      .Blood  07-21-19   No growth at 5 days.  --  --    RADIOLOGY:  < from: Xray Chest 1 View- PORTABLE-Routine (08.01.19 @ 03:12) >  IMPRESSION:   Trace bilateral pleural effusions.    < end of copied text >      Wilfredo Lamb MD; Division of Infectious Disease; Pager: [phone]; nights and weekends: [phone]

## 2019-08-01 NOTE — PROGRESS NOTE ADULT - SUBJECTIVE AND OBJECTIVE BOX
Endocrinology Attending Covering for Dr. Magdaleno      Chief complaint  Patient is a 73y old  Male who presents with a chief complaint of CABG (01 Aug 2019 13:22)   Review of systems  Patient in bed, looks comfortable, no fever,  had no hypoglycemia.    Labs and Fingersticks  CAPILLARY BLOOD GLUCOSE  89 (31 Jul 2019 18:00)      POCT Blood Glucose.: 139 mg/dL (01 Aug 2019 05:38)  POCT Blood Glucose.: 82 mg/dL (31 Jul 2019 18:21)      Anion Gap, Serum: 17 (08-01 @ 00:43)  Anion Gap, Serum: 21 <H> (07-31 @ 01:14)      Calcium, Total Serum: 7.7 <L> (08-01 @ 00:43)  Calcium, Total Serum: 7.8 <L> (07-31 @ 01:14)  Albumin, Serum: 2.2 <L> (08-01 @ 00:43)  Albumin, Serum: 2.3 <L> (07-31 @ 01:14)    Alanine Aminotransferase (ALT/SGPT): 19 (08-01 @ 00:43)  Alanine Aminotransferase (ALT/SGPT): 28 (07-31 @ 01:14)  Alkaline Phosphatase, Serum: 211 <H> (08-01 @ 00:43)  Alkaline Phosphatase, Serum: 184 <H> (07-31 @ 01:14)  Aspartate Aminotransferase (AST/SGOT): 164 <H> (08-01 @ 00:43)  Aspartate Aminotransferase (AST/SGOT): 142 <H> (07-31 @ 01:14)        08-01    135  |  101  |  53<H>  ----------------------------<  74  3.6   |  17<L>  |  4.09<H>    Ca    7.7<L>      01 Aug 2019 00:43  Phos  4.1     08-01  Mg     2.1     08-01    TPro  6.4  /  Alb  2.2<L>  /  TBili  8.9<H>  /  DBili  x   /  AST  164<H>  /  ALT  19  /  AlkPhos  211<H>  08-01                        9.6    13.8  )-----------( 88       ( 01 Aug 2019 00:43 )             28.2     Medications  MEDICATIONS  (STANDING):  aspirin  chewable 81 milliGRAM(s) Oral daily  atorvastatin 80 milliGRAM(s) Oral at bedtime  chlorhexidine 0.12% Liquid 15 milliLiter(s) Oral Mucosa every 12 hours  chlorhexidine 2% Cloths 1 Application(s) Topical <User Schedule>  chlorhexidine 4% Liquid 1 Application(s) Topical <User Schedule>  dexmedetomidine Infusion 1 MICROgram(s)/kG/Hr (18.4 mL/Hr) IV Continuous <Continuous>  dextrose 5%. 1000 milliLiter(s) (30 mL/Hr) IV Continuous <Continuous>  dextrose 50% Injectable 50 milliLiter(s) IV Push every 15 minutes  dextrose 50% Injectable 25 milliLiter(s) IV Push every 15 minutes  dextrose 50% Injectable 50 milliLiter(s) IV Push every 15 minutes  dextrose 50% Injectable 25 milliLiter(s) IV Push every 15 minutes  DOBUTamine Infusion 3 MICROgram(s)/kG/Min (6.624 mL/Hr) IV Continuous <Continuous>  insulin lispro (HumaLOG) corrective regimen sliding scale   SubCutaneous every 6 hours  levothyroxine Injectable 25 MICROGram(s) IV Push at bedtime  metroNIDAZOLE  IVPB      metroNIDAZOLE  IVPB 500 milliGRAM(s) IV Intermittent every 8 hours  midodrine 10 milliGRAM(s) Oral every 8 hours  norepinephrine Infusion 0.043 MICROgram(s)/kG/Min (6 mL/Hr) IV Continuous <Continuous>  nystatin Powder 1 Application(s) Topical two times a day  pantoprazole  Injectable 40 milliGRAM(s) IV Push two times a day  sodium chloride 0.9%. 1000 milliLiter(s) (10 mL/Hr) IV Continuous <Continuous>  vancomycin    Solution 500 milliGRAM(s) Oral every 6 hours  vasopressin Infusion 0.05 Unit(s)/Min (3 mL/Hr) IV Continuous <Continuous>      Physical Exam  General: Patient comfortable in bed  Vital Signs Last 12 Hrs  T(F): 98.8 (08-01-19 @ 12:00), Max: 99 (08-01-19 @ 04:00)  HR: 92 (08-01-19 @ 14:15) (91 - 95)  BP: --  BP(mean): --  RR: 19 (08-01-19 @ 14:15) (7 - 34)  SpO2: 94% (08-01-19 @ 14:00) (53% - 100%)  Neck: No palpable thyroid nodules.  CVS: S1S2, No murmurs  Respiratory: No wheezing, no crepitations  GI: Abdomen soft, bowel sounds positive  Musculoskeletal:  edema lower extremities.   Skin: No skin rashes, no ecchymosis    Diagnostics

## 2019-08-02 ENCOUNTER — TRANSCRIPTION ENCOUNTER (OUTPATIENT)
Age: 74
End: 2019-08-02

## 2019-08-02 LAB
ALBUMIN SERPL ELPH-MCNC: 2.2 G/DL — LOW (ref 3.3–5)
ALP SERPL-CCNC: 196 U/L — HIGH (ref 40–120)
ALT FLD-CCNC: 13 U/L — SIGNIFICANT CHANGE UP (ref 10–45)
ANION GAP SERPL CALC-SCNC: 22 MMOL/L — HIGH (ref 5–17)
APTT BLD: 58 SEC — HIGH (ref 27.5–36.3)
APTT BLD: 64.9 SEC — HIGH (ref 27.5–36.3)
AST SERPL-CCNC: 148 U/L — HIGH (ref 10–40)
BILIRUB SERPL-MCNC: 9.3 MG/DL — HIGH (ref 0.2–1.2)
BLD GP AB SCN SERPL QL: NEGATIVE — SIGNIFICANT CHANGE UP
BUN SERPL-MCNC: 63 MG/DL — HIGH (ref 7–23)
CALCIUM SERPL-MCNC: 7.9 MG/DL — LOW (ref 8.4–10.5)
CHLORIDE SERPL-SCNC: 98 MMOL/L — SIGNIFICANT CHANGE UP (ref 96–108)
CK SERPL-CCNC: 2207 U/L — HIGH (ref 30–200)
CO2 SERPL-SCNC: 16 MMOL/L — LOW (ref 22–31)
CREAT SERPL-MCNC: 4.52 MG/DL — HIGH (ref 0.5–1.3)
CULTURE RESULTS: SIGNIFICANT CHANGE UP
GAS PNL BLDA: SIGNIFICANT CHANGE UP
GLUCOSE BLDC GLUCOMTR-MCNC: 105 MG/DL — HIGH (ref 70–99)
GLUCOSE BLDC GLUCOMTR-MCNC: 126 MG/DL — HIGH (ref 70–99)
GLUCOSE BLDC GLUCOMTR-MCNC: 146 MG/DL — HIGH (ref 70–99)
GLUCOSE BLDC GLUCOMTR-MCNC: 69 MG/DL — LOW (ref 70–99)
GLUCOSE BLDC GLUCOMTR-MCNC: 69 MG/DL — LOW (ref 70–99)
GLUCOSE BLDC GLUCOMTR-MCNC: 73 MG/DL — SIGNIFICANT CHANGE UP (ref 70–99)
GLUCOSE BLDC GLUCOMTR-MCNC: 78 MG/DL — SIGNIFICANT CHANGE UP (ref 70–99)
GLUCOSE SERPL-MCNC: 88 MG/DL — SIGNIFICANT CHANGE UP (ref 70–99)
HCT VFR BLD CALC: 23.4 % — LOW (ref 39–50)
HCT VFR BLD CALC: 24.3 % — LOW (ref 39–50)
HGB BLD-MCNC: 8.2 G/DL — LOW (ref 13–17)
HGB BLD-MCNC: 8.5 G/DL — LOW (ref 13–17)
INR BLD: 2.36 RATIO — HIGH (ref 0.88–1.16)
INR BLD: 2.99 RATIO — HIGH (ref 0.88–1.16)
MAGNESIUM SERPL-MCNC: 2 MG/DL — SIGNIFICANT CHANGE UP (ref 1.6–2.6)
MCHC RBC-ENTMCNC: 31.4 PG — SIGNIFICANT CHANGE UP (ref 27–34)
MCHC RBC-ENTMCNC: 31.6 PG — SIGNIFICANT CHANGE UP (ref 27–34)
MCHC RBC-ENTMCNC: 34.9 GM/DL — SIGNIFICANT CHANGE UP (ref 32–36)
MCHC RBC-ENTMCNC: 35.2 GM/DL — SIGNIFICANT CHANGE UP (ref 32–36)
MCV RBC AUTO: 89.7 FL — SIGNIFICANT CHANGE UP (ref 80–100)
MCV RBC AUTO: 89.8 FL — SIGNIFICANT CHANGE UP (ref 80–100)
PHOSPHATE SERPL-MCNC: 5.5 MG/DL — HIGH (ref 2.5–4.5)
PLATELET # BLD AUTO: 53 K/UL — LOW (ref 150–400)
PLATELET # BLD AUTO: 90 K/UL — LOW (ref 150–400)
POTASSIUM SERPL-MCNC: 3.4 MMOL/L — LOW (ref 3.5–5.3)
POTASSIUM SERPL-SCNC: 3.4 MMOL/L — LOW (ref 3.5–5.3)
PROT SERPL-MCNC: 5.7 G/DL — LOW (ref 6–8.3)
PROTHROM AB SERPL-ACNC: 27.6 SEC — HIGH (ref 10–12.9)
PROTHROM AB SERPL-ACNC: 35.6 SEC — HIGH (ref 10–12.9)
RBC # BLD: 2.6 M/UL — LOW (ref 4.2–5.8)
RBC # BLD: 2.71 M/UL — LOW (ref 4.2–5.8)
RBC # FLD: 14.3 % — SIGNIFICANT CHANGE UP (ref 10.3–14.5)
RBC # FLD: 14.8 % — HIGH (ref 10.3–14.5)
RH IG SCN BLD-IMP: POSITIVE — SIGNIFICANT CHANGE UP
SODIUM SERPL-SCNC: 136 MMOL/L — SIGNIFICANT CHANGE UP (ref 135–145)
SPECIMEN SOURCE: SIGNIFICANT CHANGE UP
WBC # BLD: 12 K/UL — HIGH (ref 3.8–10.5)
WBC # BLD: 7.6 K/UL — SIGNIFICANT CHANGE UP (ref 3.8–10.5)
WBC # FLD AUTO: 12 K/UL — HIGH (ref 3.8–10.5)
WBC # FLD AUTO: 7.6 K/UL — SIGNIFICANT CHANGE UP (ref 3.8–10.5)

## 2019-08-02 PROCEDURE — 74177 CT ABD & PELVIS W/CONTRAST: CPT | Mod: 26

## 2019-08-02 PROCEDURE — 71260 CT THORAX DX C+: CPT | Mod: 26

## 2019-08-02 PROCEDURE — 71045 X-RAY EXAM CHEST 1 VIEW: CPT | Mod: 26

## 2019-08-02 PROCEDURE — 99291 CRITICAL CARE FIRST HOUR: CPT

## 2019-08-02 PROCEDURE — 74018 RADEX ABDOMEN 1 VIEW: CPT | Mod: 26,76

## 2019-08-02 PROCEDURE — 99232 SBSQ HOSP IP/OBS MODERATE 35: CPT

## 2019-08-02 RX ORDER — DEXTROSE 50 % IN WATER 50 %
50 SYRINGE (ML) INTRAVENOUS ONCE
Refills: 0 | Status: COMPLETED | OUTPATIENT
Start: 2019-08-02 | End: 2019-08-02

## 2019-08-02 RX ORDER — CALCIUM GLUCONATE 100 MG/ML
2 VIAL (ML) INTRAVENOUS ONCE
Refills: 0 | Status: COMPLETED | OUTPATIENT
Start: 2019-08-02 | End: 2019-08-02

## 2019-08-02 RX ORDER — POTASSIUM CHLORIDE 20 MEQ
10 PACKET (EA) ORAL ONCE
Refills: 0 | Status: COMPLETED | OUTPATIENT
Start: 2019-08-02 | End: 2019-08-02

## 2019-08-02 RX ORDER — CALCIUM GLUCONATE 100 MG/ML
1 VIAL (ML) INTRAVENOUS ONCE
Refills: 0 | Status: COMPLETED | OUTPATIENT
Start: 2019-08-02 | End: 2019-08-02

## 2019-08-02 RX ORDER — FENTANYL CITRATE 50 UG/ML
1.3 INJECTION INTRAVENOUS
Qty: 5000 | Refills: 0 | Status: DISCONTINUED | OUTPATIENT
Start: 2019-08-02 | End: 2019-08-03

## 2019-08-02 RX ORDER — DEXTROSE 50 % IN WATER 50 %
50 SYRINGE (ML) INTRAVENOUS
Refills: 0 | Status: COMPLETED | OUTPATIENT
Start: 2019-08-02 | End: 2019-08-02

## 2019-08-02 RX ORDER — CALCIUM GLUCONATE 100 MG/ML
1 VIAL (ML) INTRAVENOUS ONCE
Refills: 0 | Status: COMPLETED | OUTPATIENT
Start: 2019-08-02 | End: 2019-08-03

## 2019-08-02 RX ORDER — DEXTROSE 50 % IN WATER 50 %
25 SYRINGE (ML) INTRAVENOUS ONCE
Refills: 0 | Status: COMPLETED | OUTPATIENT
Start: 2019-08-02 | End: 2019-08-02

## 2019-08-02 RX ORDER — DEXTROSE 10 % IN WATER 10 %
1000 INTRAVENOUS SOLUTION INTRAVENOUS
Refills: 0 | Status: DISCONTINUED | OUTPATIENT
Start: 2019-08-02 | End: 2019-08-02

## 2019-08-02 RX ORDER — HYDROMORPHONE HYDROCHLORIDE 2 MG/ML
0.5 INJECTION INTRAMUSCULAR; INTRAVENOUS; SUBCUTANEOUS ONCE
Refills: 0 | Status: DISCONTINUED | OUTPATIENT
Start: 2019-08-02 | End: 2019-08-02

## 2019-08-02 RX ORDER — HEPARIN SODIUM 5000 [USP'U]/ML
300 INJECTION INTRAVENOUS; SUBCUTANEOUS
Qty: 10000 | Refills: 0 | Status: DISCONTINUED | OUTPATIENT
Start: 2019-08-02 | End: 2019-08-02

## 2019-08-02 RX ORDER — HYDROMORPHONE HYDROCHLORIDE 2 MG/ML
1 INJECTION INTRAMUSCULAR; INTRAVENOUS; SUBCUTANEOUS ONCE
Refills: 0 | Status: DISCONTINUED | OUTPATIENT
Start: 2019-08-02 | End: 2019-08-02

## 2019-08-02 RX ORDER — MEROPENEM 1 G/30ML
1000 INJECTION INTRAVENOUS EVERY 8 HOURS
Refills: 0 | Status: DISCONTINUED | OUTPATIENT
Start: 2019-08-02 | End: 2019-08-03

## 2019-08-02 RX ORDER — PHYTONADIONE (VIT K1) 5 MG
5 TABLET ORAL ONCE
Refills: 0 | Status: COMPLETED | OUTPATIENT
Start: 2019-08-02 | End: 2019-08-02

## 2019-08-02 RX ORDER — FENTANYL CITRATE 50 UG/ML
50 INJECTION INTRAVENOUS ONCE
Refills: 0 | Status: DISCONTINUED | OUTPATIENT
Start: 2019-08-02 | End: 2019-08-02

## 2019-08-02 RX ORDER — SODIUM BICARBONATE 1 MEQ/ML
50 SYRINGE (ML) INTRAVENOUS ONCE
Refills: 0 | Status: COMPLETED | OUTPATIENT
Start: 2019-08-02 | End: 2019-08-02

## 2019-08-02 RX ORDER — ALBUMIN HUMAN 25 %
250 VIAL (ML) INTRAVENOUS ONCE
Refills: 0 | Status: COMPLETED | OUTPATIENT
Start: 2019-08-02 | End: 2019-08-02

## 2019-08-02 RX ORDER — NOREPINEPHRINE BITARTRATE/D5W 8 MG/250ML
0.05 PLASTIC BAG, INJECTION (ML) INTRAVENOUS
Qty: 16 | Refills: 0 | Status: DISCONTINUED | OUTPATIENT
Start: 2019-08-02 | End: 2019-08-03

## 2019-08-02 RX ORDER — DEXTROSE 10 % IN WATER 10 %
500 INTRAVENOUS SOLUTION INTRAVENOUS
Refills: 0 | Status: DISCONTINUED | OUTPATIENT
Start: 2019-08-02 | End: 2019-08-03

## 2019-08-02 RX ORDER — ALBUMIN HUMAN 25 %
500 VIAL (ML) INTRAVENOUS ONCE
Refills: 0 | Status: COMPLETED | OUTPATIENT
Start: 2019-08-02 | End: 2019-08-02

## 2019-08-02 RX ADMIN — Medication 3.45 MICROGRAM(S)/KG/MIN: at 00:49

## 2019-08-02 RX ADMIN — Medication 100 MILLIGRAM(S): at 23:42

## 2019-08-02 RX ADMIN — PANTOPRAZOLE SODIUM 40 MILLIGRAM(S): 20 TABLET, DELAYED RELEASE ORAL at 05:15

## 2019-08-02 RX ADMIN — Medication 250 MILLILITER(S): at 13:03

## 2019-08-02 RX ADMIN — Medication 200 GRAM(S): at 17:00

## 2019-08-02 RX ADMIN — HYDROMORPHONE HYDROCHLORIDE 1 MILLIGRAM(S): 2 INJECTION INTRAMUSCULAR; INTRAVENOUS; SUBCUTANEOUS at 23:05

## 2019-08-02 RX ADMIN — Medication 20 MILLILITER(S): at 20:00

## 2019-08-02 RX ADMIN — Medication 50 MILLIEQUIVALENT(S): at 18:12

## 2019-08-02 RX ADMIN — FENTANYL CITRATE 50 MICROGRAM(S): 50 INJECTION INTRAVENOUS at 03:45

## 2019-08-02 RX ADMIN — Medication 20 MILLILITER(S): at 21:00

## 2019-08-02 RX ADMIN — MEROPENEM 100 MILLIGRAM(S): 1 INJECTION INTRAVENOUS at 13:02

## 2019-08-02 RX ADMIN — Medication 50 MILLIEQUIVALENT(S): at 14:00

## 2019-08-02 RX ADMIN — Medication 500 MILLIGRAM(S): at 18:11

## 2019-08-02 RX ADMIN — DEXMEDETOMIDINE HYDROCHLORIDE IN 0.9% SODIUM CHLORIDE 18.4 MICROGRAM(S)/KG/HR: 4 INJECTION INTRAVENOUS at 07:00

## 2019-08-02 RX ADMIN — FENTANYL CITRATE 50 MICROGRAM(S): 50 INJECTION INTRAVENOUS at 03:30

## 2019-08-02 RX ADMIN — HYDROMORPHONE HYDROCHLORIDE 0.5 MILLIGRAM(S): 2 INJECTION INTRAMUSCULAR; INTRAVENOUS; SUBCUTANEOUS at 21:11

## 2019-08-02 RX ADMIN — Medication 100 MILLIGRAM(S): at 05:09

## 2019-08-02 RX ADMIN — Medication 20 MILLILITER(S): at 22:00

## 2019-08-02 RX ADMIN — Medication 50 MILLIEQUIVALENT(S): at 14:30

## 2019-08-02 RX ADMIN — PANTOPRAZOLE SODIUM 40 MILLIGRAM(S): 20 TABLET, DELAYED RELEASE ORAL at 18:11

## 2019-08-02 RX ADMIN — Medication 50 MILLIEQUIVALENT(S): at 16:30

## 2019-08-02 RX ADMIN — Medication 20 MILLILITER(S): at 19:40

## 2019-08-02 RX ADMIN — HYDROMORPHONE HYDROCHLORIDE 1 MILLIGRAM(S): 2 INJECTION INTRAMUSCULAR; INTRAVENOUS; SUBCUTANEOUS at 23:20

## 2019-08-02 RX ADMIN — Medication 25 MILLILITER(S): at 13:56

## 2019-08-02 RX ADMIN — NYSTATIN CREAM 1 APPLICATION(S): 100000 CREAM TOPICAL at 18:11

## 2019-08-02 RX ADMIN — MIDODRINE HYDROCHLORIDE 10 MILLIGRAM(S): 2.5 TABLET ORAL at 21:50

## 2019-08-02 RX ADMIN — SODIUM CHLORIDE 10 MILLILITER(S): 9 INJECTION INTRAMUSCULAR; INTRAVENOUS; SUBCUTANEOUS at 17:21

## 2019-08-02 RX ADMIN — Medication 20 MILLILITER(S): at 23:00

## 2019-08-02 RX ADMIN — Medication 3.45 MICROGRAM(S)/KG/MIN: at 07:00

## 2019-08-02 RX ADMIN — Medication 200 GRAM(S): at 20:56

## 2019-08-02 RX ADMIN — HYDROMORPHONE HYDROCHLORIDE 0.5 MILLIGRAM(S): 2 INJECTION INTRAMUSCULAR; INTRAVENOUS; SUBCUTANEOUS at 21:00

## 2019-08-02 RX ADMIN — ATORVASTATIN CALCIUM 80 MILLIGRAM(S): 80 TABLET, FILM COATED ORAL at 21:50

## 2019-08-02 RX ADMIN — VASOPRESSIN 3 UNIT(S)/MIN: 20 INJECTION INTRAVENOUS at 07:00

## 2019-08-02 RX ADMIN — MIDODRINE HYDROCHLORIDE 10 MILLIGRAM(S): 2.5 TABLET ORAL at 15:50

## 2019-08-02 RX ADMIN — Medication 50 MILLIEQUIVALENT(S): at 16:00

## 2019-08-02 RX ADMIN — Medication 50 MILLIEQUIVALENT(S): at 17:28

## 2019-08-02 RX ADMIN — NYSTATIN CREAM 1 APPLICATION(S): 100000 CREAM TOPICAL at 05:12

## 2019-08-02 RX ADMIN — Medication 125 MILLILITER(S): at 17:28

## 2019-08-02 RX ADMIN — CHLORHEXIDINE GLUCONATE 15 MILLILITER(S): 213 SOLUTION TOPICAL at 05:11

## 2019-08-02 RX ADMIN — Medication 500 MILLIGRAM(S): at 13:03

## 2019-08-02 RX ADMIN — Medication 100 MILLIGRAM(S): at 15:49

## 2019-08-02 RX ADMIN — MIDODRINE HYDROCHLORIDE 10 MILLIGRAM(S): 2.5 TABLET ORAL at 05:11

## 2019-08-02 RX ADMIN — Medication 25 MICROGRAM(S): at 22:16

## 2019-08-02 RX ADMIN — Medication 11.04 MICROGRAM(S)/KG/MIN: at 07:00

## 2019-08-02 RX ADMIN — CHLORHEXIDINE GLUCONATE 1 APPLICATION(S): 213 SOLUTION TOPICAL at 05:08

## 2019-08-02 RX ADMIN — Medication 500 MILLIGRAM(S): at 00:49

## 2019-08-02 RX ADMIN — CHLORHEXIDINE GLUCONATE 15 MILLILITER(S): 213 SOLUTION TOPICAL at 18:11

## 2019-08-02 RX ADMIN — Medication 101 MILLIGRAM(S): at 21:49

## 2019-08-02 RX ADMIN — Medication 20 MILLILITER(S): at 19:00

## 2019-08-02 RX ADMIN — Medication 500 MILLIGRAM(S): at 05:09

## 2019-08-02 RX ADMIN — CHLORHEXIDINE GLUCONATE 1 APPLICATION(S): 213 SOLUTION TOPICAL at 05:09

## 2019-08-02 RX ADMIN — Medication 200 GRAM(S): at 02:55

## 2019-08-02 RX ADMIN — Medication 50 MILLILITER(S): at 17:29

## 2019-08-02 RX ADMIN — Medication 30 MILLILITER(S): at 12:03

## 2019-08-02 RX ADMIN — MEROPENEM 100 MILLIGRAM(S): 1 INJECTION INTRAVENOUS at 23:41

## 2019-08-02 NOTE — PROGRESS NOTE ADULT - SUBJECTIVE AND OBJECTIVE BOX
0200      Patient on high dose pressors, coagulopathic.. 7/31 TTE- with decreased RV function , _ pericardial effusion, resuscitated with PRBC's, FFp/s & plts. will discuss with Dr Hernandez regarding CT scan of chest, abdomen & pelvis in setting of shock to r/o toxic megacolon, bowel obstruction & misplaced PEG

## 2019-08-02 NOTE — PROGRESS NOTE ADULT - SUBJECTIVE AND OBJECTIVE BOX
Follow Up:  colitis    Interval History/ROS: unresponsive on vent    Allergies  No Known Allergies    ANTIMICROBIALS:  meropenem  IVPB 1000 every 8 hours  metroNIDAZOLE  IVPB    metroNIDAZOLE  IVPB 500 every 8 hours  vancomycin    Solution 500 every 6 hours    OTHER MEDS:  MEDICATIONS  (STANDING):  atorvastatin 80 at bedtime  dexmedetomidine Infusion 1 <Continuous>  dextrose 50% Injectable 50 every 15 minutes  dextrose 50% Injectable 25 every 15 minutes  dextrose 50% Injectable 50 every 15 minutes  dextrose 50% Injectable 25 every 15 minutes  DOBUTamine Infusion 5 <Continuous>  insulin lispro (HumaLOG) corrective regimen sliding scale  every 6 hours  levothyroxine Injectable 25 at bedtime  midodrine 10 every 8 hours  norepinephrine Infusion 0.05 <Continuous>  pantoprazole  Injectable 40 two times a day  vasopressin Infusion 0.05 <Continuous>    Vital Signs Last 24 Hrs  T(C): 36.6 (02 Aug 2019 04:00), Max: 37.3 (01 Aug 2019 14:00)  T(F): 97.9 (02 Aug 2019 04:00), Max: 99.1 (01 Aug 2019 14:00)  HR: 98 (02 Aug 2019 12:10) (91 - 100)  BP: --  BP(mean): --  RR: 1 (02 Aug 2019 12:00) (1 - 38)  SpO2: 95% (02 Aug 2019 12:10) (71% - 100%)    PHYSICAL EXAM:  General: ill appearing  Neurology: unresponsive  Respiratory: Clear to auscultation bilaterally  CV: RRR, S1S2, stable sternum  Abdominal: markedly distended; PEG in place - site clear  Extremities: cool feet, modest edema,   Line Sites: Clear  Skin: No rash                        8.5    12.0  )-----------( 90       ( 02 Aug 2019 04:02 )             24.3   WBC Count: 12.0 (08-02 @ 04:02)  WBC Count: 12.9 (08-01 @ 20:40)  WBC Count: 13.8 (08-01 @ 00:43)  WBC Count: 14.3 (07-31 @ 01:14)  WBC Count: 15.2 (07-30 @ 15:53)  WBC Count: 16.3 (07-30 @ 01:41)  WBC Count: 15.4 (07-29 @ 07:09)  WBC Count: 17.9 (07-29 @ 00:41)      08-02    136  |  98  |  63<H>  ----------------------------<  88  3.4<L>   |  16<L>  |  4.52<H>    Ca    7.9<L>      02 Aug 2019 03:59  Phos  5.5     08-02  Mg     2.0     08-02    TPro  5.7<L>  /  Alb  2.2<L>  /  TBili  9.3<H>  /  DBili  x   /  AST  148<H>  /  ALT  13  /  AlkPhos  196<H>  08-02      MICROBIOLOGY:  .Blood  07-31-19   No growth to date.  --  --      .Bronchial  07-30-19   Few Yeast  --  --      .Blood  07-29-19   No growth to date.  --  --      .Blood  07-28-19   No growth to date.  --  --      .Urine  07-22-19   No growth  --  --      .Blood  07-22-19   No growth at 5 days.  --  --      .Blood  07-21-19   No growth at 5 days.  --  --      .Blood  07-21-19   No growth at 5 days.  --  --    RADIOLOGY:  < from: CT Abdomen and Pelvis w/ Oral Cont and w/ IV Cont (08.02.19 @ 10:59) >  Bilateral lower lobe subsegmental atelectasis. Asymmetric pulmonary edema   versus multifocal pneumonia.    Trace bilateral pleural effusions.    Worsening abdominal ascites, now large in volume.    Thickening of the colon to the level of the rectum consistent with   colitis.    < end of copied text >        Wilfredo Lamb MD; Division of Infectious Disease; Pager: [phone]; nights and weekends: [phone]

## 2019-08-02 NOTE — PROGRESS NOTE ADULT - ASSESSMENT
73M presented to [facility] on 7/1/19 with shortness of breath and LE edema found to have newly diagnosed HFrEF (EF 30%) and RALPH vs CKD, also PVD and claudication.    7/12 pt underwent C3L , AFib.  Pt's hospital course was then been c/b  A-fib, acute on chronic renal failure with fluid overload requiring CVVH, cardiogenic shock (on Milrinone, Dobutamine, and Vasopressin gtts), and respiratory failure requiring intubation (extubated on 7/16). Patient's CBC was WNL on day of presentation to [facility]. His platelet count has been steadily downtrending since 7/12.   pt found to have C diff on 7/23/19  pt with low plts, HIT  AB positive  pt with elevated LFTs, low EF- some is congestion  reintubated , s/p trach and PEG  Patients exam has changed: now with marked abdominal distension. He currently is requiring high dose pressors.  CT scan demonstrating colitis: in this clinical context ischemic colitis superimposed on Cdiff colitis is very likely    Antibiotics  Cefuroxime 7/11-->13  IV Vanco 7/21, 22  Meropenem 7/21-->23; 8/2-->  Vanco (enteral) 7/22-->  Metronidazole 7/24 -->30; 8/1-->      Suggest  Continue Enteral Vanco/IV Flagyl  discussed with CTU team: add Meropenem    discussed with general surgery - may consider cecostomy with intracolonic vancomycin instillation  I will follow patient over this weekend

## 2019-08-02 NOTE — PROGRESS NOTE ADULT - SUBJECTIVE AND OBJECTIVE BOX
SYLVIA ROSA  MRN#:  [id number]    The patient is a 73y Male without prior medical care who presented with SOB and LE edema, found to have newly diagnosed HFrEF (EF 30%) and RALPH vs CKD, also PVD and claudication with extensive LE arterial disease on doppler, reports heavy alcohol use, further work up revealed multivessel CAD, now s/p C3L today, severe biventricular dysfunction on echocardiogram with some improvement after bypass was seen, evaluated, & examined with the CTICU staff on rounds and later in the evening with a multidisciplinary care plan formulated & implemented.  All available clinical, laboratory, radiographic, pharmacologic, and electrocardiographic data were reviewed & analyzed.      The patient was in the CTICU in critical condition secondary to acute postoperative respiratory failure, probable sepsis from C difficile, persistent cardiovascular dysfunction, and acute on chromic postoperative renal failure, & hyperglycemia.      Respiratory status required full ventilatory support via tracheostomy the following of ABG’s with A-line monitoring, and continuous pulse oximetry monitoring for support & to evaluate for & prevent further decompensation secondary to persistent cardiopulmonary dysfunction and cardiogenic shock.     Invasive hemodynamic monitoring with a central venous catheter & an A-line were required for the continuous central venous and MAP/BP monitoring to ensure adequate cardiovascular support and to evaluate for & help prevent decompensation while receiving an IV Vasopressin drip, an IV Levophed drip, an IV Dobutamine drip, and Coumadin loading secondary to septic shock from C diff colitis, cardiovascular dysfunction-shock, A Fib, ARF, and combined systolic and diastolic biventricular failure.    Patient has an acute abdomin ending CT scan findings with general surgery following    Patient required acute postoperative critical care management and I provided 30 minutes of non-continuous care to the patient. I reviewed and assessed all available clinical, laboratory, radiographic, pharmacologic, and electrocardiographic data in order to decide on maintenance or adjustment of medications, and fluid management.  Discussed at length with the CTICU staff and helped coordinate care. SYLVIA ROSA  MRN#:  [id number]    The patient is a 73y Male without prior medical care who presented with SOB and LE edema, found to have newly diagnosed HFrEF (EF 30%) and RALPH vs CKD, also PVD and claudication with extensive LE arterial disease on doppler, reports heavy alcohol use, further work up revealed multivessel CAD, now s/p C3L today, severe biventricular dysfunction on echocardiogram with some improvement after bypass was seen, evaluated, & examined with the CTICU staff on rounds and later in the evening with a multidisciplinary care plan formulated & implemented.  All available clinical, laboratory, radiographic, pharmacologic, and electrocardiographic data were reviewed & analyzed.      The patient was in the CTICU in critical condition secondary to acute postoperative respiratory failure, probable sepsis from C difficile, persistent cardiovascular dysfunction, and acute on chromic postoperative renal failure, & hyperglycemia.      Respiratory status required full ventilatory support via tracheostomy the following of ABG’s with A-line monitoring, and continuous pulse oximetry monitoring for support & to evaluate for & prevent further decompensation secondary to persistent cardiopulmonary dysfunction and cardiogenic shock.     Invasive hemodynamic monitoring with a central venous catheter & an A-line were required for the continuous central venous and MAP/BP monitoring to ensure adequate cardiovascular support and to evaluate for & help prevent decompensation while receiving an IV Vasopressin drip, an IV Levophed drip, an IV Dobutamine drip, and Coumadin loading secondary to septic shock from C diff colitis, cardiovascular dysfunction-shock, A Fib, ARF, and combined systolic and diastolic biventricular failure.    Patient has severe pancolitis on CT scan with general surgery following    Patient required acute postoperative critical care management and I provided 30 minutes of non-continuous care to the patient. I reviewed and assessed all available clinical, laboratory, radiographic, pharmacologic, and electrocardiographic data in order to decide on maintenance or adjustment of medications, and fluid management.  Discussed at length with the CTICU staff and helped coordinate care.

## 2019-08-02 NOTE — PROGRESS NOTE ADULT - SUBJECTIVE AND OBJECTIVE BOX
Endocrinology Attending Covering for Dr. Magdaleno      Chief complaint  Patient is a 73y old  Male who presents with a chief complaint of CABG (01 Aug 2019 14:39)   Review of systems  Patient in bed, peg feeding, blood pressure maintained on pressors    Labs and Fingersticks  CAPILLARY BLOOD GLUCOSE      POCT Blood Glucose.: 73 mg/dL (02 Aug 2019 06:34)      Anion Gap, Serum: 22 <H> (08-02 @ 03:59)  Anion Gap, Serum: 17 (08-01 @ 00:43)      Calcium, Total Serum: 7.9 <L> (08-02 @ 03:59)  Calcium, Total Serum: 7.7 <L> (08-01 @ 00:43)  Albumin, Serum: 2.2 <L> (08-02 @ 03:59)  Albumin, Serum: 2.2 <L> (08-01 @ 00:43)    Alanine Aminotransferase (ALT/SGPT): 13 (08-02 @ 03:59)  Alanine Aminotransferase (ALT/SGPT): 19 (08-01 @ 00:43)  Alkaline Phosphatase, Serum: 196 <H> (08-02 @ 03:59)  Alkaline Phosphatase, Serum: 211 <H> (08-01 @ 00:43)  Aspartate Aminotransferase (AST/SGOT): 148 <H> (08-02 @ 03:59)  Aspartate Aminotransferase (AST/SGOT): 164 <H> (08-01 @ 00:43)        08-02    136  |  98  |  63<H>  ----------------------------<  88  3.4<L>   |  16<L>  |  4.52<H>    Ca    7.9<L>      02 Aug 2019 03:59  Phos  5.5     08-02  Mg     2.0     08-02    TPro  5.7<L>  /  Alb  2.2<L>  /  TBili  9.3<H>  /  DBili  x   /  AST  148<H>  /  ALT  13  /  AlkPhos  196<H>  08-02                        8.5    12.0  )-----------( 90       ( 02 Aug 2019 04:02 )             24.3     Medications  MEDICATIONS  (STANDING):  atorvastatin 80 milliGRAM(s) Oral at bedtime  chlorhexidine 0.12% Liquid 15 milliLiter(s) Oral Mucosa every 12 hours  chlorhexidine 2% Cloths 1 Application(s) Topical <User Schedule>  chlorhexidine 4% Liquid 1 Application(s) Topical <User Schedule>  CRRT Treatment    <Continuous>  dexmedetomidine Infusion 1 MICROgram(s)/kG/Hr (18.4 mL/Hr) IV Continuous <Continuous>  dextrose 5%. 1000 milliLiter(s) (30 mL/Hr) IV Continuous <Continuous>  dextrose 50% Injectable 50 milliLiter(s) IV Push every 15 minutes  dextrose 50% Injectable 25 milliLiter(s) IV Push every 15 minutes  dextrose 50% Injectable 50 milliLiter(s) IV Push every 15 minutes  dextrose 50% Injectable 25 milliLiter(s) IV Push every 15 minutes  DOBUTamine Infusion 5 MICROgram(s)/kG/Min (11.04 mL/Hr) IV Continuous <Continuous>  insulin lispro (HumaLOG) corrective regimen sliding scale   SubCutaneous every 6 hours  levothyroxine Injectable 25 MICROGram(s) IV Push at bedtime  metroNIDAZOLE  IVPB      metroNIDAZOLE  IVPB 500 milliGRAM(s) IV Intermittent every 8 hours  midodrine 10 milliGRAM(s) Oral every 8 hours  norepinephrine Infusion 0.05 MICROgram(s)/kG/Min (3.45 mL/Hr) IV Continuous <Continuous>  nystatin Powder 1 Application(s) Topical two times a day  pantoprazole  Injectable 40 milliGRAM(s) IV Push two times a day  PrismaSATE Dialysate BGK 4 / 2.5 5000 milliLiter(s) (1000 mL/Hr) CRRT <Continuous>  PrismaSOL Filtration BGK 0 / 2.5 5000 milliLiter(s) (500 mL/Hr) CRRT <Continuous>  PrismaSOL Filtration BGK 0 / 2.5 5000 milliLiter(s) (500 mL/Hr) CRRT <Continuous>  sodium chloride 0.9%. 1000 milliLiter(s) (10 mL/Hr) IV Continuous <Continuous>  vancomycin    Solution 500 milliGRAM(s) Oral every 6 hours  vasopressin Infusion 0.05 Unit(s)/Min (3 mL/Hr) IV Continuous <Continuous>      Physical Exam  General: Patient comfortable in bed  Vital Signs Last 12 Hrs  T(F): 97.9 (08-02-19 @ 04:00), Max: 98 (08-02-19 @ 02:30)  HR: 97 (08-02-19 @ 09:45) (92 - 98)  BP: --  BP(mean): --  RR: 17 (08-02-19 @ 09:45) (2 - 33)  SpO2: 99% (08-02-19 @ 09:45) (90% - 100%)  Neck: No palpable thyroid nodules.  CVS: S1S2, No murmurs  Respiratory: No wheezing, no crepitations  GI: Abdomen soft, bowel sounds positive  Musculoskeletal:  edema lower extremities.   Skin: No skin rashes, no ecchymosis    Diagnostics

## 2019-08-02 NOTE — PROGRESS NOTE ADULT - SUBJECTIVE AND OBJECTIVE BOX
[facility]PHROLOGY[facility] [phone]        Patient seen and examined in bed.  He was now on more pressors   Abd is distended   On  along with levo and Vaso     ros-unable      MEDICATIONS  (STANDING):  atorvastatin 80 milliGRAM(s) Oral at bedtime  chlorhexidine 0.12% Liquid 15 milliLiter(s) Oral Mucosa every 12 hours  chlorhexidine 2% Cloths 1 Application(s) Topical <User Schedule>  chlorhexidine 4% Liquid 1 Application(s) Topical <User Schedule>  dexmedetomidine Infusion 1 MICROgram(s)/kG/Hr (18.4 mL/Hr) IV Continuous <Continuous>  dextrose 5%. 1000 milliLiter(s) (30 mL/Hr) IV Continuous <Continuous>  dextrose 50% Injectable 50 milliLiter(s) IV Push every 15 minutes  dextrose 50% Injectable 25 milliLiter(s) IV Push every 15 minutes  dextrose 50% Injectable 50 milliLiter(s) IV Push every 15 minutes  dextrose 50% Injectable 25 milliLiter(s) IV Push every 15 minutes  DOBUTamine Infusion 5 MICROgram(s)/kG/Min (11.04 mL/Hr) IV Continuous <Continuous>  insulin lispro (HumaLOG) corrective regimen sliding scale   SubCutaneous every 6 hours  levothyroxine Injectable 25 MICROGram(s) IV Push at bedtime  metroNIDAZOLE  IVPB      metroNIDAZOLE  IVPB 500 milliGRAM(s) IV Intermittent every 8 hours  midodrine 10 milliGRAM(s) Oral every 8 hours  norepinephrine Infusion 0.05 MICROgram(s)/kG/Min (3.45 mL/Hr) IV Continuous <Continuous>  nystatin Powder 1 Application(s) Topical two times a day  pantoprazole  Injectable 40 milliGRAM(s) IV Push two times a day  sodium chloride 0.9%. 1000 milliLiter(s) (10 mL/Hr) IV Continuous <Continuous>  vancomycin    Solution 500 milliGRAM(s) Oral every 6 hours  vasopressin Infusion 0.05 Unit(s)/Min (3 mL/Hr) IV Continuous <Continuous>      VITAL:  T(C): , Max: 37.3 (19 @ 14:00)  T(F): , Max: 99.1 (19 @ 14:00)  HR: 96 (19 @ 08:15)  BP: --  BP(mean): --  RR: 24 (19 @ 08:15)  SpO2: 100% (19 @ 08:15)  Wt(kg): --    I and O's:     @ 07:01  -   @ 07:00  --------------------------------------------------------  IN: 3411.7 mL / OUT: 405 mL / NET: 3006.6 mL          PHYSICAL EXAM:    Constitutional: sedated   Neck:  No JVD + trach  Respiratory: transmitted upper   Cardiovascular: S1 and S2  Gastrointestinal: BS+, soft, NT/ND  Extremities: + flank edema   Neurological: unable   : No Chávez  Skin: No rashes  Access: + [facility]     LABS:                        8.5    12.0  )-----------( 90       ( 02 Aug 2019 04:02 )             24.3     08    136  |  98  |  63<H>  ----------------------------<  88  3.4<L>   |  16<L>  |  4.52<H>    Ca    7.9<L>      02 Aug 2019 03:59  Phos  5.5       Mg     2.0         TPro  5.7<L>  /  Alb  2.2<L>  /  TBili  9.3<H>  /  DBili  x   /  AST  148<H>  /  ALT  13  /  AlkPhos  196<H>            Urine Studies:          RADIOLOGY & ADDITIONAL STUDIES:      < from: Xray Chest 1 View- PORTABLE-Routine (19 @ 03:50) >    EXAM:  XR CHEST PORTABLE ROUTINE 1V                            PROCEDURE DATE:  2019            INTERPRETATION:  A single chest x-ray was obtained on 2019.    Indication: Status post cardiac surgery.    Impression:    The heart is enlarged. Left lower lobe pneumonia and/or atelectasis. The   right lung is clear. All life supporting devices are in good position and   unchanged when compared to previous study done 2019. A   tracheostomy tube is in good position. Status post sternotomy.                    RITA ORTEGA M.D., ATTENDING RADIOLOGIST  This document has been electronically signed. Aug  2 2019  8:17AM                < end of copied text >

## 2019-08-02 NOTE — PROGRESS NOTE ADULT - ASSESSMENT
The patient is a 73y Male with CAD, HFrEF and PVD s/p CABGx3 followed by AFib, resp failure and acute on chronic kidney injury.    - CKD:  Suspect underlying CKD stage 3 and now anuric   - Hypotensive on  and Vaso and levo.  Requirements are increased   -Distended abd  -Metabolic acidosis           RECOMMENDATIONS  -  Given continued pressor requirements will try CVV to be started p CT with contrast is obtained.  No renal objection to contrast as he is anuric at present  - please dose any new medications for a CrCl of 10-15 ml/min   -C diff positive at present .  Cont PO abx.     -Taper pressors today as allowed by BP  -He will need Perm cath once off pressors and stabilizes

## 2019-08-02 NOTE — PROGRESS NOTE ADULT - ASSESSMENT
Assessment  DMT2: 73y Male with newly diagnosed DM T2  on peg feeding  septic shock  on FS and SSI  coverage blood sugar today 88   no hypoglycemic episode, on tube feeding,   CAD: s/P CABG, with Afib,  on medications, stable, monitored.  HTN: Controlled,  on antihypertensive medications.  RALPH on CKD; F/U by Renal    cata Turner MD  Cell: [phone]

## 2019-08-02 NOTE — CHART NOTE - NSCHARTNOTEFT_GEN_A_CORE
Patient requires an emergent CT with oral and IV contrast.  Family can not be located or contacted.  I authorize its use.

## 2019-08-02 NOTE — PROGRESS NOTE ADULT - SUBJECTIVE AND OBJECTIVE BOX
Asked to see patient by CTU staff for distended abdomen  73 year old s/p CABG 7/12/19 with postoperative organ failure (respiratory, renal, CV) requiring dialysis, tracheostomy, PEG, multiple inotropic and vasopressor infusions.    On exam-  Lethargic  Hemodynamically unstable on levophed / dobutamine  on ventilator  abd - distended, soft, PEG in place    WBC=12, lactate=1.8  c.dif toxin positive on 7/22/19    CT-scan - PEG in place, no signs of free air or bowel ischemia.  Diffuse colonic thickening.  Large volume ascites.    - History / exam / imaging consistent severe c.dif colitis.  Unclear how much organ failure is due to c.dif and how much is due to postoperative cardiac surgery course.  - Would attempt to maximize medical management before considering surgical options  - Care d/w Guerrero Winters, Adonis.  - Will follow

## 2019-08-03 ENCOUNTER — RESULT REVIEW (OUTPATIENT)
Age: 74
End: 2019-08-03

## 2019-08-03 LAB
ALBUMIN SERPL ELPH-MCNC: 2.4 G/DL — LOW (ref 3.3–5)
ALBUMIN SERPL ELPH-MCNC: 2.6 G/DL — LOW (ref 3.3–5)
ALP SERPL-CCNC: 149 U/L — HIGH (ref 40–120)
ALP SERPL-CCNC: 189 U/L — HIGH (ref 40–120)
ALT FLD-CCNC: 12 U/L — SIGNIFICANT CHANGE UP (ref 10–45)
ALT FLD-CCNC: 18 U/L — SIGNIFICANT CHANGE UP (ref 10–45)
ANION GAP SERPL CALC-SCNC: 20 MMOL/L — HIGH (ref 5–17)
ANION GAP SERPL CALC-SCNC: 30 MMOL/L — HIGH (ref 5–17)
APTT BLD: 47.7 SEC — HIGH (ref 27.5–36.3)
APTT BLD: 52 SEC — HIGH (ref 27.5–36.3)
AST SERPL-CCNC: 183 U/L — HIGH (ref 10–40)
AST SERPL-CCNC: 190 U/L — HIGH (ref 10–40)
BILIRUB SERPL-MCNC: 11.3 MG/DL — HIGH (ref 0.2–1.2)
BILIRUB SERPL-MCNC: 9.7 MG/DL — HIGH (ref 0.2–1.2)
BUN SERPL-MCNC: 34 MG/DL — HIGH (ref 7–23)
BUN SERPL-MCNC: 41 MG/DL — HIGH (ref 7–23)
CALCIUM SERPL-MCNC: 7.8 MG/DL — LOW (ref 8.4–10.5)
CALCIUM SERPL-MCNC: 7.8 MG/DL — LOW (ref 8.4–10.5)
CHLORIDE SERPL-SCNC: 95 MMOL/L — LOW (ref 96–108)
CHLORIDE SERPL-SCNC: 98 MMOL/L — SIGNIFICANT CHANGE UP (ref 96–108)
CO2 SERPL-SCNC: 12 MMOL/L — LOW (ref 22–31)
CO2 SERPL-SCNC: 14 MMOL/L — LOW (ref 22–31)
CREAT SERPL-MCNC: 2.55 MG/DL — HIGH (ref 0.5–1.3)
CREAT SERPL-MCNC: 2.99 MG/DL — HIGH (ref 0.5–1.3)
CULTURE RESULTS: SIGNIFICANT CHANGE UP
CULTURE RESULTS: SIGNIFICANT CHANGE UP
FIBRINOGEN PPP-MCNC: 501 MG/DL — SIGNIFICANT CHANGE UP (ref 350–510)
GAS PNL BLDA: SIGNIFICANT CHANGE UP
GLUCOSE BLDC GLUCOMTR-MCNC: 101 MG/DL — HIGH (ref 70–99)
GLUCOSE BLDC GLUCOMTR-MCNC: 76 MG/DL — SIGNIFICANT CHANGE UP (ref 70–99)
GLUCOSE SERPL-MCNC: 122 MG/DL — HIGH (ref 70–99)
GLUCOSE SERPL-MCNC: 75 MG/DL — SIGNIFICANT CHANGE UP (ref 70–99)
HCT VFR BLD CALC: 23 % — LOW (ref 39–50)
HCT VFR BLD CALC: 27.9 % — LOW (ref 39–50)
HCT VFR BLD CALC: 28 % — LOW (ref 39–50)
HGB BLD-MCNC: 7.9 G/DL — LOW (ref 13–17)
HGB BLD-MCNC: 9.7 G/DL — LOW (ref 13–17)
HGB BLD-MCNC: 9.8 G/DL — LOW (ref 13–17)
INR BLD: 1.77 RATIO — HIGH (ref 0.88–1.16)
INR BLD: 1.85 RATIO — HIGH (ref 0.88–1.16)
INR BLD: 2.9 RATIO — HIGH (ref 0.88–1.16)
MAGNESIUM SERPL-MCNC: 2.1 MG/DL — SIGNIFICANT CHANGE UP (ref 1.6–2.6)
MAGNESIUM SERPL-MCNC: 2.4 MG/DL — SIGNIFICANT CHANGE UP (ref 1.6–2.6)
MCHC RBC-ENTMCNC: 30.6 PG — SIGNIFICANT CHANGE UP (ref 27–34)
MCHC RBC-ENTMCNC: 30.7 PG — SIGNIFICANT CHANGE UP (ref 27–34)
MCHC RBC-ENTMCNC: 31.1 PG — SIGNIFICANT CHANGE UP (ref 27–34)
MCHC RBC-ENTMCNC: 34.2 GM/DL — SIGNIFICANT CHANGE UP (ref 32–36)
MCHC RBC-ENTMCNC: 34.7 GM/DL — SIGNIFICANT CHANGE UP (ref 32–36)
MCHC RBC-ENTMCNC: 35.1 GM/DL — SIGNIFICANT CHANGE UP (ref 32–36)
MCV RBC AUTO: 88.1 FL — SIGNIFICANT CHANGE UP (ref 80–100)
MCV RBC AUTO: 88.6 FL — SIGNIFICANT CHANGE UP (ref 80–100)
MCV RBC AUTO: 89.8 FL — SIGNIFICANT CHANGE UP (ref 80–100)
OB PNL STL: POSITIVE
PHOSPHATE SERPL-MCNC: 3.6 MG/DL — SIGNIFICANT CHANGE UP (ref 2.5–4.5)
PHOSPHATE SERPL-MCNC: 6.6 MG/DL — HIGH (ref 2.5–4.5)
PLATELET # BLD AUTO: 34 K/UL — LOW (ref 150–400)
PLATELET # BLD AUTO: 46 K/UL — LOW (ref 150–400)
PLATELET # BLD AUTO: 69 K/UL — LOW (ref 150–400)
POTASSIUM SERPL-MCNC: 3.8 MMOL/L — SIGNIFICANT CHANGE UP (ref 3.5–5.3)
POTASSIUM SERPL-MCNC: 4.6 MMOL/L — SIGNIFICANT CHANGE UP (ref 3.5–5.3)
POTASSIUM SERPL-SCNC: 3.8 MMOL/L — SIGNIFICANT CHANGE UP (ref 3.5–5.3)
POTASSIUM SERPL-SCNC: 4.6 MMOL/L — SIGNIFICANT CHANGE UP (ref 3.5–5.3)
PROT SERPL-MCNC: 5.4 G/DL — LOW (ref 6–8.3)
PROT SERPL-MCNC: 6 G/DL — SIGNIFICANT CHANGE UP (ref 6–8.3)
PROTHROM AB SERPL-ACNC: 20.5 SEC — HIGH (ref 10–12.9)
PROTHROM AB SERPL-ACNC: 21.5 SEC — HIGH (ref 10–12.9)
PROTHROM AB SERPL-ACNC: 34.5 SEC — HIGH (ref 10–12.9)
RBC # BLD: 2.56 M/UL — LOW (ref 4.2–5.8)
RBC # BLD: 3.15 M/UL — LOW (ref 4.2–5.8)
RBC # BLD: 3.18 M/UL — LOW (ref 4.2–5.8)
RBC # FLD: 15.3 % — HIGH (ref 10.3–14.5)
RBC # FLD: 15.6 % — HIGH (ref 10.3–14.5)
RBC # FLD: 16.2 % — HIGH (ref 10.3–14.5)
SODIUM SERPL-SCNC: 132 MMOL/L — LOW (ref 135–145)
SODIUM SERPL-SCNC: 137 MMOL/L — SIGNIFICANT CHANGE UP (ref 135–145)
SPECIMEN SOURCE: SIGNIFICANT CHANGE UP
SPECIMEN SOURCE: SIGNIFICANT CHANGE UP
WBC # BLD: 7.1 K/UL — SIGNIFICANT CHANGE UP (ref 3.8–10.5)
WBC # BLD: 8.2 K/UL — SIGNIFICANT CHANGE UP (ref 3.8–10.5)
WBC # BLD: 8.7 K/UL — SIGNIFICANT CHANGE UP (ref 3.8–10.5)
WBC # FLD AUTO: 7.1 K/UL — SIGNIFICANT CHANGE UP (ref 3.8–10.5)
WBC # FLD AUTO: 8.2 K/UL — SIGNIFICANT CHANGE UP (ref 3.8–10.5)
WBC # FLD AUTO: 8.7 K/UL — SIGNIFICANT CHANGE UP (ref 3.8–10.5)

## 2019-08-03 PROCEDURE — 99232 SBSQ HOSP IP/OBS MODERATE 35: CPT

## 2019-08-03 PROCEDURE — 99291 CRITICAL CARE FIRST HOUR: CPT

## 2019-08-03 PROCEDURE — 71045 X-RAY EXAM CHEST 1 VIEW: CPT | Mod: 26

## 2019-08-03 PROCEDURE — 88304 TISSUE EXAM BY PATHOLOGIST: CPT | Mod: 26

## 2019-08-03 PROCEDURE — 47600 CHOLECYSTECTOMY: CPT

## 2019-08-03 RX ORDER — CALCIUM CHLORIDE
1000 POWDER (GRAM) MISCELLANEOUS ONCE
Refills: 0 | Status: COMPLETED | OUTPATIENT
Start: 2019-08-03 | End: 2019-08-03

## 2019-08-03 RX ORDER — NOREPINEPHRINE BITARTRATE/D5W 8 MG/250ML
0.3 PLASTIC BAG, INJECTION (ML) INTRAVENOUS
Qty: 16 | Refills: 0 | Status: DISCONTINUED | OUTPATIENT
Start: 2019-08-03 | End: 2019-08-05

## 2019-08-03 RX ORDER — CALCIUM GLUCONATE 100 MG/ML
1 VIAL (ML) INTRAVENOUS ONCE
Refills: 0 | Status: COMPLETED | OUTPATIENT
Start: 2019-08-03 | End: 2019-08-03

## 2019-08-03 RX ORDER — ALBUMIN HUMAN 25 %
250 VIAL (ML) INTRAVENOUS ONCE
Refills: 0 | Status: COMPLETED | OUTPATIENT
Start: 2019-08-03 | End: 2019-08-03

## 2019-08-03 RX ORDER — PANTOPRAZOLE SODIUM 20 MG/1
8 TABLET, DELAYED RELEASE ORAL
Qty: 80 | Refills: 0 | Status: DISCONTINUED | OUTPATIENT
Start: 2019-08-03 | End: 2019-08-05

## 2019-08-03 RX ORDER — VANCOMYCIN HCL 1 G
500 VIAL (EA) INTRAVENOUS EVERY 6 HOURS
Refills: 0 | Status: DISCONTINUED | OUTPATIENT
Start: 2019-08-03 | End: 2019-08-05

## 2019-08-03 RX ORDER — SODIUM BICARBONATE 1 MEQ/ML
50 SYRINGE (ML) INTRAVENOUS ONCE
Refills: 0 | Status: COMPLETED | OUTPATIENT
Start: 2019-08-03 | End: 2019-08-03

## 2019-08-03 RX ORDER — DEXTROSE 10 % IN WATER 10 %
500 INTRAVENOUS SOLUTION INTRAVENOUS
Refills: 0 | Status: DISCONTINUED | OUTPATIENT
Start: 2019-08-03 | End: 2019-08-05

## 2019-08-03 RX ORDER — SODIUM BICARBONATE 1 MEQ/ML
0.1 SYRINGE (ML) INTRAVENOUS
Qty: 150 | Refills: 0 | Status: DISCONTINUED | OUTPATIENT
Start: 2019-08-03 | End: 2019-08-05

## 2019-08-03 RX ORDER — DOBUTAMINE HCL 250MG/20ML
10 VIAL (ML) INTRAVENOUS
Qty: 500 | Refills: 0 | Status: DISCONTINUED | OUTPATIENT
Start: 2019-08-03 | End: 2019-08-05

## 2019-08-03 RX ORDER — VASOPRESSIN 20 [USP'U]/ML
0.1 INJECTION INTRAVENOUS
Qty: 50 | Refills: 0 | Status: DISCONTINUED | OUTPATIENT
Start: 2019-08-03 | End: 2019-08-05

## 2019-08-03 RX ORDER — METOCLOPRAMIDE HCL 10 MG
5 TABLET ORAL EVERY 8 HOURS
Refills: 0 | Status: DISCONTINUED | OUTPATIENT
Start: 2019-08-03 | End: 2019-08-03

## 2019-08-03 RX ORDER — MEROPENEM 1 G/30ML
1000 INJECTION INTRAVENOUS EVERY 8 HOURS
Refills: 0 | Status: DISCONTINUED | OUTPATIENT
Start: 2019-08-03 | End: 2019-08-05

## 2019-08-03 RX ORDER — CHLORHEXIDINE GLUCONATE 213 G/1000ML
5 SOLUTION TOPICAL EVERY 4 HOURS
Refills: 0 | Status: DISCONTINUED | OUTPATIENT
Start: 2019-08-03 | End: 2019-08-05

## 2019-08-03 RX ORDER — SODIUM CHLORIDE 9 MG/ML
1000 INJECTION INTRAMUSCULAR; INTRAVENOUS; SUBCUTANEOUS
Refills: 0 | Status: DISCONTINUED | OUTPATIENT
Start: 2019-08-03 | End: 2019-08-05

## 2019-08-03 RX ORDER — PROTHROMBIN COMPLEX CONCENTRATE (HUMAN) 25.5; 16.5; 24; 22; 22; 26 [IU]/ML; [IU]/ML; [IU]/ML; [IU]/ML; [IU]/ML; [IU]/ML
1500 POWDER, FOR SOLUTION INTRAVENOUS ONCE
Refills: 0 | Status: COMPLETED | OUTPATIENT
Start: 2019-08-03 | End: 2019-08-03

## 2019-08-03 RX ORDER — VECURONIUM BROMIDE 20 MG/1
2.5 INJECTION, POWDER, FOR SOLUTION INTRAVENOUS ONCE
Refills: 0 | Status: COMPLETED | OUTPATIENT
Start: 2019-08-03 | End: 2019-08-03

## 2019-08-03 RX ORDER — MICAFUNGIN SODIUM 100 MG/1
100 INJECTION, POWDER, LYOPHILIZED, FOR SOLUTION INTRAVENOUS EVERY 24 HOURS
Refills: 0 | Status: DISCONTINUED | OUTPATIENT
Start: 2019-08-03 | End: 2019-08-05

## 2019-08-03 RX ORDER — FENTANYL CITRATE 50 UG/ML
2 INJECTION INTRAVENOUS
Qty: 5000 | Refills: 0 | Status: DISCONTINUED | OUTPATIENT
Start: 2019-08-03 | End: 2019-08-05

## 2019-08-03 RX ORDER — VANCOMYCIN HCL 1 G
1000 VIAL (EA) INTRAVENOUS ONCE
Refills: 0 | Status: COMPLETED | OUTPATIENT
Start: 2019-08-03 | End: 2019-08-03

## 2019-08-03 RX ORDER — SODIUM BICARBONATE 1 MEQ/ML
50 SYRINGE (ML) INTRAVENOUS
Refills: 0 | Status: COMPLETED | OUTPATIENT
Start: 2019-08-03 | End: 2019-08-03

## 2019-08-03 RX ORDER — FENTANYL CITRATE 50 UG/ML
0.5 INJECTION INTRAVENOUS
Qty: 2500 | Refills: 0 | Status: DISCONTINUED | OUTPATIENT
Start: 2019-08-03 | End: 2019-08-03

## 2019-08-03 RX ADMIN — Medication 100 MILLIGRAM(S): at 06:17

## 2019-08-03 RX ADMIN — Medication 40 MILLILITER(S): at 18:00

## 2019-08-03 RX ADMIN — Medication 125 MILLILITER(S): at 08:16

## 2019-08-03 RX ADMIN — Medication 40 MILLILITER(S): at 21:00

## 2019-08-03 RX ADMIN — CHLORHEXIDINE GLUCONATE 5 MILLILITER(S): 213 SOLUTION TOPICAL at 15:47

## 2019-08-03 RX ADMIN — Medication 20 MILLILITER(S): at 07:00

## 2019-08-03 RX ADMIN — Medication 200 GRAM(S): at 21:33

## 2019-08-03 RX ADMIN — Medication 250 MILLIGRAM(S): at 18:43

## 2019-08-03 RX ADMIN — VECURONIUM BROMIDE 2.5 MILLIGRAM(S): 20 INJECTION, POWDER, FOR SOLUTION INTRAVENOUS at 23:00

## 2019-08-03 RX ADMIN — Medication 500 MILLIGRAM(S): at 00:21

## 2019-08-03 RX ADMIN — Medication 125 MILLILITER(S): at 08:56

## 2019-08-03 RX ADMIN — Medication 20 MILLILITER(S): at 10:00

## 2019-08-03 RX ADMIN — Medication 20 MILLILITER(S): at 06:00

## 2019-08-03 RX ADMIN — Medication 40 MILLILITER(S): at 17:08

## 2019-08-03 RX ADMIN — NYSTATIN CREAM 1 APPLICATION(S): 100000 CREAM TOPICAL at 06:18

## 2019-08-03 RX ADMIN — Medication 50 MILLIEQUIVALENT(S): at 14:08

## 2019-08-03 RX ADMIN — Medication 40 MILLILITER(S): at 23:00

## 2019-08-03 RX ADMIN — FENTANYL CITRATE 4.78 MICROGRAM(S)/KG/HR: 50 INJECTION INTRAVENOUS at 07:22

## 2019-08-03 RX ADMIN — Medication 40 MILLILITER(S): at 20:00

## 2019-08-03 RX ADMIN — MICAFUNGIN SODIUM 105 MILLIGRAM(S): 100 INJECTION, POWDER, LYOPHILIZED, FOR SOLUTION INTRAVENOUS at 20:17

## 2019-08-03 RX ADMIN — Medication 40 MILLILITER(S): at 19:00

## 2019-08-03 RX ADMIN — Medication 50 MILLIEQUIVALENT(S): at 21:49

## 2019-08-03 RX ADMIN — Medication 20.7 MICROGRAM(S)/KG/MIN: at 14:18

## 2019-08-03 RX ADMIN — CHLORHEXIDINE GLUCONATE 5 MILLILITER(S): 213 SOLUTION TOPICAL at 17:40

## 2019-08-03 RX ADMIN — Medication 3.45 MICROGRAM(S)/KG/MIN: at 03:48

## 2019-08-03 RX ADMIN — Medication 20 MILLILITER(S): at 08:00

## 2019-08-03 RX ADMIN — Medication 50 MILLIEQUIVALENT(S): at 00:09

## 2019-08-03 RX ADMIN — Medication 1000 MILLIGRAM(S): at 14:08

## 2019-08-03 RX ADMIN — MIDODRINE HYDROCHLORIDE 10 MILLIGRAM(S): 2.5 TABLET ORAL at 06:17

## 2019-08-03 RX ADMIN — Medication 40 MILLILITER(S): at 22:00

## 2019-08-03 RX ADMIN — CHLORHEXIDINE GLUCONATE 5 MILLILITER(S): 213 SOLUTION TOPICAL at 22:56

## 2019-08-03 RX ADMIN — Medication 50 MILLIEQUIVALENT(S): at 21:33

## 2019-08-03 RX ADMIN — Medication 20 MILLILITER(S): at 00:00

## 2019-08-03 RX ADMIN — CHLORHEXIDINE GLUCONATE 1 APPLICATION(S): 213 SOLUTION TOPICAL at 06:18

## 2019-08-03 RX ADMIN — Medication 20 MILLILITER(S): at 01:00

## 2019-08-03 RX ADMIN — Medication 1 APPLICATION(S): at 06:18

## 2019-08-03 RX ADMIN — Medication 20 MILLILITER(S): at 03:00

## 2019-08-03 RX ADMIN — PANTOPRAZOLE SODIUM 40 MILLIGRAM(S): 20 TABLET, DELAYED RELEASE ORAL at 06:16

## 2019-08-03 RX ADMIN — Medication 11.04 MICROGRAM(S)/KG/MIN: at 03:13

## 2019-08-03 RX ADMIN — Medication 20 MILLILITER(S): at 05:00

## 2019-08-03 RX ADMIN — Medication 50 MILLIEQUIVALENT(S): at 03:14

## 2019-08-03 RX ADMIN — SODIUM CHLORIDE 10 MILLILITER(S): 9 INJECTION INTRAMUSCULAR; INTRAVENOUS; SUBCUTANEOUS at 13:29

## 2019-08-03 RX ADMIN — CHLORHEXIDINE GLUCONATE 15 MILLILITER(S): 213 SOLUTION TOPICAL at 06:17

## 2019-08-03 RX ADMIN — Medication 500 MILLIGRAM(S): at 17:41

## 2019-08-03 RX ADMIN — Medication 20 MILLILITER(S): at 04:00

## 2019-08-03 RX ADMIN — Medication 200 GRAM(S): at 01:40

## 2019-08-03 RX ADMIN — PANTOPRAZOLE SODIUM 10 MG/HR: 20 TABLET, DELAYED RELEASE ORAL at 20:16

## 2019-08-03 RX ADMIN — Medication 50 MILLIEQUIVALENT(S): at 08:15

## 2019-08-03 RX ADMIN — Medication 1000 MILLIGRAM(S): at 08:16

## 2019-08-03 RX ADMIN — Medication 1000 MILLIGRAM(S): at 16:26

## 2019-08-03 RX ADMIN — MEROPENEM 100 MILLIGRAM(S): 1 INJECTION INTRAVENOUS at 22:55

## 2019-08-03 RX ADMIN — MEROPENEM 100 MILLIGRAM(S): 1 INJECTION INTRAVENOUS at 15:47

## 2019-08-03 RX ADMIN — Medication 50 MILLIEQUIVALENT(S): at 16:26

## 2019-08-03 RX ADMIN — MEROPENEM 100 MILLIGRAM(S): 1 INJECTION INTRAVENOUS at 06:17

## 2019-08-03 RX ADMIN — Medication 5 MILLIGRAM(S): at 07:29

## 2019-08-03 RX ADMIN — Medication 500 MILLIGRAM(S): at 06:17

## 2019-08-03 RX ADMIN — VASOPRESSIN 6 UNIT(S)/MIN: 20 INJECTION INTRAVENOUS at 13:28

## 2019-08-03 RX ADMIN — Medication 22.08 MICROGRAM(S)/KG/MIN: at 14:18

## 2019-08-03 RX ADMIN — Medication 20 MILLILITER(S): at 09:00

## 2019-08-03 RX ADMIN — FENTANYL CITRATE 4.78 MICROGRAM(S)/KG/HR: 50 INJECTION INTRAVENOUS at 00:23

## 2019-08-03 RX ADMIN — VASOPRESSIN 3 UNIT(S)/MIN: 20 INJECTION INTRAVENOUS at 03:13

## 2019-08-03 RX ADMIN — CHLORHEXIDINE GLUCONATE 1 APPLICATION(S): 213 SOLUTION TOPICAL at 06:17

## 2019-08-03 NOTE — PROGRESS NOTE ADULT - SUBJECTIVE AND OBJECTIVE BOX
Seen prior to OR for ex lap.  On three pressor support. Dobutamine 5 mg, levophed 0.3 and vasopressin. Tube feeds turned off. Concerning as his lactate is increasing from 8.5 from 5.6.  Patient is currently being given Albumin of 500 ml. Patient seen on CVVHDF with no fluid off.   	  	    Review of systems: Unable to obtain.    chlorhexidine 0.12% Liquid 15 milliLiter(s) Oral Mucosa every 12 hours  chlorhexidine 2% Cloths 1 Application(s) Topical <User Schedule>  chlorhexidine 4% Liquid 1 Application(s) Topical <User Schedule>  CRRT Treatment    <Continuous>  dexmedetomidine Infusion 1 MICROgram(s)/kG/Hr IV Continuous <Continuous>  dextrose 20%. 500 milliLiter(s) IV Continuous <Continuous>  dextrose 50% Injectable 50 milliLiter(s) IV Push every 15 minutes  dextrose 50% Injectable 25 milliLiter(s) IV Push every 15 minutes  dextrose 50% Injectable 50 milliLiter(s) IV Push every 15 minutes  dextrose 50% Injectable 25 milliLiter(s) IV Push every 15 minutes  DOBUTamine Infusion 5 MICROgram(s)/kG/Min IV Continuous <Continuous>  fentaNYL   Infusion 1.3 MICROgram(s)/kG/Hr IV Continuous <Continuous>  insulin lispro (HumaLOG) corrective regimen sliding scale   SubCutaneous every 6 hours  levothyroxine Injectable 25 MICROGram(s) IV Push at bedtime  meropenem  IVPB 1000 milliGRAM(s) IV Intermittent every 8 hours  metoclopramide Injectable 5 milliGRAM(s) IV Push every 8 hours  metroNIDAZOLE  IVPB      metroNIDAZOLE  IVPB 500 milliGRAM(s) IV Intermittent every 8 hours  midodrine 10 milliGRAM(s) Oral every 8 hours  norepinephrine Infusion 0.05 MICROgram(s)/kG/Min IV Continuous <Continuous>  nystatin Powder 1 Application(s) Topical two times a day  pantoprazole  Injectable 40 milliGRAM(s) IV Push two times a day  petrolatum Ophthalmic Ointment 1 Application(s) Both EYES every 6 hours  PrismaSATE Dialysate BGK 4 / 2.5 5000 milliLiter(s) CRRT <Continuous>  PrismaSOL Filtration BGK 4 / 2.5 5000 milliLiter(s) CRRT <Continuous>  PrismaSOL Filtration BGK 4 / 2.5 5000 milliLiter(s) CRRT <Continuous>  sodium chloride 0.9% lock flush 10 milliLiter(s) IV Push every 1 hour PRN  sodium chloride 0.9% lock flush 10 milliLiter(s) IV Push every 1 hour PRN  sodium chloride 0.9%. 1000 milliLiter(s) IV Continuous <Continuous>  vancomycin    Solution 500 milliGRAM(s) Oral every 6 hours  vasopressin Infusion 0.05 Unit(s)/Min IV Continuous <Continuous>      VITAL:  T(C): , Max: 37.1 (08-02-19 @ 12:00)  T(F): , Max: 98.8 (08-02-19 @ 12:00)  HR: 84 (08-03-19 @ 09:00)  RR: 23 (08-03-19 @ 09:00)  SpO2: 96% (08-03-19 @ 09:00)      08-02-19 @ 07:01  -  08-03-19 @ 07:00  --------------------------------------------------------  IN: 4495.3 mL / OUT: 3406 mL / NET: 1089.3 mL    08-03-19 @ 07:01  -  08-03-19 @ 09:26  --------------------------------------------------------  IN: 630.6 mL / OUT: 601 mL / NET: 29.6 mL        PHYSICAL EXAM:  General: Trached, pegged and sedated in guarded condition. On CVVHDF.  HEENT:  Eyes are open. Nonreactive on sedation.   Neck: Right triple CVC.   Respiratory: Vesicular breath sounds and rales heard throughout the abdomen.   Cardiac: RRR s1s2  Gastrointestinal: BS+, soft, NT/ND  Urologic: No law  Extremities: 1 +  edema at the mid and distal tibial levels bilaterally.   Access: Right femoral Shiley catheter in use.     LABS:                          9.8    8.2   )-----------( 46       ( 03 Aug 2019 01:46 )             27.9     Na(132)/K(3.8)/Cl(98)/HCO3(14)/BUN(41)/Cr(2.99)Glu(122)/Ca(7.8)/Mg(2.1)/PO4(3.6)    08-03 @ 01:46  Na(136)/K(3.4)/Cl(98)/HCO3(16)/BUN(63)/Cr(4.52)Glu(88)/Ca(7.9)/Mg(2.0)/PO4(5.5)    08-02 @ 03:59  Na(135)/K(3.6)/Cl(101)/HCO3(17)/BUN(53)/Cr(4.09)Glu(74)/Ca(7.7)/Mg(2.1)/PO4(4.1)    08-01 @ 00:43    08-03    132<L>  |  98  |  41<H>  ----------------------------<  122<H>  3.8   |  14<L>  |  2.99<H>    Ca    7.8<L>      03 Aug 2019 01:46  Phos  3.6     08-03  Mg     2.1     08-03    TPro  6.0  /  Alb  2.4<L>  /  TBili  11.3<H>  /  DBili  x   /  AST  183<H>  /  ALT  12  /  AlkPhos  189<H>  08-03

## 2019-08-03 NOTE — PROVIDER CONTACT NOTE (OTHER) - ACTION/TREATMENT ORDERED:
and respi bedside. Vent sent up. Etomidate 20mg and Vecoronium 5mg. Pt intubated at 630am. propofol gtt started.
NANNETTE Mukherjee assessed pt at bedside and okay to change dressing.
NANNETTE Mukherjee at bedside assessing LE with doppler. NANNETTE Arvizu unable to find pulses on DP and PT. NANNETTE Arvizu consulting vascular.
NP aware and reviewed tele strip: EKG and continue to monitor patient overnight.
as per MD and NP, skin is mottled and necrotic, continue with skin regimen and recommendations above listed above
no action required at this time for lactate of 5.6
CT scan, Ammonia levels and ABG

## 2019-08-03 NOTE — PROVIDER CONTACT NOTE (EICU) - SITUATION
Patient popliteal pulse is unable to be identified by doppler. Popliteal pulse was present 8/2 overnight into 8/3 the popliteal pulse was unable to be identified.

## 2019-08-03 NOTE — PROGRESS NOTE ADULT - ASSESSMENT
73M presented to [facility] on 7/1/19 with shortness of breath and LE edema found to have newly diagnosed HFrEF (EF 30%) and RALPH vs CKD, also PVD and claudication.    7/12 pt underwent C3L , AFib.  Pt's hospital course was then been c/b  A-fib, acute on chronic renal failure with fluid overload requiring CVVH, cardiogenic shock (on Milrinone, Dobutamine, and Vasopressin gtts), and respiratory failure requiring intubation (extubated on 7/16). Patient's CBC was WNL on day of presentation to [facility]. His platelet count has been steadily downtrending since 7/12.   pt found to have C diff on 7/23/19  pt with low plts, HIT  AB positive  pt with elevated LFTs, low EF- some is congestion  reintubated , s/p trach and PEG  Patient has increased lactic acidosis with increasing pressor requirements  for OR today for colectomy    Antibiotics  Cefuroxime 7/11-->13  IV Vanco 7/21, 22  Meropenem 7/21-->23; 8/2-->  Vanco (enteral) 7/22-->  Metronidazole 7/24 -->30; 8/1-->      Suggest  Continue Meropenem, Enteral Vanco/IV Flagyl    discussed with CTU team:

## 2019-08-03 NOTE — PROGRESS NOTE ADULT - ASSESSMENT
Assessment  DMT2: 73y Male with newly diagnosed DM T2  on peg feeding    on FS and SSI  coverage blood sugar today 122   no hypoglycemic episode, on tube feeding,   CAD: s/P CABG, with Afib,  on medications, monitored.   Cardiogenic Shock:: On Milrinone Dobutamine vasopressin  RALPH on CKD; F/U by Renal    cata Turner MD  Cell: [phone]

## 2019-08-03 NOTE — PROVIDER CONTACT NOTE (CRITICAL VALUE NOTIFICATION) - ACTION/TREATMENT ORDERED:
NANNETTE Miramontes aware. 500 ml albumin to be given stat.
Decrease Argatroban gtt to 1.6mcg/kg/min
IV Protonix

## 2019-08-03 NOTE — BRIEF OPERATIVE NOTE - NSICDXBRIEFOPLAUNCH_GEN_ALL_CORE
<--- Click to Launch ICDx for PreOp, PostOp and Procedure
I will START or STAY ON the medications listed below when I get home from the hospital:    metFORMIN 500 mg oral tablet  -- 1 tab(s) by mouth 2 times a day  -- Indication: For Diabetes    glipiZIDE 5 mg oral tablet  -- 1 tab(s) by mouth once a day  -- Indication: For DM (diabetes mellitus)    pravastatin 10 mg oral tablet  -- 1 tab(s) by mouth once a day  -- Indication: For HLD    Metoprolol Tartrate 50 mg oral tablet  -- 1 tab(s) by mouth 2 times a day  -- Indication: For HTN (hypertension)    levothyroxine 100 mcg (0.1 mg) oral tablet  -- 1 tab(s) by mouth once a day  -- Indication: For Hypothyroid

## 2019-08-03 NOTE — PROGRESS NOTE ADULT - SUBJECTIVE AND OBJECTIVE BOX
CRITICAL CARE ATTENDING - CTICU    MEDICATIONS  (STANDING):  prothrombin complex concentrate IVPB (KCENTRA) 1500 International Unit(s) IV Intermittent once                                    9.8    8.2   )-----------( 46       ( 03 Aug 2019 01:46 )             27.9       08-03    132<L>  |  98  |  41<H>  ----------------------------<  122<H>  3.8   |  14<L>  |  2.99<H>    Ca    7.8<L>      03 Aug 2019 01:46  Phos  3.6     -  Mg     2.1     -    TPro  6.0  /  Alb  2.4<L>  /  TBili  11.3<H>  /  DBili  x   /  AST  183<H>  /  ALT  12  /  AlkPhos  189<H>        PT/INR - ( 03 Aug 2019 01:46 )   PT: 34.5 sec;   INR: 2.90 ratio         PTT - ( 02 Aug 2019 18:08 )  PTT:64.9 sec    Mode: vent off      Daily     Daily Weight in k (03 Aug 2019 01:00)       @ 07: @ 07:00  --------------------------------------------------------  IN: 4495.3 mL / OUT: 3406 mL / NET: 1089.3 mL     @ : @ 11:21  --------------------------------------------------------  IN: 703.4 mL / OUT: 601 mL / NET: 102.4 mL        Critically Ill patient  : [ ] preoperative ,   [x ] post operative    Requires :  [x ] Arterial Line   [x ] Central Line  [ ] PA catheter  [ ] IABP  [ ] ECMO  [ ] LVAD  [ x] Ventilator  [ ] pacemaker [ ] Impella.                      [x ] ABG's     [ x] Pulse Oxymetry Monitoring  Bedside evaluation , monitoring , treatment of hemodynamics , fluids , IVP/ IVCD meds.        Diagnosis:     POD 7/12 - CABG X 3 L    Post Op Cardiogenic Shock - resolving    CHF- acute [x ]   chronic [x ]    systolic [x ]   diatolic [x ]          - Echo- EF -   30's          [ ] RV dysfunction          - Cxr-cardiomegally, edema          - Clinical-  [ x]inotropes   [x ]pressors   [ ]diuresis   [ ]IABP   [ ]ECMO   [ ]LVAD   [x ]Respiratory Failure    respiratory failure - Tracheostomy     Requires chest PT, pulmonary toilet, ambu bagging, suctioning to maintain SaO2,  patent airway and treat atelectasis.     Ventilator Management:  [ x]AC-rest    [ ]CPAP-PS Wean    [ ]Trach Collar     [ ]Extubate    [ ] T-Piece  [ ]peep>5     Difficult weaning process - multiple organ system involvement in critically ill patient      Renal Failure - Acute Kidney Injury     CVVHD    Hemodynamic lability,instability. Requires IVCD [ x] vasopressors [ x] inotropes  [ ] vasodilator  [x ]IVSS fluid  to maintain MAP, perfusion, C.I.    SHock - abdominal distensio / Tympanic / C Diff enterocolitis    Abdominal Exploration in OR by Surgery team    Thrombocytopenia     Hyponatremia                      -                     Discussed with CT surgeon, Physician's Assistant - Nurse Practitioner- Critical care medicine team.   Dicussed at  AM / PM rounds.   Chart, labs , films reviewed.    Total Time: 30 min

## 2019-08-03 NOTE — PROGRESS NOTE ADULT - ASSESSMENT
ASSESSMENT/PLAN    73 year old  gentleman h/o no implantable devices who prior to his admission to [facility] had not been followed by a doctor regularly (on no meds at home), presented to [facility] on 7/1/19 with shortness of breath and LE edema found to have newly diagnosed HFrEF (EF 30%) and RALPH vs CKD, also PVD and claudication with extensive LE arterial disease on doppler. 7/2/19 abd US: small amount of perihepatic ascited. 7/2/19 TTE: EF 30%, mild LVH, multiple regional WMA's, mod MR/TR. 7/3/19 Nuclear ST: moderate focal apical/inferolat/aterolat ischemia. 7/9/19: h/h 12.9/39.5, platelets 180, Bun/Cr 28/1.9, eGFR 42. Pt was diuresed at [facility] treated with lasix/hydralzine/nitrates/statin/ASA. No beta blockade 2/2 low blood pressures. Per [facility] discharge note, pt will need  vascular intervention d/t extensive lower extremity vascular disease. Pt transferred to [facility] today for left heart cath to rule out ischemia. Mr. Emerson hospital course has been complicated with atrial fibrillation RALPH on CKD.  His platelet count has been steadily decreasing since 7/12. Patient found to have C difficile on 7/23/19 with low platelets found to be HIT AB positive. His LVEF is low with LV dysfunction and he then was reintubated. Finally had trach and PEG. Restarted on CVVHDF. Patients now with marked abdominal distension. He currently is requiring high dose pressors.  CT scan demonstrating colitis: in this clinical context ischemic colitis superimposed on Cdiff colitis is very likely. He is seen prior to the OR going in for EX Lap on meropenem 1000 mg IV every 8 hours, vancomycin 500 mg po and metronidazole 500 mg IV every 8 hours. Seen on CVVHDF     1. Anuric RALPH on CKD. On CVVHDF will continue with no fluid off.   2. High anion gap metabolic acidosis with lactate acidosis.   3. Distended abdomen.  4. C-difficile that has possible ischemic colitis. Defer to infectious disease and surgery.    5. Hypotension- Has 3 pressor support.   6. Anemia with thrombocytopenia.     RECOMMENDATIONS:  1. Continue CVVHDF. Hold while in OR.   2. No fluid removal.   3. Please obtain iron studies and ferritin.   4. Please keep MAP>65.  5. Renal dose all medications for GFR <10.   6. Trend BMP, phosphorus and CBC BID. If phosphorus decreases < 3.0; start phosphorus gtts or replete phosphorus per protocol of the unit. As CVVHDF lowers the phosphorus.   7. Consider a permacath placement once he is stable.     Critical Care time of 60 minutes.     Spoke with bedside nurse.     Daysi Howe,   [employer]  [phone] ASSESSMENT/PLAN    73 year old  gentleman h/o no implantable devices who prior to his admission to [facility] had not been followed by a doctor regularly (on no meds at home), presented to [facility] on 7/1/19 with shortness of breath and LE edema found to have newly diagnosed HFrEF (EF 30%) and RALPH vs CKD, also PVD and claudication with extensive LE arterial disease on doppler. 7/2/19 abd US: small amount of perihepatic ascited. 7/2/19 TTE: EF 30%, mild LVH, multiple regional WMA's, mod MR/TR. 7/3/19 Nuclear ST: moderate focal apical/inferolat/aterolat ischemia. 7/9/19: h/h 12.9/39.5, platelets 180, Bun/Cr 28/1.9, eGFR 42. Pt was diuresed at [facility] treated with lasix/hydralzine/nitrates/statin/ASA. No beta blockade 2/2 low blood pressures. Per [facility] discharge note, pt will need  vascular intervention d/t extensive lower extremity vascular disease. Pt transferred to [facility] today for left heart cath to rule out ischemia. Mr. Emerson hospital course has been complicated with atrial fibrillation RALPH on CKD.  His platelet count has been steadily decreasing since 7/12. Patient found to have C difficile on 7/23/19 with low platelets found to be HIT AB positive. His LVEF is low with LV dysfunction and he then was reintubated. Finally had trach and PEG. Restarted on CVVHDF. Patients now with marked abdominal distension. He currently is requiring high dose pressors.  CT scan demonstrating colitis: in this clinical context ischemic colitis superimposed on Cdiff colitis is very likely. He is seen prior to the OR going in for EX Lap on meropenem 1000 mg IV every 8 hours, vancomycin 500 mg po and metronidazole 500 mg IV every 8 hours. Seen on CVVHDF     1. Anuric RALPH on CKD. On CVVHDF will continue with no fluid off.   2. High anion gap metabolic acidosis with lactate acidosis.   3. Distended abdomen.  4. C-difficile that has possible ischemic colitis. Defer to infectious disease and surgery.    5. Hypotension- Has 3 pressor support.   6. Anemia with thrombocytopenia.     RECOMMENDATIONS:  1. Continue CVVHDF. Hold while in OR.   2. No fluid removal.   3. Please obtain iron studies and ferritin.   4. Please keep MAP>65.  5. Renal dose all medications for GFR <10.   6. Trend BMP, phosphorus and CBC BID. If phosphorus decreases < 3.0; start phosphorus gtts or replete phosphorus per protocol of the unit. As CVVHDF lowers the phosphorus.   7. Consider a permacath placement once he is stable.     Critical Care time of 60 minutes.     Spoke with  Dr. Damico and bedside nurse.     Daysi Howe,   [employer]  [phone]

## 2019-08-03 NOTE — PROGRESS NOTE ADULT - SUBJECTIVE AND OBJECTIVE BOX
Follow Up:  colitis    Interval History/ROS: unresponsive on vent    Allergies  No Known Allergies    ANTIMICROBIALS:  meropenem  IVPB 1000 every 8 hours  metroNIDAZOLE  IVPB    metroNIDAZOLE  IVPB 500 every 8 hours  vancomycin    Solution 500 every 6 hours    OTHER MEDS:  MEDICATIONS  (STANDING):  dexmedetomidine Infusion 1 <Continuous>  dextrose 50% Injectable 50 every 15 minutes  dextrose 50% Injectable 25 every 15 minutes  dextrose 50% Injectable 50 every 15 minutes  dextrose 50% Injectable 25 every 15 minutes  DOBUTamine Infusion 5 <Continuous>  fentaNYL   Infusion 1.3 <Continuous>  insulin lispro (HumaLOG) corrective regimen sliding scale  every 6 hours  levothyroxine Injectable 25 at bedtime  metoclopramide Injectable 5 every 8 hours  midodrine 10 every 8 hours  norepinephrine Infusion 0.05 <Continuous>  pantoprazole  Injectable 40 two times a day  vasopressin Infusion 0.05 <Continuous>      Vital Signs Last 24 Hrs  T(C): 35.4 (03 Aug 2019 08:00), Max: 37.1 (02 Aug 2019 12:00)  T(F): 95.7 (03 Aug 2019 08:00), Max: 98.8 (02 Aug 2019 12:00)  HR: 84 (03 Aug 2019 09:00) (81 - 107)  BP: --  BP(mean): --  RR: 23 (03 Aug 2019 09:00) (0 - 38)  SpO2: 96% (03 Aug 2019 09:00) (70% - 100%)    PHYSICAL EXAM:  General: ill appearing, warming blanket  Neurology: unresponsive  Respiratory: trach in place, Clear to auscultation bilaterally  CV: RRR, S1S2, stable sternum  Abdominal: -distended, PEG in place  Extremities: Z boots   Line Sites: Clear  Skin: No rash                        9.8    8.2   )-----------( 46       ( 03 Aug 2019 01:46 )             27.9       08-03    132<L>  |  98  |  41<H>  ----------------------------<  122<H>  3.8   |  14<L>  |  2.99<H>    Ca    7.8<L>      03 Aug 2019 01:46  Phos  3.6     08-03  Mg     2.1     08-03    TPro  6.0  /  Alb  2.4<L>  /  TBili  11.3<H>  /  DBili  x   /  AST  183<H>  /  ALT  12  /  AlkPhos  189<H>  08-03    Blood Gas Arterial, Lactate (08.03.19 @ 08:00)    Blood Gas Arterial, Lactate: 8.5 mmol/L      MICROBIOLOGY:  .Blood  07-31-19   No growth to date.  --  --      .Bronchial  07-30-19   Few Yeast  --  --      .Blood  07-29-19   No growth to date.  --  --      .Blood  07-28-19   No growth at 5 days.  --  --      .Urine  07-22-19   No growth  --  --      .Blood  07-22-19   No growth at 5 days.  --  --      .Blood  07-21-19   No growth at 5 days.  --  --      .Blood  07-21-19   No growth at 5 days.  --  --      RADIOLOGY:  < from: CT Abdomen and Pelvis w/ Oral Cont and w/ IV Cont (08.02.19 @ 10:59) >  IMPRESSION:     Bilateral lower lobe subsegmental atelectasis. Asymmetric pulmonary edema   versus multifocal pneumonia.    Trace bilateral pleural effusions.    Worsening abdominal ascites, now large in volume.    Thickening of the colon to the level of the rectum consistent with   colitis.    < end of copied text >      Wilfredo Lamb MD; Division of Infectious Disease; Pager: [phone]; nights and weekends: [phone]

## 2019-08-03 NOTE — BRIEF OPERATIVE NOTE - NSICDXBRIEFPREOP_GEN_ALL_CORE_FT
PRE-OP DIAGNOSIS:  Failure respiratory 30-Jul-2019 14:27:24  Gerard Mitchell
PRE-OP DIAGNOSIS:  Colitis presumed infectious 03-Aug-2019 14:05:41  Danielle Menezes
PRE-OP DIAGNOSIS:  3-vessel CAD 13-Jul-2019 06:56:24  Luis Vela

## 2019-08-03 NOTE — PROVIDER CONTACT NOTE (OTHER) - ASSESSMENT
Diffuse impaired skin integrity, AILEEN Bauer and Gahssan ANDREWS called to bedside to assess
Mid sternal incision dressing site saturated. Pt on argatraban gtt.
Unable to locate DP and PT pulses with doppler. Pt on vasopressor, , primacor, and epi gtt. Pt able to wiggle toes on LE; c/o numbness and tingling on both feet. B/l LE cool from mid shin down to foot. Dusky nail beds to b/l foot.
patient is sedated on fentanyl gtt
pt is asymptomatic resting comfortably HR back up to 60s. cath site benign.
pt on 5LNC 93% O2sat.  ABG oxygen saturation 87. Po2 53. Increased work of breath. Tachypneic.
Upon reassessment patient unresponsive to verbal command and nonverbal. Patient withdraws from pain. Increasingly lethargic and pupils sluggish bilaterally. /52 MAP 77 HR 81 CVP 20 SpO2 100% on 4L NC.

## 2019-08-03 NOTE — PRE-ANESTHESIA EVALUATION ADULT - NSANTHPMHFT_GEN_ALL_CORE
HF xfer from outside hospital, underwent CABGx3  - guarded prognosis. Remains intubated (reintubated multiple times for respiratory failure this stay), remains on vasopressors (Levo, Vaso, ) sedated with Precedex. EF 30-40%, RV failure, temp pacer, thrombocytopenia, sharlene
s/p cabg 3 weeks ago,s/p peg,tracheostomy  post p cva,renal failure on cvvh
Recent dx of CAD and Triple vessel CABG 2 weeks prior, admitted to CTICU for respiratory failure, intubated. MV settings 500/10/40/5. Sedated on precedex, prop, and on pressor support; levo, vaso, and inotrope, dobutamine drip. Stopped argatroban on 7/27. Course further c/b C-diff (on flagyl and vanc). EF 35-40%, diffuse hypokinesis.
B/L Carotid Stenosis  Renal Insufficiency  Positive Stess s/p CCath with Severe 3V CAD  CHF 30%  low P2Y12  Severe LE claudication B/L   Past Smoker, hx of EtOH in the past

## 2019-08-03 NOTE — BRIEF OPERATIVE NOTE - NSICDXBRIEFPOSTOP_GEN_ALL_CORE_FT
POST-OP DIAGNOSIS:  Respiratory failure 30-Jul-2019 14:27:51  Gerard Mitchell
POST-OP DIAGNOSIS:  Gastrostomy tube dysfunction 03-Aug-2019 14:06:05  Danielle Menezes  Gangrenous cholecystitis 03-Aug-2019 14:05:55  Danielle Menezes
POST-OP DIAGNOSIS:  3-vessel CAD 13-Jul-2019 06:56:57  Luis Vela

## 2019-08-03 NOTE — PROVIDER CONTACT NOTE (EICU) - ACTION/TREATMENT ORDERED:
MD Anthony came to bedside and assessed pt. Per MD Anthony will continue to monitor.
continue to monitor

## 2019-08-03 NOTE — PROVIDER CONTACT NOTE (OTHER) - RECOMMENDATIONS
EKG
Notify NANNETTE Arvizu to assess pt at bedside. NANNETTE Arvizu also aware epi gtt was started overnight.
Notify NANNETTE Mukherjee to assess pt at bedside.
assess patient
turn and position patient, pressure off loading techniques, wean off pressors as tolerate, protective skin aliments such as cavilon
CT Scan

## 2019-08-03 NOTE — BRIEF OPERATIVE NOTE - OPERATION/FINDINGS
Portex #7 trach
PEG tube through stomach, with significant leakage around tube. Removed and oversewn.    Gangrenous and necrotic gallbladder, removed with top-down approach, small cuff left open, with drainage.    Colon examined, mild thickening in the sigmoid, no evidence of toxic megacolon.
Patient converted to afib intra-operatively prior to going on bypass

## 2019-08-03 NOTE — PROVIDER CONTACT NOTE (CRITICAL VALUE NOTIFICATION) - ASSESSMENT
lactate 8.5 from 5.6. abdomen is distended. feeds are off.
No active bleeding noted upon assessment. No visible bruising.
blood in stool. positive blood on occult stool

## 2019-08-03 NOTE — PROVIDER CONTACT NOTE (EICU) - BACKGROUND
Pt. s/p C3L , s/p Trach & Peg  and Pt currently on CVVHDF. Pt on  7 mcg, VASO 0.1 units, LEVO 0.3 mcg and Fentanyl Drip @ 1.3 mcg

## 2019-08-03 NOTE — PROGRESS NOTE ADULT - SUBJECTIVE AND OBJECTIVE BOX
Endocrinology Attending Covering for Dr. Magdaleno      Chief complaint  Patient is a 73y old  Male who presents with a chief complaint of CABG (03 Aug 2019 09:55)   Review of systems  Patient in bed, looks comfortable, no fever,  had no hypoglycemia.    Labs and Fingersticks  CAPILLARY BLOOD GLUCOSE      POCT Blood Glucose.: 101 mg/dL (03 Aug 2019 06:41)  POCT Blood Glucose.: 126 mg/dL (02 Aug 2019 18:53)  POCT Blood Glucose.: 146 mg/dL (02 Aug 2019 18:11)  POCT Blood Glucose.: 69 mg/dL (02 Aug 2019 17:08)  POCT Blood Glucose.: 78 mg/dL (02 Aug 2019 15:48)  POCT Blood Glucose.: 105 mg/dL (02 Aug 2019 13:55)  POCT Blood Glucose.: 69 mg/dL (02 Aug 2019 13:28)      Anion Gap, Serum: 20 <H> (08-03 @ 01:46)  Anion Gap, Serum: 22 <H> (08-02 @ 03:59)      Calcium, Total Serum: 7.8 <L> (08-03 @ 01:46)  Calcium, Total Serum: 7.9 <L> (08-02 @ 03:59)  Albumin, Serum: 2.4 <L> (08-03 @ 01:46)  Albumin, Serum: 2.2 <L> (08-02 @ 03:59)    Alanine Aminotransferase (ALT/SGPT): 12 (08-03 @ 01:46)  Alanine Aminotransferase (ALT/SGPT): 13 (08-02 @ 03:59)  Alkaline Phosphatase, Serum: 189 <H> (08-03 @ 01:46)  Alkaline Phosphatase, Serum: 196 <H> (08-02 @ 03:59)  Aspartate Aminotransferase (AST/SGOT): 183 <H> (08-03 @ 01:46)  Aspartate Aminotransferase (AST/SGOT): 148 <H> (08-02 @ 03:59)        08-03    132<L>  |  98  |  41<H>  ----------------------------<  122<H>  3.8   |  14<L>  |  2.99<H>    Ca    7.8<L>      03 Aug 2019 01:46  Phos  3.6     08-03  Mg     2.1     08-03    TPro  6.0  /  Alb  2.4<L>  /  TBili  11.3<H>  /  DBili  x   /  AST  183<H>  /  ALT  12  /  AlkPhos  189<H>  08-03                        9.8    8.2   )-----------( 46       ( 03 Aug 2019 01:46 )             27.9     Medications  MEDICATIONS  (STANDING):  chlorhexidine 0.12% Liquid 15 milliLiter(s) Oral Mucosa every 12 hours  chlorhexidine 2% Cloths 1 Application(s) Topical <User Schedule>  chlorhexidine 4% Liquid 1 Application(s) Topical <User Schedule>  CRRT Treatment    <Continuous>  dexmedetomidine Infusion 1 MICROgram(s)/kG/Hr (18.4 mL/Hr) IV Continuous <Continuous>  dextrose 20%. 500 milliLiter(s) (20 mL/Hr) IV Continuous <Continuous>  dextrose 50% Injectable 50 milliLiter(s) IV Push every 15 minutes  dextrose 50% Injectable 25 milliLiter(s) IV Push every 15 minutes  dextrose 50% Injectable 50 milliLiter(s) IV Push every 15 minutes  dextrose 50% Injectable 25 milliLiter(s) IV Push every 15 minutes  DOBUTamine Infusion 5 MICROgram(s)/kG/Min (11.04 mL/Hr) IV Continuous <Continuous>  fentaNYL   Infusion 1.3 MICROgram(s)/kG/Hr (4.784 mL/Hr) IV Continuous <Continuous>  insulin lispro (HumaLOG) corrective regimen sliding scale   SubCutaneous every 6 hours  levothyroxine Injectable 25 MICROGram(s) IV Push at bedtime  meropenem  IVPB 1000 milliGRAM(s) IV Intermittent every 8 hours  metoclopramide Injectable 5 milliGRAM(s) IV Push every 8 hours  metroNIDAZOLE  IVPB      metroNIDAZOLE  IVPB 500 milliGRAM(s) IV Intermittent every 8 hours  midodrine 10 milliGRAM(s) Oral every 8 hours  norepinephrine Infusion 0.05 MICROgram(s)/kG/Min (3.45 mL/Hr) IV Continuous <Continuous>  nystatin Powder 1 Application(s) Topical two times a day  pantoprazole  Injectable 40 milliGRAM(s) IV Push two times a day  petrolatum Ophthalmic Ointment 1 Application(s) Both EYES every 6 hours  PrismaSATE Dialysate BGK 4 / 2.5 5000 milliLiter(s) (1000 mL/Hr) CRRT <Continuous>  PrismaSOL Filtration BGK 4 / 2.5 5000 milliLiter(s) (500 mL/Hr) CRRT <Continuous>  PrismaSOL Filtration BGK 4 / 2.5 5000 milliLiter(s) (500 mL/Hr) CRRT <Continuous>  sodium chloride 0.9%. 1000 milliLiter(s) (10 mL/Hr) IV Continuous <Continuous>  vancomycin    Solution 500 milliGRAM(s) Oral every 6 hours  vasopressin Infusion 0.05 Unit(s)/Min (3 mL/Hr) IV Continuous <Continuous>      Physical Exam  General: Patient comfortable in bed  Vital Signs Last 12 Hrs  T(F): 95.7 (08-03-19 @ 08:00), Max: 95.7 (08-03-19 @ 00:00)  HR: 84 (08-03-19 @ 09:00) (81 - 97)  BP: --  BP(mean): --  RR: 23 (08-03-19 @ 09:00) (8 - 35)  SpO2: 96% (08-03-19 @ 09:00) (70% - 100%)  Neck: No palpable thyroid nodules.  CVS: S1S2, No murmurs  Respiratory: No wheezing, no crepitations  GI: Abdomen soft, bowel sounds positive  Musculoskeletal:  edema lower extremities.   Skin: No skin rashes, no ecchymosis    Diagnostics

## 2019-08-03 NOTE — BRIEF OPERATIVE NOTE - NSICDXBRIEFPROCEDURE_GEN_ALL_CORE_FT
PROCEDURES:  Bronchoscopy, flexible, adult 30-Jul-2019 14:26:55  Gerard Mitchell  Open tracheostomy 30-Jul-2019 14:26:44  Gerard Mitchell  Insertion, PEG tube 30-Jul-2019 14:26:34  Gerard Mitchell
PROCEDURES:  Open cholecystectomy 03-Aug-2019 14:05:11  Danielle Menezes  Removal of percutaneous endoscopic gastrostomy (PEG) tube 03-Aug-2019 14:02:54  Danielle Menezes  Laparoscopy, exploratory 03-Aug-2019 14:01:44  Danielle Menezes
PROCEDURES:  CABG, 1 arterial and 2 venous 13-Jul-2019 06:56:10  Luis Vela

## 2019-08-03 NOTE — CHART NOTE - NSCHARTNOTEFT_GEN_A_CORE
Surgeon	                              Franck  Pre Op Diagnosis:                 Colitis presumed infectious   Post Op Diagnosis:               Gastrostomy tube dysfunction, Gangrenous cholecystitis   PROCEDURES:                     Exploratory laparoscopy, Removal of PEG tube, and Open cholecystectomy     SUBJECTIVE:   Pt seen in bedside 4 hours after procedure.   Pt tolerated procedure well.  Intraoperatively the gallbladder was found to be necrotic while the colon was non toxic. Decision made to remove gallbladder and leave colon in place.   Pt sedated in CTU on pressors  Appears stable, VS unchanged    OBJECTIVE:  PHYSICAL EXAM:  Gen: Sedated.   ABDOMEN: (+) tympanic, distended. (+) incisional dressings C/D/I with minimal heme saturation. No discharge    Vital Signs Last 24 Hrs  T(C): 34.7 (03 Aug 2019 16:00), Max: 35.4 (03 Aug 2019 00:00)  T(F): 94.4 (03 Aug 2019 16:00), Max: 95.7 (03 Aug 2019 00:00)  HR: 97 (03 Aug 2019 17:00) (81 - 101)  BP: --  BP(mean): --  RR: 63 (03 Aug 2019 17:00) (8 - 75)  SpO2: 84% (03 Aug 2019 17:00) (70% - 100%)    A/P: 73y Male s/p Exploratory laparoscopy, Removal of PEG tube, and Open cholecystectomy   - Care per primary Surgeon	                         Franck  Pre Op Diagnosis:                 Colitis presumed infectious   Post Op Diagnosis:               Gastrostomy tube dysfunction, Gangrenous cholecystitis   PROCEDURES:                   Exploratory laparoscopy, Removal of PEG tube, and Open cholecystectomy     SUBJECTIVE:   Pt seen in bedside 4 hours after procedure.   Pt tolerated procedure well.  Intraoperatively the gallbladder was found to be necrotic while the colon was non toxic. Decision made to remove gallbladder and leave colon in place.   Pt sedated in CTU on pressors  Appears stable, VS unchanged    OBJECTIVE:  PHYSICAL EXAM:  Gen: Sedated.   ABDOMEN: (+) tympanic, distended. (+) incisional dressings C/D/I with minimal heme saturation. No discharge    Vital Signs Last 24 Hrs  T(C): 34.7 (03 Aug 2019 16:00), Max: 35.4 (03 Aug 2019 00:00)  T(F): 94.4 (03 Aug 2019 16:00), Max: 95.7 (03 Aug 2019 00:00)  HR: 97 (03 Aug 2019 17:00) (81 - 101)  BP: --  BP(mean): --  RR: 63 (03 Aug 2019 17:00) (8 - 75)  SpO2: 84% (03 Aug 2019 17:00) (70% - 100%)    A/P: 73y Male s/p Exploratory laparoscopy, Removal of PEG tube, and Open cholecystectomy   - Care per primary Surgeon	                     Franck  Pre Op Diagnosis:                 Colitis presumed infectious   Post Op Diagnosis:               Gastrostomy tube dysfunction, Gangrenous cholecystitis   PROCEDURES:                   Exploratory laparoscopy, Removal of PEG tube, and Open cholecystectomy     SUBJECTIVE:   Pt seen in bedside 4 hours after procedure.   Pt tolerated procedure well.  Intraoperatively the gallbladder was found to be necrotic while the colon was non toxic. Decision made to remove gallbladder and leave colon in place.   Pt sedated in CTU on pressors  Appears stable, VS unchanged    OBJECTIVE:  PHYSICAL EXAM:  Gen: Sedated.   ABDOMEN: (+) tympanic, distended. (+) incisional dressings C/D/I with minimal heme saturation. No discharge    Vital Signs Last 24 Hrs  T(C): 34.7 (03 Aug 2019 16:00), Max: 35.4 (03 Aug 2019 00:00)  T(F): 94.4 (03 Aug 2019 16:00), Max: 95.7 (03 Aug 2019 00:00)  HR: 97 (03 Aug 2019 17:00) (81 - 101)  BP: --  BP(mean): --  RR: 63 (03 Aug 2019 17:00) (8 - 75)  SpO2: 84% (03 Aug 2019 17:00) (70% - 100%)    A/P: 73y Male s/p Exploratory laparoscopy, Removal of PEG tube, and Open cholecystectomy   - Care per primary Surgeon	                     Franck  Pre Op Diagnosis:                 Colitis presumed infectious   Post Op Diagnosis:               Gastrostomy tube dysfunction, Gangrenous cholecystitis   PROCEDURES:                   Exploratory laparoscopy, Removal of PEG tube, and Open cholecystectomy     SUBJECTIVE:   Pt seen in CTU bedside 4 hours after procedure.   Pt tolerated procedure well.  Intraoperatively the gallbladder was found to be necrotic while the colon was non toxic. Decision made to remove gallbladder and leave colon in place.   Pt sedated, on pressors  Appears stable, VS unchanged    OBJECTIVE:  PHYSICAL EXAM:  Gen: Sedated.   ABDOMEN: (+) tympanic, distended. (+) incisional dressings C/D/I with minimal heme saturation. No discharge    Vital Signs Last 24 Hrs  T(C): 34.7 (03 Aug 2019 16:00), Max: 35.4 (03 Aug 2019 00:00)  T(F): 94.4 (03 Aug 2019 16:00), Max: 95.7 (03 Aug 2019 00:00)  HR: 97 (03 Aug 2019 17:00) (81 - 101)  BP: --  BP(mean): --  RR: 63 (03 Aug 2019 17:00) (8 - 75)  SpO2: 84% (03 Aug 2019 17:00) (70% - 100%)    A/P: 73y Male s/p Exploratory laparoscopy, Removal of PEG tube, and Open cholecystectomy   - Care per primary

## 2019-08-03 NOTE — PROVIDER CONTACT NOTE (OTHER) - BACKGROUND
C3BL 7/12
PAD and PVD of b/l LE, RALPH, HF.
PVD and PAD of LE
patient on CVV, prolonged hospitalization, multiple pressor requirements, trached, hemodynamic instability, bedrest requirement for instability and safety
sp cardiac cath triple vessel disease
trached on full vent support, CVV, on levo, vaso, , fentanyl, D20%,
C3L with Bilateral leg harvest

## 2019-08-03 NOTE — PRE-ANESTHESIA EVALUATION ADULT - NSANTHOSAYNRD_GEN_A_CORE
NA post op ventilation/No. OTIS screening performed.  STOP BANG Legend: 0-2 = LOW Risk; 3-4 = INTERMEDIATE Risk; 5-8 = HIGH Risk
No. OTIS screening performed.  STOP BANG Legend: 0-2 = LOW Risk; 3-4 = INTERMEDIATE Risk; 5-8 = HIGH Risk/NA post op ventilation

## 2019-08-04 LAB
ALBUMIN SERPL ELPH-MCNC: 2.4 G/DL — LOW (ref 3.3–5)
ALP SERPL-CCNC: 161 U/L — HIGH (ref 40–120)
ALT FLD-CCNC: 32 U/L — SIGNIFICANT CHANGE UP (ref 10–45)
ANION GAP SERPL CALC-SCNC: 37 MMOL/L — HIGH (ref 5–17)
APTT BLD: 48.2 SEC — HIGH (ref 27.5–36.3)
AST SERPL-CCNC: 348 U/L — HIGH (ref 10–40)
BASE EXCESS BLDMV CALC-SCNC: -12.7 MMOL/L — LOW (ref -3–3)
BASE EXCESS BLDMV CALC-SCNC: -13 MMOL/L — LOW (ref -3–3)
BASE EXCESS BLDMV CALC-SCNC: -14.3 MMOL/L — LOW (ref -3–3)
BASE EXCESS BLDMV CALC-SCNC: -15.8 MMOL/L — LOW (ref -3–3)
BASE EXCESS BLDMV CALC-SCNC: -16.1 MMOL/L — LOW (ref -3–3)
BASE EXCESS BLDMV CALC-SCNC: -16.5 MMOL/L — LOW (ref -3–3)
BASE EXCESS BLDMV CALC-SCNC: -17.3 MMOL/L — LOW (ref -3–3)
BASE EXCESS BLDMV CALC-SCNC: -17.4 MMOL/L — LOW (ref -3–3)
BASE EXCESS BLDMV CALC-SCNC: -21 MMOL/L — LOW (ref -3–3)
BILIRUB SERPL-MCNC: 10.7 MG/DL — HIGH (ref 0.2–1.2)
BUN SERPL-MCNC: 22 MG/DL — SIGNIFICANT CHANGE UP (ref 7–23)
CALCIUM SERPL-MCNC: 8.1 MG/DL — LOW (ref 8.4–10.5)
CHLORIDE SERPL-SCNC: 93 MMOL/L — LOW (ref 96–108)
CK SERPL-CCNC: 4990 U/L — HIGH (ref 30–200)
CO2 BLDMV-SCNC: 10 MMOL/L — LOW (ref 21–29)
CO2 BLDMV-SCNC: 12 MMOL/L — LOW (ref 21–29)
CO2 BLDMV-SCNC: 12 MMOL/L — LOW (ref 21–29)
CO2 BLDMV-SCNC: 13 MMOL/L — LOW (ref 21–29)
CO2 BLDMV-SCNC: 14 MMOL/L — LOW (ref 21–29)
CO2 BLDMV-SCNC: 14 MMOL/L — LOW (ref 21–29)
CO2 BLDMV-SCNC: 15 MMOL/L — LOW (ref 21–29)
CO2 BLDMV-SCNC: 15 MMOL/L — LOW (ref 21–29)
CO2 BLDMV-SCNC: 16 MMOL/L — LOW (ref 21–29)
CO2 SERPL-SCNC: 9 MMOL/L — CRITICAL LOW (ref 22–31)
CREAT SERPL-MCNC: 1.68 MG/DL — HIGH (ref 0.5–1.3)
GAS PNL BLDA: SIGNIFICANT CHANGE UP
GAS PNL BLDMV: SIGNIFICANT CHANGE UP
GLUCOSE SERPL-MCNC: 140 MG/DL — HIGH (ref 70–99)
HCO3 BLDMV-SCNC: 11 MMOL/L — LOW (ref 20–28)
HCO3 BLDMV-SCNC: 11 MMOL/L — LOW (ref 20–28)
HCO3 BLDMV-SCNC: 12 MMOL/L — LOW (ref 20–28)
HCO3 BLDMV-SCNC: 13 MMOL/L — LOW (ref 20–28)
HCO3 BLDMV-SCNC: 13 MMOL/L — LOW (ref 20–28)
HCO3 BLDMV-SCNC: 14 MMOL/L — LOW (ref 20–28)
HCO3 BLDMV-SCNC: 14 MMOL/L — LOW (ref 20–28)
HCO3 BLDMV-SCNC: 15 MMOL/L — LOW (ref 20–28)
HCO3 BLDMV-SCNC: 9 MMOL/L — LOW (ref 20–28)
HCT VFR BLD CALC: 25.6 % — LOW (ref 39–50)
HGB BLD-MCNC: 8.6 G/DL — LOW (ref 13–17)
HOROWITZ INDEX BLDMV+IHG-RTO: 50 — SIGNIFICANT CHANGE UP
HOROWITZ INDEX BLDMV+IHG-RTO: 70 — SIGNIFICANT CHANGE UP
HOROWITZ INDEX BLDMV+IHG-RTO: 70 — SIGNIFICANT CHANGE UP
HOROWITZ INDEX BLDMV+IHG-RTO: 80 — SIGNIFICANT CHANGE UP
INR BLD: 2.04 RATIO — HIGH (ref 0.88–1.16)
MAGNESIUM SERPL-MCNC: 2.3 MG/DL — SIGNIFICANT CHANGE UP (ref 1.6–2.6)
MCHC RBC-ENTMCNC: 30.2 PG — SIGNIFICANT CHANGE UP (ref 27–34)
MCHC RBC-ENTMCNC: 33.5 GM/DL — SIGNIFICANT CHANGE UP (ref 32–36)
MCV RBC AUTO: 90 FL — SIGNIFICANT CHANGE UP (ref 80–100)
O2 CT VFR BLD CALC: 31 MMHG — SIGNIFICANT CHANGE UP (ref 30–65)
O2 CT VFR BLD CALC: 32 MMHG — SIGNIFICANT CHANGE UP (ref 30–65)
O2 CT VFR BLD CALC: 33 MMHG — SIGNIFICANT CHANGE UP (ref 30–65)
O2 CT VFR BLD CALC: 35 MMHG — SIGNIFICANT CHANGE UP (ref 30–65)
O2 CT VFR BLD CALC: 36 MMHG — SIGNIFICANT CHANGE UP (ref 30–65)
O2 CT VFR BLD CALC: 37 MMHG — SIGNIFICANT CHANGE UP (ref 30–65)
O2 CT VFR BLD CALC: 42 MMHG — SIGNIFICANT CHANGE UP (ref 30–65)
PCO2 BLDMV: 35 MMHG — SIGNIFICANT CHANGE UP (ref 30–65)
PCO2 BLDMV: 36 MMHG — SIGNIFICANT CHANGE UP (ref 30–65)
PCO2 BLDMV: 38 MMHG — SIGNIFICANT CHANGE UP (ref 30–65)
PCO2 BLDMV: 38 MMHG — SIGNIFICANT CHANGE UP (ref 30–65)
PCO2 BLDMV: 39 MMHG — SIGNIFICANT CHANGE UP (ref 30–65)
PCO2 BLDMV: 41 MMHG — SIGNIFICANT CHANGE UP (ref 30–65)
PCO2 BLDMV: 43 MMHG — SIGNIFICANT CHANGE UP (ref 30–65)
PCO2 BLDMV: 43 MMHG — SIGNIFICANT CHANGE UP (ref 30–65)
PCO2 BLDMV: 45 MMHG — SIGNIFICANT CHANGE UP (ref 30–65)
PH BLDMV: 7 — CRITICAL LOW (ref 7.32–7.45)
PH BLDMV: 7.08 — CRITICAL LOW (ref 7.32–7.45)
PH BLDMV: 7.1 — CRITICAL LOW (ref 7.32–7.45)
PH BLDMV: 7.1 — CRITICAL LOW (ref 7.32–7.45)
PH BLDMV: 7.11 — CRITICAL LOW (ref 7.32–7.45)
PH BLDMV: 7.13 — CRITICAL LOW (ref 7.32–7.45)
PH BLDMV: 7.14 — CRITICAL LOW (ref 7.32–7.45)
PH BLDMV: 7.16 — CRITICAL LOW (ref 7.32–7.45)
PH BLDMV: 7.22 — LOW (ref 7.32–7.45)
PHOSPHATE SERPL-MCNC: 5 MG/DL — HIGH (ref 2.5–4.5)
PLATELET # BLD AUTO: 41 K/UL — LOW (ref 150–400)
POTASSIUM SERPL-MCNC: 4.3 MMOL/L — SIGNIFICANT CHANGE UP (ref 3.5–5.3)
POTASSIUM SERPL-SCNC: 4.3 MMOL/L — SIGNIFICANT CHANGE UP (ref 3.5–5.3)
PROT SERPL-MCNC: 5.1 G/DL — LOW (ref 6–8.3)
PROTHROM AB SERPL-ACNC: 23.8 SEC — HIGH (ref 10–12.9)
RBC # BLD: 2.84 M/UL — LOW (ref 4.2–5.8)
RBC # FLD: 16.2 % — HIGH (ref 10.3–14.5)
SAO2 % BLDMV: 43 % — LOW (ref 60–90)
SAO2 % BLDMV: 48 % — LOW (ref 60–90)
SAO2 % BLDMV: 51 % — LOW (ref 60–90)
SAO2 % BLDMV: 52 % — LOW (ref 60–90)
SAO2 % BLDMV: 53 % — LOW (ref 60–90)
SAO2 % BLDMV: 53 % — LOW (ref 60–90)
SAO2 % BLDMV: 55 % — LOW (ref 60–90)
SAO2 % BLDMV: 56 % — LOW (ref 60–90)
SAO2 % BLDMV: 64 % — SIGNIFICANT CHANGE UP (ref 60–90)
SODIUM SERPL-SCNC: 139 MMOL/L — SIGNIFICANT CHANGE UP (ref 135–145)
VANCOMYCIN TROUGH SERPL-MCNC: 10.5 UG/ML — SIGNIFICANT CHANGE UP (ref 10–20)
VIT B1 SERPL-MCNC: 129 NMOL/L — SIGNIFICANT CHANGE UP (ref 66.5–200)
WBC # BLD: 8 K/UL — SIGNIFICANT CHANGE UP (ref 3.8–10.5)
WBC # FLD AUTO: 8 K/UL — SIGNIFICANT CHANGE UP (ref 3.8–10.5)

## 2019-08-04 PROCEDURE — 93503 INSERT/PLACE HEART CATHETER: CPT

## 2019-08-04 PROCEDURE — 99292 CRITICAL CARE ADDL 30 MIN: CPT

## 2019-08-04 PROCEDURE — 99291 CRITICAL CARE FIRST HOUR: CPT

## 2019-08-04 PROCEDURE — 36556 INSERT NON-TUNNEL CV CATH: CPT | Mod: 78,59

## 2019-08-04 PROCEDURE — 71045 X-RAY EXAM CHEST 1 VIEW: CPT | Mod: 26

## 2019-08-04 PROCEDURE — 99232 SBSQ HOSP IP/OBS MODERATE 35: CPT

## 2019-08-04 RX ORDER — METRONIDAZOLE 500 MG
500 TABLET ORAL EVERY 12 HOURS
Refills: 0 | Status: DISCONTINUED | OUTPATIENT
Start: 2019-08-04 | End: 2019-08-05

## 2019-08-04 RX ORDER — CALCIUM GLUCONATE 100 MG/ML
1 VIAL (ML) INTRAVENOUS
Refills: 0 | Status: COMPLETED | OUTPATIENT
Start: 2019-08-04 | End: 2019-08-04

## 2019-08-04 RX ORDER — SODIUM BICARBONATE 1 MEQ/ML
50 SYRINGE (ML) INTRAVENOUS
Refills: 0 | Status: COMPLETED | OUTPATIENT
Start: 2019-08-04 | End: 2019-08-04

## 2019-08-04 RX ORDER — INSULIN HUMAN 100 [IU]/ML
10 INJECTION, SOLUTION SUBCUTANEOUS ONCE
Refills: 0 | Status: COMPLETED | OUTPATIENT
Start: 2019-08-04 | End: 2019-08-04

## 2019-08-04 RX ORDER — VANCOMYCIN HCL 1 G
750 VIAL (EA) INTRAVENOUS ONCE
Refills: 0 | Status: COMPLETED | OUTPATIENT
Start: 2019-08-04 | End: 2019-08-04

## 2019-08-04 RX ORDER — CALCIUM GLUCONATE 100 MG/ML
1 VIAL (ML) INTRAVENOUS ONCE
Refills: 0 | Status: COMPLETED | OUTPATIENT
Start: 2019-08-04 | End: 2019-08-04

## 2019-08-04 RX ORDER — EPINEPHRINE 0.3 MG/.3ML
0.04 INJECTION INTRAMUSCULAR; SUBCUTANEOUS
Qty: 8 | Refills: 0 | Status: DISCONTINUED | OUTPATIENT
Start: 2019-08-04 | End: 2019-08-04

## 2019-08-04 RX ORDER — EPINEPHRINE 0.3 MG/.3ML
0.04 INJECTION INTRAMUSCULAR; SUBCUTANEOUS
Qty: 4 | Refills: 0 | Status: DISCONTINUED | OUTPATIENT
Start: 2019-08-04 | End: 2019-08-05

## 2019-08-04 RX ORDER — SODIUM BICARBONATE 1 MEQ/ML
50 SYRINGE (ML) INTRAVENOUS ONCE
Refills: 0 | Status: COMPLETED | OUTPATIENT
Start: 2019-08-04 | End: 2019-08-04

## 2019-08-04 RX ORDER — METRONIDAZOLE 500 MG
TABLET ORAL
Refills: 0 | Status: DISCONTINUED | OUTPATIENT
Start: 2019-08-04 | End: 2019-08-05

## 2019-08-04 RX ORDER — SODIUM CHLORIDE 9 MG/ML
1000 INJECTION, SOLUTION INTRAVENOUS
Refills: 0 | Status: DISCONTINUED | OUTPATIENT
Start: 2019-08-04 | End: 2019-08-05

## 2019-08-04 RX ORDER — METRONIDAZOLE 500 MG
500 TABLET ORAL ONCE
Refills: 0 | Status: COMPLETED | OUTPATIENT
Start: 2019-08-04 | End: 2019-08-04

## 2019-08-04 RX ORDER — HYDROCORTISONE 20 MG
100 TABLET ORAL EVERY 8 HOURS
Refills: 0 | Status: DISCONTINUED | OUTPATIENT
Start: 2019-08-04 | End: 2019-08-05

## 2019-08-04 RX ORDER — DEXTROSE 50 % IN WATER 50 %
50 SYRINGE (ML) INTRAVENOUS ONCE
Refills: 0 | Status: COMPLETED | OUTPATIENT
Start: 2019-08-04 | End: 2019-08-04

## 2019-08-04 RX ADMIN — CHLORHEXIDINE GLUCONATE 5 MILLILITER(S): 213 SOLUTION TOPICAL at 21:25

## 2019-08-04 RX ADMIN — MEROPENEM 100 MILLIGRAM(S): 1 INJECTION INTRAVENOUS at 15:00

## 2019-08-04 RX ADMIN — Medication 40 MILLILITER(S): at 02:00

## 2019-08-04 RX ADMIN — Medication 40 MILLILITER(S): at 17:00

## 2019-08-04 RX ADMIN — Medication 500 MILLIGRAM(S): at 18:02

## 2019-08-04 RX ADMIN — Medication 20.7 MICROGRAM(S)/KG/MIN: at 21:26

## 2019-08-04 RX ADMIN — Medication 40 MILLILITER(S): at 14:00

## 2019-08-04 RX ADMIN — Medication 50 MILLIEQUIVALENT(S): at 12:15

## 2019-08-04 RX ADMIN — Medication 22.08 MICROGRAM(S)/KG/MIN: at 21:26

## 2019-08-04 RX ADMIN — Medication 200 GRAM(S): at 22:34

## 2019-08-04 RX ADMIN — Medication 50 MILLIEQUIVALENT(S): at 06:15

## 2019-08-04 RX ADMIN — Medication 50 MILLIEQUIVALENT(S): at 20:40

## 2019-08-04 RX ADMIN — FENTANYL CITRATE 1.84 MICROGRAM(S)/KG/HR: 50 INJECTION INTRAVENOUS at 21:27

## 2019-08-04 RX ADMIN — Medication 200 GRAM(S): at 12:06

## 2019-08-04 RX ADMIN — Medication 200 GRAM(S): at 18:48

## 2019-08-04 RX ADMIN — Medication 50 MILLIEQUIVALENT(S): at 18:50

## 2019-08-04 RX ADMIN — Medication 50 MILLIEQUIVALENT(S): at 12:06

## 2019-08-04 RX ADMIN — Medication 50 MILLILITER(S): at 18:47

## 2019-08-04 RX ADMIN — Medication 500 MILLIGRAM(S): at 05:36

## 2019-08-04 RX ADMIN — Medication 40 MILLILITER(S): at 19:00

## 2019-08-04 RX ADMIN — Medication 50 MILLIEQUIVALENT(S): at 18:45

## 2019-08-04 RX ADMIN — Medication 40 MILLILITER(S): at 15:00

## 2019-08-04 RX ADMIN — PANTOPRAZOLE SODIUM 10 MG/HR: 20 TABLET, DELAYED RELEASE ORAL at 22:37

## 2019-08-04 RX ADMIN — Medication 40 MILLILITER(S): at 06:00

## 2019-08-04 RX ADMIN — CHLORHEXIDINE GLUCONATE 5 MILLILITER(S): 213 SOLUTION TOPICAL at 01:49

## 2019-08-04 RX ADMIN — Medication 200 GRAM(S): at 06:35

## 2019-08-04 RX ADMIN — Medication 50 MILLIEQUIVALENT(S): at 02:45

## 2019-08-04 RX ADMIN — MEROPENEM 100 MILLIGRAM(S): 1 INJECTION INTRAVENOUS at 05:36

## 2019-08-04 RX ADMIN — Medication 50 MILLIEQUIVALENT(S): at 00:40

## 2019-08-04 RX ADMIN — Medication 50 MILLIEQUIVALENT(S): at 22:35

## 2019-08-04 RX ADMIN — VASOPRESSIN 6 UNIT(S)/MIN: 20 INJECTION INTRAVENOUS at 21:26

## 2019-08-04 RX ADMIN — Medication 40 MILLILITER(S): at 03:00

## 2019-08-04 RX ADMIN — Medication 200 GRAM(S): at 01:46

## 2019-08-04 RX ADMIN — CHLORHEXIDINE GLUCONATE 5 MILLILITER(S): 213 SOLUTION TOPICAL at 15:32

## 2019-08-04 RX ADMIN — Medication 40 MILLILITER(S): at 08:00

## 2019-08-04 RX ADMIN — Medication 40 MILLILITER(S): at 07:00

## 2019-08-04 RX ADMIN — MEROPENEM 100 MILLIGRAM(S): 1 INJECTION INTRAVENOUS at 21:57

## 2019-08-04 RX ADMIN — Medication 40 MILLILITER(S): at 21:27

## 2019-08-04 RX ADMIN — CHLORHEXIDINE GLUCONATE 5 MILLILITER(S): 213 SOLUTION TOPICAL at 18:03

## 2019-08-04 RX ADMIN — INSULIN HUMAN 10 UNIT(S): 100 INJECTION, SOLUTION SUBCUTANEOUS at 18:48

## 2019-08-04 RX ADMIN — Medication 100 MILLIGRAM(S): at 08:28

## 2019-08-04 RX ADMIN — Medication 50 MILLIEQUIVALENT(S): at 20:35

## 2019-08-04 RX ADMIN — Medication 40 MILLILITER(S): at 04:00

## 2019-08-04 RX ADMIN — PANTOPRAZOLE SODIUM 10 MG/HR: 20 TABLET, DELAYED RELEASE ORAL at 16:21

## 2019-08-04 RX ADMIN — Medication 100 MILLIGRAM(S): at 22:46

## 2019-08-04 RX ADMIN — MICAFUNGIN SODIUM 105 MILLIGRAM(S): 100 INJECTION, POWDER, LYOPHILIZED, FOR SOLUTION INTRAVENOUS at 20:17

## 2019-08-04 RX ADMIN — Medication 40 MILLILITER(S): at 21:00

## 2019-08-04 RX ADMIN — Medication 50 MEQ/KG/HR: at 15:33

## 2019-08-04 RX ADMIN — Medication 50 MILLIEQUIVALENT(S): at 00:41

## 2019-08-04 RX ADMIN — CHLORHEXIDINE GLUCONATE 5 MILLILITER(S): 213 SOLUTION TOPICAL at 05:36

## 2019-08-04 RX ADMIN — Medication 50 MILLIEQUIVALENT(S): at 06:25

## 2019-08-04 RX ADMIN — Medication 40 MILLILITER(S): at 16:21

## 2019-08-04 RX ADMIN — Medication 100 MILLIGRAM(S): at 18:04

## 2019-08-04 RX ADMIN — Medication 50 MEQ/KG/HR: at 18:57

## 2019-08-04 RX ADMIN — Medication 50 MILLIEQUIVALENT(S): at 20:50

## 2019-08-04 RX ADMIN — Medication 40 MILLILITER(S): at 22:00

## 2019-08-04 RX ADMIN — Medication 40 MILLILITER(S): at 10:00

## 2019-08-04 RX ADMIN — Medication 50 MILLIEQUIVALENT(S): at 18:52

## 2019-08-04 RX ADMIN — Medication 50 MILLIEQUIVALENT(S): at 06:20

## 2019-08-04 RX ADMIN — VASOPRESSIN 6 UNIT(S)/MIN: 20 INJECTION INTRAVENOUS at 15:34

## 2019-08-04 RX ADMIN — Medication 100 MILLIGRAM(S): at 11:03

## 2019-08-04 RX ADMIN — SODIUM CHLORIDE 75 MILLILITER(S): 9 INJECTION, SOLUTION INTRAVENOUS at 21:28

## 2019-08-04 RX ADMIN — Medication 500 MILLIGRAM(S): at 01:48

## 2019-08-04 RX ADMIN — Medication 40 MILLILITER(S): at 05:00

## 2019-08-04 RX ADMIN — Medication 22.08 MICROGRAM(S)/KG/MIN: at 15:34

## 2019-08-04 RX ADMIN — Medication 200 GRAM(S): at 18:47

## 2019-08-04 RX ADMIN — Medication 500 MILLIGRAM(S): at 11:06

## 2019-08-04 RX ADMIN — Medication 40 MILLILITER(S): at 20:00

## 2019-08-04 RX ADMIN — PANTOPRAZOLE SODIUM 10 MG/HR: 20 TABLET, DELAYED RELEASE ORAL at 18:02

## 2019-08-04 RX ADMIN — PANTOPRAZOLE SODIUM 10 MG/HR: 20 TABLET, DELAYED RELEASE ORAL at 21:27

## 2019-08-04 RX ADMIN — Medication 50 MILLIEQUIVALENT(S): at 02:30

## 2019-08-04 RX ADMIN — Medication 200 GRAM(S): at 21:25

## 2019-08-04 RX ADMIN — Medication 200 GRAM(S): at 23:13

## 2019-08-04 RX ADMIN — Medication 40 MILLILITER(S): at 23:00

## 2019-08-04 RX ADMIN — Medication 100 MILLIGRAM(S): at 15:00

## 2019-08-04 RX ADMIN — CHLORHEXIDINE GLUCONATE 5 MILLILITER(S): 213 SOLUTION TOPICAL at 11:06

## 2019-08-04 RX ADMIN — Medication 20.7 MICROGRAM(S)/KG/MIN: at 18:02

## 2019-08-04 RX ADMIN — Medication 50 MILLIEQUIVALENT(S): at 20:45

## 2019-08-04 NOTE — PROCEDURE NOTE - NSCVLACTUALSITE_VASC_A_CORE
right/femoral vein
internal jugular/right
internal jugular/right
subclavian vein/left
left/internal jugular
left/internal jugular
right/internal jugular

## 2019-08-04 NOTE — PROGRESS NOTE ADULT - ASSESSMENT
73M presented to [facility] on 7/1/19 with shortness of breath and LE edema found to have newly diagnosed HFrEF (EF 30%) and RALPH vs CKD, also PVD and claudication.    7/12 pt underwent C3L , AFib.  Pt's hospital course was then been c/b  A-fib, acute on chronic renal failure with fluid overload requiring CVVH, cardiogenic shock (on Milrinone, Dobutamine, and Vasopressin gtts), and respiratory failure requiring intubation (extubated on 7/16). Patient's CBC was WNL on day of presentation to [facility]. His platelet count has been steadily downtrending since 7/12.   pt found to have C diff on 7/23/19  pt with low plts, HIT  AB positive  pt with elevated LFTs, low EF- some is congestion  reintubated , s/p trach and PEG  Patient has increased lactic acidosis with increasing pressor requirements   OR 8/3: gangrenous cholecystectomy, leak from PEG noted which was removed and oversewn, good colonic appearance   patient is critically ill on high dose pressor support with severe lactic acidosis  guarded outlook    Antibiotics  Cefuroxime 7/11-->13  IV Vanco 7/21, 22  Meropenem 7/21-->23; 8/2-->  Vanco (enteral) 7/22-->  Metronidazole 7/24 -->30; 8/1-->3; 8/4-->        Suggest  resume Metronidazole    discussed with CTU team:

## 2019-08-04 NOTE — PROCEDURE NOTE - NSSITEPREP_SKIN_A_CORE
chlorhexidine
chlorhexidine/Adherence to aseptic technique: hand hygiene prior to donning barriers (gown, gloves), don cap and mask, sterile drape over patient
chlorhexidine

## 2019-08-04 NOTE — PROGRESS NOTE ADULT - ASSESSMENT
Assessment  DMT2: 73y Male with newly diagnosed DM T2  NPO, post eXp-lap testerday   on FS and SSI  coverage blood sugar today 140   no hypoglycemic episode, on tube feeding,   CAD: s/P CABG, with Afib,  on medications, stable, monitored.  HTN: Controlled,  on antihypertensive medications.  RALPH on CKD; F/U by Renal    cata Turner MD  Cell: [phone]

## 2019-08-04 NOTE — PROGRESS NOTE ADULT - SUBJECTIVE AND OBJECTIVE BOX
Follow Up:  gangrenous cholecystectomy 8/3    Interval History/ROS: unresponsive on vent, high dose pressors and NO in place    Allergies  No Known Allergies      ANTIMICROBIALS:  meropenem  IVPB 1000 every 8 hours  metroNIDAZOLE  IVPB    micafungin IVPB 100 every 24 hours  vancomycin    Solution 500 every 6 hours    OTHER MEDS:  MEDICATIONS  (STANDING):  DOBUTamine Infusion 10 <Continuous>  fentaNYL   Infusion 0.5 <Continuous>  hydrocortisone sodium succinate Injectable 100 every 8 hours  norepinephrine Infusion 0.3 <Continuous>  pantoprazole Infusion 8 <Continuous>  vasopressin Infusion 0.1 <Continuous>    Vital Signs Last 24 Hrs  T(C): 34.8 (04 Aug 2019 08:00), Max: 35.4 (03 Aug 2019 11:03)  T(F): 94.6 (04 Aug 2019 08:00), Max: 95 (03 Aug 2019 14:00)  HR: 80 (04 Aug 2019 09:30) (73 - 101)  BP: --  BP(mean): --  RR: 16 (04 Aug 2019 09:30) (0 - 39)  SpO2: 53% (03 Aug 2019 21:00) (53% - 99%)    PHYSICAL EXAM:  General: toxic appearance, warming blanket  Neurology: unrepsonsive  Respiratory: Clear to auscultation bilaterally  CV: RRR, S1S2,   Abdominal:  non-distended,  Extremities: generalized edema, dusky appearance of feet  Line Sites: Clear  Skin: No rash                        8.6    8.0   )-----------( 41       ( 04 Aug 2019 02:25 )             25.6     08-04    139  |  93<L>  |  22  ----------------------------<  140<H>  4.3   |  9<LL>  |  1.68<H>    Ca    8.1<L>      04 Aug 2019 02:25  Phos  5.0     08-04  Mg     2.3     08-04    TPro  5.1<L>  /  Alb  2.4<L>  /  TBili  10.7<H>  /  DBili  x   /  AST  348<H>  /  ALT  32  /  AlkPhos  161<H>  08-04    Blood Gas Profile - Arterial (08.04.19 @ 08:39)    pH, Arterial: 7.21    Blood Gas Arterial, Lactate (08.04.19 @ 08:39)    Blood Gas Arterial, Lactate: 22.0 mmol/L          MICROBIOLOGY:  .Blood  07-31-19   No growth to date.  --  --      .Bronchial  07-30-19   Culture is being performed.  --  --      .Blood  07-29-19   No growth at 5 days.  --  --      .Blood  07-28-19   No growth at 5 days.  --  --      .Urine  07-22-19   No growth  --  --      .Blood  07-22-19   No growth at 5 days.  --  --      .Blood  07-21-19   No growth at 5 days.  --  --      .Blood  07-21-19   No growth at 5 days.  --  --          v          RADIOLOGY:    Wilfredo Lamb MD; Division of Infectious Disease; Pager: [phone]; nights and weekends: [phone]

## 2019-08-04 NOTE — PROGRESS NOTE ADULT - SUBJECTIVE AND OBJECTIVE BOX
Endocrinology Attending Covering for Dr. Magdaleno      Chief complaint  Patient is a 73y old  Male who presents with a chief complaint of MI (04 Aug 2019 10:20)   Review of systems  Patient in day #1 post Explorative laparotomy with removal of gangrenous gall bladder  On Pressors  Non responsive to stimuli had no hypoglycemia.    Labs and Fingersticks  CAPILLARY BLOOD GLUCOSE      POCT Blood Glucose.: 76 mg/dL (03 Aug 2019 15:12)      Anion Gap, Serum: 37 <H> (08-04 @ 02:25)  Anion Gap, Serum: 30 <H> (08-03 @ 13:44)  Anion Gap, Serum: 20 <H> (08-03 @ 01:46)      Calcium, Total Serum: 8.1 <L> (08-04 @ 02:25)  Calcium, Total Serum: 7.8 <L> (08-03 @ 13:44)  Calcium, Total Serum: 7.8 <L> (08-03 @ 01:46)  Albumin, Serum: 2.4 <L> (08-04 @ 02:25)  Albumin, Serum: 2.6 <L> (08-03 @ 13:44)  Albumin, Serum: 2.4 <L> (08-03 @ 01:46)    Alanine Aminotransferase (ALT/SGPT): 32 (08-04 @ 02:25)  Alanine Aminotransferase (ALT/SGPT): 18 (08-03 @ 13:44)  Alanine Aminotransferase (ALT/SGPT): 12 (08-03 @ 01:46)  Alkaline Phosphatase, Serum: 161 <H> (08-04 @ 02:25)  Alkaline Phosphatase, Serum: 149 <H> (08-03 @ 13:44)  Alkaline Phosphatase, Serum: 189 <H> (08-03 @ 01:46)  Aspartate Aminotransferase (AST/SGOT): 348 <H> (08-04 @ 02:25)  Aspartate Aminotransferase (AST/SGOT): 190 <H> (08-03 @ 13:44)  Aspartate Aminotransferase (AST/SGOT): 183 <H> (08-03 @ 01:46)        08-04    139  |  93<L>  |  22  ----------------------------<  140<H>  4.3   |  9<LL>  |  1.68<H>    Ca    8.1<L>      04 Aug 2019 02:25  Phos  5.0     08-04  Mg     2.3     08-04    TPro  5.1<L>  /  Alb  2.4<L>  /  TBili  10.7<H>  /  DBili  x   /  AST  348<H>  /  ALT  32  /  AlkPhos  161<H>  08-04                        8.6    8.0   )-----------( 41       ( 04 Aug 2019 02:25 )             25.6     Medications  MEDICATIONS  (STANDING):  chlorhexidine 0.12% Liquid 5 milliLiter(s) Oral Mucosa every 4 hours  CRRT Treatment    <Continuous>  CRRT Treatment    <Continuous>  dextrose 20%. 500 milliLiter(s) (40 mL/Hr) IV Continuous <Continuous>  dextrose 5% 1000 milliLiter(s) (75 mL/Hr) IV Continuous <Continuous>  DOBUTamine Infusion 10 MICROgram(s)/kG/Min (22.08 mL/Hr) IV Continuous <Continuous>  fentaNYL   Infusion 0.5 MICROgram(s)/kG/Hr (1.84 mL/Hr) IV Continuous <Continuous>  hydrocortisone sodium succinate Injectable 100 milliGRAM(s) IV Push every 8 hours  meropenem  IVPB 1000 milliGRAM(s) IV Intermittent every 8 hours  metroNIDAZOLE  IVPB      metroNIDAZOLE  IVPB 500 milliGRAM(s) IV Intermittent every 12 hours  micafungin IVPB 100 milliGRAM(s) IV Intermittent every 24 hours  norepinephrine Infusion 0.3 MICROgram(s)/kG/Min (20.7 mL/Hr) IV Continuous <Continuous>  pantoprazole Infusion 8 mG/Hr (10 mL/Hr) IV Continuous <Continuous>  PrismaSATE Dialysate BGK 4 / 2.5 5000 milliLiter(s) (1000 mL/Hr) CRRT <Continuous>  PrismaSATE Dialysate BGK 4 / 2.5 5000 milliLiter(s) (5000 mL/Hr) CRRT <Continuous>  PrismaSOL Filtration BGK 4 / 2.5 5000 milliLiter(s) (500 mL/Hr) CRRT <Continuous>  PrismaSOL Filtration BGK 4 / 2.5 5000 milliLiter(s) (500 mL/Hr) CRRT <Continuous>  PrismaSOL Filtration BGK 4 / 2.5 5000 milliLiter(s) (500 mL/Hr) CRRT <Continuous>  PrismaSOL Filtration BGK 4 / 2.5 5000 milliLiter(s) (500 mL/Hr) CRRT <Continuous>  sodium bicarbonate  Infusion 0.102 mEq/kG/Hr (50 mL/Hr) IV Continuous <Continuous>  sodium chloride 0.9%. 1000 milliLiter(s) (10 mL/Hr) IV Continuous <Continuous>  vancomycin    Solution 500 milliGRAM(s) Oral every 6 hours  vasopressin Infusion 0.1 Unit(s)/Min (6 mL/Hr) IV Continuous <Continuous>      Physical Exam  General: Patient comfortable in bed  Vital Signs Last 12 Hrs  T(F): 94.6 (08-04-19 @ 08:00), Max: 94.6 (08-04-19 @ 08:00)  HR: 78 (08-04-19 @ 11:11) (73 - 86)  BP: --  BP(mean): --  RR: 16 (08-04-19 @ 09:30) (0 - 21)  SpO2: --  Neck: No palpable thyroid nodules.  CVS: S1S2, No murmurs  Respiratory: No wheezing, no crepitations  GI: Abdomen soft, bowel sounds positive  Musculoskeletal:  edema lower extremities.   Skin: No skin rashes, no ecchymosis    Diagnostics

## 2019-08-04 NOTE — PROGRESS NOTE ADULT - SUBJECTIVE AND OBJECTIVE BOX
Gangrenous cholecystectomy 8/3. He then had no pulses in his lower extremities on 3 pressor support that was increased. He is on vasopressin, norepinephrine and vasopressin with dobutamine infusion. Patient seen on CVVHDF running even.     Interval History/ROS: unresponsive on vent, high dose pressors and NO in place    chlorhexidine 0.12% Liquid 5 milliLiter(s) Oral Mucosa every 4 hours  CRRT Treatment    <Continuous>  dextrose 20%. 500 milliLiter(s) IV Continuous <Continuous>  dextrose 5% 1000 milliLiter(s) IV Continuous <Continuous>  DOBUTamine Infusion 10 MICROgram(s)/kG/Min IV Continuous <Continuous>  fentaNYL   Infusion 0.5 MICROgram(s)/kG/Hr IV Continuous <Continuous>  hydrocortisone sodium succinate Injectable 100 milliGRAM(s) IV Push every 8 hours  meropenem  IVPB 1000 milliGRAM(s) IV Intermittent every 8 hours  metroNIDAZOLE  IVPB      metroNIDAZOLE  IVPB 500 milliGRAM(s) IV Intermittent once  metroNIDAZOLE  IVPB 500 milliGRAM(s) IV Intermittent every 12 hours  micafungin IVPB 100 milliGRAM(s) IV Intermittent every 24 hours  norepinephrine Infusion 0.3 MICROgram(s)/kG/Min IV Continuous <Continuous>  pantoprazole Infusion 8 mG/Hr IV Continuous <Continuous>  PrismaSATE Dialysate BGK 4 / 2.5 5000 milliLiter(s) CRRT <Continuous>  PrismaSOL Filtration BGK 4 / 2.5 5000 milliLiter(s) CRRT <Continuous>  PrismaSOL Filtration BGK 4 / 2.5 5000 milliLiter(s) CRRT <Continuous>  sodium bicarbonate  Infusion 0.102 mEq/kG/Hr IV Continuous <Continuous>  sodium chloride 0.9%. 1000 milliLiter(s) IV Continuous <Continuous>  vancomycin    Solution 500 milliGRAM(s) Oral every 6 hours  vasopressin Infusion 0.1 Unit(s)/Min IV Continuous <Continuous>      VITAL:  T(C): , Max: 35.4 (08-03-19 @ 11:03)  T(F): , Max: 95 (08-03-19 @ 14:00)  HR: 80 (08-04-19 @ 09:30)  RR: 16 (08-04-19 @ 09:30)  SpO2: 53% (08-03-19 @ 21:00)      08-03-19 @ 07:01  -  08-04-19 @ 07:00  --------------------------------------------------------  IN: 5808.8 mL / OUT: 3406 mL / NET: 2402.8 mL    08-04-19 @ 07:01  -  08-04-19 @ 10:58  --------------------------------------------------------  IN: 360.9 mL / OUT: 45 mL / NET: 315.9 mL        PHYSICAL EXAM:  General: Trached, pegged and sedated in guarded condition. On CVVHDF now s.p surgery in guarded condition.   HEENT:  Eyes are open. Nonreactive on sedation.   Neck: Right triple CVC.   Respiratory: Vesicular breath sounds and rales heard throughout the abdomen.   Cardiac: RRR s1s2  Gastrointestinal: Distended. unable to hear any bowel sounds.   Urologic: No law  Extremities: 1+ edema at the mid and distal tibial levels bilaterally.   Access: Right femoral Shiley catheter in use.       LABS:                          8.6    8.0   )-----------( 41       ( 04 Aug 2019 02:25 )             25.6     Na(139)/K(4.3)/Cl(93)/HCO3(9)/BUN(22)/Cr(1.68)Glu(140)/Ca(8.1)/Mg(2.3)/PO4(5.0)    08-04 @ 02:25  Na(137)/K(4.6)/Cl(95)/HCO3(12)/BUN(34)/Cr(2.55)Glu(75)/Ca(7.8)/Mg(2.4)/PO4(6.6)    08-03 @ 13:44  Na(132)/K(3.8)/Cl(98)/HCO3(14)/BUN(41)/Cr(2.99)Glu(122)/Ca(7.8)/Mg(2.1)/PO4(3.6)    08-03 @ 01:46  Na(136)/K(3.4)/Cl(98)/HCO3(16)/BUN(63)/Cr(4.52)Glu(88)/Ca(7.9)/Mg(2.0)/PO4(5.5)    08-02 @ 03:59      08-04    139  |  93<L>  |  22  ----------------------------<  140<H>  4.3   |  9<LL>  |  1.68<H>    Ca    8.1<L>      04 Aug 2019 02:25  Phos  5.0     08-04  Mg     2.3     08-04    TPro  5.1<L>  /  Alb  2.4<L>  /  TBili  10.7<H>  /  DBili  x   /  AST  348<H>  /  ALT  32  /  AlkPhos  161<H>  08-04

## 2019-08-04 NOTE — PROGRESS NOTE ADULT - ATTENDING COMMENTS
patient in extreme condition  discussed with CTU team  no further surgical interventions possible
Thrombocytopenia and change in pulses prompted repeat HIT antibody that has come back as being positive with high likelihood of positive STUART  -agree with argatroban  -HIT likely etiology at this time  -smear review as above, unlikely tma  -will follow

## 2019-08-04 NOTE — PROCEDURE NOTE - NSINDICATIONS_GEN_A_CORE
dialysis/CRRT
arterial puncture to obtain ABG's/monitoring purposes/blood sampling/critical patient
critical patient/airway protection/mental status change/respiratory failure/respiratory distress
dialysis/CRRT
respiratory distress/critical patient/airway protection/mental status change
arterial puncture to obtain ABG's/blood sampling/monitoring purposes
emergency venous access/hemodynamic monitoring
hemodynamic monitoring/critical illness
hemodynamic monitoring/venous access

## 2019-08-04 NOTE — PROCEDURE NOTE - NSPROCNAME_GEN_A_CORE
Arterial Puncture/Cannulation
Arterial Puncture/Cannulation
Central Line Insertion
Tracheal Intubation
Tracheal Intubation
Central Line Insertion
Central Line Insertion
General

## 2019-08-04 NOTE — PROCEDURE NOTE - NSTIMEOUT_GEN_A_CORE
Patient's first and last name, , procedure, and correct site confirmed prior to the start of procedure.

## 2019-08-04 NOTE — PROGRESS NOTE ADULT - REASON FOR ADMISSION
CABG
CP
CABG
Cardiac Surgery
SOB
CABG
MI
PVD, S/p CABG
cabg
MI
cabg
SOB
cabg
hyperglycemia
RALPH

## 2019-08-04 NOTE — PROCEDURE NOTE - NSANESTHESIA_GEN_A_CORE
1% lidocaine

## 2019-08-04 NOTE — PROGRESS NOTE ADULT - SUBJECTIVE AND OBJECTIVE BOX
SYLVIA ROSA  MRN#:  [id number]    The patient is a 73y Male without prior medical care who presented with SOB and LE edema, found to have newly diagnosed HFrEF (EF 30%) and RALPH vs CKD, also PVD and claudication with extensive LE arterial disease on doppler, reports heavy alcohol use, further work up revealed multivessel CAD, now s/p C3L today, severe biventricular dysfunction on echocardiogram with some improvement after bypass was seen, evaluated, & examined with the CTICU staff on rounds and later in the evening with a multidisciplinary care plan formulated & implemented.  All available clinical, laboratory, radiographic, pharmacologic, and electrocardiographic data were reviewed & analyzed.      The patient was in the CTICU in critical condition secondary to acute postoperative respiratory failure, progressive vasodilatory shock-gram negative bacteremia, C difficile colitis, cardiovascular dysfunction-cardiogenic shock, progressive hyperlactatemia, acute on chromic postoperative renal failure, & hyperglycemia.      Respiratory failure required full ventilatory support via tracheostomy the following of ABG’s with A-line monitoring, and continuous pulse oximetry monitoring for support & to evaluate for & prevent further decompensation secondary to persistent cardiopulmonary dysfunction, progressive vasodilatory shock-gram negative bacteremia, C difficile colitis, cardiovascular dysfunction-cardiogenic shock, progressive hyperlactatemia, and acute on chromic postoperative renal failure.     Invasive hemodynamic monitoring with a PA catheter & an A-line were required for the following of serial CI’s/MVO2’s & continuous MAP/BP monitoring to ensure adequate cardiovascular support and to evaluate for & help prevent decompensation while receiving intermittent volume expansion, blood transfusion, an IV Levophed drip, an IV Vasopressin drip, CVVH, an IV Dobutamine drip, and IV Sodium bicarbonate secondary to progressive vasodilatory shock-gram negative bacteremia, C difficile colitis, cardiovascular dysfunction-cardiogenic shock, progressive hyperlactatemia, and acute on chromic postoperative renal failure.     Patient required acute postoperative critical care management and I provided 80 minutes of non-continuous care to the patient. I reviewed and assessed all available clinical, laboratory, radiographic, pharmacologic, and electrocardiographic data in order to decide on maintenance or adjustment of medications, and fluid management.  Discussed at length with the CTICU staff and helped coordinate care. SYLVIA ROSA  MRN#:  [id number]    The patient is a 73y Male without prior medical care who presented with SOB and LE edema, found to have newly diagnosed HFrEF (EF 30%) and RALPH vs CKD, also PVD and claudication with extensive LE arterial disease on doppler, reports heavy alcohol use, further work up revealed multivessel CAD, now s/p C3L today, severe biventricular dysfunction on echocardiogram with some improvement after bypass was seen, evaluated, & examined with the CTICU staff on rounds and later in the evening with a multidisciplinary care plan formulated & implemented.  All available clinical, laboratory, radiographic, pharmacologic, and electrocardiographic data were reviewed & analyzed.      The patient was in the CTICU in critical condition secondary to acute postoperative respiratory failure, progressive vasodilatory shock, C difficile colitis, cardiovascular dysfunction-cardiogenic shock, progressive hyperlactatemia, acute on chromic postoperative renal failure, & hyperglycemia.      Respiratory failure required full ventilatory support via tracheostomy the following of ABG’s with A-line monitoring, and continuous pulse oximetry monitoring for support & to evaluate for & prevent further decompensation secondary to persistent cardiopulmonary dysfunction, progressive vasodilatory shock, C difficile colitis, cardiovascular dysfunction-cardiogenic shock, progressive hyperlactatemia, and acute on chromic postoperative renal failure.     Invasive hemodynamic monitoring with a PA catheter & an A-line were required for the following of serial CI’s/MVO2’s & continuous MAP/BP monitoring to ensure adequate cardiovascular support and to evaluate for & help prevent decompensation while receiving intermittent volume expansion, blood transfusion, an IV Levophed drip, an IV Vasopressin drip, CVVH, an IV Dobutamine drip, and IV Sodium bicarbonate secondary to progressive vasodilatory shock, C difficile colitis, cardiovascular dysfunction-cardiogenic shock, progressive hyperlactatemia, and acute on chromic postoperative renal failure.     Patient required acute postoperative critical care management and I provided 80 minutes of non-continuous care to the patient. I reviewed and assessed all available clinical, laboratory, radiographic, pharmacologic, and electrocardiographic data in order to decide on maintenance or adjustment of medications, and fluid management.  Discussed at length with the CTICU staff and helped coordinate care.

## 2019-08-04 NOTE — PROCEDURE NOTE - NSPROCDETAILS_GEN_ALL_CORE
lumen(s) aspirated and flushed/sterile dressing applied/guidewire recovered/sterile technique, catheter placed/ultrasound guidance
patient pre-oxygenated, tube inserted, placement confirmed
patient pre-oxygenated, tube inserted, placement confirmed/connected to ventilator
guidewire recovered/sterile technique, catheter placed/lumen(s) aspirated and flushed/sterile dressing applied
sterile dressing applied/sterile technique, catheter placed/ultrasound guidance/guidewire recovered/lumen(s) aspirated and flushed
lumen(s) aspirated and flushed/sterile dressing applied/guidewire recovered/sterile technique, catheter placed
sterile dressing applied/see chart for picture/guidewire recovered/ultrasound guidance/sterile technique, catheter placed/lumen(s) aspirated and flushed
connected to a pressurized flush line/hemostasis with direct pressure, dressing applied/positive blood return obtained via catheter/Seldinger technique/ultrasound guidance/location identified, draped/prepped, sterile technique used, needle inserted/introduced/sutured in place/all materials/supplies accounted for at end of procedure
guidewire recovered/sterile technique, catheter placed/ultrasound guidance/lumen(s) aspirated and flushed/sterile dressing applied
sterile dressing applied/guidewire recovered/sterile technique, catheter placed/lumen(s) aspirated and flushed/ultrasound guidance

## 2019-08-04 NOTE — PROCEDURE NOTE - NSCVLATTEMPTSITEVASC_A_CORE
left/femoral vein
left/subclavian vein
right/internal jugular
right/internal jugular
internal jugular/left
internal jugular/left
right/internal jugular

## 2019-08-04 NOTE — PROCEDURE NOTE - PROCEDURE DATE TIME, MLM
14-Jul-2019
14-Jul-2019
21-Jul-2019
22-Jul-2019
27-Jul-2019 06:00
29-Jul-2019 04:00
04-Aug-2019 00:10
14-Jul-2019 05:34
21-Jul-2019 06:00
25-Jul-2019 14:00
29-Jul-2019 03:00
29-Jul-2019 02:00

## 2019-08-04 NOTE — PROCEDURE NOTE - NSPOSTPRCRAD_GEN_A_CORE
central line located in the superior vena cava/line adjusted to depth of insertion/no pneumothorax/post-procedure radiography performed
central line located in the superior vena cava/no pneumothorax/central line located in the/post-procedure radiography performed
central line located in the superior vena cava/post-procedure radiography performed/no pneumothorax
no pneumothorax/post-procedure radiography performed
no pneumothorax/post-procedure radiography performed/central line located in the superior vena cava
line adjusted to depth of insertion/post-procedure radiography performed/central line located in the superior vena cava/no pneumothorax
no pneumothorax/line adjusted to depth of insertion/central line located in the superior vena cava/post-procedure radiography performed

## 2019-08-04 NOTE — PROCEDURE NOTE - NSINFORMCONSENT_GEN_A_CORE
This was an emergent procedure.
Benefits, risks, and possible complications of procedure explained to patient/caregiver who verbalized understanding and gave written consent.
This was an emergent procedure.

## 2019-08-04 NOTE — PROGRESS NOTE ADULT - ASSESSMENT
ASSESSMENT/PLAN    73 year old  gentleman h/o no implantable devices who prior to his admission to [facility] had not been followed by a doctor regularly (on no meds at home), presented to [facility] on 7/1/19 with shortness of breath and LE edema found to have newly diagnosed HFrEF (EF 30%) and RALPH vs CKD, also PVD and claudication with extensive LE arterial disease on doppler. 7/2/19 abd US: small amount of perihepatic ascited. 7/2/19 TTE: EF 30%, mild LVH, multiple regional WMA's, mod MR/TR. 7/3/19 Nuclear ST: moderate focal apical/inferolat/aterolat ischemia. 7/9/19: h/h 12.9/39.5, platelets 180, Bun/Cr 28/1.9, eGFR 42. Pt was diuresed at [facility] treated with lasix/hydralzine/nitrates/statin/ASA. No beta blockade 2/2 low blood pressures. Per [facility] discharge note, pt will need  vascular intervention d/t extensive lower extremity vascular disease. Pt transferred to [facility] today for left heart cath to rule out ischemia. Mr. Emerson hospital course has been complicated with atrial fibrillation RALPH on CKD.  His platelet count has been steadily decreasing since 7/12. Patient found to have C difficile on 7/23/19 with low platelets found to be HIT AB positive. His LVEF is low with LV dysfunction and he then was reintubated. Finally had trach and PEG. Restarted on CVVHDF. Patients now with marked abdominal distension. He currently is requiring high dose pressors.  CT scan demonstrating colitis: in this clinical context ischemic colitis superimposed on Cdiff colitis is very likely. He is seen POD # 1 from Gangrenous cholecystectomy 8/3. Post operative course complicated with no pulses in the LE then hypotension and patient received fluids. Seen on CVVHDF with no fluid off.     1. Anuric RALPH on CKD. On CVVHDF will increase him to positive fluid balance of 100 cc/hr on CVVHDF with 4/2.5 bath.   2. High anion gap metabolic acidosis (AG of 37) with lactate acidosis 22. In severe acidosis. Consider a dead gut. Defer to surgery.    3. Distended abdomen.  4. C-difficile that has possible ischemic colitis. Defer to infectious disease and surgery.    5. Hypotension- Has 3 pressor support.   6. Anemia with thrombocytopenia worsening.   7. Condition is grave s/p cholecystectomy.       RECOMMENDATIONS:  1. Continue CVVHDF.  But now with grave nature and hypotensive. Will increase to positive 100 cc/hr.    2. Positive 100 cc/hr positive.   3. Prognosis is poor. Family from out of state is coming in to make decisions about end of life issues.   4. Please keep MAP>65.  5. Renal dose all medications for GFR <10.   6. Trend BMP, phosphorus and CBC BID. If phosphorus decreases < 3.0; start phosphorus gtts or replete phosphorus per protocol of the unit. As CVVHDF lowers the phosphorus.   7. Patient condition is critical.     Critical Care time of 60 minutes.     Spoke with Dr. Masters and bedside nurse.     Daysi Howe,   [facility]  [phone]

## 2019-08-04 NOTE — PROGRESS NOTE ADULT - ASSESSMENT
73M admitted w/ Meir GALVAN +, MSOF, POD#1 s/p open cholecystectomy, w/ increasing pressor requirements    - local wound care to midline  - NPO  - pressors per CTICU  - awaiting family from out of state  - will follow  - seen with Dr. Franck Keith MD  [facility] [phone]

## 2019-08-04 NOTE — PROCEDURE NOTE - NSPOSTCAREGUIDE_GEN_A_CORE
Keep the cast/splint/dressing clean and dry/Verbal/written post procedure instructions were given to patient/caregiver
Care for catheter as per unit/ICU protocols
Care for catheter as per unit/ICU protocols/Verbal/written post procedure instructions were given to patient/caregiver
Verbal/written post procedure instructions were given to patient/caregiver/Care for catheter as per unit/ICU protocols
Care for catheter as per unit/ICU protocols
Care for catheter as per unit/ICU protocols
Verbal/written post procedure instructions were given to patient/caregiver/Care for catheter as per unit/ICU protocols
Care for catheter as per unit/ICU protocols/Verbal/written post procedure instructions were given to patient/caregiver

## 2019-08-05 LAB
ALBUMIN SERPL ELPH-MCNC: 1.8 G/DL — LOW (ref 3.3–5)
ALP SERPL-CCNC: 310 U/L — HIGH (ref 40–120)
ALT FLD-CCNC: 416 U/L — HIGH (ref 10–45)
ANION GAP SERPL CALC-SCNC: 45 MMOL/L — HIGH (ref 5–17)
APTT BLD: 60.7 SEC — HIGH (ref 27.5–36.3)
AST SERPL-CCNC: 4449 U/L — HIGH (ref 10–40)
BASE EXCESS BLDMV CALC-SCNC: -17 MMOL/L — LOW (ref -3–3)
BASE EXCESS BLDMV CALC-SCNC: -18.5 MMOL/L — LOW (ref -3–3)
BASE EXCESS BLDMV CALC-SCNC: -22.7 MMOL/L — LOW (ref -3–3)
BILIRUB SERPL-MCNC: 9.7 MG/DL — HIGH (ref 0.2–1.2)
BUN SERPL-MCNC: 12 MG/DL — SIGNIFICANT CHANGE UP (ref 7–23)
CALCIUM SERPL-MCNC: 7.4 MG/DL — LOW (ref 8.4–10.5)
CHLORIDE SERPL-SCNC: 89 MMOL/L — LOW (ref 96–108)
CO2 BLDMV-SCNC: 10 MMOL/L — LOW (ref 21–29)
CO2 BLDMV-SCNC: 12 MMOL/L — LOW (ref 21–29)
CO2 BLDMV-SCNC: 12 MMOL/L — LOW (ref 21–29)
CO2 SERPL-SCNC: 7 MMOL/L — CRITICAL LOW (ref 22–31)
CORTICOSTEROID BINDING GLOBULIN RESULT: 2.2 MG/DL — SIGNIFICANT CHANGE UP
CORTIS F/TOTAL MFR SERPL: 29 % — SIGNIFICANT CHANGE UP
CORTIS SERPL-MCNC: 20 UG/DL — HIGH
CORTISOL, FREE RESULT: 5.8 UG/DL — HIGH
CREAT SERPL-MCNC: 1.04 MG/DL — SIGNIFICANT CHANGE UP (ref 0.5–1.3)
CULTURE RESULTS: SIGNIFICANT CHANGE UP
GAS PNL BLDA: SIGNIFICANT CHANGE UP
GAS PNL BLDMV: SIGNIFICANT CHANGE UP
GLUCOSE SERPL-MCNC: 202 MG/DL — HIGH (ref 70–99)
HCO3 BLDMV-SCNC: 11 MMOL/L — LOW (ref 20–28)
HCO3 BLDMV-SCNC: 11 MMOL/L — LOW (ref 20–28)
HCO3 BLDMV-SCNC: 8 MMOL/L — LOW (ref 20–28)
HCT VFR BLD CALC: 29.6 % — LOW (ref 39–50)
HGB BLD-MCNC: 10.3 G/DL — LOW (ref 13–17)
HOROWITZ INDEX BLDMV+IHG-RTO: 60 — SIGNIFICANT CHANGE UP
INR BLD: 3.72 RATIO — HIGH (ref 0.88–1.16)
MAGNESIUM SERPL-MCNC: 2.3 MG/DL — SIGNIFICANT CHANGE UP (ref 1.6–2.6)
MCHC RBC-ENTMCNC: 32.1 PG — SIGNIFICANT CHANGE UP (ref 27–34)
MCHC RBC-ENTMCNC: 34.8 GM/DL — SIGNIFICANT CHANGE UP (ref 32–36)
MCV RBC AUTO: 92.4 FL — SIGNIFICANT CHANGE UP (ref 80–100)
O2 CT VFR BLD CALC: 32 MMHG — SIGNIFICANT CHANGE UP (ref 30–65)
O2 CT VFR BLD CALC: 33 MMHG — SIGNIFICANT CHANGE UP (ref 30–65)
O2 CT VFR BLD CALC: 37 MMHG — SIGNIFICANT CHANGE UP (ref 30–65)
PCO2 BLDMV: 37 MMHG — SIGNIFICANT CHANGE UP (ref 30–65)
PCO2 BLDMV: 39 MMHG — SIGNIFICANT CHANGE UP (ref 30–65)
PCO2 BLDMV: 41 MMHG — SIGNIFICANT CHANGE UP (ref 30–65)
PH BLDMV: 6.94 — CRITICAL LOW (ref 7.32–7.45)
PH BLDMV: 7.06 — CRITICAL LOW (ref 7.32–7.45)
PH BLDMV: 7.11 — CRITICAL LOW (ref 7.32–7.45)
PHOSPHATE SERPL-MCNC: 6.1 MG/DL — HIGH (ref 2.5–4.5)
PLATELET # BLD AUTO: 21 K/UL — LOW (ref 150–400)
POTASSIUM SERPL-MCNC: 5.6 MMOL/L — HIGH (ref 3.5–5.3)
POTASSIUM SERPL-SCNC: 5.6 MMOL/L — HIGH (ref 3.5–5.3)
PROT SERPL-MCNC: 3.7 G/DL — LOW (ref 6–8.3)
PROTHROM AB SERPL-ACNC: 44.6 SEC — HIGH (ref 10–12.9)
RBC # BLD: 3.21 M/UL — LOW (ref 4.2–5.8)
RBC # FLD: 16.8 % — HIGH (ref 10.3–14.5)
SAO2 % BLDMV: 46 % — LOW (ref 60–90)
SAO2 % BLDMV: 46 % — LOW (ref 60–90)
SAO2 % BLDMV: 48 % — LOW (ref 60–90)
SODIUM SERPL-SCNC: 141 MMOL/L — SIGNIFICANT CHANGE UP (ref 135–145)
SPECIMEN SOURCE: SIGNIFICANT CHANGE UP
WBC # BLD: 14.1 K/UL — HIGH (ref 3.8–10.5)
WBC # FLD AUTO: 14.1 K/UL — HIGH (ref 3.8–10.5)

## 2019-08-05 PROCEDURE — 99292 CRITICAL CARE ADDL 30 MIN: CPT

## 2019-08-05 PROCEDURE — 99291 CRITICAL CARE FIRST HOUR: CPT

## 2019-08-05 PROCEDURE — 71045 X-RAY EXAM CHEST 1 VIEW: CPT | Mod: 26

## 2019-08-05 RX ORDER — PROPOFOL 10 MG/ML
20 INJECTION, EMULSION INTRAVENOUS
Qty: 500 | Refills: 0 | Status: DISCONTINUED | OUTPATIENT
Start: 2019-08-05 | End: 2019-08-05

## 2019-08-05 RX ORDER — SODIUM BICARBONATE 1 MEQ/ML
50 SYRINGE (ML) INTRAVENOUS
Refills: 0 | Status: COMPLETED | OUTPATIENT
Start: 2019-08-05 | End: 2019-08-05

## 2019-08-05 RX ORDER — CALCIUM GLUCONATE 100 MG/ML
1 VIAL (ML) INTRAVENOUS
Refills: 0 | Status: COMPLETED | OUTPATIENT
Start: 2019-08-05 | End: 2019-08-05

## 2019-08-05 RX ORDER — DEXTROSE 50 % IN WATER 50 %
50 SYRINGE (ML) INTRAVENOUS ONCE
Refills: 0 | Status: COMPLETED | OUTPATIENT
Start: 2019-08-05 | End: 2019-08-05

## 2019-08-05 RX ORDER — INSULIN HUMAN 100 [IU]/ML
10 INJECTION, SOLUTION SUBCUTANEOUS ONCE
Refills: 0 | Status: COMPLETED | OUTPATIENT
Start: 2019-08-05 | End: 2019-08-05

## 2019-08-05 RX ADMIN — Medication 40 MILLILITER(S): at 03:00

## 2019-08-05 RX ADMIN — Medication 500 MILLIGRAM(S): at 05:47

## 2019-08-05 RX ADMIN — VASOPRESSIN 6 UNIT(S)/MIN: 20 INJECTION INTRAVENOUS at 00:30

## 2019-08-05 RX ADMIN — CHLORHEXIDINE GLUCONATE 5 MILLILITER(S): 213 SOLUTION TOPICAL at 05:54

## 2019-08-05 RX ADMIN — Medication 40 MILLILITER(S): at 06:00

## 2019-08-05 RX ADMIN — MEROPENEM 100 MILLIGRAM(S): 1 INJECTION INTRAVENOUS at 05:47

## 2019-08-05 RX ADMIN — Medication 40 MILLILITER(S): at 02:00

## 2019-08-05 RX ADMIN — Medication 22.08 MICROGRAM(S)/KG/MIN: at 02:43

## 2019-08-05 RX ADMIN — Medication 100 MILLIGRAM(S): at 06:22

## 2019-08-05 RX ADMIN — Medication 40 MILLILITER(S): at 01:00

## 2019-08-05 RX ADMIN — Medication 50 MILLILITER(S): at 03:33

## 2019-08-05 RX ADMIN — INSULIN HUMAN 10 UNIT(S): 100 INJECTION, SOLUTION SUBCUTANEOUS at 03:33

## 2019-08-05 RX ADMIN — Medication 40 MILLILITER(S): at 08:00

## 2019-08-05 RX ADMIN — Medication 40 MILLILITER(S): at 00:00

## 2019-08-05 RX ADMIN — Medication 100 MILLIGRAM(S): at 05:47

## 2019-08-05 RX ADMIN — Medication 40 MILLILITER(S): at 04:00

## 2019-08-05 RX ADMIN — Medication 500 MILLIGRAM(S): at 00:33

## 2019-08-05 RX ADMIN — EPINEPHRINE 11.04 MICROGRAM(S)/KG/MIN: 0.3 INJECTION INTRAMUSCULAR; SUBCUTANEOUS at 21:45

## 2019-08-05 RX ADMIN — CHLORHEXIDINE GLUCONATE 5 MILLILITER(S): 213 SOLUTION TOPICAL at 02:44

## 2019-08-05 RX ADMIN — Medication 50 MILLIEQUIVALENT(S): at 00:33

## 2019-08-05 RX ADMIN — VASOPRESSIN 6 UNIT(S)/MIN: 20 INJECTION INTRAVENOUS at 07:37

## 2019-08-05 RX ADMIN — SODIUM CHLORIDE 75 MILLILITER(S): 9 INJECTION, SOLUTION INTRAVENOUS at 06:14

## 2019-08-05 RX ADMIN — Medication 40 MILLILITER(S): at 05:00

## 2019-08-05 RX ADMIN — PROPOFOL 8.83 MICROGRAM(S)/KG/MIN: 10 INJECTION, EMULSION INTRAVENOUS at 07:37

## 2019-08-05 RX ADMIN — Medication 200 GRAM(S): at 01:14

## 2019-08-05 RX ADMIN — Medication 40 MILLILITER(S): at 09:00

## 2019-08-05 RX ADMIN — PANTOPRAZOLE SODIUM 10 MG/HR: 20 TABLET, DELAYED RELEASE ORAL at 06:22

## 2019-08-05 RX ADMIN — Medication 250 MILLIGRAM(S): at 01:24

## 2019-08-05 RX ADMIN — Medication 40 MILLILITER(S): at 07:00

## 2019-08-05 RX ADMIN — Medication 50 MILLIEQUIVALENT(S): at 00:45

## 2019-08-05 RX ADMIN — Medication 200 GRAM(S): at 00:31

## 2019-08-05 NOTE — PROGRESS NOTE ADULT - SUBJECTIVE AND OBJECTIVE BOX
NEPHROLOGY-NS[facility] [phone]        Patient seen and examined in bed.  He is hypotensive on mult pressors   Metabolic acidosis        MEDICATIONS  (STANDING):  chlorhexidine 0.12% Liquid 5 milliLiter(s) Oral Mucosa every 4 hours  CRRT Treatment    <Continuous>  dextrose 20%. 500 milliLiter(s) (40 mL/Hr) IV Continuous <Continuous>  dextrose 5% 1000 milliLiter(s) (75 mL/Hr) IV Continuous <Continuous>  fentaNYL   Infusion 2 MICROgram(s)/kG/Hr (7.36 mL/Hr) IV Continuous <Continuous>  meropenem  IVPB 1000 milliGRAM(s) IV Intermittent every 8 hours  metroNIDAZOLE  IVPB      metroNIDAZOLE  IVPB 500 milliGRAM(s) IV Intermittent every 12 hours  micafungin IVPB 100 milliGRAM(s) IV Intermittent every 24 hours  norepinephrine Infusion 0.3 MICROgram(s)/kG/Min (20.7 mL/Hr) IV Continuous <Continuous>  pantoprazole Infusion 8 mG/Hr (10 mL/Hr) IV Continuous <Continuous>  PrismaSATE Dialysate BK 0 / 3.5 5000 milliLiter(s) (1000 mL/Hr) CRRT <Continuous>  PrismaSOL Filtration BGK 0 / 2.5 5000 milliLiter(s) (500 mL/Hr) CRRT <Continuous>  PrismaSOL Filtration BGK 0 / 2.5 5000 milliLiter(s) (500 mL/Hr) CRRT <Continuous>  propofol Infusion 20 MICROgram(s)/kG/Min (8.832 mL/Hr) IV Continuous <Continuous>  sodium bicarbonate  Infusion 0.102 mEq/kG/Hr (50 mL/Hr) IV Continuous <Continuous>  sodium chloride 0.9%. 1000 milliLiter(s) (10 mL/Hr) IV Continuous <Continuous>  vancomycin    Solution 500 milliGRAM(s) Oral every 6 hours  vasopressin Infusion 0.1 Unit(s)/Min (6 mL/Hr) IV Continuous <Continuous>      VITAL:  T(C): , Max: 35.8 (08-04-19 @ 15:00)  T(F): , Max: 96.4 (08-04-19 @ 15:00)  HR: 60 (08-05-19 @ 07:45)  BP: --  BP(mean): --  RR: 24 (08-05-19 @ 07:45)  SpO2: 84% (08-05-19 @ 04:30)  Wt(kg): --    I and O's:    08-04 @ 07:01  -  08-05 @ 07:00  --------------------------------------------------------  IN: 6362.1 mL / OUT: 3446 mL / NET: 2916.1 mL    08-05 @ 07:01  -  08-05 @ 08:10  --------------------------------------------------------  IN: 207.4 mL / OUT: 119 mL / NET: 88.4 mL          PHYSICAL EXAM:    Constitutional: sedated   Neck:  No JVD  Respiratory: transmitted upper   Cardiovascular: S1 and S2  Gastrointestinal: BS+, soft, NT/ND  Extremities: +  peripheral edema  Neurological: unable   : No Chávez  Skin: mottled   Access: [facility]     LABS:                        10.3   14.1  )-----------( 21       ( 05 Aug 2019 02:30 )             29.6     08-05    141  |  89<L>  |  12  ----------------------------<  202<H>  5.6<H>   |  7<LL>  |  1.04    Ca    7.4<L>      05 Aug 2019 02:30  Phos  6.1     08-05  Mg     2.3     08-05    TPro  3.7<L>  /  Alb  1.8<L>  /  TBili  9.7<H>  /  DBili  x   /  AST  4449<H>  /  ALT  416<H>  /  AlkPhos  310<H>  08-05          Urine Studies:          RADIOLOGY & ADDITIONAL STUDIES:      < from: Xray Chest 1 View- PORTABLE-Urgent (08.04.19 @ 00:50) >    EXAM:  XR CHEST PORTABLE URGENT 1V                            PROCEDURE DATE:  08/04/2019            INTERPRETATION:  CLINICAL INFORMATION: Postop from exploratory laparotomy   and PEG tube removal.    TECHNIQUE: Portable AP radiograph of the chest.    COMPARISON: Portable AP of the chest 8/3/2019 at 2:19 AM. CT chest   8/2/2019    FINDINGS:    Tracheostomy tube in place with cuff inflated. Left internal jugular   [place] catheter with tip in the right main pulmonary artery. Right   internal jugular central venous catheter with tip in the SVC. Enteric   tube with tip below the diaphragm.    Bilateral pleural effusions. Bilateral patchy opacities left greater than   right.    Heart size cannot be accurately evaluated on this projection. Status post   median sternotomy and CABG.    Unremarkable skeletal structures.      IMPRESSION:     CHF and/or pneumonia.                MAVERICK LOPEZ M.D., RESIDENT RADIOLOGIST  This document has been electronically signed.  CLAIRE RICARDO M.D., ATTENDING RADIOLOGIST  This document has been electronically signed. Aug  4 2019 10:13AM                < end of copied text >

## 2019-08-05 NOTE — PROGRESS NOTE ADULT - PROBLEM SELECTOR PROBLEM 1
Diabetes
Coronary artery disease involving native coronary artery of native heart with unstable angina pectoris
Diabetes

## 2019-08-05 NOTE — PROGRESS NOTE ADULT - PROBLEM SELECTOR PROBLEM 3
RALPH (acute kidney injury)

## 2019-08-05 NOTE — PROGRESS NOTE ADULT - ASSESSMENT
The patient is a 73y Male with CAD, HFrEF and PVD s/p CABGx3 followed by AFib, resp failure and acute on chronic kidney injury.    - CKD:  Suspect underlying CKD stage 3 and now anuric   - Hypotensive on  and Vaso and levo.  Requirements are increased   -Distended abd  -Metabolic acidosis  -Hepatitis            RECOMMENDATIONS  - Hypotensive and on mult pressors and hyperkalemia...This am he was placed on a 0K bath on CVV  - please dose any new medications for a CrCl of 10-15 ml/min   -C diff positive at present .  Cont PO abx.        Overall prognosis is poor

## 2019-08-05 NOTE — PROGRESS NOTE ADULT - SUBJECTIVE AND OBJECTIVE BOX
Trauma Surgery Progress Note     Subjective/24hour Events: No acute events overnight. Pain controlled. . No N/V. No CP or SOB.    Vital Signs:  Vital Signs Last 24 Hrs  T(C): 35.7 (05 Aug 2019 04:00), Max: 35.8 (04 Aug 2019 15:00)  T(F): 96.3 (05 Aug 2019 04:00), Max: 96.4 (04 Aug 2019 15:00)  HR: 67 (05 Aug 2019 05:00) (67 - 95)  BP: --  BP(mean): --  RR: 0 (05 Aug 2019 05:00) (0 - 24)  SpO2: 84% (05 Aug 2019 04:30) (84% - 84%)    CAPILLARY BLOOD GLUCOSE          I&O's Detail    03 Aug 2019 07:01  -  04 Aug 2019 07:00  --------------------------------------------------------  IN:    Albumin 5%  - 250 mL: 500 mL    dextrose 20%: 60 mL    dextrose 20%.: 720 mL    DOBUTamine Infusion: 396 mL    DOBUTamine Infusion: 42 mL    Enteral Tube Flush: 20 mL    fentaNYL  Infusion: 23.4 mL    fentaNYL  Infusion: 14.4 mL    IV PiggyBack: 800 mL    norepinephrine Infusion: 338 mL    norepinephrine Infusion: 54 mL    Packed Red Blood Cells: 700 mL    pantoprazole Infusion: 110 mL    Plasma: 300 mL    Platelets - Single Donor: 600 mL    sodium bicarbonate  Infusion: 900 mL    sodium chloride 0.9%: 15 mL    sodium chloride 0.9%.: 90 mL    vasopressin Infusion: 18 mL    vasopressin Infusion: 108 mL  Total IN: 5808.8 mL    OUT:    Bulb: 45 mL    Other: 3061 mL    PEG (Percutaneous Endoscopic Gastrostomy) Tube: 300 mL  Total OUT: 3406 mL    Total NET: 2402.8 mL      04 Aug 2019 07:01  -  05 Aug 2019 05:26  --------------------------------------------------------  IN:    dextrose 20%.: 880 mL    DOBUTamine Infusion: 486.2 mL    Enteral Tube Flush: 90 mL    EPINEPHrine Infusion: 77 mL    fentaNYL  Infusion: 39.6 mL    IV PiggyBack: 1450 mL    norepinephrine Infusion: 671.3 mL    pantoprazole Infusion: 220 mL    sodium bicarbonate  Infusion: 1650 mL    sodium chloride 0.9%.: 110 mL    vasopressin Infusion: 132 mL  Total IN: 5806.1 mL    OUT:    Bulb: 65 mL    Other: 3117 mL  Total OUT: 3182 mL    Total NET: 2624.1 mL            MEDICATIONS  (STANDING):  chlorhexidine 0.12% Liquid 5 milliLiter(s) Oral Mucosa every 4 hours  CRRT Treatment    <Continuous>  dextrose 20%. 500 milliLiter(s) (40 mL/Hr) IV Continuous <Continuous>  dextrose 5% 1000 milliLiter(s) (75 mL/Hr) IV Continuous <Continuous>  DOBUTamine Infusion 10 MICROgram(s)/kG/Min (22.08 mL/Hr) IV Continuous <Continuous>  fentaNYL   Infusion 1 MICROgram(s)/kG/Hr (3.68 mL/Hr) IV Continuous <Continuous>  hydrocortisone sodium succinate Injectable 100 milliGRAM(s) IV Push every 8 hours  meropenem  IVPB 1000 milliGRAM(s) IV Intermittent every 8 hours  metroNIDAZOLE  IVPB      metroNIDAZOLE  IVPB 500 milliGRAM(s) IV Intermittent every 12 hours  micafungin IVPB 100 milliGRAM(s) IV Intermittent every 24 hours  norepinephrine Infusion 0.3 MICROgram(s)/kG/Min (20.7 mL/Hr) IV Continuous <Continuous>  pantoprazole Infusion 8 mG/Hr (10 mL/Hr) IV Continuous <Continuous>  PrismaSATE Dialysate BGK 4 / 2.5 5000 milliLiter(s) (5000 mL/Hr) CRRT <Continuous>  PrismaSOL Filtration BGK 4 / 2.5 5000 milliLiter(s) (500 mL/Hr) CRRT <Continuous>  PrismaSOL Filtration BGK 4 / 2.5 5000 milliLiter(s) (500 mL/Hr) CRRT <Continuous>  sodium bicarbonate  Infusion 0.102 mEq/kG/Hr (50 mL/Hr) IV Continuous <Continuous>  sodium chloride 0.9%. 1000 milliLiter(s) (10 mL/Hr) IV Continuous <Continuous>  vancomycin    Solution 500 milliGRAM(s) Oral every 6 hours  vasopressin Infusion 0.1 Unit(s)/Min (6 mL/Hr) IV Continuous <Continuous>    MEDICATIONS  (PRN):        Physical Exam:  Gen: NAD  Chest:   LS: nml respiratory effort  Card: pulse regularly present  GI:   Ext: warm      Labs:    08-05    141  |  89<L>  |  12  ----------------------------<  202<H>  5.6<H>   |  7<LL>  |  1.04    Ca    7.4<L>      05 Aug 2019 02:30  Phos  6.1     08-05  Mg     2.3     08-05    TPro  3.7<L>  /  Alb  1.8<L>  /  TBili  9.7<H>  /  DBili  x   /  AST  4449<H>  /  ALT  416<H>  /  AlkPhos  310<H>  08-05    LIVER FUNCTIONS - ( 05 Aug 2019 02:30 )  Alb: 1.8 g/dL / Pro: 3.7 g/dL / ALK PHOS: 310 U/L / ALT: 416 U/L / AST: 4449 U/L / GGT: x                                 10.3   14.1  )-----------( 21       ( 05 Aug 2019 02:30 )             29.6     PT/INR - ( 05 Aug 2019 02:30 )   PT: 44.6 sec;   INR: 3.72 ratio         PTT - ( 05 Aug 2019 02:30 )  PTT:60.7 sec          Imaging:  EXAM:  XR CHEST PORTABLE URGENT 1V                          PROCEDURE DATE:  08/04/2019      INTERPRETATION:  CLINICAL INFORMATION: Postop from exploratory laparotomy   and PEG tube removal.    TECHNIQUE: Portable AP radiograph of the chest.    COMPARISON: Portable AP of the chest 8/3/2019 at 2:19 AM. CT chest   8/2/2019    FINDINGS:    Tracheostomy tube in place with cuff inflated. Left internal jugular   [place]-Ramos catheter with tip in the right main pulmonary artery. Right   internal jugular central venous catheter with tip in the SVC. Enteric   tube with tip below the diaphragm.    Bilateral pleural effusions. Bilateral patchy opacities left greater than   right.    Heart size cannot be accurately evaluated on this projection. Status post   median sternotomy and CABG.    Unremarkable skeletal structures.      IMPRESSION:     CHF and/or pneumonia. Trauma Surgery Progress Note     Subjective/24hour Events: Patient with continued pressor requirements. Made DNR.     Vital Signs:  Vital Signs Last 24 Hrs  T(C): 35.7 (05 Aug 2019 04:00), Max: 35.8 (04 Aug 2019 15:00)  T(F): 96.3 (05 Aug 2019 04:00), Max: 96.4 (04 Aug 2019 15:00)  HR: 67 (05 Aug 2019 05:00) (67 - 95)  BP: --  BP(mean): --  RR: 0 (05 Aug 2019 05:00) (0 - 24)  SpO2: 84% (05 Aug 2019 04:30) (84% - 84%)    CAPILLARY BLOOD GLUCOSE          I&O's Detail    03 Aug 2019 07:01  -  04 Aug 2019 07:00  --------------------------------------------------------  IN:    Albumin 5%  - 250 mL: 500 mL    dextrose 20%: 60 mL    dextrose 20%.: 720 mL    DOBUTamine Infusion: 396 mL    DOBUTamine Infusion: 42 mL    Enteral Tube Flush: 20 mL    fentaNYL  Infusion: 23.4 mL    fentaNYL  Infusion: 14.4 mL    IV PiggyBack: 800 mL    norepinephrine Infusion: 338 mL    norepinephrine Infusion: 54 mL    Packed Red Blood Cells: 700 mL    pantoprazole Infusion: 110 mL    Plasma: 300 mL    Platelets - Single Donor: 600 mL    sodium bicarbonate  Infusion: 900 mL    sodium chloride 0.9%: 15 mL    sodium chloride 0.9%.: 90 mL    vasopressin Infusion: 18 mL    vasopressin Infusion: 108 mL  Total IN: 5808.8 mL    OUT:    Bulb: 45 mL    Other: 3061 mL    PEG (Percutaneous Endoscopic Gastrostomy) Tube: 300 mL  Total OUT: 3406 mL    Total NET: 2402.8 mL      04 Aug 2019 07:01  -  05 Aug 2019 05:26  --------------------------------------------------------  IN:    dextrose 20%.: 880 mL    DOBUTamine Infusion: 486.2 mL    Enteral Tube Flush: 90 mL    EPINEPHrine Infusion: 77 mL    fentaNYL  Infusion: 39.6 mL    IV PiggyBack: 1450 mL    norepinephrine Infusion: 671.3 mL    pantoprazole Infusion: 220 mL    sodium bicarbonate  Infusion: 1650 mL    sodium chloride 0.9%.: 110 mL    vasopressin Infusion: 132 mL  Total IN: 5806.1 mL    OUT:    Bulb: 65 mL    Other: 3117 mL  Total OUT: 3182 mL    Total NET: 2624.1 mL            MEDICATIONS  (STANDING):  chlorhexidine 0.12% Liquid 5 milliLiter(s) Oral Mucosa every 4 hours  CRRT Treatment    <Continuous>  dextrose 20%. 500 milliLiter(s) (40 mL/Hr) IV Continuous <Continuous>  dextrose 5% 1000 milliLiter(s) (75 mL/Hr) IV Continuous <Continuous>  DOBUTamine Infusion 10 MICROgram(s)/kG/Min (22.08 mL/Hr) IV Continuous <Continuous>  fentaNYL   Infusion 1 MICROgram(s)/kG/Hr (3.68 mL/Hr) IV Continuous <Continuous>  hydrocortisone sodium succinate Injectable 100 milliGRAM(s) IV Push every 8 hours  meropenem  IVPB 1000 milliGRAM(s) IV Intermittent every 8 hours  metroNIDAZOLE  IVPB      metroNIDAZOLE  IVPB 500 milliGRAM(s) IV Intermittent every 12 hours  micafungin IVPB 100 milliGRAM(s) IV Intermittent every 24 hours  norepinephrine Infusion 0.3 MICROgram(s)/kG/Min (20.7 mL/Hr) IV Continuous <Continuous>  pantoprazole Infusion 8 mG/Hr (10 mL/Hr) IV Continuous <Continuous>  PrismaSATE Dialysate BGK 4 / 2.5 5000 milliLiter(s) (5000 mL/Hr) CRRT <Continuous>  PrismaSOL Filtration BGK 4 / 2.5 5000 milliLiter(s) (500 mL/Hr) CRRT <Continuous>  PrismaSOL Filtration BGK 4 / 2.5 5000 milliLiter(s) (500 mL/Hr) CRRT <Continuous>  sodium bicarbonate  Infusion 0.102 mEq/kG/Hr (50 mL/Hr) IV Continuous <Continuous>  sodium chloride 0.9%. 1000 milliLiter(s) (10 mL/Hr) IV Continuous <Continuous>  vancomycin    Solution 500 milliGRAM(s) Oral every 6 hours  vasopressin Infusion 0.1 Unit(s)/Min (6 mL/Hr) IV Continuous <Continuous>    MEDICATIONS  (PRN):        Physical Exam:  Gen: NAD  Chest:   LS: nml respiratory effort  Card: pulse regularly present  GI:   Ext: warm      Labs:    08-05    141  |  89<L>  |  12  ----------------------------<  202<H>  5.6<H>   |  7<LL>  |  1.04    Ca    7.4<L>      05 Aug 2019 02:30  Phos  6.1     08-05  Mg     2.3     08-05    TPro  3.7<L>  /  Alb  1.8<L>  /  TBili  9.7<H>  /  DBili  x   /  AST  4449<H>  /  ALT  416<H>  /  AlkPhos  310<H>  08-05    LIVER FUNCTIONS - ( 05 Aug 2019 02:30 )  Alb: 1.8 g/dL / Pro: 3.7 g/dL / ALK PHOS: 310 U/L / ALT: 416 U/L / AST: 4449 U/L / GGT: x                                 10.3   14.1  )-----------( 21       ( 05 Aug 2019 02:30 )             29.6     PT/INR - ( 05 Aug 2019 02:30 )   PT: 44.6 sec;   INR: 3.72 ratio         PTT - ( 05 Aug 2019 02:30 )  PTT:60.7 sec          Imaging:  EXAM:  XR CHEST PORTABLE URGENT 1V                          PROCEDURE DATE:  08/04/2019      INTERPRETATION:  CLINICAL INFORMATION: Postop from exploratory laparotomy   and PEG tube removal.    TECHNIQUE: Portable AP radiograph of the chest.    COMPARISON: Portable AP of the chest 8/3/2019 at 2:19 AM. CT chest   8/2/2019    FINDINGS:    Tracheostomy tube in place with cuff inflated. Left internal jugular   [place]-Ramos catheter with tip in the right main pulmonary artery. Right   internal jugular central venous catheter with tip in the SVC. Enteric   tube with tip below the diaphragm.    Bilateral pleural effusions. Bilateral patchy opacities left greater than   right.    Heart size cannot be accurately evaluated on this projection. Status post   median sternotomy and CABG.    Unremarkable skeletal structures.      IMPRESSION:     CHF and/or pneumonia. Trauma Surgery Progress Note     Subjective/24hour Events: Patient with continued pressor requirements and CVVHD. Made DNR.     Vital Signs:  Vital Signs Last 24 Hrs  T(C): 35.7 (05 Aug 2019 04:00), Max: 35.8 (04 Aug 2019 15:00)  T(F): 96.3 (05 Aug 2019 04:00), Max: 96.4 (04 Aug 2019 15:00)  HR: 67 (05 Aug 2019 05:00) (67 - 95)  BP: --  BP(mean): --  RR: 0 (05 Aug 2019 05:00) (0 - 24)  SpO2: 84% (05 Aug 2019 04:30) (84% - 84%)    CAPILLARY BLOOD GLUCOSE          I&O's Detail    03 Aug 2019 07:01  -  04 Aug 2019 07:00  --------------------------------------------------------  IN:    Albumin 5%  - 250 mL: 500 mL    dextrose 20%: 60 mL    dextrose 20%.: 720 mL    DOBUTamine Infusion: 396 mL    DOBUTamine Infusion: 42 mL    Enteral Tube Flush: 20 mL    fentaNYL  Infusion: 23.4 mL    fentaNYL  Infusion: 14.4 mL    IV PiggyBack: 800 mL    norepinephrine Infusion: 338 mL    norepinephrine Infusion: 54 mL    Packed Red Blood Cells: 700 mL    pantoprazole Infusion: 110 mL    Plasma: 300 mL    Platelets - Single Donor: 600 mL    sodium bicarbonate  Infusion: 900 mL    sodium chloride 0.9%: 15 mL    sodium chloride 0.9%.: 90 mL    vasopressin Infusion: 18 mL    vasopressin Infusion: 108 mL  Total IN: 5808.8 mL    OUT:    Bulb: 45 mL    Other: 3061 mL    PEG (Percutaneous Endoscopic Gastrostomy) Tube: 300 mL  Total OUT: 3406 mL    Total NET: 2402.8 mL      04 Aug 2019 07:01  -  05 Aug 2019 05:26  --------------------------------------------------------  IN:    dextrose 20%.: 880 mL    DOBUTamine Infusion: 486.2 mL    Enteral Tube Flush: 90 mL    EPINEPHrine Infusion: 77 mL    fentaNYL  Infusion: 39.6 mL    IV PiggyBack: 1450 mL    norepinephrine Infusion: 671.3 mL    pantoprazole Infusion: 220 mL    sodium bicarbonate  Infusion: 1650 mL    sodium chloride 0.9%.: 110 mL    vasopressin Infusion: 132 mL  Total IN: 5806.1 mL    OUT:    Bulb: 65 mL    Other: 3117 mL  Total OUT: 3182 mL    Total NET: 2624.1 mL            MEDICATIONS  (STANDING):  chlorhexidine 0.12% Liquid 5 milliLiter(s) Oral Mucosa every 4 hours  CRRT Treatment    <Continuous>  dextrose 20%. 500 milliLiter(s) (40 mL/Hr) IV Continuous <Continuous>  dextrose 5% 1000 milliLiter(s) (75 mL/Hr) IV Continuous <Continuous>  DOBUTamine Infusion 10 MICROgram(s)/kG/Min (22.08 mL/Hr) IV Continuous <Continuous>  fentaNYL   Infusion 1 MICROgram(s)/kG/Hr (3.68 mL/Hr) IV Continuous <Continuous>  hydrocortisone sodium succinate Injectable 100 milliGRAM(s) IV Push every 8 hours  meropenem  IVPB 1000 milliGRAM(s) IV Intermittent every 8 hours  metroNIDAZOLE  IVPB      metroNIDAZOLE  IVPB 500 milliGRAM(s) IV Intermittent every 12 hours  micafungin IVPB 100 milliGRAM(s) IV Intermittent every 24 hours  norepinephrine Infusion 0.3 MICROgram(s)/kG/Min (20.7 mL/Hr) IV Continuous <Continuous>  pantoprazole Infusion 8 mG/Hr (10 mL/Hr) IV Continuous <Continuous>  PrismaSATE Dialysate BGK 4 / 2.5 5000 milliLiter(s) (5000 mL/Hr) CRRT <Continuous>  PrismaSOL Filtration BGK 4 / 2.5 5000 milliLiter(s) (500 mL/Hr) CRRT <Continuous>  PrismaSOL Filtration BGK 4 / 2.5 5000 milliLiter(s) (500 mL/Hr) CRRT <Continuous>  sodium bicarbonate  Infusion 0.102 mEq/kG/Hr (50 mL/Hr) IV Continuous <Continuous>  sodium chloride 0.9%. 1000 milliLiter(s) (10 mL/Hr) IV Continuous <Continuous>  vancomycin    Solution 500 milliGRAM(s) Oral every 6 hours  vasopressin Infusion 0.1 Unit(s)/Min (6 mL/Hr) IV Continuous <Continuous>    MEDICATIONS  (PRN):        Physical Exam:  Gen: NAD  HEENT: tracheostomy in place  LS: on VENT  Card: pulse regularly present  GI: abd soft, midline incision w/ dressing c/d/i, drain w/ dark red output  Groin: right femoral line in place  Ext: warm      Labs:    08-05    141  |  89<L>  |  12  ----------------------------<  202<H>  5.6<H>   |  7<LL>  |  1.04    Ca    7.4<L>      05 Aug 2019 02:30  Phos  6.1     08-05  Mg     2.3     08-05    TPro  3.7<L>  /  Alb  1.8<L>  /  TBili  9.7<H>  /  DBili  x   /  AST  4449<H>  /  ALT  416<H>  /  AlkPhos  310<H>  08-05    LIVER FUNCTIONS - ( 05 Aug 2019 02:30 )  Alb: 1.8 g/dL / Pro: 3.7 g/dL / ALK PHOS: 310 U/L / ALT: 416 U/L / AST: 4449 U/L / GGT: x                                 10.3   14.1  )-----------( 21       ( 05 Aug 2019 02:30 )             29.6     PT/INR - ( 05 Aug 2019 02:30 )   PT: 44.6 sec;   INR: 3.72 ratio         PTT - ( 05 Aug 2019 02:30 )  PTT:60.7 sec          Imaging:  EXAM:  XR CHEST PORTABLE URGENT 1V                          PROCEDURE DATE:  08/04/2019      INTERPRETATION:  CLINICAL INFORMATION: Postop from exploratory laparotomy   and PEG tube removal.    TECHNIQUE: Portable AP radiograph of the chest.    COMPARISON: Portable AP of the chest 8/3/2019 at 2:19 AM. CT chest   8/2/2019    FINDINGS:    Tracheostomy tube in place with cuff inflated. Left internal jugular   [place]-Ramos catheter with tip in the right main pulmonary artery. Right   internal jugular central venous catheter with tip in the SVC. Enteric   tube with tip below the diaphragm.    Bilateral pleural effusions. Bilateral patchy opacities left greater than   right.    Heart size cannot be accurately evaluated on this projection. Status post   median sternotomy and CABG.    Unremarkable skeletal structures.      IMPRESSION:     CHF and/or pneumonia. Trauma Surgery Progress Note     Subjective/24hour Events: Patient with continued pressor requirements and CVVHD.      Vital Signs:  Vital Signs Last 24 Hrs  T(C): 35.7 (05 Aug 2019 04:00), Max: 35.8 (04 Aug 2019 15:00)  T(F): 96.3 (05 Aug 2019 04:00), Max: 96.4 (04 Aug 2019 15:00)  HR: 67 (05 Aug 2019 05:00) (67 - 95)  BP: --  BP(mean): --  RR: 0 (05 Aug 2019 05:00) (0 - 24)  SpO2: 84% (05 Aug 2019 04:30) (84% - 84%)    CAPILLARY BLOOD GLUCOSE          I&O's Detail    03 Aug 2019 07:01  -  04 Aug 2019 07:00  --------------------------------------------------------  IN:    Albumin 5%  - 250 mL: 500 mL    dextrose 20%: 60 mL    dextrose 20%.: 720 mL    DOBUTamine Infusion: 396 mL    DOBUTamine Infusion: 42 mL    Enteral Tube Flush: 20 mL    fentaNYL  Infusion: 23.4 mL    fentaNYL  Infusion: 14.4 mL    IV PiggyBack: 800 mL    norepinephrine Infusion: 338 mL    norepinephrine Infusion: 54 mL    Packed Red Blood Cells: 700 mL    pantoprazole Infusion: 110 mL    Plasma: 300 mL    Platelets - Single Donor: 600 mL    sodium bicarbonate  Infusion: 900 mL    sodium chloride 0.9%: 15 mL    sodium chloride 0.9%.: 90 mL    vasopressin Infusion: 18 mL    vasopressin Infusion: 108 mL  Total IN: 5808.8 mL    OUT:    Bulb: 45 mL    Other: 3061 mL    PEG (Percutaneous Endoscopic Gastrostomy) Tube: 300 mL  Total OUT: 3406 mL    Total NET: 2402.8 mL      04 Aug 2019 07:01  -  05 Aug 2019 05:26  --------------------------------------------------------  IN:    dextrose 20%.: 880 mL    DOBUTamine Infusion: 486.2 mL    Enteral Tube Flush: 90 mL    EPINEPHrine Infusion: 77 mL    fentaNYL  Infusion: 39.6 mL    IV PiggyBack: 1450 mL    norepinephrine Infusion: 671.3 mL    pantoprazole Infusion: 220 mL    sodium bicarbonate  Infusion: 1650 mL    sodium chloride 0.9%.: 110 mL    vasopressin Infusion: 132 mL  Total IN: 5806.1 mL    OUT:    Bulb: 65 mL    Other: 3117 mL  Total OUT: 3182 mL    Total NET: 2624.1 mL            MEDICATIONS  (STANDING):  chlorhexidine 0.12% Liquid 5 milliLiter(s) Oral Mucosa every 4 hours  CRRT Treatment    <Continuous>  dextrose 20%. 500 milliLiter(s) (40 mL/Hr) IV Continuous <Continuous>  dextrose 5% 1000 milliLiter(s) (75 mL/Hr) IV Continuous <Continuous>  DOBUTamine Infusion 10 MICROgram(s)/kG/Min (22.08 mL/Hr) IV Continuous <Continuous>  fentaNYL   Infusion 1 MICROgram(s)/kG/Hr (3.68 mL/Hr) IV Continuous <Continuous>  hydrocortisone sodium succinate Injectable 100 milliGRAM(s) IV Push every 8 hours  meropenem  IVPB 1000 milliGRAM(s) IV Intermittent every 8 hours  metroNIDAZOLE  IVPB      metroNIDAZOLE  IVPB 500 milliGRAM(s) IV Intermittent every 12 hours  micafungin IVPB 100 milliGRAM(s) IV Intermittent every 24 hours  norepinephrine Infusion 0.3 MICROgram(s)/kG/Min (20.7 mL/Hr) IV Continuous <Continuous>  pantoprazole Infusion 8 mG/Hr (10 mL/Hr) IV Continuous <Continuous>  PrismaSATE Dialysate BGK 4 / 2.5 5000 milliLiter(s) (5000 mL/Hr) CRRT <Continuous>  PrismaSOL Filtration BGK 4 / 2.5 5000 milliLiter(s) (500 mL/Hr) CRRT <Continuous>  PrismaSOL Filtration BGK 4 / 2.5 5000 milliLiter(s) (500 mL/Hr) CRRT <Continuous>  sodium bicarbonate  Infusion 0.102 mEq/kG/Hr (50 mL/Hr) IV Continuous <Continuous>  sodium chloride 0.9%. 1000 milliLiter(s) (10 mL/Hr) IV Continuous <Continuous>  vancomycin    Solution 500 milliGRAM(s) Oral every 6 hours  vasopressin Infusion 0.1 Unit(s)/Min (6 mL/Hr) IV Continuous <Continuous>    MEDICATIONS  (PRN):        Physical Exam:  Gen: NAD  HEENT: tracheostomy in place  LS: on VENT  Card: pulse regularly present  GI: abd soft, midline incision w/ dressing c/d/i, drain w/ dark red output  Groin: right femoral line in place  Ext: warm      Labs:    08-05    141  |  89<L>  |  12  ----------------------------<  202<H>  5.6<H>   |  7<LL>  |  1.04    Ca    7.4<L>      05 Aug 2019 02:30  Phos  6.1     08-05  Mg     2.3     08-05    TPro  3.7<L>  /  Alb  1.8<L>  /  TBili  9.7<H>  /  DBili  x   /  AST  4449<H>  /  ALT  416<H>  /  AlkPhos  310<H>  08-05    LIVER FUNCTIONS - ( 05 Aug 2019 02:30 )  Alb: 1.8 g/dL / Pro: 3.7 g/dL / ALK PHOS: 310 U/L / ALT: 416 U/L / AST: 4449 U/L / GGT: x                                 10.3   14.1  )-----------( 21       ( 05 Aug 2019 02:30 )             29.6     PT/INR - ( 05 Aug 2019 02:30 )   PT: 44.6 sec;   INR: 3.72 ratio         PTT - ( 05 Aug 2019 02:30 )  PTT:60.7 sec          Imaging:  EXAM:  XR CHEST PORTABLE URGENT 1V                          PROCEDURE DATE:  08/04/2019      INTERPRETATION:  CLINICAL INFORMATION: Postop from exploratory laparotomy   and PEG tube removal.    TECHNIQUE: Portable AP radiograph of the chest.    COMPARISON: Portable AP of the chest 8/3/2019 at 2:19 AM. CT chest   8/2/2019    FINDINGS:    Tracheostomy tube in place with cuff inflated. Left internal jugular   [place]-Ramos catheter with tip in the right main pulmonary artery. Right   internal jugular central venous catheter with tip in the SVC. Enteric   tube with tip below the diaphragm.    Bilateral pleural effusions. Bilateral patchy opacities left greater than   right.    Heart size cannot be accurately evaluated on this projection. Status post   median sternotomy and CABG.    Unremarkable skeletal structures.      IMPRESSION:     CHF and/or pneumonia. Trauma Surgery Progress Note     Subjective/24hour Events: Patient with continued pressor requirements and CVVHD. Made DNR after discussion with family.    Vital Signs:  Vital Signs Last 24 Hrs  T(C): 35.7 (05 Aug 2019 04:00), Max: 35.8 (04 Aug 2019 15:00)  T(F): 96.3 (05 Aug 2019 04:00), Max: 96.4 (04 Aug 2019 15:00)  HR: 67 (05 Aug 2019 05:00) (67 - 95)  BP: --  BP(mean): --  RR: 0 (05 Aug 2019 05:00) (0 - 24)  SpO2: 84% (05 Aug 2019 04:30) (84% - 84%)    CAPILLARY BLOOD GLUCOSE          I&O's Detail    03 Aug 2019 07:01  -  04 Aug 2019 07:00  --------------------------------------------------------  IN:    Albumin 5%  - 250 mL: 500 mL    dextrose 20%: 60 mL    dextrose 20%.: 720 mL    DOBUTamine Infusion: 396 mL    DOBUTamine Infusion: 42 mL    Enteral Tube Flush: 20 mL    fentaNYL  Infusion: 23.4 mL    fentaNYL  Infusion: 14.4 mL    IV PiggyBack: 800 mL    norepinephrine Infusion: 338 mL    norepinephrine Infusion: 54 mL    Packed Red Blood Cells: 700 mL    pantoprazole Infusion: 110 mL    Plasma: 300 mL    Platelets - Single Donor: 600 mL    sodium bicarbonate  Infusion: 900 mL    sodium chloride 0.9%: 15 mL    sodium chloride 0.9%.: 90 mL    vasopressin Infusion: 18 mL    vasopressin Infusion: 108 mL  Total IN: 5808.8 mL    OUT:    Bulb: 45 mL    Other: 3061 mL    PEG (Percutaneous Endoscopic Gastrostomy) Tube: 300 mL  Total OUT: 3406 mL    Total NET: 2402.8 mL      04 Aug 2019 07:01  -  05 Aug 2019 05:26  --------------------------------------------------------  IN:    dextrose 20%.: 880 mL    DOBUTamine Infusion: 486.2 mL    Enteral Tube Flush: 90 mL    EPINEPHrine Infusion: 77 mL    fentaNYL  Infusion: 39.6 mL    IV PiggyBack: 1450 mL    norepinephrine Infusion: 671.3 mL    pantoprazole Infusion: 220 mL    sodium bicarbonate  Infusion: 1650 mL    sodium chloride 0.9%.: 110 mL    vasopressin Infusion: 132 mL  Total IN: 5806.1 mL    OUT:    Bulb: 65 mL    Other: 3117 mL  Total OUT: 3182 mL    Total NET: 2624.1 mL            MEDICATIONS  (STANDING):  chlorhexidine 0.12% Liquid 5 milliLiter(s) Oral Mucosa every 4 hours  CRRT Treatment    <Continuous>  dextrose 20%. 500 milliLiter(s) (40 mL/Hr) IV Continuous <Continuous>  dextrose 5% 1000 milliLiter(s) (75 mL/Hr) IV Continuous <Continuous>  DOBUTamine Infusion 10 MICROgram(s)/kG/Min (22.08 mL/Hr) IV Continuous <Continuous>  fentaNYL   Infusion 1 MICROgram(s)/kG/Hr (3.68 mL/Hr) IV Continuous <Continuous>  hydrocortisone sodium succinate Injectable 100 milliGRAM(s) IV Push every 8 hours  meropenem  IVPB 1000 milliGRAM(s) IV Intermittent every 8 hours  metroNIDAZOLE  IVPB      metroNIDAZOLE  IVPB 500 milliGRAM(s) IV Intermittent every 12 hours  micafungin IVPB 100 milliGRAM(s) IV Intermittent every 24 hours  norepinephrine Infusion 0.3 MICROgram(s)/kG/Min (20.7 mL/Hr) IV Continuous <Continuous>  pantoprazole Infusion 8 mG/Hr (10 mL/Hr) IV Continuous <Continuous>  PrismaSATE Dialysate BGK 4 / 2.5 5000 milliLiter(s) (5000 mL/Hr) CRRT <Continuous>  PrismaSOL Filtration BGK 4 / 2.5 5000 milliLiter(s) (500 mL/Hr) CRRT <Continuous>  PrismaSOL Filtration BGK 4 / 2.5 5000 milliLiter(s) (500 mL/Hr) CRRT <Continuous>  sodium bicarbonate  Infusion 0.102 mEq/kG/Hr (50 mL/Hr) IV Continuous <Continuous>  sodium chloride 0.9%. 1000 milliLiter(s) (10 mL/Hr) IV Continuous <Continuous>  vancomycin    Solution 500 milliGRAM(s) Oral every 6 hours  vasopressin Infusion 0.1 Unit(s)/Min (6 mL/Hr) IV Continuous <Continuous>    MEDICATIONS  (PRN):        Physical Exam:  Gen: NAD  HEENT: tracheostomy in place  LS: on VENT  Card: pulse regularly present  GI: abd soft, midline incision w/ dressing c/d/i, drain w/ dark red output  Groin: right femoral line in place  Ext: warm      Labs:    08-05    141  |  89<L>  |  12  ----------------------------<  202<H>  5.6<H>   |  7<LL>  |  1.04    Ca    7.4<L>      05 Aug 2019 02:30  Phos  6.1     08-05  Mg     2.3     08-05    TPro  3.7<L>  /  Alb  1.8<L>  /  TBili  9.7<H>  /  DBili  x   /  AST  4449<H>  /  ALT  416<H>  /  AlkPhos  310<H>  08-05    LIVER FUNCTIONS - ( 05 Aug 2019 02:30 )  Alb: 1.8 g/dL / Pro: 3.7 g/dL / ALK PHOS: 310 U/L / ALT: 416 U/L / AST: 4449 U/L / GGT: x                                 10.3   14.1  )-----------( 21       ( 05 Aug 2019 02:30 )             29.6     PT/INR - ( 05 Aug 2019 02:30 )   PT: 44.6 sec;   INR: 3.72 ratio         PTT - ( 05 Aug 2019 02:30 )  PTT:60.7 sec          Imaging:  EXAM:  XR CHEST PORTABLE URGENT 1V                          PROCEDURE DATE:  08/04/2019      INTERPRETATION:  CLINICAL INFORMATION: Postop from exploratory laparotomy   and PEG tube removal.    TECHNIQUE: Portable AP radiograph of the chest.    COMPARISON: Portable AP of the chest 8/3/2019 at 2:19 AM. CT chest   8/2/2019    FINDINGS:    Tracheostomy tube in place with cuff inflated. Left internal jugular   [place]-Ramos catheter with tip in the right main pulmonary artery. Right   internal jugular central venous catheter with tip in the SVC. Enteric   tube with tip below the diaphragm.    Bilateral pleural effusions. Bilateral patchy opacities left greater than   right.    Heart size cannot be accurately evaluated on this projection. Status post   median sternotomy and CABG.    Unremarkable skeletal structures.      IMPRESSION:     CHF and/or pneumonia.

## 2019-08-05 NOTE — PROGRESS NOTE ADULT - PROBLEM SELECTOR PLAN 1
Will continue current insulin regimen for now.   Will continue monitoring FS, log, will Follow up.
Will continue current insulin regimen for now.   if blood sugares remain high, please start lantus 6 units QHS  Will continue monitoring FS, log, will Follow up.
Will continue current insulin regimen for now. Will continue monitoring FS, log, will Follow up.
Will continue current insulin regimen for now. Will continue monitoring FS, log, will Follow up.  Patient counseled for compliance with consistent low carb diet.
Pre op Cardiac surgery work up in progress   Continue with ASA 81 mg PO daily   Start low dose BB --> Lopressor 12.5 mg PO BID   Continue with Statin   NPO after midnight   Type and Screen   Check P2Y12   Hibiclens Shower tonight at 8 pm   Transfer to [address] [place] [address]   Carotid Duplex study   B/L BLAIRE Arterial Duplex study   Plan for Cardiac Surgery in AM with Dr. Hernandez 7/12
Will continue current insulin regimen for now. Will continue monitoring FS, log, will Follow up.  Patient counseled for compliance with consistent low carb diet.

## 2019-08-05 NOTE — PROGRESS NOTE ADULT - PROBLEM SELECTOR PLAN 2
On medications,  no chest pain, stable, monitored and followed up by  cardiothoracic team/cardiology team
On medications,  no chest pain, stable, monitored and followed up by  cardiothoracic team/cardiology team
On pressors , monitored and followed up by  cardiothoracic team/cardiology team
On medications,  no chest pain, stable, monitored and followed up by  cardiothoracic team/cardiology team
On pressors , monitored and followed up by  cardiothoracic team/cardiology team

## 2019-08-05 NOTE — PROGRESS NOTE ADULT - PROBLEM SELECTOR PROBLEM 2
Coronary artery disease involving native coronary artery of native heart with unstable angina pectoris

## 2019-08-05 NOTE — PROGRESS NOTE ADULT - SUBJECTIVE AND OBJECTIVE BOX
SYLVIA ROSA  MRN#:  [id number]    The patient is a 73y Male without prior medical care who presented with SOB and LE edema, found to have newly diagnosed HFrEF (EF 30%) and RALPH vs CKD, also PVD and claudication with extensive LE arterial disease on doppler, reports heavy alcohol use, further work up revealed multivessel CAD, now s/p C3L today, severe biventricular dysfunction on echocardiogram with some improvement after bypass was seen, evaluated, & examined with the CTICU staff on rounds and later in the evening with a multidisciplinary care plan formulated & implemented.  All available clinical, laboratory, radiographic, pharmacologic, and electrocardiographic data were reviewed & analyzed.      The patient was in the CTICU in critical condition secondary to acute postoperative respiratory failure, progressive vasodilatory shock, C difficile colitis, cardiovascular dysfunction-cardiogenic shock, progressive hyperlactatemia, acute on chromic postoperative renal failure, & hyperglycemia.      Respiratory failure required full ventilatory support via tracheostomy the following of ABG’s with A-line monitoring, and continuous pulse oximetry monitoring for support & to evaluate for & prevent further decompensation secondary to persistent cardiopulmonary dysfunction, progressive vasodilatory shock, C difficile colitis, cardiovascular dysfunction-cardiogenic shock, progressive hyperlactatemia, and acute on chromic postoperative renal failure.     Invasive hemodynamic monitoring with a PA catheter & an A-line were required for the following of serial CI’s/MVO2’s & continuous MAP/BP monitoring to ensure adequate cardiovascular support and to evaluate for & help prevent decompensation while receiving an IV Levophed drip, an IV Vasopressin drip, CVVH, an IV Dobutamine drip, and IV Sodium bicarbonate secondary to progressive vasodilatory shock, C difficile colitis, cardiovascular dysfunction-cardiogenic shock, progressive hyperlactatemia, and acute on chromic postoperative renal failure.     Patient required acute postoperative critical care management and I provided 80 minutes of non-continuous care to the patient. I reviewed and assessed all available clinical, laboratory, radiographic, pharmacologic, and electrocardiographic data in order to decide on maintenance or adjustment of medications, and fluid management.  Discussed at length with the CTICU staff and helped coordinate care.

## 2019-08-05 NOTE — PROGRESS NOTE ADULT - SUBJECTIVE AND OBJECTIVE BOX
Endocrinology Attending Covering for Dr. Magdaleno      Chief complaint  Patient is a 73y old  Male who presents with a chief complaint of MI (04 Aug 2019 11:34)   Review of systems  Patient with tracheostomy , hypotensive on multiple pressors    Labs and Fingersticks  CAPILLARY BLOOD GLUCOSE          Anion Gap, Serum: 45 <H> (08-05 @ 02:30)  Anion Gap, Serum: 37 <H> (08-04 @ 02:25)  Anion Gap, Serum: 30 <H> (08-03 @ 13:44)      Calcium, Total Serum: 7.4 <L> (08-05 @ 02:30)  Calcium, Total Serum: 8.1 <L> (08-04 @ 02:25)  Calcium, Total Serum: 7.8 <L> (08-03 @ 13:44)  Albumin, Serum: 1.8 <L> (08-05 @ 02:30)  Albumin, Serum: 2.4 <L> (08-04 @ 02:25)  Albumin, Serum: 2.6 <L> (08-03 @ 13:44)    Alanine Aminotransferase (ALT/SGPT): 416 <H> (08-05 @ 02:30)  Alanine Aminotransferase (ALT/SGPT): 32 (08-04 @ 02:25)  Alanine Aminotransferase (ALT/SGPT): 18 (08-03 @ 13:44)  Alkaline Phosphatase, Serum: 310 <H> (08-05 @ 02:30)  Alkaline Phosphatase, Serum: 161 <H> (08-04 @ 02:25)  Alkaline Phosphatase, Serum: 149 <H> (08-03 @ 13:44)  Aspartate Aminotransferase (AST/SGOT): 4449 <H> (08-05 @ 02:30)  Aspartate Aminotransferase (AST/SGOT): 348 <H> (08-04 @ 02:25)  Aspartate Aminotransferase (AST/SGOT): 190 <H> (08-03 @ 13:44)        08-05    141  |  89<L>  |  12  ----------------------------<  202<H>  5.6<H>   |  7<LL>  |  1.04    Ca    7.4<L>      05 Aug 2019 02:30  Phos  6.1     08-05  Mg     2.3     08-05    TPro  3.7<L>  /  Alb  1.8<L>  /  TBili  9.7<H>  /  DBili  x   /  AST  4449<H>  /  ALT  416<H>  /  AlkPhos  310<H>  08-05                        10.3   14.1  )-----------( 21       ( 05 Aug 2019 02:30 )             29.6     Medications  MEDICATIONS  (STANDING):  chlorhexidine 0.12% Liquid 5 milliLiter(s) Oral Mucosa every 4 hours  CRRT Treatment    <Continuous>  dextrose 20%. 500 milliLiter(s) (40 mL/Hr) IV Continuous <Continuous>  dextrose 5% 1000 milliLiter(s) (75 mL/Hr) IV Continuous <Continuous>  fentaNYL   Infusion 2 MICROgram(s)/kG/Hr (7.36 mL/Hr) IV Continuous <Continuous>  meropenem  IVPB 1000 milliGRAM(s) IV Intermittent every 8 hours  metroNIDAZOLE  IVPB      metroNIDAZOLE  IVPB 500 milliGRAM(s) IV Intermittent every 12 hours  micafungin IVPB 100 milliGRAM(s) IV Intermittent every 24 hours  norepinephrine Infusion 0.3 MICROgram(s)/kG/Min (20.7 mL/Hr) IV Continuous <Continuous>  pantoprazole Infusion 8 mG/Hr (10 mL/Hr) IV Continuous <Continuous>  PrismaSATE Dialysate BK 0 / 3.5 5000 milliLiter(s) (1000 mL/Hr) CRRT <Continuous>  PrismaSOL Filtration BGK 0 / 2.5 5000 milliLiter(s) (500 mL/Hr) CRRT <Continuous>  PrismaSOL Filtration BGK 0 / 2.5 5000 milliLiter(s) (500 mL/Hr) CRRT <Continuous>  propofol Infusion 20 MICROgram(s)/kG/Min (8.832 mL/Hr) IV Continuous <Continuous>  sodium bicarbonate  Infusion 0.102 mEq/kG/Hr (50 mL/Hr) IV Continuous <Continuous>  sodium chloride 0.9%. 1000 milliLiter(s) (10 mL/Hr) IV Continuous <Continuous>  vancomycin    Solution 500 milliGRAM(s) Oral every 6 hours  vasopressin Infusion 0.1 Unit(s)/Min (6 mL/Hr) IV Continuous <Continuous>      Physical Exam  General: Patient comfortable in bed  Vital Signs Last 12 Hrs  T(F): 95 (08-05-19 @ 08:00), Max: 96.4 (08-05-19 @ 00:00)  HR: 58 (08-05-19 @ 08:32) (56 - 95)  BP: --  BP(mean): --  RR: 24 (08-05-19 @ 07:45) (0 - 24)  SpO2: 84% (08-05-19 @ 04:30) (84% - 84%)  Neck: No palpable thyroid nodules.  CVS: S1S2, No murmurs  Respiratory: No wheezing, no crepitations  GI: Abdomen soft, bowel sounds positive  Musculoskeletal:  edema lower extremities.   Skin: No skin rashes, no ecchymosis    Diagnostics

## 2019-08-05 NOTE — DISCHARGE NOTE FOR THE EXPIRED PATIENT - HOSPITAL COURSE
Presented to [facility] , 7/1 heart failure, renal insuffiencey PVD, issa carotid stenosis, MR, TR, ETOH.7/11 B/L LE arterial severe stenosis of R CFA. 7/12 CABG X# Ef 35%. 7/14 Anuric / hyperkalemic CVVHD, Reintubated 7/19 + occult blood in stool. 7/21 TTE- Ef 40-45% Mod MR, Sev TR, small pericardial effusion.7/22 Head CT- chronic lacunar infarct, RT basal ganglia infarct. 7/22 Cold RUE 7/27 Reintubated 7/27 Trache& Peg  8/3 emergent EX- Lap cholecystectomy/ peg Removal over sewing of gastric stoma.

## 2019-08-05 NOTE — PROGRESS NOTE ADULT - PROBLEM SELECTOR PLAN 3
Monitor labs, Renal FU

## 2019-08-05 NOTE — PROGRESS NOTE ADULT - SUBJECTIVE AND OBJECTIVE BOX
SYLVIA ROSA  MRN#:  [id number]  08-05-19 @ 9:55  The patient is a 73y Male who was pronounced dead on 08/05/2019 at 09:55.  No family at bedside-notiifed.  No spontaneous respirations, no breath sounds bilaterally, no pulses, no corneal reflexes, pupils fixed and dilated.  Family refused autopsy.  Covering attending of record notified.  Patient was not referred to the medical examiner.

## 2019-08-05 NOTE — DISCHARGE NOTE FOR THE EXPIRED PATIENT - OTHER SIGNIFICANT FINDINGS
Respiratory failure required full ventilatory support via tracheostomy the following of ABG’s with A-line monitoring, and continuous pulse oximetry monitoring for support & to evaluate for & prevent further decompensation secondary to persistent cardiopulmonary dysfunction, progressive vasodilatory shock, C difficile colitis, cardiovascular dysfunction-cardiogenic shock, progressive hyperlactatemia, and acute on chromic postoperative renal failure.     Invasive hemodynamic monitoring with a PA catheter & an A-line were required for the following of serial CI’s/MVO2’s & continuous MAP/BP monitoring to ensure adequate cardiovascular support and to evaluate for & help prevent decompensation while receiving an IV Levophed drip, an IV Vasopressin drip, CVVH, an IV Dobutamine drip, and IV Sodium bicarbonate secondary to progressive vasodilatory shock, C difficile colitis, cardiovascular dysfunction-cardiogenic shock, progressive hyperlactatemia, and acute on chromic postoperative renal failure.

## 2019-08-05 NOTE — PROGRESS NOTE ADULT - ASSESSMENT
Assessment  DMT2: 73y Male with DM  NPO, post eXp-lap  2ND day   on FS and SSI  coverage blood sugar today 202  High anion Gap, lactic acidosis ( lactate >25) PH 6.9-poor Prognosis   no hypoglycemic episode,    CAD: s/P CABG, with Afib,  on medications, stable, monitored.  Hypotension: on Multiple pressors.  RALPH on CKD; F/U by Renal    cata Turner MD  Cell: [phone]

## 2019-08-05 NOTE — CHART NOTE - NSCHARTNOTEFT_GEN_A_CORE
Daughter Maria Esther (HCP) called and after speaking with other family members, decision was made to make patient DNR. Order placed, RN Made aware, will notify MD Hernandez.

## 2019-08-05 NOTE — CHART NOTE - NSCHARTNOTEFT_GEN_A_CORE
patient remains in refractory lactic acidosis, multi-organ dysfunction, mixed cardiogenic/distributive shock (CI < 1), despite multiple medical interventions. Patient with increasing vasopressor requirements.     Reached out to daughter Maria Esther (HCP) to make aware of grave condition and poor prognosis, she expressed multiple times that she wants to continue full support and that she wishes for patient to receive CPR if heart were to stop. She is reaching out to other family members to update on patients condition. All questions and concerns addressed.     Will continue supportive care at this time despite futility of the case.

## 2019-08-05 NOTE — PROGRESS NOTE ADULT - ASSESSMENT
74yo M admitted w/ MI, C.diff +, MSOF, POD#2 s/p open cholecystectomy.    Plan  -   -   -   - 74yo M admitted w/ MI, C.diff +, MSOF, POD#2 s/p open cholecystectomy.    Plan  - Pressor miles per CTU team  -   - Patient now DNR 74yo M admitted w/ MI, C.diff +, MSOF, POD#2 s/p open cholecystectomy.    Plan  - Pressor miles per CTU team  - Patient now DNR  - No plan for surgical intervention  - Page [phone] with any questions or concerns

## 2019-08-08 LAB — SURGICAL PATHOLOGY STUDY: SIGNIFICANT CHANGE UP

## 2019-08-09 LAB
CULTURE RESULTS: SIGNIFICANT CHANGE UP
SPECIMEN SOURCE: SIGNIFICANT CHANGE UP

## 2019-08-29 LAB
CULTURE RESULTS: SIGNIFICANT CHANGE UP
SPECIMEN SOURCE: SIGNIFICANT CHANGE UP

## 2019-09-18 LAB
CULTURE RESULTS: SIGNIFICANT CHANGE UP
SPECIMEN SOURCE: SIGNIFICANT CHANGE UP

## 2019-10-01 PROCEDURE — 82746 ASSAY OF FOLIC ACID SERUM: CPT

## 2019-10-01 PROCEDURE — 93454 CORONARY ARTERY ANGIO S&I: CPT

## 2019-10-01 PROCEDURE — 87640 STAPH A DNA AMP PROBE: CPT

## 2019-10-01 PROCEDURE — C1894: CPT

## 2019-10-01 PROCEDURE — 97110 THERAPEUTIC EXERCISES: CPT

## 2019-10-01 PROCEDURE — 70450 CT HEAD/BRAIN W/O DYE: CPT

## 2019-10-01 PROCEDURE — 82272 OCCULT BLD FECES 1-3 TESTS: CPT

## 2019-10-01 PROCEDURE — 36430 TRANSFUSION BLD/BLD COMPNT: CPT

## 2019-10-01 PROCEDURE — 85730 THROMBOPLASTIN TIME PARTIAL: CPT

## 2019-10-01 PROCEDURE — 82533 TOTAL CORTISOL: CPT

## 2019-10-01 PROCEDURE — P9037: CPT

## 2019-10-01 PROCEDURE — 86706 HEP B SURFACE ANTIBODY: CPT

## 2019-10-01 PROCEDURE — 71260 CT THORAX DX C+: CPT

## 2019-10-01 PROCEDURE — 82435 ASSAY OF BLOOD CHLORIDE: CPT

## 2019-10-01 PROCEDURE — 87015 SPECIMEN INFECT AGNT CONCNTJ: CPT

## 2019-10-01 PROCEDURE — 97530 THERAPEUTIC ACTIVITIES: CPT

## 2019-10-01 PROCEDURE — 87449 NOS EACH ORGANISM AG IA: CPT

## 2019-10-01 PROCEDURE — 80202 ASSAY OF VANCOMYCIN: CPT

## 2019-10-01 PROCEDURE — 93306 TTE W/DOPPLER COMPLETE: CPT

## 2019-10-01 PROCEDURE — 95816 EEG AWAKE AND DROWSY: CPT

## 2019-10-01 PROCEDURE — 84436 ASSAY OF TOTAL THYROXINE: CPT

## 2019-10-01 PROCEDURE — 84132 ASSAY OF SERUM POTASSIUM: CPT

## 2019-10-01 PROCEDURE — P9017: CPT

## 2019-10-01 PROCEDURE — 83735 ASSAY OF MAGNESIUM: CPT

## 2019-10-01 PROCEDURE — 87641 MR-STAPH DNA AMP PROBE: CPT

## 2019-10-01 PROCEDURE — 87116 MYCOBACTERIA CULTURE: CPT

## 2019-10-01 PROCEDURE — 74176 CT ABD & PELVIS W/O CONTRAST: CPT

## 2019-10-01 PROCEDURE — 86850 RBC ANTIBODY SCREEN: CPT

## 2019-10-01 PROCEDURE — 87086 URINE CULTURE/COLONY COUNT: CPT

## 2019-10-01 PROCEDURE — 83605 ASSAY OF LACTIC ACID: CPT

## 2019-10-01 PROCEDURE — 93880 EXTRACRANIAL BILAT STUDY: CPT

## 2019-10-01 PROCEDURE — 93925 LOWER EXTREMITY STUDY: CPT

## 2019-10-01 PROCEDURE — 93321 DOPPLER ECHO F-UP/LMTD STD: CPT

## 2019-10-01 PROCEDURE — 86022 PLATELET ANTIBODIES: CPT

## 2019-10-01 PROCEDURE — 86900 BLOOD TYPING SEROLOGIC ABO: CPT

## 2019-10-01 PROCEDURE — 82607 VITAMIN B-12: CPT

## 2019-10-01 PROCEDURE — 82803 BLOOD GASES ANY COMBINATION: CPT

## 2019-10-01 PROCEDURE — 94002 VENT MGMT INPAT INIT DAY: CPT

## 2019-10-01 PROCEDURE — 86901 BLOOD TYPING SEROLOGIC RH(D): CPT

## 2019-10-01 PROCEDURE — 84443 ASSAY THYROID STIM HORMONE: CPT

## 2019-10-01 PROCEDURE — 84295 ASSAY OF SERUM SODIUM: CPT

## 2019-10-01 PROCEDURE — 82565 ASSAY OF CREATININE: CPT

## 2019-10-01 PROCEDURE — C1769: CPT

## 2019-10-01 PROCEDURE — 82570 ASSAY OF URINE CREATININE: CPT

## 2019-10-01 PROCEDURE — 31720 CLEARANCE OF AIRWAYS: CPT

## 2019-10-01 PROCEDURE — 84100 ASSAY OF PHOSPHORUS: CPT

## 2019-10-01 PROCEDURE — 84134 ASSAY OF PREALBUMIN: CPT

## 2019-10-01 PROCEDURE — 83615 LACTATE (LD) (LDH) ENZYME: CPT

## 2019-10-01 PROCEDURE — 85610 PROTHROMBIN TIME: CPT

## 2019-10-01 PROCEDURE — 73206 CT ANGIO UPR EXTRM W/O&W/DYE: CPT

## 2019-10-01 PROCEDURE — 80053 COMPREHEN METABOLIC PANEL: CPT

## 2019-10-01 PROCEDURE — 85384 FIBRINOGEN ACTIVITY: CPT

## 2019-10-01 PROCEDURE — P9047: CPT

## 2019-10-01 PROCEDURE — 94003 VENT MGMT INPAT SUBQ DAY: CPT

## 2019-10-01 PROCEDURE — 88304 TISSUE EXAM BY PATHOLOGIST: CPT

## 2019-10-01 PROCEDURE — 71045 X-RAY EXAM CHEST 1 VIEW: CPT

## 2019-10-01 PROCEDURE — G0515: CPT

## 2019-10-01 PROCEDURE — P9041: CPT

## 2019-10-01 PROCEDURE — 82330 ASSAY OF CALCIUM: CPT

## 2019-10-01 PROCEDURE — 85027 COMPLETE CBC AUTOMATED: CPT

## 2019-10-01 PROCEDURE — P9059: CPT

## 2019-10-01 PROCEDURE — 49465 FLUORO EXAM OF G/COLON TUBE: CPT

## 2019-10-01 PROCEDURE — 87493 C DIFF AMPLIFIED PROBE: CPT

## 2019-10-01 PROCEDURE — 86803 HEPATITIS C AB TEST: CPT

## 2019-10-01 PROCEDURE — 84439 ASSAY OF FREE THYROXINE: CPT

## 2019-10-01 PROCEDURE — 99152 MOD SED SAME PHYS/QHP 5/>YRS: CPT

## 2019-10-01 PROCEDURE — 86891 AUTOLOGOUS BLOOD OP SALVAGE: CPT

## 2019-10-01 PROCEDURE — 87102 FUNGUS ISOLATION CULTURE: CPT

## 2019-10-01 PROCEDURE — 87206 SMEAR FLUORESCENT/ACID STAI: CPT

## 2019-10-01 PROCEDURE — C1887: CPT

## 2019-10-01 PROCEDURE — 84425 ASSAY OF VITAMIN B-1: CPT

## 2019-10-01 PROCEDURE — 97162 PT EVAL MOD COMPLEX 30 MIN: CPT

## 2019-10-01 PROCEDURE — 99261: CPT

## 2019-10-01 PROCEDURE — P9016: CPT

## 2019-10-01 PROCEDURE — 93308 TTE F-UP OR LMTD: CPT

## 2019-10-01 PROCEDURE — 84480 ASSAY TRIIODOTHYRONINE (T3): CPT

## 2019-10-01 PROCEDURE — 85014 HEMATOCRIT: CPT

## 2019-10-01 PROCEDURE — 94799 UNLISTED PULMONARY SVC/PX: CPT

## 2019-10-01 PROCEDURE — 83036 HEMOGLOBIN GLYCOSYLATED A1C: CPT

## 2019-10-01 PROCEDURE — 87040 BLOOD CULTURE FOR BACTERIA: CPT

## 2019-10-01 PROCEDURE — 85576 BLOOD PLATELET AGGREGATION: CPT

## 2019-10-01 PROCEDURE — C1889: CPT

## 2019-10-01 PROCEDURE — 86923 COMPATIBILITY TEST ELECTRIC: CPT

## 2019-10-01 PROCEDURE — 82962 GLUCOSE BLOOD TEST: CPT

## 2019-10-01 PROCEDURE — 97167 OT EVAL HIGH COMPLEX 60 MIN: CPT

## 2019-10-01 PROCEDURE — 93005 ELECTROCARDIOGRAM TRACING: CPT

## 2019-10-01 PROCEDURE — 76700 US EXAM ABDOM COMPLETE: CPT

## 2019-10-01 PROCEDURE — 74018 RADEX ABDOMEN 1 VIEW: CPT

## 2019-10-01 PROCEDURE — 84300 ASSAY OF URINE SODIUM: CPT

## 2019-10-01 PROCEDURE — L8699: CPT

## 2019-10-01 PROCEDURE — 80048 BASIC METABOLIC PNL TOTAL CA: CPT

## 2019-10-01 PROCEDURE — 74177 CT ABD & PELVIS W/CONTRAST: CPT

## 2019-10-01 PROCEDURE — 81001 URINALYSIS AUTO W/SCOPE: CPT

## 2019-10-01 PROCEDURE — 82550 ASSAY OF CK (CPK): CPT

## 2019-10-01 PROCEDURE — 82140 ASSAY OF AMMONIA: CPT

## 2019-10-01 PROCEDURE — P9045: CPT

## 2019-10-01 PROCEDURE — 92610 EVALUATE SWALLOWING FUNCTION: CPT

## 2019-10-01 PROCEDURE — 82947 ASSAY GLUCOSE BLOOD QUANT: CPT

## 2019-10-01 PROCEDURE — 87340 HEPATITIS B SURFACE AG IA: CPT

## 2019-10-01 PROCEDURE — 87324 CLOSTRIDIUM AG IA: CPT

## 2023-11-14 NOTE — PROGRESS NOTE ADULT - SUBJECTIVE AND OBJECTIVE BOX
Assumed pt care   Report at bedside in the nursery NEPHROLOGY-[facility] [phone]        Patient seen and examined in bed.  He was still intubated  On  gtt at present and Vaso gtt         MEDICATIONS  (STANDING):  aspirin  chewable 81 milliGRAM(s) Oral daily  atorvastatin 80 milliGRAM(s) Oral at bedtime  chlorhexidine 0.12% Liquid 15 milliLiter(s) Oral Mucosa every 12 hours  chlorhexidine 2% Cloths 1 Application(s) Topical <User Schedule>  chlorhexidine 4% Liquid 1 Application(s) Topical <User Schedule>  dextrose 50% Injectable 50 milliLiter(s) IV Push every 15 minutes  dextrose 50% Injectable 25 milliLiter(s) IV Push every 15 minutes  dextrose 50% Injectable 50 milliLiter(s) IV Push every 15 minutes  dextrose 50% Injectable 25 milliLiter(s) IV Push every 15 minutes  DOBUTamine Infusion 5 MICROgram(s)/kG/Min (11.04 mL/Hr) IV Continuous <Continuous>  heparin  Infusion 1000 Unit(s)/Hr (5 mL/Hr) IV Continuous <Continuous>  insulin lispro (HumaLOG) corrective regimen sliding scale   SubCutaneous every 6 hours  levothyroxine Injectable 25 MICROGram(s) IV Push at bedtime  lidocaine 2% Gel 1 Application(s) Topical every 8 hours  metroNIDAZOLE  IVPB 500 milliGRAM(s) IV Intermittent every 8 hours  midodrine 10 milliGRAM(s) Oral every 8 hours  nystatin Powder 1 Application(s) Topical two times a day  pantoprazole  Injectable 40 milliGRAM(s) IV Push two times a day  propofol Infusion 10 MICROgram(s)/kG/Min (4.416 mL/Hr) IV Continuous <Continuous>  sodium chloride 0.9%. 1000 milliLiter(s) (10 mL/Hr) IV Continuous <Continuous>  vancomycin    Solution 500 milliGRAM(s) Oral every 6 hours  vasopressin Infusion 0.05 Unit(s)/Min (3 mL/Hr) IV Continuous <Continuous>  zinc oxide 40% Ointment 1 Application(s) Topical two times a day      VITAL:  T(C): , Max: 37 (19 @ 12:25)  T(F): , Max: 98.6 (19 @ 12:25)  HR: 78 (19 @ 09:15)  BP: --  BP(mean): --  RR: 16 (19 @ 09:15)  SpO2: 98% (19 @ 09:15)  Wt(kg): --    I and O's:     @ 07:  -   @ 07:00  --------------------------------------------------------  IN: 2880.4 mL / OUT: 2600 mL / NET: 280.4 mL     @ 07:01  -   @ 09:49  --------------------------------------------------------  IN: 221.6 mL / OUT: 0 mL / NET: 221.6 mL          PHYSICAL EXAM:    Constitutional: intubated   Neck:  No JVD  Respiratory: transmitted upper   Cardiovascular: S1 and S2  Gastrointestinal: BS+, soft, NT/ND  Extremities: No peripheral edema  Neurological: unable  : No Chávez  Skin: No rashes  Access: left IJ    LABS:                        8.3    15.3  )-----------( 106      ( 2019 00:24 )             23.6         139  |  102  |  49<H>  ----------------------------<  137<H>  3.2<L>   |  19<L>  |  3.95<H>    Ca    8.1<L>      2019 00:24  Phos  2.8       Mg     2.1         TPro  6.6  /  Alb  2.6<L>  /  TBili  3.4<H>  /  DBili  x   /  AST  155<H>  /  ALT  58<H>  /  AlkPhos  169<H>            Urine Studies:          RADIOLOGY & ADDITIONAL STUDIES:    < from: Xray Chest 1 View- PORTABLE-Routine (19 @ 03:12) >    EXAM:  XR CHEST PORTABLE ROUTINE 1V                            PROCEDURE DATE:  2019            INTERPRETATION:  CLINICAL INDICATION: Cardiothoracic surgery.    Frontal view of the chest is obtained.    Comparison is made with the chest x-rayof 2019.    IMPRESSION: The patient is status post sternotomy. ET tube has its tip   above the addy. Enteric tube courses towards the left hemidiaphragm   although the tip is not imaged. There is a left IJ line with the tip in   the superior vena cava. Right IJ line has its tip in the superior vena   cava.    There are bilateral increased interstitial markings suggestive of   pulmonary edema as on the prior study. The heart size is enlarged. Please   note that evaluation of the lungs are limited as the costophrenic angles   in the left lung base are not included on this image.                    CYRUS HOUGH M.D. ATTENDING RADIOLOGIST  This document has been electronically signed. 2019  9:43AM                < end of copied text > non-distended/non-tender

## 2024-03-05 NOTE — CONSULT NOTE ADULT - PROVIDER SPECIALTY LIST ADULT
Called   She answered the phone but was in her car  She requests a return call tomorrow   Vascular Surgery

## 2025-03-21 NOTE — BRIEF OPERATIVE NOTE - ANESTHESIOLOGIST NAME
Patient identification verified with 2 identifiers.    Location: [facility] - [address] [address] [phone] option #1     Referring Physician: DR. CARLOS HOWELL  Enrollment/ Re-enrollment date: 6/24/
Dr. Gil
Lazar

## 2025-05-15 NOTE — PROGRESS NOTE ADULT - ASSESSMENT
Patient sent prescription.    Assessment  DMT2: 73y Male with newly diagnosed DM T2 with hyperglycemia, on low dose insulin, coverage blood sugars in acceptable range,   no hypoglycemic episode, started eating partial meals.  CAD: s/P CABG, on medications, stable, monitored.  HTN: Controlled,  on antihypertensive medications.      cata Turner MD  Cell: [phone]